# Patient Record
Sex: FEMALE | Race: WHITE
[De-identification: names, ages, dates, MRNs, and addresses within clinical notes are randomized per-mention and may not be internally consistent; named-entity substitution may affect disease eponyms.]

---

## 2021-01-14 ENCOUNTER — HOSPITAL ENCOUNTER (INPATIENT)
Dept: HOSPITAL 54 - ER | Age: 81
LOS: 40 days | Discharge: NURSING, REHAB OR LONG-TERM CARE | DRG: 3 | End: 2021-02-23
Attending: NURSE PRACTITIONER
Payer: MEDICARE

## 2021-01-14 VITALS — HEIGHT: 62 IN | WEIGHT: 146.31 LBS | BODY MASS INDEX: 26.93 KG/M2

## 2021-01-14 DIAGNOSIS — N18.30: ICD-10-CM

## 2021-01-14 DIAGNOSIS — M89.9: ICD-10-CM

## 2021-01-14 DIAGNOSIS — B96.5: ICD-10-CM

## 2021-01-14 DIAGNOSIS — T82.41XA: ICD-10-CM

## 2021-01-14 DIAGNOSIS — E11.65: ICD-10-CM

## 2021-01-14 DIAGNOSIS — J96.01: ICD-10-CM

## 2021-01-14 DIAGNOSIS — I13.2: ICD-10-CM

## 2021-01-14 DIAGNOSIS — Z79.4: ICD-10-CM

## 2021-01-14 DIAGNOSIS — G92: ICD-10-CM

## 2021-01-14 DIAGNOSIS — J90: ICD-10-CM

## 2021-01-14 DIAGNOSIS — D63.8: ICD-10-CM

## 2021-01-14 DIAGNOSIS — Z79.83: ICD-10-CM

## 2021-01-14 DIAGNOSIS — E43: ICD-10-CM

## 2021-01-14 DIAGNOSIS — R13.10: ICD-10-CM

## 2021-01-14 DIAGNOSIS — L97.128: ICD-10-CM

## 2021-01-14 DIAGNOSIS — I50.42: ICD-10-CM

## 2021-01-14 DIAGNOSIS — G20: ICD-10-CM

## 2021-01-14 DIAGNOSIS — F09: ICD-10-CM

## 2021-01-14 DIAGNOSIS — L89.153: ICD-10-CM

## 2021-01-14 DIAGNOSIS — E87.5: ICD-10-CM

## 2021-01-14 DIAGNOSIS — Z86.16: ICD-10-CM

## 2021-01-14 DIAGNOSIS — I70.0: ICD-10-CM

## 2021-01-14 DIAGNOSIS — N17.0: ICD-10-CM

## 2021-01-14 DIAGNOSIS — J44.0: ICD-10-CM

## 2021-01-14 DIAGNOSIS — J98.11: ICD-10-CM

## 2021-01-14 DIAGNOSIS — L89.143: ICD-10-CM

## 2021-01-14 DIAGNOSIS — F02.80: ICD-10-CM

## 2021-01-14 DIAGNOSIS — J69.0: ICD-10-CM

## 2021-01-14 DIAGNOSIS — L89.626: ICD-10-CM

## 2021-01-14 DIAGNOSIS — K31.7: ICD-10-CM

## 2021-01-14 DIAGNOSIS — K21.9: ICD-10-CM

## 2021-01-14 DIAGNOSIS — E87.2: ICD-10-CM

## 2021-01-14 DIAGNOSIS — J96.02: ICD-10-CM

## 2021-01-14 DIAGNOSIS — E11.22: ICD-10-CM

## 2021-01-14 DIAGNOSIS — J98.4: ICD-10-CM
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
## 2021-01-14 DIAGNOSIS — R18.8: ICD-10-CM

## 2021-01-14 DIAGNOSIS — Z20.822: ICD-10-CM

## 2021-01-14 DIAGNOSIS — J32.0: ICD-10-CM

## 2021-01-14 DIAGNOSIS — R65.21: ICD-10-CM
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
## 2021-01-14 DIAGNOSIS — L97.118: ICD-10-CM

## 2021-01-14 DIAGNOSIS — D68.59: ICD-10-CM

## 2021-01-14 DIAGNOSIS — E87.1: ICD-10-CM

## 2021-01-14 DIAGNOSIS — A41.9: Primary | ICD-10-CM

## 2021-01-14 DIAGNOSIS — E78.5: ICD-10-CM

## 2021-01-14 DIAGNOSIS — K29.40: ICD-10-CM

## 2021-01-14 DIAGNOSIS — L30.4: ICD-10-CM

## 2021-01-14 DIAGNOSIS — Z79.82: ICD-10-CM

## 2021-01-14 DIAGNOSIS — N39.0: ICD-10-CM

## 2021-01-14 DIAGNOSIS — E86.1: ICD-10-CM

## 2021-01-14 DIAGNOSIS — Z79.899: ICD-10-CM

## 2021-01-14 DIAGNOSIS — Z99.2: ICD-10-CM

## 2021-01-14 DIAGNOSIS — M10.9: ICD-10-CM

## 2021-01-14 DIAGNOSIS — K44.9: ICD-10-CM

## 2021-01-14 LAB
ALBUMIN SERPL BCP-MCNC: 2.2 G/DL (ref 3.4–5)
ALP SERPL-CCNC: 108 U/L (ref 46–116)
ALT SERPL W P-5'-P-CCNC: 19 U/L (ref 12–78)
AST SERPL W P-5'-P-CCNC: 15 U/L (ref 15–37)
BASE EXCESS BLDA CALC-SCNC: -9.9 MMOL/L
BASOPHILS # BLD AUTO: 0 /CMM (ref 0–0.2)
BASOPHILS NFR BLD AUTO: 0.3 % (ref 0–2)
BILIRUB DIRECT SERPL-MCNC: 0.3 MG/DL (ref 0–0.2)
BILIRUB SERPL-MCNC: 0.6 MG/DL (ref 0.2–1)
BILIRUB UR QL STRIP: (no result)
BUN SERPL-MCNC: 79 MG/DL (ref 7–18)
CALCIUM SERPL-MCNC: 8.5 MG/DL (ref 8.5–10.1)
CHLORIDE SERPL-SCNC: 102 MMOL/L (ref 98–107)
CK SERPL-CCNC: 28 U/L (ref 26–192)
CO2 SERPL-SCNC: 19 MMOL/L (ref 21–32)
COLOR UR: YELLOW
CREAT SERPL-MCNC: 4.4 MG/DL (ref 0.6–1.3)
CRP SERPL-MCNC: 16.3 MG/DL (ref 0–0.9)
D DIMER PPP FEU-MCNC: 2.23 MG/L(FEU (ref 0.17–0.5)
DO-HGB MFR BLDA: 15.6 MMHG
EOSINOPHIL NFR BLD AUTO: 0.1 % (ref 0–6)
GLUCOSE SERPL-MCNC: 155 MG/DL (ref 74–106)
GLUCOSE UR STRIP-MCNC: (no result) MG/DL
HCT VFR BLD AUTO: 24 % (ref 33–45)
HGB BLD-MCNC: 7.3 G/DL (ref 11.5–14.8)
INHALED O2 CONCENTRATION: 21 %
LYMPHOCYTES NFR BLD AUTO: 0.5 /CMM (ref 0.8–4.8)
LYMPHOCYTES NFR BLD AUTO: 6.6 % (ref 20–44)
MCHC RBC AUTO-ENTMCNC: 31 G/DL (ref 31–36)
MCV RBC AUTO: 103 FL (ref 82–100)
MONOCYTES NFR BLD AUTO: 0.4 /CMM (ref 0.1–1.3)
MONOCYTES NFR BLD AUTO: 5.2 % (ref 2–12)
NEUTROPHILS # BLD AUTO: 6.4 /CMM (ref 1.8–8.9)
NEUTROPHILS NFR BLD AUTO: 87.8 % (ref 43–81)
NT-PROBNP SERPL-MCNC: 2934 PG/ML (ref 0–125)
PCO2 TEMP ADJ BLDA: 56 MMHG (ref 35–45)
PH TEMP ADJ BLDA: 7.14 [PH] (ref 7.35–7.45)
PH UR STRIP: 5.5 [PH] (ref 5–8)
PLATELET # BLD AUTO: 256 /CMM (ref 150–450)
PO2 TEMP ADJ BLDA: 67.1 MMHG (ref 75–100)
POTASSIUM SERPL-SCNC: 6.2 MMOL/L (ref 3.5–5.1)
PROT SERPL-MCNC: 6.7 G/DL (ref 6.4–8.2)
PROT UR QL STRIP: >=300 MG/DL
RBC # BLD AUTO: 2.31 MIL/UL (ref 4–5.2)
RBC #/AREA URNS HPF: (no result) /HPF (ref 0–2)
SAO2 % BLDA: 87.7 % (ref 92–98.5)
SODIUM SERPL-SCNC: 137 MMOL/L (ref 136–145)
UROBILINOGEN UR STRIP-MCNC: 0.2 EU/DL
VENTILATION MODE VENT: (no result)
WBC #/AREA URNS HPF: (no result) /HPF
WBC #/AREA URNS HPF: (no result) /HPF (ref 0–3)
WBC NRBC COR # BLD AUTO: 7.3 K/UL (ref 4.3–11)

## 2021-01-14 PROCEDURE — C1751 CATH, INF, PER/CENT/MIDLINE: HCPCS

## 2021-01-14 PROCEDURE — A6403 STERILE GAUZE>16 <= 48 SQ IN: HCPCS

## 2021-01-14 PROCEDURE — A4623 TRACHEOSTOMY INNER CANNULA: HCPCS

## 2021-01-14 PROCEDURE — G0378 HOSPITAL OBSERVATION PER HR: HCPCS

## 2021-01-14 PROCEDURE — C9803 HOPD COVID-19 SPEC COLLECT: HCPCS

## 2021-01-14 PROCEDURE — A6253 ABSORPT DRG > 48 SQ IN W/O B: HCPCS

## 2021-01-14 PROCEDURE — P9047 ALBUMIN (HUMAN), 25%, 50ML: HCPCS

## 2021-01-14 PROCEDURE — A4216 STERILE WATER/SALINE, 10 ML: HCPCS

## 2021-01-14 PROCEDURE — U0003 INFECTIOUS AGENT DETECTION BY NUCLEIC ACID (DNA OR RNA); SEVERE ACUTE RESPIRATORY SYNDROME CORONAVIRUS 2 (SARS-COV-2) (CORONAVIRUS DISEASE [COVID-19]), AMPLIFIED PROBE TECHNIQUE, MAKING USE OF HIGH THROUGHPUT TECHNOLOGIES AS DESCRIBED BY CMS-2020-01-R: HCPCS

## 2021-01-14 PROCEDURE — C1769 GUIDE WIRE: HCPCS

## 2021-01-14 NOTE — NUR
-------------------------------------------------------------------------------

           *** Note undone in [facility] - 01/15/21 at 0018 by EVICTOR ***            

-------------------------------------------------------------------------------

MD PLACED CENTRAL LINE.

## 2021-01-14 NOTE — NUR
PT ROSYRA 60 FROM [employer] FOR LOW HR AND LOW BP. HR OF 40s ON THE FIELD, 
PER RA PT WAS GIVEN "0.5 ATROPINE AND 2ML OF EPI PUSH." UPON ARRIVAL PT PLACED 
ON PADS, CADIAC MONITOR, AND PULSE OX. IV LINE ESTABLISHED LAC 18G, BLOOD 
COLLECTED, SENT TO LAB. AWAITING MD FOR EVAL AND ORDERS.

## 2021-01-14 NOTE — NUR
RT



pt intubated post abg results on RA. abg results: ph 7.13 co2 56 po2 67 hco3 18.



pt intubated with ett size 7.0, 22 at lip. vent settings: 16 450 100% +5.

vent plugged in to red outlet. alarms on and audible. ambu bag at head of bed. 

lung  sounds clear throughout. no secretions suctioned via ett. 



no complications during intubations.



will continue to monitor.

## 2021-01-15 VITALS — SYSTOLIC BLOOD PRESSURE: 90 MMHG | DIASTOLIC BLOOD PRESSURE: 50 MMHG

## 2021-01-15 VITALS — SYSTOLIC BLOOD PRESSURE: 88 MMHG | DIASTOLIC BLOOD PRESSURE: 48 MMHG

## 2021-01-15 VITALS — SYSTOLIC BLOOD PRESSURE: 98 MMHG | DIASTOLIC BLOOD PRESSURE: 55 MMHG

## 2021-01-15 VITALS — SYSTOLIC BLOOD PRESSURE: 91 MMHG | DIASTOLIC BLOOD PRESSURE: 53 MMHG

## 2021-01-15 VITALS — DIASTOLIC BLOOD PRESSURE: 45 MMHG | SYSTOLIC BLOOD PRESSURE: 96 MMHG

## 2021-01-15 VITALS — DIASTOLIC BLOOD PRESSURE: 46 MMHG | SYSTOLIC BLOOD PRESSURE: 89 MMHG

## 2021-01-15 VITALS — SYSTOLIC BLOOD PRESSURE: 99 MMHG | DIASTOLIC BLOOD PRESSURE: 46 MMHG

## 2021-01-15 VITALS — DIASTOLIC BLOOD PRESSURE: 49 MMHG | SYSTOLIC BLOOD PRESSURE: 95 MMHG

## 2021-01-15 VITALS — SYSTOLIC BLOOD PRESSURE: 97 MMHG | DIASTOLIC BLOOD PRESSURE: 48 MMHG

## 2021-01-15 VITALS — SYSTOLIC BLOOD PRESSURE: 93 MMHG | DIASTOLIC BLOOD PRESSURE: 45 MMHG

## 2021-01-15 VITALS — SYSTOLIC BLOOD PRESSURE: 94 MMHG | DIASTOLIC BLOOD PRESSURE: 53 MMHG

## 2021-01-15 VITALS — DIASTOLIC BLOOD PRESSURE: 51 MMHG | SYSTOLIC BLOOD PRESSURE: 98 MMHG

## 2021-01-15 LAB
ALBUMIN SERPL BCP-MCNC: 1.9 G/DL (ref 3.4–5)
ALP SERPL-CCNC: 104 U/L (ref 46–116)
ALT SERPL W P-5'-P-CCNC: 16 U/L (ref 12–78)
AST SERPL W P-5'-P-CCNC: 18 U/L (ref 15–37)
BASE EXCESS BLDA CALC-SCNC: -6.7 MMOL/L
BASOPHILS # BLD AUTO: 0 /CMM (ref 0–0.2)
BASOPHILS NFR BLD AUTO: 0.1 % (ref 0–2)
BILIRUB SERPL-MCNC: 0.6 MG/DL (ref 0.2–1)
BUN SERPL-MCNC: 75 MG/DL (ref 7–18)
CALCIUM SERPL-MCNC: 8.1 MG/DL (ref 8.5–10.1)
CHLORIDE SERPL-SCNC: 98 MMOL/L (ref 98–107)
CHOLEST SERPL-MCNC: 100 MG/DL (ref ?–200)
CO2 SERPL-SCNC: 18 MMOL/L (ref 21–32)
CREAT SERPL-MCNC: 4 MG/DL (ref 0.6–1.3)
CRP SERPL-MCNC: 16.1 MG/DL (ref 0–0.9)
DO-HGB MFR BLDA: 95.5 MMHG
EOSINOPHIL NFR BLD AUTO: 0 % (ref 0–6)
FERRITIN SERPL-MCNC: 5816 NG/ML (ref 8–388)
GLUCOSE SERPL-MCNC: 413 MG/DL (ref 74–106)
HCT VFR BLD AUTO: 26 % (ref 33–45)
HDLC SERPL-MCNC: 50 MG/DL (ref 40–60)
HGB BLD-MCNC: 7.9 G/DL (ref 11.5–14.8)
INHALED O2 CONCENTRATION: 100 %
IRON SERPL-MCNC: 71 UG/DL (ref 50–175)
LDLC SERPL DIRECT ASSAY-MCNC: 44 MG/DL (ref 0–99)
LYMPHOCYTES NFR BLD AUTO: 0.1 /CMM (ref 0.8–4.8)
LYMPHOCYTES NFR BLD AUTO: 1.5 % (ref 20–44)
MAGNESIUM SERPL-MCNC: 1.9 MG/DL (ref 1.8–2.4)
MCHC RBC AUTO-ENTMCNC: 31 G/DL (ref 31–36)
MCV RBC AUTO: 100 FL (ref 82–100)
MONOCYTES NFR BLD AUTO: 0.1 /CMM (ref 0.1–1.3)
MONOCYTES NFR BLD AUTO: 1.5 % (ref 2–12)
NEUTROPHILS # BLD AUTO: 9.2 /CMM (ref 1.8–8.9)
NEUTROPHILS NFR BLD AUTO: 96.9 % (ref 43–81)
PCO2 TEMP ADJ BLDA: 23.2 MMHG (ref 35–45)
PH TEMP ADJ BLDA: 7.46 [PH] (ref 7.35–7.45)
PHOSPHATE SERPL-MCNC: 4.5 MG/DL (ref 2.5–4.9)
PLATELET # BLD AUTO: 348 /CMM (ref 150–450)
PO2 TEMP ADJ BLDA: 594.3 MMHG (ref 75–100)
POTASSIUM SERPL-SCNC: 5.3 MMOL/L (ref 3.5–5.1)
PROT SERPL-MCNC: 6.2 G/DL (ref 6.4–8.2)
RBC # BLD AUTO: 2.55 MIL/UL (ref 4–5.2)
SAO2 % BLDA: 99.5 % (ref 92–98.5)
SODIUM SERPL-SCNC: 133 MMOL/L (ref 136–145)
TIBC SERPL-MCNC: 157 UG/DL (ref 250–450)
TRIGL SERPL-MCNC: 59 MG/DL (ref 30–150)
TSH SERPL DL<=0.005 MIU/L-ACNC: 9.59 UIU/ML (ref 0.36–3.74)
VENTILATION MODE VENT: (no result)
WBC NRBC COR # BLD AUTO: 9.5 K/UL (ref 4.3–11)

## 2021-01-15 PROCEDURE — 5A1935Z RESPIRATORY VENTILATION, LESS THAN 24 CONSECUTIVE HOURS: ICD-10-PCS | Performed by: INTERNAL MEDICINE

## 2021-01-15 PROCEDURE — 0BH17EZ INSERTION OF ENDOTRACHEAL AIRWAY INTO TRACHEA, VIA NATURAL OR ARTIFICIAL OPENING: ICD-10-PCS | Performed by: STUDENT IN AN ORGANIZED HEALTH CARE EDUCATION/TRAINING PROGRAM

## 2021-01-15 RX ADMIN — Medication SCH EACH: at 23:27

## 2021-01-15 RX ADMIN — FAMOTIDINE SCH MG: 10 INJECTION INTRAVENOUS at 10:43

## 2021-01-15 RX ADMIN — MEROPENEM SCH MLS/HR: 500 INJECTION INTRAVENOUS at 22:36

## 2021-01-15 RX ADMIN — IPRATROPIUM BROMIDE AND ALBUTEROL SULFATE SCH PUFF: 103; 18 AEROSOL, METERED RESPIRATORY (INHALATION) at 18:00

## 2021-01-15 RX ADMIN — FAMOTIDINE SCH MG: 10 INJECTION INTRAVENOUS at 21:14

## 2021-01-15 RX ADMIN — Medication SCH EACH: at 12:18

## 2021-01-15 RX ADMIN — Medication SCH EACH: at 18:03

## 2021-01-15 RX ADMIN — DEXTROSE MONOHYDRATE SCH MLS/HR: 50 INJECTION, SOLUTION INTRAVENOUS at 10:43

## 2021-01-15 RX ADMIN — INSULIN HUMAN PRN UNIT: 100 INJECTION, SOLUTION PARENTERAL at 18:05

## 2021-01-15 RX ADMIN — DEXTROSE AND SODIUM CHLORIDE PRN MLS/HR: 5; 900 INJECTION, SOLUTION INTRAVENOUS at 06:33

## 2021-01-15 RX ADMIN — PROPOFOL PRN MLS/HR: 10 INJECTION, EMULSION INTRAVENOUS at 10:22

## 2021-01-15 RX ADMIN — DEXTROSE MONOHYDRATE PRN MLS/HR: 50 INJECTION, SOLUTION INTRAVENOUS at 10:22

## 2021-01-15 RX ADMIN — DEXTROSE MONOHYDRATE PRN MLS/HR: 50 INJECTION, SOLUTION INTRAVENOUS at 18:05

## 2021-01-15 RX ADMIN — Medication SCH EACH: at 06:29

## 2021-01-15 RX ADMIN — INSULIN HUMAN PRN UNIT: 100 INJECTION, SOLUTION PARENTERAL at 23:26

## 2021-01-15 RX ADMIN — DEXTROSE MONOHYDRATE SCH MLS/HR: 50 INJECTION, SOLUTION INTRAVENOUS at 21:11

## 2021-01-15 RX ADMIN — INSULIN HUMAN PRN UNIT: 100 INJECTION, SOLUTION PARENTERAL at 06:42

## 2021-01-15 RX ADMIN — INSULIN HUMAN PRN UNIT: 100 INJECTION, SOLUTION PARENTERAL at 12:49

## 2021-01-15 NOTE — NUR
PATIENT SEEN AND EVALUATED BY DR. PETERS. GAVE ORDER TO DC IV FLUIDS. AND CONTINUE 
DRIPS AS ORDERED.

## 2021-01-15 NOTE — NUR
RT



pt went to ct and back. no complications during transport and procedure. pt returned and 
placed back on vent.

## 2021-01-15 NOTE — NUR
PATIENT CONTINUES ON DOPAMINE, PROPOFOL AND SODIUM BICARB DRIP. TITRATE TO 
EFFECT. MESSAGED DR. PETERS RE: ORDERS.

## 2021-01-15 NOTE — NUR
ICU RN: RECEIVED PT FROM ED S/O ORAL INTUBATION WT SETTINGS AS ORDERED. ON BICARB. DRIP AT 
100ML/HR, DIPRIVAN AT 5MCG/KG/MIN AND DOPAMINE 20MCG/KG/MIN. WILL TITRATE DIPRIVAN AND 
DOPAMINE AS ORDERED. SR ON CARDIAC MONITOR. AFEBRILE. BODY ASSESSMENT DONE WT MULT. 
DISCOLORATIONS, MASD AND WOUNDS. PICTURES TAKEN. HOB AT 35 DEGREES. SAFETY PRECAUTION NOTED. 
WILL CONTINUE TO MONITOR.

## 2021-01-15 NOTE — NUR
PATIENT'S CONTINUES ON MECHANICAL VENTILATOR. TURNED AND REPOSITIONED EVERY 2 
HOURS. NO DISTRESS NOTED. CONT ON IV DRIPS. TITRATE TO EFFECT.

## 2021-01-16 VITALS — SYSTOLIC BLOOD PRESSURE: 120 MMHG | DIASTOLIC BLOOD PRESSURE: 53 MMHG

## 2021-01-16 VITALS — DIASTOLIC BLOOD PRESSURE: 54 MMHG | SYSTOLIC BLOOD PRESSURE: 102 MMHG

## 2021-01-16 VITALS — DIASTOLIC BLOOD PRESSURE: 43 MMHG | SYSTOLIC BLOOD PRESSURE: 94 MMHG

## 2021-01-16 VITALS — SYSTOLIC BLOOD PRESSURE: 129 MMHG | DIASTOLIC BLOOD PRESSURE: 56 MMHG

## 2021-01-16 VITALS — SYSTOLIC BLOOD PRESSURE: 114 MMHG | DIASTOLIC BLOOD PRESSURE: 49 MMHG

## 2021-01-16 VITALS — DIASTOLIC BLOOD PRESSURE: 47 MMHG | SYSTOLIC BLOOD PRESSURE: 110 MMHG

## 2021-01-16 VITALS — SYSTOLIC BLOOD PRESSURE: 118 MMHG | DIASTOLIC BLOOD PRESSURE: 48 MMHG

## 2021-01-16 VITALS — SYSTOLIC BLOOD PRESSURE: 109 MMHG | DIASTOLIC BLOOD PRESSURE: 48 MMHG

## 2021-01-16 VITALS — SYSTOLIC BLOOD PRESSURE: 108 MMHG | DIASTOLIC BLOOD PRESSURE: 50 MMHG

## 2021-01-16 VITALS — DIASTOLIC BLOOD PRESSURE: 50 MMHG | SYSTOLIC BLOOD PRESSURE: 102 MMHG

## 2021-01-16 VITALS — SYSTOLIC BLOOD PRESSURE: 106 MMHG | DIASTOLIC BLOOD PRESSURE: 48 MMHG

## 2021-01-16 VITALS — DIASTOLIC BLOOD PRESSURE: 45 MMHG | SYSTOLIC BLOOD PRESSURE: 104 MMHG

## 2021-01-16 VITALS — DIASTOLIC BLOOD PRESSURE: 56 MMHG | SYSTOLIC BLOOD PRESSURE: 119 MMHG

## 2021-01-16 VITALS — SYSTOLIC BLOOD PRESSURE: 71 MMHG | DIASTOLIC BLOOD PRESSURE: 30 MMHG

## 2021-01-16 VITALS — DIASTOLIC BLOOD PRESSURE: 52 MMHG | SYSTOLIC BLOOD PRESSURE: 109 MMHG

## 2021-01-16 VITALS — SYSTOLIC BLOOD PRESSURE: 134 MMHG | DIASTOLIC BLOOD PRESSURE: 59 MMHG

## 2021-01-16 VITALS — SYSTOLIC BLOOD PRESSURE: 100 MMHG | DIASTOLIC BLOOD PRESSURE: 56 MMHG

## 2021-01-16 VITALS — SYSTOLIC BLOOD PRESSURE: 112 MMHG | DIASTOLIC BLOOD PRESSURE: 65 MMHG

## 2021-01-16 VITALS — SYSTOLIC BLOOD PRESSURE: 114 MMHG | DIASTOLIC BLOOD PRESSURE: 50 MMHG

## 2021-01-16 VITALS — DIASTOLIC BLOOD PRESSURE: 53 MMHG | SYSTOLIC BLOOD PRESSURE: 101 MMHG

## 2021-01-16 VITALS — DIASTOLIC BLOOD PRESSURE: 59 MMHG | SYSTOLIC BLOOD PRESSURE: 104 MMHG

## 2021-01-16 VITALS — DIASTOLIC BLOOD PRESSURE: 61 MMHG | SYSTOLIC BLOOD PRESSURE: 120 MMHG

## 2021-01-16 VITALS — DIASTOLIC BLOOD PRESSURE: 57 MMHG | SYSTOLIC BLOOD PRESSURE: 104 MMHG

## 2021-01-16 VITALS — DIASTOLIC BLOOD PRESSURE: 54 MMHG | SYSTOLIC BLOOD PRESSURE: 112 MMHG

## 2021-01-16 VITALS — DIASTOLIC BLOOD PRESSURE: 50 MMHG | SYSTOLIC BLOOD PRESSURE: 116 MMHG

## 2021-01-16 VITALS — DIASTOLIC BLOOD PRESSURE: 52 MMHG | SYSTOLIC BLOOD PRESSURE: 103 MMHG

## 2021-01-16 VITALS — SYSTOLIC BLOOD PRESSURE: 94 MMHG | DIASTOLIC BLOOD PRESSURE: 53 MMHG

## 2021-01-16 VITALS — DIASTOLIC BLOOD PRESSURE: 52 MMHG | SYSTOLIC BLOOD PRESSURE: 110 MMHG

## 2021-01-16 VITALS — SYSTOLIC BLOOD PRESSURE: 111 MMHG | DIASTOLIC BLOOD PRESSURE: 49 MMHG

## 2021-01-16 VITALS — SYSTOLIC BLOOD PRESSURE: 123 MMHG | DIASTOLIC BLOOD PRESSURE: 55 MMHG

## 2021-01-16 VITALS — SYSTOLIC BLOOD PRESSURE: 110 MMHG | DIASTOLIC BLOOD PRESSURE: 52 MMHG

## 2021-01-16 VITALS — SYSTOLIC BLOOD PRESSURE: 102 MMHG | DIASTOLIC BLOOD PRESSURE: 54 MMHG

## 2021-01-16 VITALS — SYSTOLIC BLOOD PRESSURE: 100 MMHG | DIASTOLIC BLOOD PRESSURE: 47 MMHG

## 2021-01-16 VITALS — DIASTOLIC BLOOD PRESSURE: 45 MMHG | SYSTOLIC BLOOD PRESSURE: 99 MMHG

## 2021-01-16 VITALS — DIASTOLIC BLOOD PRESSURE: 47 MMHG | SYSTOLIC BLOOD PRESSURE: 96 MMHG

## 2021-01-16 VITALS — DIASTOLIC BLOOD PRESSURE: 55 MMHG | SYSTOLIC BLOOD PRESSURE: 99 MMHG

## 2021-01-16 VITALS — SYSTOLIC BLOOD PRESSURE: 78 MMHG | DIASTOLIC BLOOD PRESSURE: 39 MMHG

## 2021-01-16 VITALS — DIASTOLIC BLOOD PRESSURE: 60 MMHG | SYSTOLIC BLOOD PRESSURE: 117 MMHG

## 2021-01-16 VITALS — SYSTOLIC BLOOD PRESSURE: 113 MMHG | DIASTOLIC BLOOD PRESSURE: 52 MMHG

## 2021-01-16 VITALS — DIASTOLIC BLOOD PRESSURE: 58 MMHG | SYSTOLIC BLOOD PRESSURE: 106 MMHG

## 2021-01-16 VITALS — DIASTOLIC BLOOD PRESSURE: 75 MMHG | SYSTOLIC BLOOD PRESSURE: 103 MMHG

## 2021-01-16 VITALS — DIASTOLIC BLOOD PRESSURE: 45 MMHG | SYSTOLIC BLOOD PRESSURE: 102 MMHG

## 2021-01-16 VITALS — SYSTOLIC BLOOD PRESSURE: 101 MMHG | DIASTOLIC BLOOD PRESSURE: 57 MMHG

## 2021-01-16 VITALS — DIASTOLIC BLOOD PRESSURE: 51 MMHG | SYSTOLIC BLOOD PRESSURE: 110 MMHG

## 2021-01-16 VITALS — SYSTOLIC BLOOD PRESSURE: 105 MMHG | DIASTOLIC BLOOD PRESSURE: 48 MMHG

## 2021-01-16 VITALS — DIASTOLIC BLOOD PRESSURE: 59 MMHG | SYSTOLIC BLOOD PRESSURE: 113 MMHG

## 2021-01-16 VITALS — SYSTOLIC BLOOD PRESSURE: 111 MMHG | DIASTOLIC BLOOD PRESSURE: 53 MMHG

## 2021-01-16 VITALS — DIASTOLIC BLOOD PRESSURE: 48 MMHG | SYSTOLIC BLOOD PRESSURE: 102 MMHG

## 2021-01-16 VITALS — DIASTOLIC BLOOD PRESSURE: 46 MMHG | SYSTOLIC BLOOD PRESSURE: 107 MMHG

## 2021-01-16 VITALS — DIASTOLIC BLOOD PRESSURE: 41 MMHG | SYSTOLIC BLOOD PRESSURE: 94 MMHG

## 2021-01-16 VITALS — SYSTOLIC BLOOD PRESSURE: 107 MMHG | DIASTOLIC BLOOD PRESSURE: 52 MMHG

## 2021-01-16 VITALS — DIASTOLIC BLOOD PRESSURE: 49 MMHG | SYSTOLIC BLOOD PRESSURE: 118 MMHG

## 2021-01-16 VITALS — SYSTOLIC BLOOD PRESSURE: 110 MMHG | DIASTOLIC BLOOD PRESSURE: 54 MMHG

## 2021-01-16 VITALS — SYSTOLIC BLOOD PRESSURE: 95 MMHG | DIASTOLIC BLOOD PRESSURE: 46 MMHG

## 2021-01-16 VITALS — DIASTOLIC BLOOD PRESSURE: 69 MMHG | SYSTOLIC BLOOD PRESSURE: 112 MMHG

## 2021-01-16 VITALS — SYSTOLIC BLOOD PRESSURE: 108 MMHG | DIASTOLIC BLOOD PRESSURE: 48 MMHG

## 2021-01-16 VITALS — DIASTOLIC BLOOD PRESSURE: 61 MMHG | SYSTOLIC BLOOD PRESSURE: 117 MMHG

## 2021-01-16 VITALS — DIASTOLIC BLOOD PRESSURE: 58 MMHG | SYSTOLIC BLOOD PRESSURE: 116 MMHG

## 2021-01-16 VITALS — SYSTOLIC BLOOD PRESSURE: 129 MMHG | DIASTOLIC BLOOD PRESSURE: 54 MMHG

## 2021-01-16 VITALS — DIASTOLIC BLOOD PRESSURE: 58 MMHG | SYSTOLIC BLOOD PRESSURE: 114 MMHG

## 2021-01-16 VITALS — DIASTOLIC BLOOD PRESSURE: 51 MMHG | SYSTOLIC BLOOD PRESSURE: 120 MMHG

## 2021-01-16 VITALS — SYSTOLIC BLOOD PRESSURE: 81 MMHG | DIASTOLIC BLOOD PRESSURE: 44 MMHG

## 2021-01-16 VITALS — DIASTOLIC BLOOD PRESSURE: 37 MMHG | SYSTOLIC BLOOD PRESSURE: 73 MMHG

## 2021-01-16 VITALS — SYSTOLIC BLOOD PRESSURE: 113 MMHG | DIASTOLIC BLOOD PRESSURE: 51 MMHG

## 2021-01-16 VITALS — DIASTOLIC BLOOD PRESSURE: 51 MMHG | SYSTOLIC BLOOD PRESSURE: 109 MMHG

## 2021-01-16 VITALS — DIASTOLIC BLOOD PRESSURE: 55 MMHG | SYSTOLIC BLOOD PRESSURE: 110 MMHG

## 2021-01-16 VITALS — DIASTOLIC BLOOD PRESSURE: 46 MMHG | SYSTOLIC BLOOD PRESSURE: 108 MMHG

## 2021-01-16 VITALS — DIASTOLIC BLOOD PRESSURE: 49 MMHG | SYSTOLIC BLOOD PRESSURE: 108 MMHG

## 2021-01-16 VITALS — SYSTOLIC BLOOD PRESSURE: 117 MMHG | DIASTOLIC BLOOD PRESSURE: 53 MMHG

## 2021-01-16 VITALS — SYSTOLIC BLOOD PRESSURE: 109 MMHG | DIASTOLIC BLOOD PRESSURE: 49 MMHG

## 2021-01-16 VITALS — DIASTOLIC BLOOD PRESSURE: 47 MMHG | SYSTOLIC BLOOD PRESSURE: 97 MMHG

## 2021-01-16 VITALS — DIASTOLIC BLOOD PRESSURE: 57 MMHG | SYSTOLIC BLOOD PRESSURE: 110 MMHG

## 2021-01-16 VITALS — DIASTOLIC BLOOD PRESSURE: 51 MMHG | SYSTOLIC BLOOD PRESSURE: 117 MMHG

## 2021-01-16 VITALS — DIASTOLIC BLOOD PRESSURE: 53 MMHG | SYSTOLIC BLOOD PRESSURE: 100 MMHG

## 2021-01-16 VITALS — DIASTOLIC BLOOD PRESSURE: 50 MMHG | SYSTOLIC BLOOD PRESSURE: 107 MMHG

## 2021-01-16 VITALS — DIASTOLIC BLOOD PRESSURE: 55 MMHG | SYSTOLIC BLOOD PRESSURE: 107 MMHG

## 2021-01-16 VITALS — DIASTOLIC BLOOD PRESSURE: 48 MMHG | SYSTOLIC BLOOD PRESSURE: 115 MMHG

## 2021-01-16 VITALS — DIASTOLIC BLOOD PRESSURE: 60 MMHG | SYSTOLIC BLOOD PRESSURE: 110 MMHG

## 2021-01-16 VITALS — SYSTOLIC BLOOD PRESSURE: 106 MMHG | DIASTOLIC BLOOD PRESSURE: 50 MMHG

## 2021-01-16 LAB
BASE EXCESS BLDA CALC-SCNC: -5.1 MMOL/L
BASOPHILS # BLD AUTO: 0 /CMM (ref 0–0.2)
BASOPHILS NFR BLD AUTO: 0 % (ref 0–2)
BUN SERPL-MCNC: 70 MG/DL (ref 7–18)
CALCIUM SERPL-MCNC: 7.5 MG/DL (ref 8.5–10.1)
CHLORIDE SERPL-SCNC: 98 MMOL/L (ref 98–107)
CK SERPL-CCNC: 53 U/L (ref 26–192)
CO2 SERPL-SCNC: 22 MMOL/L (ref 21–32)
CREAT SERPL-MCNC: 4 MG/DL (ref 0.6–1.3)
DO-HGB MFR BLDA: 124 MMHG
EOSINOPHIL NFR BLD AUTO: 0 % (ref 0–6)
FERRITIN SERPL-MCNC: 3700 NG/ML (ref 8–388)
GLUCOSE SERPL-MCNC: 109 MG/DL (ref 74–106)
HCT VFR BLD AUTO: 24 % (ref 33–45)
HGB BLD-MCNC: 7.5 G/DL (ref 11.5–14.8)
INHALED O2 CONCENTRATION: 40 %
INTRINSIC PEEP RESPIRATORY: 5 CM H2O
LYMPHOCYTES NFR BLD AUTO: 0.5 /CMM (ref 0.8–4.8)
LYMPHOCYTES NFR BLD AUTO: 2.1 % (ref 20–44)
MAGNESIUM SERPL-MCNC: 1.8 MG/DL (ref 1.8–2.4)
MCHC RBC AUTO-ENTMCNC: 31 G/DL (ref 31–36)
MCV RBC AUTO: 99 FL (ref 82–100)
MONOCYTES NFR BLD AUTO: 0.2 /CMM (ref 0.1–1.3)
MONOCYTES NFR BLD AUTO: 0.9 % (ref 2–12)
NEUTROPHILS # BLD AUTO: 22.8 /CMM (ref 1.8–8.9)
NEUTROPHILS NFR BLD AUTO: 97 % (ref 43–81)
PCO2 TEMP ADJ BLDA: 36.8 MMHG (ref 35–45)
PH TEMP ADJ BLDA: 7.35 [PH] (ref 7.35–7.45)
PHOSPHATE SERPL-MCNC: 5.1 MG/DL (ref 2.5–4.9)
PLATELET # BLD AUTO: 303 /CMM (ref 150–450)
PO2 TEMP ADJ BLDA: 118.9 MMHG (ref 75–100)
POTASSIUM SERPL-SCNC: 5.2 MMOL/L (ref 3.5–5.1)
RBC # BLD AUTO: 2.45 MIL/UL (ref 4–5.2)
SAO2 % BLDA: 98.7 % (ref 92–98.5)
SODIUM SERPL-SCNC: 134 MMOL/L (ref 136–145)
VENTILATION MODE VENT: (no result)
WBC NRBC COR # BLD AUTO: 23.6 K/UL (ref 4.3–11)

## 2021-01-16 RX ADMIN — FAMOTIDINE SCH MG: 10 INJECTION INTRAVENOUS at 09:17

## 2021-01-16 RX ADMIN — IPRATROPIUM BROMIDE AND ALBUTEROL SULFATE SCH PUFF: 103; 18 AEROSOL, METERED RESPIRATORY (INHALATION) at 00:24

## 2021-01-16 RX ADMIN — Medication SCH EACH: at 13:31

## 2021-01-16 RX ADMIN — DEXTROSE MONOHYDRATE PRN MLS/HR: 50 INJECTION, SOLUTION INTRAVENOUS at 00:14

## 2021-01-16 RX ADMIN — DEXTROSE MONOHYDRATE SCH MLS/HR: 50 INJECTION, SOLUTION INTRAVENOUS at 20:44

## 2021-01-16 RX ADMIN — FAMOTIDINE SCH MG: 10 INJECTION INTRAVENOUS at 20:45

## 2021-01-16 RX ADMIN — IPRATROPIUM BROMIDE AND ALBUTEROL SULFATE SCH PUFF: 103; 18 AEROSOL, METERED RESPIRATORY (INHALATION) at 18:18

## 2021-01-16 RX ADMIN — DEXTROSE MONOHYDRATE PRN MLS/HR: 50 INJECTION, SOLUTION INTRAVENOUS at 06:47

## 2021-01-16 RX ADMIN — DEXTROSE MONOHYDRATE SCH MLS/HR: 50 INJECTION, SOLUTION INTRAVENOUS at 09:53

## 2021-01-16 RX ADMIN — INSULIN HUMAN PRN UNIT: 100 INJECTION, SOLUTION PARENTERAL at 17:50

## 2021-01-16 RX ADMIN — Medication SCH EACH: at 17:51

## 2021-01-16 RX ADMIN — INSULIN HUMAN PRN UNIT: 100 INJECTION, SOLUTION PARENTERAL at 13:40

## 2021-01-16 RX ADMIN — SODIUM CHLORIDE PRN MLS/HR: 4.5 INJECTION, SOLUTION INTRAVENOUS at 20:45

## 2021-01-16 RX ADMIN — DEXTROSE MONOHYDRATE PRN MLS/HR: 50 INJECTION, SOLUTION INTRAVENOUS at 13:16

## 2021-01-16 RX ADMIN — IPRATROPIUM BROMIDE AND ALBUTEROL SULFATE SCH PUFF: 103; 18 AEROSOL, METERED RESPIRATORY (INHALATION) at 14:05

## 2021-01-16 RX ADMIN — DEXTROSE MONOHYDRATE PRN MLS/HR: 50 INJECTION, SOLUTION INTRAVENOUS at 20:44

## 2021-01-16 RX ADMIN — Medication SCH EACH: at 06:47

## 2021-01-16 RX ADMIN — PROPOFOL PRN MLS/HR: 10 INJECTION, EMULSION INTRAVENOUS at 05:04

## 2021-01-16 RX ADMIN — MEROPENEM SCH MLS/HR: 500 INJECTION INTRAVENOUS at 22:02

## 2021-01-16 NOTE — NUR
pt is awake placed into SIMV 4 / PS 15, FIO2 40%, PEEP +5 PER DR. MATHIS FOR WEANING TRIAL.



RN IS AWARE ON VENT CHANGES MADE.

-------------------------------------------------------------------------------

Addendum: 01/16/21 at 1337 by DAVIE GOVEA RT

-------------------------------------------------------------------------------

Amended: Links added.

## 2021-01-16 NOTE — NUR
ICU RN: STILL SAME VENT SETTINGS AS ORDERED, CONTINUE ON BICARB DRIP AT 100ML/HR, DOPAMINE 
AT 20MCG/KG/MIN, AND DIPRIVAN DRIP AT 25MCG/KG/MIN. URINE OUPUT VIA F/C ONLY 100CC. SAFETY 
PRECAUTION NOTED AT ALL TIMES.

## 2021-01-16 NOTE — NUR
EXTUBATED @ 1525 PER DR. MATHIS.



VITAL SIGNS BELOW POST EXTUBATION:



SPO2 99 -100% @ 1 LPM O2 FLOW VIA NASAL CANNULA

RR 16 BPM

HR 90 - 94 BPM



NO INCREASE WOB NOTED.

-------------------------------------------------------------------------------

Addendum: 01/16/21 at 1532 by DAVIE GOVEA RT

-------------------------------------------------------------------------------

Amended: Links added.

## 2021-01-16 NOTE — NUR
RN ICU CLOSING NOTE



PT CONTINUES TO TOLERATE NC 1LPM WELL, SPO2 96% WITH NO SIGNS OF RESP DISTRESS OR SOB. NO 
OTHER CHANGES TO PT STATUS DURING SHIFT. PT IS STABLE. ALL PT SAFETY MEASURES IN PLACE. WILL 
ENDORSE DASHAWN TO ONCOMING RN

## 2021-01-16 NOTE — NUR
RN NOTE



PT EXTUBATED AT 1525, TOLERATING WELL NC 1LPM WITH NO SIGNS OF RESP DISTRESS OR SOB. SPO2 IN 
MID 90S

## 2021-01-17 VITALS — DIASTOLIC BLOOD PRESSURE: 41 MMHG | SYSTOLIC BLOOD PRESSURE: 102 MMHG

## 2021-01-17 VITALS — SYSTOLIC BLOOD PRESSURE: 88 MMHG | DIASTOLIC BLOOD PRESSURE: 43 MMHG

## 2021-01-17 VITALS — DIASTOLIC BLOOD PRESSURE: 41 MMHG | SYSTOLIC BLOOD PRESSURE: 105 MMHG

## 2021-01-17 VITALS — SYSTOLIC BLOOD PRESSURE: 100 MMHG | DIASTOLIC BLOOD PRESSURE: 51 MMHG

## 2021-01-17 VITALS — SYSTOLIC BLOOD PRESSURE: 107 MMHG | DIASTOLIC BLOOD PRESSURE: 48 MMHG

## 2021-01-17 VITALS — SYSTOLIC BLOOD PRESSURE: 92 MMHG | DIASTOLIC BLOOD PRESSURE: 46 MMHG

## 2021-01-17 VITALS — SYSTOLIC BLOOD PRESSURE: 102 MMHG | DIASTOLIC BLOOD PRESSURE: 48 MMHG

## 2021-01-17 VITALS — DIASTOLIC BLOOD PRESSURE: 44 MMHG | SYSTOLIC BLOOD PRESSURE: 96 MMHG

## 2021-01-17 VITALS — SYSTOLIC BLOOD PRESSURE: 99 MMHG | DIASTOLIC BLOOD PRESSURE: 46 MMHG

## 2021-01-17 VITALS — DIASTOLIC BLOOD PRESSURE: 51 MMHG | SYSTOLIC BLOOD PRESSURE: 99 MMHG

## 2021-01-17 VITALS — DIASTOLIC BLOOD PRESSURE: 66 MMHG | SYSTOLIC BLOOD PRESSURE: 107 MMHG

## 2021-01-17 VITALS — SYSTOLIC BLOOD PRESSURE: 98 MMHG | DIASTOLIC BLOOD PRESSURE: 55 MMHG

## 2021-01-17 VITALS — SYSTOLIC BLOOD PRESSURE: 86 MMHG | DIASTOLIC BLOOD PRESSURE: 39 MMHG

## 2021-01-17 VITALS — SYSTOLIC BLOOD PRESSURE: 104 MMHG | DIASTOLIC BLOOD PRESSURE: 44 MMHG

## 2021-01-17 VITALS — DIASTOLIC BLOOD PRESSURE: 49 MMHG | SYSTOLIC BLOOD PRESSURE: 106 MMHG

## 2021-01-17 VITALS — DIASTOLIC BLOOD PRESSURE: 44 MMHG | SYSTOLIC BLOOD PRESSURE: 92 MMHG

## 2021-01-17 VITALS — DIASTOLIC BLOOD PRESSURE: 50 MMHG | SYSTOLIC BLOOD PRESSURE: 104 MMHG

## 2021-01-17 VITALS — SYSTOLIC BLOOD PRESSURE: 86 MMHG | DIASTOLIC BLOOD PRESSURE: 34 MMHG

## 2021-01-17 VITALS — DIASTOLIC BLOOD PRESSURE: 42 MMHG | SYSTOLIC BLOOD PRESSURE: 94 MMHG

## 2021-01-17 VITALS — SYSTOLIC BLOOD PRESSURE: 96 MMHG | DIASTOLIC BLOOD PRESSURE: 45 MMHG

## 2021-01-17 VITALS — DIASTOLIC BLOOD PRESSURE: 39 MMHG | SYSTOLIC BLOOD PRESSURE: 85 MMHG

## 2021-01-17 VITALS — DIASTOLIC BLOOD PRESSURE: 49 MMHG | SYSTOLIC BLOOD PRESSURE: 101 MMHG

## 2021-01-17 VITALS — SYSTOLIC BLOOD PRESSURE: 113 MMHG | DIASTOLIC BLOOD PRESSURE: 46 MMHG

## 2021-01-17 VITALS — SYSTOLIC BLOOD PRESSURE: 98 MMHG | DIASTOLIC BLOOD PRESSURE: 44 MMHG

## 2021-01-17 VITALS — SYSTOLIC BLOOD PRESSURE: 87 MMHG | DIASTOLIC BLOOD PRESSURE: 43 MMHG

## 2021-01-17 VITALS — DIASTOLIC BLOOD PRESSURE: 50 MMHG | SYSTOLIC BLOOD PRESSURE: 107 MMHG

## 2021-01-17 VITALS — DIASTOLIC BLOOD PRESSURE: 38 MMHG | SYSTOLIC BLOOD PRESSURE: 95 MMHG

## 2021-01-17 VITALS — SYSTOLIC BLOOD PRESSURE: 96 MMHG | DIASTOLIC BLOOD PRESSURE: 50 MMHG

## 2021-01-17 VITALS — DIASTOLIC BLOOD PRESSURE: 41 MMHG | SYSTOLIC BLOOD PRESSURE: 86 MMHG

## 2021-01-17 VITALS — DIASTOLIC BLOOD PRESSURE: 49 MMHG | SYSTOLIC BLOOD PRESSURE: 102 MMHG

## 2021-01-17 VITALS — DIASTOLIC BLOOD PRESSURE: 46 MMHG | SYSTOLIC BLOOD PRESSURE: 96 MMHG

## 2021-01-17 VITALS — SYSTOLIC BLOOD PRESSURE: 91 MMHG | DIASTOLIC BLOOD PRESSURE: 37 MMHG

## 2021-01-17 VITALS — SYSTOLIC BLOOD PRESSURE: 91 MMHG | DIASTOLIC BLOOD PRESSURE: 48 MMHG

## 2021-01-17 VITALS — SYSTOLIC BLOOD PRESSURE: 94 MMHG | DIASTOLIC BLOOD PRESSURE: 48 MMHG

## 2021-01-17 VITALS — DIASTOLIC BLOOD PRESSURE: 43 MMHG | SYSTOLIC BLOOD PRESSURE: 90 MMHG

## 2021-01-17 VITALS — DIASTOLIC BLOOD PRESSURE: 46 MMHG | SYSTOLIC BLOOD PRESSURE: 98 MMHG

## 2021-01-17 VITALS — SYSTOLIC BLOOD PRESSURE: 95 MMHG | DIASTOLIC BLOOD PRESSURE: 42 MMHG

## 2021-01-17 VITALS — DIASTOLIC BLOOD PRESSURE: 39 MMHG | SYSTOLIC BLOOD PRESSURE: 92 MMHG

## 2021-01-17 VITALS — DIASTOLIC BLOOD PRESSURE: 55 MMHG | SYSTOLIC BLOOD PRESSURE: 100 MMHG

## 2021-01-17 VITALS — SYSTOLIC BLOOD PRESSURE: 109 MMHG | DIASTOLIC BLOOD PRESSURE: 50 MMHG

## 2021-01-17 VITALS — DIASTOLIC BLOOD PRESSURE: 50 MMHG | SYSTOLIC BLOOD PRESSURE: 106 MMHG

## 2021-01-17 VITALS — DIASTOLIC BLOOD PRESSURE: 42 MMHG | SYSTOLIC BLOOD PRESSURE: 96 MMHG

## 2021-01-17 VITALS — SYSTOLIC BLOOD PRESSURE: 95 MMHG | DIASTOLIC BLOOD PRESSURE: 31 MMHG

## 2021-01-17 LAB
ALBUMIN SERPL BCP-MCNC: 1.6 G/DL (ref 3.4–5)
ALP SERPL-CCNC: 84 U/L (ref 46–116)
ALT SERPL W P-5'-P-CCNC: 14 U/L (ref 12–78)
AST SERPL W P-5'-P-CCNC: 14 U/L (ref 15–37)
BASOPHILS # BLD AUTO: 0 /CMM (ref 0–0.2)
BASOPHILS NFR BLD AUTO: 0.1 % (ref 0–2)
BILIRUB SERPL-MCNC: 0.4 MG/DL (ref 0.2–1)
BUN SERPL-MCNC: 68 MG/DL (ref 7–18)
CALCIUM SERPL-MCNC: 7.5 MG/DL (ref 8.5–10.1)
CHLORIDE SERPL-SCNC: 95 MMOL/L (ref 98–107)
CO2 SERPL-SCNC: 21 MMOL/L (ref 21–32)
CREAT SERPL-MCNC: 3.6 MG/DL (ref 0.6–1.3)
EOSINOPHIL NFR BLD AUTO: 0 % (ref 0–6)
GLUCOSE SERPL-MCNC: 284 MG/DL (ref 74–106)
HCT VFR BLD AUTO: 22 % (ref 33–45)
HGB BLD-MCNC: 6.8 G/DL (ref 11.5–14.8)
LYMPHOCYTES NFR BLD AUTO: 0.5 /CMM (ref 0.8–4.8)
LYMPHOCYTES NFR BLD AUTO: 2.8 % (ref 20–44)
LYMPHOCYTES NFR BLD MANUAL: 1 % (ref 16–48)
MAGNESIUM SERPL-MCNC: 1.7 MG/DL (ref 1.8–2.4)
MCHC RBC AUTO-ENTMCNC: 31 G/DL (ref 31–36)
MCV RBC AUTO: 98 FL (ref 82–100)
MONOCYTES NFR BLD AUTO: 0.4 /CMM (ref 0.1–1.3)
MONOCYTES NFR BLD AUTO: 2 % (ref 2–12)
MONOCYTES NFR BLD MANUAL: 1 % (ref 0–11)
NEUTROPHILS # BLD AUTO: 18.3 /CMM (ref 1.8–8.9)
NEUTROPHILS NFR BLD AUTO: 95.1 % (ref 43–81)
NEUTS SEG NFR BLD MANUAL: 98 % (ref 42–76)
PHOSPHATE SERPL-MCNC: 6 MG/DL (ref 2.5–4.9)
PLATELET # BLD AUTO: 232 /CMM (ref 150–450)
POTASSIUM SERPL-SCNC: 5.1 MMOL/L (ref 3.5–5.1)
PROT SERPL-MCNC: 5.5 G/DL (ref 6.4–8.2)
RBC # BLD AUTO: 2.23 MIL/UL (ref 4–5.2)
SODIUM SERPL-SCNC: 128 MMOL/L (ref 136–145)
WBC NRBC COR # BLD AUTO: 19.2 K/UL (ref 4.3–11)

## 2021-01-17 PROCEDURE — 30233N1 TRANSFUSION OF NONAUTOLOGOUS RED BLOOD CELLS INTO PERIPHERAL VEIN, PERCUTANEOUS APPROACH: ICD-10-PCS | Performed by: INTERNAL MEDICINE

## 2021-01-17 PROCEDURE — 5A1D70Z PERFORMANCE OF URINARY FILTRATION, INTERMITTENT, LESS THAN 6 HOURS PER DAY: ICD-10-PCS | Performed by: INTERNAL MEDICINE

## 2021-01-17 RX ADMIN — INSULIN HUMAN PRN UNIT: 100 INJECTION, SOLUTION PARENTERAL at 01:13

## 2021-01-17 RX ADMIN — Medication SCH EACH: at 17:04

## 2021-01-17 RX ADMIN — Medication SCH EACH: at 07:03

## 2021-01-17 RX ADMIN — DEXTROSE MONOHYDRATE SCH MLS/HR: 50 INJECTION, SOLUTION INTRAVENOUS at 20:39

## 2021-01-17 RX ADMIN — INSULIN HUMAN PRN UNIT: 100 INJECTION, SOLUTION PARENTERAL at 07:04

## 2021-01-17 RX ADMIN — SODIUM CHLORIDE PRN MLS/HR: 4.5 INJECTION, SOLUTION INTRAVENOUS at 09:05

## 2021-01-17 RX ADMIN — MEROPENEM SCH MLS/HR: 500 INJECTION INTRAVENOUS at 22:04

## 2021-01-17 RX ADMIN — DEXTROSE MONOHYDRATE PRN MLS/HR: 50 INJECTION, SOLUTION INTRAVENOUS at 08:21

## 2021-01-17 RX ADMIN — IPRATROPIUM BROMIDE AND ALBUTEROL SULFATE SCH PUFF: 103; 18 AEROSOL, METERED RESPIRATORY (INHALATION) at 17:04

## 2021-01-17 RX ADMIN — INSULIN HUMAN PRN UNIT: 100 INJECTION, SOLUTION PARENTERAL at 12:33

## 2021-01-17 RX ADMIN — INSULIN HUMAN PRN UNIT: 100 INJECTION, SOLUTION PARENTERAL at 17:07

## 2021-01-17 RX ADMIN — IPRATROPIUM BROMIDE AND ALBUTEROL SULFATE SCH PUFF: 103; 18 AEROSOL, METERED RESPIRATORY (INHALATION) at 17:05

## 2021-01-17 RX ADMIN — Medication SCH EACH: at 12:30

## 2021-01-17 RX ADMIN — Medication SCH EACH: at 01:11

## 2021-01-17 RX ADMIN — FAMOTIDINE SCH MG: 10 INJECTION INTRAVENOUS at 09:06

## 2021-01-17 NOTE — NUR
RN NOTE



1 UNIT PRBC INFUSED INTO PT WITH NO ADVERSE EFFECTS, PT TOLERATED WELL, VITALS WNL. 
TRANSFUSION FROM 1513 TO 1831.

## 2021-01-17 NOTE — NUR
ICU/LVN

CHARGE NURSE RN ED WAS ABLE TO TITRATE DOWN THE DOPA TO 8MCG FROM 18, THAT WAS RECEIVED AT 
THE CHANGE OF SHIFT. PT'S BLOOD PRESSURE HAS BEEN STABLE WILL CONTINUE TO MONITOR THIS PT 
AND HER BLOOD PRESSURE.

## 2021-01-17 NOTE — NUR
RN ICU CLOSING NOTE



PT STABLE, NO SIGNS OF RESP DISTRESS OR SOB. PT A/Ox2. PT TOLERATED PRBC TRANSFUSION WELL. 
NO CHANGES TO PT STATUS DURING SHIFT. ALL PT SAFETY MEASURES IN PLACE. WILL ENDORSE DASHAWN TO 
ONCOMING NURSE

## 2021-01-17 NOTE — NUR
RN ICU OPENING NOTE



PT LYING IN BED ON NC 1LPM TOLERATING WELL WITH NO SIGNS OF RESP DISTRESS OR SOB, SPO2 OF 
97%. PT IS Greek SPEAKING A/Ox1 AND DENIES PAIN AT THIS TIME. PT HAS RT FEMORAL TLC THAT 
IS FLUSHED AND PATENT. PT CURRENTLY ON DOPAMINE AT 8MCG/KG/MIN AND 1/2 NS AT 100ML/HR. PT 
HAS ASHBY CATH DRAINING CLOUDY ABIOLA URINE TO GRAVITY. ALL PT SAFETY MEASURES IN PLACE. WILL 
CONTINUE TO MONITOR

## 2021-01-18 VITALS — SYSTOLIC BLOOD PRESSURE: 100 MMHG | DIASTOLIC BLOOD PRESSURE: 44 MMHG

## 2021-01-18 VITALS — SYSTOLIC BLOOD PRESSURE: 94 MMHG | DIASTOLIC BLOOD PRESSURE: 47 MMHG

## 2021-01-18 VITALS — DIASTOLIC BLOOD PRESSURE: 47 MMHG | SYSTOLIC BLOOD PRESSURE: 109 MMHG

## 2021-01-18 VITALS — SYSTOLIC BLOOD PRESSURE: 133 MMHG | DIASTOLIC BLOOD PRESSURE: 69 MMHG

## 2021-01-18 VITALS — DIASTOLIC BLOOD PRESSURE: 49 MMHG | SYSTOLIC BLOOD PRESSURE: 106 MMHG

## 2021-01-18 VITALS — DIASTOLIC BLOOD PRESSURE: 46 MMHG | SYSTOLIC BLOOD PRESSURE: 111 MMHG

## 2021-01-18 VITALS — DIASTOLIC BLOOD PRESSURE: 60 MMHG | SYSTOLIC BLOOD PRESSURE: 118 MMHG

## 2021-01-18 VITALS — DIASTOLIC BLOOD PRESSURE: 57 MMHG | SYSTOLIC BLOOD PRESSURE: 123 MMHG

## 2021-01-18 VITALS — SYSTOLIC BLOOD PRESSURE: 85 MMHG | DIASTOLIC BLOOD PRESSURE: 45 MMHG

## 2021-01-18 VITALS — DIASTOLIC BLOOD PRESSURE: 58 MMHG | SYSTOLIC BLOOD PRESSURE: 109 MMHG

## 2021-01-18 VITALS — DIASTOLIC BLOOD PRESSURE: 49 MMHG | SYSTOLIC BLOOD PRESSURE: 114 MMHG

## 2021-01-18 VITALS — DIASTOLIC BLOOD PRESSURE: 49 MMHG | SYSTOLIC BLOOD PRESSURE: 91 MMHG

## 2021-01-18 VITALS — SYSTOLIC BLOOD PRESSURE: 104 MMHG | DIASTOLIC BLOOD PRESSURE: 53 MMHG

## 2021-01-18 VITALS — SYSTOLIC BLOOD PRESSURE: 88 MMHG | DIASTOLIC BLOOD PRESSURE: 46 MMHG

## 2021-01-18 VITALS — SYSTOLIC BLOOD PRESSURE: 115 MMHG | DIASTOLIC BLOOD PRESSURE: 52 MMHG

## 2021-01-18 VITALS — DIASTOLIC BLOOD PRESSURE: 64 MMHG | SYSTOLIC BLOOD PRESSURE: 118 MMHG

## 2021-01-18 VITALS — SYSTOLIC BLOOD PRESSURE: 91 MMHG | DIASTOLIC BLOOD PRESSURE: 44 MMHG

## 2021-01-18 VITALS — SYSTOLIC BLOOD PRESSURE: 112 MMHG | DIASTOLIC BLOOD PRESSURE: 49 MMHG

## 2021-01-18 VITALS — DIASTOLIC BLOOD PRESSURE: 47 MMHG | SYSTOLIC BLOOD PRESSURE: 90 MMHG

## 2021-01-18 VITALS — SYSTOLIC BLOOD PRESSURE: 103 MMHG | DIASTOLIC BLOOD PRESSURE: 50 MMHG

## 2021-01-18 VITALS — DIASTOLIC BLOOD PRESSURE: 46 MMHG | SYSTOLIC BLOOD PRESSURE: 91 MMHG

## 2021-01-18 VITALS — DIASTOLIC BLOOD PRESSURE: 52 MMHG | SYSTOLIC BLOOD PRESSURE: 119 MMHG

## 2021-01-18 VITALS — DIASTOLIC BLOOD PRESSURE: 43 MMHG | SYSTOLIC BLOOD PRESSURE: 91 MMHG

## 2021-01-18 VITALS — SYSTOLIC BLOOD PRESSURE: 93 MMHG | DIASTOLIC BLOOD PRESSURE: 48 MMHG

## 2021-01-18 VITALS — SYSTOLIC BLOOD PRESSURE: 105 MMHG | DIASTOLIC BLOOD PRESSURE: 47 MMHG

## 2021-01-18 VITALS — SYSTOLIC BLOOD PRESSURE: 89 MMHG | DIASTOLIC BLOOD PRESSURE: 43 MMHG

## 2021-01-18 VITALS — DIASTOLIC BLOOD PRESSURE: 49 MMHG | SYSTOLIC BLOOD PRESSURE: 117 MMHG

## 2021-01-18 VITALS — DIASTOLIC BLOOD PRESSURE: 48 MMHG | SYSTOLIC BLOOD PRESSURE: 101 MMHG

## 2021-01-18 VITALS — DIASTOLIC BLOOD PRESSURE: 47 MMHG | SYSTOLIC BLOOD PRESSURE: 107 MMHG

## 2021-01-18 VITALS — DIASTOLIC BLOOD PRESSURE: 48 MMHG | SYSTOLIC BLOOD PRESSURE: 115 MMHG

## 2021-01-18 VITALS — DIASTOLIC BLOOD PRESSURE: 54 MMHG | SYSTOLIC BLOOD PRESSURE: 116 MMHG

## 2021-01-18 VITALS — DIASTOLIC BLOOD PRESSURE: 56 MMHG | SYSTOLIC BLOOD PRESSURE: 119 MMHG

## 2021-01-18 VITALS — SYSTOLIC BLOOD PRESSURE: 109 MMHG | DIASTOLIC BLOOD PRESSURE: 44 MMHG

## 2021-01-18 VITALS — SYSTOLIC BLOOD PRESSURE: 96 MMHG | DIASTOLIC BLOOD PRESSURE: 46 MMHG

## 2021-01-18 VITALS — DIASTOLIC BLOOD PRESSURE: 50 MMHG | SYSTOLIC BLOOD PRESSURE: 91 MMHG

## 2021-01-18 VITALS — SYSTOLIC BLOOD PRESSURE: 105 MMHG | DIASTOLIC BLOOD PRESSURE: 44 MMHG

## 2021-01-18 VITALS — SYSTOLIC BLOOD PRESSURE: 110 MMHG | DIASTOLIC BLOOD PRESSURE: 50 MMHG

## 2021-01-18 VITALS — SYSTOLIC BLOOD PRESSURE: 146 MMHG | DIASTOLIC BLOOD PRESSURE: 106 MMHG

## 2021-01-18 VITALS — DIASTOLIC BLOOD PRESSURE: 61 MMHG | SYSTOLIC BLOOD PRESSURE: 114 MMHG

## 2021-01-18 VITALS — DIASTOLIC BLOOD PRESSURE: 53 MMHG | SYSTOLIC BLOOD PRESSURE: 100 MMHG

## 2021-01-18 VITALS — DIASTOLIC BLOOD PRESSURE: 44 MMHG | SYSTOLIC BLOOD PRESSURE: 108 MMHG

## 2021-01-18 VITALS — DIASTOLIC BLOOD PRESSURE: 46 MMHG | SYSTOLIC BLOOD PRESSURE: 98 MMHG

## 2021-01-18 VITALS — SYSTOLIC BLOOD PRESSURE: 92 MMHG | DIASTOLIC BLOOD PRESSURE: 43 MMHG

## 2021-01-18 VITALS — SYSTOLIC BLOOD PRESSURE: 134 MMHG | DIASTOLIC BLOOD PRESSURE: 58 MMHG

## 2021-01-18 VITALS — DIASTOLIC BLOOD PRESSURE: 52 MMHG | SYSTOLIC BLOOD PRESSURE: 108 MMHG

## 2021-01-18 VITALS — DIASTOLIC BLOOD PRESSURE: 60 MMHG | SYSTOLIC BLOOD PRESSURE: 128 MMHG

## 2021-01-18 VITALS — SYSTOLIC BLOOD PRESSURE: 123 MMHG | DIASTOLIC BLOOD PRESSURE: 60 MMHG

## 2021-01-18 VITALS — SYSTOLIC BLOOD PRESSURE: 86 MMHG | DIASTOLIC BLOOD PRESSURE: 40 MMHG

## 2021-01-18 VITALS — SYSTOLIC BLOOD PRESSURE: 90 MMHG | DIASTOLIC BLOOD PRESSURE: 44 MMHG

## 2021-01-18 VITALS — SYSTOLIC BLOOD PRESSURE: 118 MMHG | DIASTOLIC BLOOD PRESSURE: 52 MMHG

## 2021-01-18 VITALS — DIASTOLIC BLOOD PRESSURE: 73 MMHG | SYSTOLIC BLOOD PRESSURE: 113 MMHG

## 2021-01-18 VITALS — DIASTOLIC BLOOD PRESSURE: 52 MMHG | SYSTOLIC BLOOD PRESSURE: 97 MMHG

## 2021-01-18 VITALS — DIASTOLIC BLOOD PRESSURE: 37 MMHG | SYSTOLIC BLOOD PRESSURE: 88 MMHG

## 2021-01-18 VITALS — DIASTOLIC BLOOD PRESSURE: 60 MMHG | SYSTOLIC BLOOD PRESSURE: 111 MMHG

## 2021-01-18 VITALS — SYSTOLIC BLOOD PRESSURE: 119 MMHG | DIASTOLIC BLOOD PRESSURE: 52 MMHG

## 2021-01-18 VITALS — DIASTOLIC BLOOD PRESSURE: 57 MMHG | SYSTOLIC BLOOD PRESSURE: 114 MMHG

## 2021-01-18 VITALS — DIASTOLIC BLOOD PRESSURE: 78 MMHG | SYSTOLIC BLOOD PRESSURE: 120 MMHG

## 2021-01-18 VITALS — SYSTOLIC BLOOD PRESSURE: 101 MMHG | DIASTOLIC BLOOD PRESSURE: 58 MMHG

## 2021-01-18 VITALS — SYSTOLIC BLOOD PRESSURE: 109 MMHG | DIASTOLIC BLOOD PRESSURE: 62 MMHG

## 2021-01-18 VITALS — SYSTOLIC BLOOD PRESSURE: 117 MMHG | DIASTOLIC BLOOD PRESSURE: 58 MMHG

## 2021-01-18 VITALS — DIASTOLIC BLOOD PRESSURE: 45 MMHG | SYSTOLIC BLOOD PRESSURE: 109 MMHG

## 2021-01-18 VITALS — DIASTOLIC BLOOD PRESSURE: 54 MMHG | SYSTOLIC BLOOD PRESSURE: 92 MMHG

## 2021-01-18 VITALS — DIASTOLIC BLOOD PRESSURE: 50 MMHG | SYSTOLIC BLOOD PRESSURE: 107 MMHG

## 2021-01-18 VITALS — DIASTOLIC BLOOD PRESSURE: 54 MMHG | SYSTOLIC BLOOD PRESSURE: 106 MMHG

## 2021-01-18 VITALS — SYSTOLIC BLOOD PRESSURE: 113 MMHG | DIASTOLIC BLOOD PRESSURE: 64 MMHG

## 2021-01-18 VITALS — DIASTOLIC BLOOD PRESSURE: 48 MMHG | SYSTOLIC BLOOD PRESSURE: 107 MMHG

## 2021-01-18 VITALS — DIASTOLIC BLOOD PRESSURE: 58 MMHG | SYSTOLIC BLOOD PRESSURE: 116 MMHG

## 2021-01-18 VITALS — DIASTOLIC BLOOD PRESSURE: 58 MMHG | SYSTOLIC BLOOD PRESSURE: 115 MMHG

## 2021-01-18 VITALS — SYSTOLIC BLOOD PRESSURE: 105 MMHG | DIASTOLIC BLOOD PRESSURE: 55 MMHG

## 2021-01-18 VITALS — DIASTOLIC BLOOD PRESSURE: 43 MMHG | SYSTOLIC BLOOD PRESSURE: 102 MMHG

## 2021-01-18 LAB
BASE EXCESS BLDA CALC-SCNC: -7.8 MMOL/L
BASE EXCESS BLDA CALC-SCNC: -8.4 MMOL/L
BASOPHILS # BLD AUTO: 0 /CMM (ref 0–0.2)
BASOPHILS NFR BLD AUTO: 0.1 % (ref 0–2)
BUN SERPL-MCNC: 69 MG/DL (ref 7–18)
CALCIUM SERPL-MCNC: 7.9 MG/DL (ref 8.5–10.1)
CHLORIDE SERPL-SCNC: 95 MMOL/L (ref 98–107)
CO2 SERPL-SCNC: 22 MMOL/L (ref 21–32)
CREAT SERPL-MCNC: 3.6 MG/DL (ref 0.6–1.3)
DO-HGB MFR BLDA: 176.6 MMHG
DO-HGB MFR BLDA: 21.3 MMHG
EOSINOPHIL NFR BLD AUTO: 0 % (ref 0–6)
GLUCOSE SERPL-MCNC: 98 MG/DL (ref 74–106)
HCT VFR BLD AUTO: 31 % (ref 33–45)
HGB BLD-MCNC: 9.7 G/DL (ref 11.5–14.8)
INHALED O2 CONCENTRATION: 24 %
INHALED O2 CONCENTRATION: 50 %
INHALED O2 FLOW RATE: 1 L/MIN (ref 0–30)
INTRINSIC PEEP RESPIRATORY: 5 CM H2O
LYMPHOCYTES NFR BLD AUTO: 0.6 /CMM (ref 0.8–4.8)
LYMPHOCYTES NFR BLD AUTO: 3.1 % (ref 20–44)
MCHC RBC AUTO-ENTMCNC: 32 G/DL (ref 31–36)
MCV RBC AUTO: 97 FL (ref 82–100)
MONOCYTES NFR BLD AUTO: 0.6 /CMM (ref 0.1–1.3)
MONOCYTES NFR BLD AUTO: 3.3 % (ref 2–12)
NEUTROPHILS # BLD AUTO: 17.3 /CMM (ref 1.8–8.9)
NEUTROPHILS NFR BLD AUTO: 93.5 % (ref 43–81)
PCO2 TEMP ADJ BLDA: 45.7 MMHG (ref 35–45)
PCO2 TEMP ADJ BLDA: 52.3 MMHG (ref 35–45)
PH TEMP ADJ BLDA: 7.2 [PH] (ref 7.35–7.45)
PH TEMP ADJ BLDA: 7.23 [PH] (ref 7.35–7.45)
PLATELET # BLD AUTO: 208 /CMM (ref 150–450)
PO2 TEMP ADJ BLDA: 128.5 MMHG (ref 75–100)
PO2 TEMP ADJ BLDA: 87.6 MMHG (ref 75–100)
POTASSIUM SERPL-SCNC: 4.7 MMOL/L (ref 3.5–5.1)
RBC # BLD AUTO: 3.13 MIL/UL (ref 4–5.2)
SAO2 % BLDA: 96.2 % (ref 92–98.5)
SAO2 % BLDA: 98.6 % (ref 92–98.5)
SET RATE, BG: 12
SODIUM SERPL-SCNC: 129 MMOL/L (ref 136–145)
VENTILATION MODE VENT: (no result)
VENTILATION MODE VENT: (no result)
WBC NRBC COR # BLD AUTO: 18.5 K/UL (ref 4.3–11)

## 2021-01-18 PROCEDURE — 5A09557 ASSISTANCE WITH RESPIRATORY VENTILATION, GREATER THAN 96 CONSECUTIVE HOURS, CONTINUOUS POSITIVE AIRWAY PRESSURE: ICD-10-PCS | Performed by: INTERNAL MEDICINE

## 2021-01-18 RX ADMIN — IPRATROPIUM BROMIDE AND ALBUTEROL SULFATE SCH PUFF: 103; 18 AEROSOL, METERED RESPIRATORY (INHALATION) at 11:59

## 2021-01-18 RX ADMIN — FAMOTIDINE SCH MG: 10 INJECTION INTRAVENOUS at 08:14

## 2021-01-18 RX ADMIN — MUPIROCIN SCH GM: 20 OINTMENT TOPICAL at 21:09

## 2021-01-18 RX ADMIN — DEXTROSE MONOHYDRATE PRN MLS/HR: 50 INJECTION, SOLUTION INTRAVENOUS at 08:14

## 2021-01-18 RX ADMIN — Medication SCH EACH: at 00:09

## 2021-01-18 RX ADMIN — Medication SCH EACH: at 06:43

## 2021-01-18 RX ADMIN — MEROPENEM SCH MLS/HR: 500 INJECTION INTRAVENOUS at 21:41

## 2021-01-18 RX ADMIN — Medication SCH OZ: at 15:31

## 2021-01-18 RX ADMIN — IPRATROPIUM BROMIDE AND ALBUTEROL SULFATE SCH PUFF: 103; 18 AEROSOL, METERED RESPIRATORY (INHALATION) at 04:20

## 2021-01-18 RX ADMIN — Medication SCH EACH: at 19:46

## 2021-01-18 RX ADMIN — DEXTROSE MONOHYDRATE SCH MLS/HR: 50 INJECTION, SOLUTION INTRAVENOUS at 21:09

## 2021-01-18 RX ADMIN — SODIUM CHLORIDE PRN MLS/HR: 4.5 INJECTION, SOLUTION INTRAVENOUS at 06:45

## 2021-01-18 RX ADMIN — Medication SCH EACH: at 12:32

## 2021-01-18 RX ADMIN — Medication SCH OZ: at 21:11

## 2021-01-18 RX ADMIN — IPRATROPIUM BROMIDE AND ALBUTEROL SULFATE SCH PUFF: 103; 18 AEROSOL, METERED RESPIRATORY (INHALATION) at 06:52

## 2021-01-18 RX ADMIN — IPRATROPIUM BROMIDE AND ALBUTEROL SULFATE SCH PUFF: 103; 18 AEROSOL, METERED RESPIRATORY (INHALATION) at 18:00

## 2021-01-18 NOTE — NUR
RN NOTES



PATIENT  REMAINED AOX2 Bruneian SPEAKING.  WITH O2 1-2 LPM VIA NC  SATURATION %.. 
AFEBRILE.  NO ACUTE RESPIRATORY DISTRESS. IV SITE  ON RIGHT  FEMORAL TLC WITH  DOPAMINE 
TITRATED AS PROTOCOL ORDERS AND 1/2 NS @ 100 M.HR. ACCUCHECKED  Q6H CONTINUE  LAST BS  97 
MG/DL. PATIENT REMAINED INPO WILL HAVE SWALLOW EVAL TODAY.  CRITICAL VALUES REPORTED TO MD. NUNEZ.

## 2021-01-18 NOTE — NUR
RN NOTES 

PT REMANINS ON BIPAP, PT IS MORE ALERT, TOLERATING BIPAP SETTING WELL, VSS STABLE, PT IS ON 
DOPAMINE FOR BP SUPPORT, WILL ENDOSE TO NIGHT SHIFT NURSE FOR CONTINUITY OF CARE .

## 2021-01-18 NOTE — NUR
bipap changes per dr delarosa telephone order rn notified

-------------------------------------------------------------------------------

Addendum: 01/18/21 at 1152 by MARIA ESTHER ESPINOZA RT

-------------------------------------------------------------------------------

Amended: Links added.

## 2021-01-19 VITALS — SYSTOLIC BLOOD PRESSURE: 99 MMHG | DIASTOLIC BLOOD PRESSURE: 55 MMHG

## 2021-01-19 VITALS — DIASTOLIC BLOOD PRESSURE: 45 MMHG | SYSTOLIC BLOOD PRESSURE: 105 MMHG

## 2021-01-19 VITALS — SYSTOLIC BLOOD PRESSURE: 67 MMHG | DIASTOLIC BLOOD PRESSURE: 27 MMHG

## 2021-01-19 VITALS — DIASTOLIC BLOOD PRESSURE: 56 MMHG | SYSTOLIC BLOOD PRESSURE: 117 MMHG

## 2021-01-19 VITALS — DIASTOLIC BLOOD PRESSURE: 44 MMHG | SYSTOLIC BLOOD PRESSURE: 96 MMHG

## 2021-01-19 VITALS — DIASTOLIC BLOOD PRESSURE: 51 MMHG | SYSTOLIC BLOOD PRESSURE: 107 MMHG

## 2021-01-19 VITALS — DIASTOLIC BLOOD PRESSURE: 65 MMHG | SYSTOLIC BLOOD PRESSURE: 101 MMHG

## 2021-01-19 VITALS — SYSTOLIC BLOOD PRESSURE: 106 MMHG | DIASTOLIC BLOOD PRESSURE: 59 MMHG

## 2021-01-19 VITALS — SYSTOLIC BLOOD PRESSURE: 91 MMHG | DIASTOLIC BLOOD PRESSURE: 36 MMHG

## 2021-01-19 VITALS — SYSTOLIC BLOOD PRESSURE: 97 MMHG | DIASTOLIC BLOOD PRESSURE: 50 MMHG

## 2021-01-19 VITALS — SYSTOLIC BLOOD PRESSURE: 108 MMHG | DIASTOLIC BLOOD PRESSURE: 56 MMHG

## 2021-01-19 VITALS — DIASTOLIC BLOOD PRESSURE: 40 MMHG | SYSTOLIC BLOOD PRESSURE: 100 MMHG

## 2021-01-19 VITALS — DIASTOLIC BLOOD PRESSURE: 56 MMHG | SYSTOLIC BLOOD PRESSURE: 101 MMHG

## 2021-01-19 VITALS — DIASTOLIC BLOOD PRESSURE: 54 MMHG | SYSTOLIC BLOOD PRESSURE: 109 MMHG

## 2021-01-19 VITALS — DIASTOLIC BLOOD PRESSURE: 49 MMHG | SYSTOLIC BLOOD PRESSURE: 120 MMHG

## 2021-01-19 VITALS — SYSTOLIC BLOOD PRESSURE: 88 MMHG | DIASTOLIC BLOOD PRESSURE: 46 MMHG

## 2021-01-19 VITALS — DIASTOLIC BLOOD PRESSURE: 50 MMHG | SYSTOLIC BLOOD PRESSURE: 110 MMHG

## 2021-01-19 VITALS — DIASTOLIC BLOOD PRESSURE: 54 MMHG | SYSTOLIC BLOOD PRESSURE: 108 MMHG

## 2021-01-19 VITALS — DIASTOLIC BLOOD PRESSURE: 54 MMHG | SYSTOLIC BLOOD PRESSURE: 102 MMHG

## 2021-01-19 VITALS — DIASTOLIC BLOOD PRESSURE: 48 MMHG | SYSTOLIC BLOOD PRESSURE: 107 MMHG

## 2021-01-19 VITALS — SYSTOLIC BLOOD PRESSURE: 94 MMHG | DIASTOLIC BLOOD PRESSURE: 49 MMHG

## 2021-01-19 VITALS — DIASTOLIC BLOOD PRESSURE: 62 MMHG | SYSTOLIC BLOOD PRESSURE: 121 MMHG

## 2021-01-19 VITALS — DIASTOLIC BLOOD PRESSURE: 52 MMHG | SYSTOLIC BLOOD PRESSURE: 100 MMHG

## 2021-01-19 VITALS — DIASTOLIC BLOOD PRESSURE: 42 MMHG | SYSTOLIC BLOOD PRESSURE: 101 MMHG

## 2021-01-19 VITALS — DIASTOLIC BLOOD PRESSURE: 45 MMHG | SYSTOLIC BLOOD PRESSURE: 89 MMHG

## 2021-01-19 VITALS — DIASTOLIC BLOOD PRESSURE: 54 MMHG | SYSTOLIC BLOOD PRESSURE: 90 MMHG

## 2021-01-19 VITALS — DIASTOLIC BLOOD PRESSURE: 49 MMHG | SYSTOLIC BLOOD PRESSURE: 93 MMHG

## 2021-01-19 VITALS — SYSTOLIC BLOOD PRESSURE: 112 MMHG | DIASTOLIC BLOOD PRESSURE: 53 MMHG

## 2021-01-19 VITALS — SYSTOLIC BLOOD PRESSURE: 100 MMHG | DIASTOLIC BLOOD PRESSURE: 41 MMHG

## 2021-01-19 VITALS — SYSTOLIC BLOOD PRESSURE: 99 MMHG | DIASTOLIC BLOOD PRESSURE: 57 MMHG

## 2021-01-19 VITALS — DIASTOLIC BLOOD PRESSURE: 47 MMHG | SYSTOLIC BLOOD PRESSURE: 99 MMHG

## 2021-01-19 VITALS — DIASTOLIC BLOOD PRESSURE: 53 MMHG | SYSTOLIC BLOOD PRESSURE: 109 MMHG

## 2021-01-19 VITALS — DIASTOLIC BLOOD PRESSURE: 50 MMHG | SYSTOLIC BLOOD PRESSURE: 106 MMHG

## 2021-01-19 VITALS — SYSTOLIC BLOOD PRESSURE: 98 MMHG | DIASTOLIC BLOOD PRESSURE: 43 MMHG

## 2021-01-19 VITALS — SYSTOLIC BLOOD PRESSURE: 125 MMHG | DIASTOLIC BLOOD PRESSURE: 54 MMHG

## 2021-01-19 VITALS — DIASTOLIC BLOOD PRESSURE: 48 MMHG | SYSTOLIC BLOOD PRESSURE: 110 MMHG

## 2021-01-19 VITALS — SYSTOLIC BLOOD PRESSURE: 110 MMHG | DIASTOLIC BLOOD PRESSURE: 50 MMHG

## 2021-01-19 VITALS — DIASTOLIC BLOOD PRESSURE: 54 MMHG | SYSTOLIC BLOOD PRESSURE: 107 MMHG

## 2021-01-19 VITALS — SYSTOLIC BLOOD PRESSURE: 112 MMHG | DIASTOLIC BLOOD PRESSURE: 47 MMHG

## 2021-01-19 VITALS — SYSTOLIC BLOOD PRESSURE: 108 MMHG | DIASTOLIC BLOOD PRESSURE: 51 MMHG

## 2021-01-19 VITALS — DIASTOLIC BLOOD PRESSURE: 55 MMHG | SYSTOLIC BLOOD PRESSURE: 111 MMHG

## 2021-01-19 VITALS — SYSTOLIC BLOOD PRESSURE: 101 MMHG | DIASTOLIC BLOOD PRESSURE: 48 MMHG

## 2021-01-19 VITALS — SYSTOLIC BLOOD PRESSURE: 106 MMHG | DIASTOLIC BLOOD PRESSURE: 50 MMHG

## 2021-01-19 VITALS — SYSTOLIC BLOOD PRESSURE: 104 MMHG | DIASTOLIC BLOOD PRESSURE: 48 MMHG

## 2021-01-19 VITALS — SYSTOLIC BLOOD PRESSURE: 108 MMHG | DIASTOLIC BLOOD PRESSURE: 48 MMHG

## 2021-01-19 VITALS — SYSTOLIC BLOOD PRESSURE: 116 MMHG | DIASTOLIC BLOOD PRESSURE: 55 MMHG

## 2021-01-19 VITALS — SYSTOLIC BLOOD PRESSURE: 93 MMHG | DIASTOLIC BLOOD PRESSURE: 42 MMHG

## 2021-01-19 VITALS — DIASTOLIC BLOOD PRESSURE: 59 MMHG | SYSTOLIC BLOOD PRESSURE: 98 MMHG

## 2021-01-19 VITALS — SYSTOLIC BLOOD PRESSURE: 102 MMHG | DIASTOLIC BLOOD PRESSURE: 49 MMHG

## 2021-01-19 VITALS — DIASTOLIC BLOOD PRESSURE: 51 MMHG | SYSTOLIC BLOOD PRESSURE: 126 MMHG

## 2021-01-19 VITALS — SYSTOLIC BLOOD PRESSURE: 104 MMHG | DIASTOLIC BLOOD PRESSURE: 50 MMHG

## 2021-01-19 VITALS — SYSTOLIC BLOOD PRESSURE: 105 MMHG | DIASTOLIC BLOOD PRESSURE: 52 MMHG

## 2021-01-19 VITALS — SYSTOLIC BLOOD PRESSURE: 117 MMHG | DIASTOLIC BLOOD PRESSURE: 69 MMHG

## 2021-01-19 VITALS — DIASTOLIC BLOOD PRESSURE: 48 MMHG | SYSTOLIC BLOOD PRESSURE: 104 MMHG

## 2021-01-19 VITALS — DIASTOLIC BLOOD PRESSURE: 46 MMHG | SYSTOLIC BLOOD PRESSURE: 100 MMHG

## 2021-01-19 VITALS — DIASTOLIC BLOOD PRESSURE: 52 MMHG | SYSTOLIC BLOOD PRESSURE: 104 MMHG

## 2021-01-19 VITALS — SYSTOLIC BLOOD PRESSURE: 103 MMHG | DIASTOLIC BLOOD PRESSURE: 45 MMHG

## 2021-01-19 VITALS — DIASTOLIC BLOOD PRESSURE: 49 MMHG | SYSTOLIC BLOOD PRESSURE: 98 MMHG

## 2021-01-19 VITALS — SYSTOLIC BLOOD PRESSURE: 98 MMHG | DIASTOLIC BLOOD PRESSURE: 51 MMHG

## 2021-01-19 VITALS — DIASTOLIC BLOOD PRESSURE: 45 MMHG | SYSTOLIC BLOOD PRESSURE: 98 MMHG

## 2021-01-19 VITALS — DIASTOLIC BLOOD PRESSURE: 52 MMHG | SYSTOLIC BLOOD PRESSURE: 107 MMHG

## 2021-01-19 VITALS — DIASTOLIC BLOOD PRESSURE: 52 MMHG | SYSTOLIC BLOOD PRESSURE: 106 MMHG

## 2021-01-19 VITALS — SYSTOLIC BLOOD PRESSURE: 107 MMHG | DIASTOLIC BLOOD PRESSURE: 53 MMHG

## 2021-01-19 VITALS — DIASTOLIC BLOOD PRESSURE: 50 MMHG | SYSTOLIC BLOOD PRESSURE: 113 MMHG

## 2021-01-19 LAB
BASE EXCESS BLDA CALC-SCNC: -7.3 MMOL/L
BASE EXCESS BLDA CALC-SCNC: -7.4 MMOL/L
BUN SERPL-MCNC: 69 MG/DL (ref 7–18)
CALCIUM SERPL-MCNC: 7.9 MG/DL (ref 8.5–10.1)
CHLORIDE SERPL-SCNC: 92 MMOL/L (ref 98–107)
CO2 SERPL-SCNC: 21 MMOL/L (ref 21–32)
CREAT SERPL-MCNC: 3.4 MG/DL (ref 0.6–1.3)
DO-HGB MFR BLDA: 67.9 MMHG
DO-HGB MFR BLDA: 75.2 MMHG
GLUCOSE SERPL-MCNC: 156 MG/DL (ref 74–106)
INHALED O2 CONCENTRATION: 28 %
INHALED O2 CONCENTRATION: 30 %
INHALED O2 FLOW RATE: 2 L/MIN (ref 0–30)
PCO2 TEMP ADJ BLDA: 35.2 MMHG (ref 35–45)
PCO2 TEMP ADJ BLDA: 39.6 MMHG (ref 35–45)
PH TEMP ADJ BLDA: 7.29 [PH] (ref 7.35–7.45)
PH TEMP ADJ BLDA: 7.32 [PH] (ref 7.35–7.45)
PO2 TEMP ADJ BLDA: 85 MMHG (ref 75–100)
PO2 TEMP ADJ BLDA: 97.3 MMHG (ref 75–100)
POTASSIUM SERPL-SCNC: 4.9 MMOL/L (ref 3.5–5.1)
SAO2 % BLDA: 96.1 % (ref 92–98.5)
SAO2 % BLDA: 97.5 % (ref 92–98.5)
SET RATE, BG: 12
SODIUM SERPL-SCNC: 125 MMOL/L (ref 136–145)
VENTILATION MODE VENT: (no result)

## 2021-01-19 RX ADMIN — DEXTROSE MONOHYDRATE PRN MLS/HR: 50 INJECTION, SOLUTION INTRAVENOUS at 05:13

## 2021-01-19 RX ADMIN — IPRATROPIUM BROMIDE AND ALBUTEROL SULFATE SCH PUFF: 103; 18 AEROSOL, METERED RESPIRATORY (INHALATION) at 06:46

## 2021-01-19 RX ADMIN — Medication SCH EACH: at 18:48

## 2021-01-19 RX ADMIN — FAMOTIDINE SCH MG: 10 INJECTION INTRAVENOUS at 12:14

## 2021-01-19 RX ADMIN — SODIUM CHLORIDE PRN MLS/HR: 9 INJECTION, SOLUTION INTRAVENOUS at 21:56

## 2021-01-19 RX ADMIN — MUPIROCIN SCH GM: 20 OINTMENT TOPICAL at 21:29

## 2021-01-19 RX ADMIN — MUPIROCIN SCH GM: 20 OINTMENT TOPICAL at 12:14

## 2021-01-19 RX ADMIN — Medication SCH EACH: at 14:35

## 2021-01-19 RX ADMIN — Medication SCH OZ: at 12:14

## 2021-01-19 RX ADMIN — Medication SCH EACH: at 00:41

## 2021-01-19 RX ADMIN — DEXTROSE MONOHYDRATE SCH MLS/HR: 50 INJECTION, SOLUTION INTRAVENOUS at 21:31

## 2021-01-19 RX ADMIN — IPRATROPIUM BROMIDE AND ALBUTEROL SULFATE SCH PUFF: 103; 18 AEROSOL, METERED RESPIRATORY (INHALATION) at 18:00

## 2021-01-19 RX ADMIN — SODIUM CHLORIDE PRN MLS/HR: 9 INJECTION, SOLUTION INTRAVENOUS at 12:24

## 2021-01-19 RX ADMIN — Medication SCH OZ: at 21:31

## 2021-01-19 RX ADMIN — SODIUM CHLORIDE PRN MLS/HR: 4.5 INJECTION, SOLUTION INTRAVENOUS at 05:06

## 2021-01-19 RX ADMIN — IPRATROPIUM BROMIDE AND ALBUTEROL SULFATE SCH PUFF: 103; 18 AEROSOL, METERED RESPIRATORY (INHALATION) at 12:00

## 2021-01-19 RX ADMIN — Medication SCH EACH: at 06:44

## 2021-01-19 RX ADMIN — IPRATROPIUM BROMIDE AND ALBUTEROL SULFATE SCH PUFF: 103; 18 AEROSOL, METERED RESPIRATORY (INHALATION) at 00:42

## 2021-01-19 NOTE — NUR
RN NOTES



CONTINUE  ON  BIPAP  AS ORDERED AND TOLERATED WELL. SATURATION  REMAINED >95%, AFEBRILE. 
VSS, DOPAMINE CONTINUE TITRATED AS ORDERED. IV SITE REMAINED INTACT AND PATENT  IVF  1/2 NS 
@ 40 ML/HR  ONGOING.  KEPT PT CLEAN AND DRY. ENDORSED CONTINUITY OF CARE TO AM NURSE.

## 2021-01-20 VITALS — SYSTOLIC BLOOD PRESSURE: 114 MMHG | DIASTOLIC BLOOD PRESSURE: 47 MMHG

## 2021-01-20 VITALS — DIASTOLIC BLOOD PRESSURE: 56 MMHG | SYSTOLIC BLOOD PRESSURE: 126 MMHG

## 2021-01-20 VITALS — SYSTOLIC BLOOD PRESSURE: 105 MMHG | DIASTOLIC BLOOD PRESSURE: 49 MMHG

## 2021-01-20 VITALS — SYSTOLIC BLOOD PRESSURE: 116 MMHG | DIASTOLIC BLOOD PRESSURE: 53 MMHG

## 2021-01-20 VITALS — DIASTOLIC BLOOD PRESSURE: 57 MMHG | SYSTOLIC BLOOD PRESSURE: 120 MMHG

## 2021-01-20 VITALS — DIASTOLIC BLOOD PRESSURE: 48 MMHG | SYSTOLIC BLOOD PRESSURE: 106 MMHG

## 2021-01-20 VITALS — SYSTOLIC BLOOD PRESSURE: 106 MMHG | DIASTOLIC BLOOD PRESSURE: 46 MMHG

## 2021-01-20 VITALS — SYSTOLIC BLOOD PRESSURE: 101 MMHG | DIASTOLIC BLOOD PRESSURE: 49 MMHG

## 2021-01-20 VITALS — DIASTOLIC BLOOD PRESSURE: 54 MMHG | SYSTOLIC BLOOD PRESSURE: 116 MMHG

## 2021-01-20 VITALS — DIASTOLIC BLOOD PRESSURE: 56 MMHG | SYSTOLIC BLOOD PRESSURE: 107 MMHG

## 2021-01-20 VITALS — DIASTOLIC BLOOD PRESSURE: 50 MMHG | SYSTOLIC BLOOD PRESSURE: 112 MMHG

## 2021-01-20 VITALS — SYSTOLIC BLOOD PRESSURE: 122 MMHG | DIASTOLIC BLOOD PRESSURE: 55 MMHG

## 2021-01-20 VITALS — SYSTOLIC BLOOD PRESSURE: 98 MMHG | DIASTOLIC BLOOD PRESSURE: 46 MMHG

## 2021-01-20 VITALS — SYSTOLIC BLOOD PRESSURE: 112 MMHG | DIASTOLIC BLOOD PRESSURE: 53 MMHG

## 2021-01-20 VITALS — SYSTOLIC BLOOD PRESSURE: 124 MMHG | DIASTOLIC BLOOD PRESSURE: 59 MMHG

## 2021-01-20 VITALS — SYSTOLIC BLOOD PRESSURE: 109 MMHG | DIASTOLIC BLOOD PRESSURE: 50 MMHG

## 2021-01-20 VITALS — SYSTOLIC BLOOD PRESSURE: 106 MMHG | DIASTOLIC BLOOD PRESSURE: 55 MMHG

## 2021-01-20 VITALS — DIASTOLIC BLOOD PRESSURE: 48 MMHG | SYSTOLIC BLOOD PRESSURE: 101 MMHG

## 2021-01-20 VITALS — DIASTOLIC BLOOD PRESSURE: 48 MMHG | SYSTOLIC BLOOD PRESSURE: 98 MMHG

## 2021-01-20 VITALS — DIASTOLIC BLOOD PRESSURE: 50 MMHG | SYSTOLIC BLOOD PRESSURE: 105 MMHG

## 2021-01-20 VITALS — DIASTOLIC BLOOD PRESSURE: 45 MMHG | SYSTOLIC BLOOD PRESSURE: 101 MMHG

## 2021-01-20 VITALS — SYSTOLIC BLOOD PRESSURE: 101 MMHG | DIASTOLIC BLOOD PRESSURE: 45 MMHG

## 2021-01-20 VITALS — DIASTOLIC BLOOD PRESSURE: 51 MMHG | SYSTOLIC BLOOD PRESSURE: 110 MMHG

## 2021-01-20 VITALS — DIASTOLIC BLOOD PRESSURE: 55 MMHG | SYSTOLIC BLOOD PRESSURE: 124 MMHG

## 2021-01-20 VITALS — SYSTOLIC BLOOD PRESSURE: 110 MMHG | DIASTOLIC BLOOD PRESSURE: 58 MMHG

## 2021-01-20 VITALS — DIASTOLIC BLOOD PRESSURE: 61 MMHG | SYSTOLIC BLOOD PRESSURE: 121 MMHG

## 2021-01-20 VITALS — SYSTOLIC BLOOD PRESSURE: 103 MMHG | DIASTOLIC BLOOD PRESSURE: 50 MMHG

## 2021-01-20 VITALS — SYSTOLIC BLOOD PRESSURE: 96 MMHG | DIASTOLIC BLOOD PRESSURE: 48 MMHG

## 2021-01-20 VITALS — SYSTOLIC BLOOD PRESSURE: 107 MMHG | DIASTOLIC BLOOD PRESSURE: 50 MMHG

## 2021-01-20 VITALS — DIASTOLIC BLOOD PRESSURE: 57 MMHG | SYSTOLIC BLOOD PRESSURE: 107 MMHG

## 2021-01-20 VITALS — DIASTOLIC BLOOD PRESSURE: 57 MMHG | SYSTOLIC BLOOD PRESSURE: 118 MMHG

## 2021-01-20 VITALS — DIASTOLIC BLOOD PRESSURE: 53 MMHG | SYSTOLIC BLOOD PRESSURE: 114 MMHG

## 2021-01-20 VITALS — DIASTOLIC BLOOD PRESSURE: 52 MMHG | SYSTOLIC BLOOD PRESSURE: 105 MMHG

## 2021-01-20 VITALS — SYSTOLIC BLOOD PRESSURE: 119 MMHG | DIASTOLIC BLOOD PRESSURE: 49 MMHG

## 2021-01-20 VITALS — DIASTOLIC BLOOD PRESSURE: 55 MMHG | SYSTOLIC BLOOD PRESSURE: 123 MMHG

## 2021-01-20 VITALS — DIASTOLIC BLOOD PRESSURE: 46 MMHG | SYSTOLIC BLOOD PRESSURE: 115 MMHG

## 2021-01-20 VITALS — SYSTOLIC BLOOD PRESSURE: 107 MMHG | DIASTOLIC BLOOD PRESSURE: 53 MMHG

## 2021-01-20 VITALS — DIASTOLIC BLOOD PRESSURE: 48 MMHG | SYSTOLIC BLOOD PRESSURE: 111 MMHG

## 2021-01-20 VITALS — DIASTOLIC BLOOD PRESSURE: 54 MMHG | SYSTOLIC BLOOD PRESSURE: 106 MMHG

## 2021-01-20 VITALS — DIASTOLIC BLOOD PRESSURE: 60 MMHG | SYSTOLIC BLOOD PRESSURE: 118 MMHG

## 2021-01-20 VITALS — SYSTOLIC BLOOD PRESSURE: 102 MMHG | DIASTOLIC BLOOD PRESSURE: 50 MMHG

## 2021-01-20 VITALS — DIASTOLIC BLOOD PRESSURE: 27 MMHG | SYSTOLIC BLOOD PRESSURE: 82 MMHG

## 2021-01-20 VITALS — SYSTOLIC BLOOD PRESSURE: 106 MMHG | DIASTOLIC BLOOD PRESSURE: 52 MMHG

## 2021-01-20 VITALS — SYSTOLIC BLOOD PRESSURE: 122 MMHG | DIASTOLIC BLOOD PRESSURE: 53 MMHG

## 2021-01-20 VITALS — DIASTOLIC BLOOD PRESSURE: 37 MMHG | SYSTOLIC BLOOD PRESSURE: 108 MMHG

## 2021-01-20 VITALS — DIASTOLIC BLOOD PRESSURE: 53 MMHG | SYSTOLIC BLOOD PRESSURE: 105 MMHG

## 2021-01-20 VITALS — DIASTOLIC BLOOD PRESSURE: 54 MMHG | SYSTOLIC BLOOD PRESSURE: 115 MMHG

## 2021-01-20 VITALS — DIASTOLIC BLOOD PRESSURE: 51 MMHG | SYSTOLIC BLOOD PRESSURE: 105 MMHG

## 2021-01-20 VITALS — DIASTOLIC BLOOD PRESSURE: 52 MMHG | SYSTOLIC BLOOD PRESSURE: 103 MMHG

## 2021-01-20 VITALS — SYSTOLIC BLOOD PRESSURE: 116 MMHG | DIASTOLIC BLOOD PRESSURE: 58 MMHG

## 2021-01-20 VITALS — SYSTOLIC BLOOD PRESSURE: 95 MMHG | DIASTOLIC BLOOD PRESSURE: 43 MMHG

## 2021-01-20 VITALS — DIASTOLIC BLOOD PRESSURE: 47 MMHG | SYSTOLIC BLOOD PRESSURE: 101 MMHG

## 2021-01-20 VITALS — DIASTOLIC BLOOD PRESSURE: 58 MMHG | SYSTOLIC BLOOD PRESSURE: 136 MMHG

## 2021-01-20 VITALS — DIASTOLIC BLOOD PRESSURE: 50 MMHG | SYSTOLIC BLOOD PRESSURE: 110 MMHG

## 2021-01-20 VITALS — DIASTOLIC BLOOD PRESSURE: 52 MMHG | SYSTOLIC BLOOD PRESSURE: 108 MMHG

## 2021-01-20 VITALS — DIASTOLIC BLOOD PRESSURE: 50 MMHG | SYSTOLIC BLOOD PRESSURE: 91 MMHG

## 2021-01-20 VITALS — SYSTOLIC BLOOD PRESSURE: 113 MMHG | DIASTOLIC BLOOD PRESSURE: 64 MMHG

## 2021-01-20 VITALS — DIASTOLIC BLOOD PRESSURE: 51 MMHG | SYSTOLIC BLOOD PRESSURE: 118 MMHG

## 2021-01-20 VITALS — DIASTOLIC BLOOD PRESSURE: 58 MMHG | SYSTOLIC BLOOD PRESSURE: 113 MMHG

## 2021-01-20 VITALS — DIASTOLIC BLOOD PRESSURE: 56 MMHG | SYSTOLIC BLOOD PRESSURE: 113 MMHG

## 2021-01-20 VITALS — SYSTOLIC BLOOD PRESSURE: 100 MMHG | DIASTOLIC BLOOD PRESSURE: 46 MMHG

## 2021-01-20 VITALS — DIASTOLIC BLOOD PRESSURE: 50 MMHG | SYSTOLIC BLOOD PRESSURE: 103 MMHG

## 2021-01-20 VITALS — DIASTOLIC BLOOD PRESSURE: 52 MMHG | SYSTOLIC BLOOD PRESSURE: 109 MMHG

## 2021-01-20 VITALS — DIASTOLIC BLOOD PRESSURE: 54 MMHG | SYSTOLIC BLOOD PRESSURE: 117 MMHG

## 2021-01-20 VITALS — SYSTOLIC BLOOD PRESSURE: 104 MMHG | DIASTOLIC BLOOD PRESSURE: 48 MMHG

## 2021-01-20 VITALS — SYSTOLIC BLOOD PRESSURE: 104 MMHG | DIASTOLIC BLOOD PRESSURE: 51 MMHG

## 2021-01-20 VITALS — DIASTOLIC BLOOD PRESSURE: 58 MMHG | SYSTOLIC BLOOD PRESSURE: 102 MMHG

## 2021-01-20 VITALS — SYSTOLIC BLOOD PRESSURE: 111 MMHG | DIASTOLIC BLOOD PRESSURE: 54 MMHG

## 2021-01-20 VITALS — SYSTOLIC BLOOD PRESSURE: 109 MMHG | DIASTOLIC BLOOD PRESSURE: 49 MMHG

## 2021-01-20 VITALS — DIASTOLIC BLOOD PRESSURE: 43 MMHG | SYSTOLIC BLOOD PRESSURE: 100 MMHG

## 2021-01-20 VITALS — SYSTOLIC BLOOD PRESSURE: 103 MMHG | DIASTOLIC BLOOD PRESSURE: 47 MMHG

## 2021-01-20 VITALS — SYSTOLIC BLOOD PRESSURE: 98 MMHG | DIASTOLIC BLOOD PRESSURE: 47 MMHG

## 2021-01-20 VITALS — DIASTOLIC BLOOD PRESSURE: 52 MMHG | SYSTOLIC BLOOD PRESSURE: 132 MMHG

## 2021-01-20 VITALS — DIASTOLIC BLOOD PRESSURE: 53 MMHG | SYSTOLIC BLOOD PRESSURE: 116 MMHG

## 2021-01-20 VITALS — DIASTOLIC BLOOD PRESSURE: 56 MMHG | SYSTOLIC BLOOD PRESSURE: 115 MMHG

## 2021-01-20 VITALS — DIASTOLIC BLOOD PRESSURE: 34 MMHG | SYSTOLIC BLOOD PRESSURE: 97 MMHG

## 2021-01-20 VITALS — SYSTOLIC BLOOD PRESSURE: 92 MMHG | DIASTOLIC BLOOD PRESSURE: 35 MMHG

## 2021-01-20 VITALS — SYSTOLIC BLOOD PRESSURE: 101 MMHG | DIASTOLIC BLOOD PRESSURE: 42 MMHG

## 2021-01-20 VITALS — SYSTOLIC BLOOD PRESSURE: 112 MMHG | DIASTOLIC BLOOD PRESSURE: 59 MMHG

## 2021-01-20 LAB
BASE EXCESS BLDA CALC-SCNC: -10.2 MMOL/L
BASOPHILS # BLD AUTO: 0 /CMM (ref 0–0.2)
BASOPHILS NFR BLD AUTO: 0.2 % (ref 0–2)
BUN SERPL-MCNC: 68 MG/DL (ref 7–18)
CALCIUM SERPL-MCNC: 8 MG/DL (ref 8.5–10.1)
CHLORIDE SERPL-SCNC: 93 MMOL/L (ref 98–107)
CO2 SERPL-SCNC: 19 MMOL/L (ref 21–32)
CREAT SERPL-MCNC: 3.1 MG/DL (ref 0.6–1.3)
DO-HGB MFR BLDA: 128.9 MMHG
EOSINOPHIL NFR BLD AUTO: 0 % (ref 0–6)
GLUCOSE SERPL-MCNC: 166 MG/DL (ref 74–106)
HCT VFR BLD AUTO: 30 % (ref 33–45)
HGB BLD-MCNC: 9.8 G/DL (ref 11.5–14.8)
INHALED O2 CONCENTRATION: 32 %
INHALED O2 FLOW RATE: 3 L/MIN (ref 0–30)
LYMPHOCYTES NFR BLD AUTO: 0.3 /CMM (ref 0.8–4.8)
LYMPHOCYTES NFR BLD AUTO: 3.3 % (ref 20–44)
MCHC RBC AUTO-ENTMCNC: 33 G/DL (ref 31–36)
MCV RBC AUTO: 96 FL (ref 82–100)
MONOCYTES NFR BLD AUTO: 0.4 /CMM (ref 0.1–1.3)
MONOCYTES NFR BLD AUTO: 3.8 % (ref 2–12)
NEUTROPHILS # BLD AUTO: 9.7 /CMM (ref 1.8–8.9)
NEUTROPHILS NFR BLD AUTO: 92.7 % (ref 43–81)
PCO2 TEMP ADJ BLDA: 39.2 MMHG (ref 35–45)
PH TEMP ADJ BLDA: 7.24 [PH] (ref 7.35–7.45)
PLATELET # BLD AUTO: 180 /CMM (ref 150–450)
PO2 TEMP ADJ BLDA: 53.4 MMHG (ref 75–100)
POTASSIUM SERPL-SCNC: 5 MMOL/L (ref 3.5–5.1)
RBC # BLD AUTO: 3.09 MIL/UL (ref 4–5.2)
SAO2 % BLDA: 86.8 % (ref 92–98.5)
SODIUM SERPL-SCNC: 124 MMOL/L (ref 136–145)
WBC NRBC COR # BLD AUTO: 10.4 K/UL (ref 4.3–11)

## 2021-01-20 RX ADMIN — Medication SCH EACH: at 17:35

## 2021-01-20 RX ADMIN — DEXTROSE MONOHYDRATE SCH MLS/HR: 50 INJECTION, SOLUTION INTRAVENOUS at 23:31

## 2021-01-20 RX ADMIN — IPRATROPIUM BROMIDE AND ALBUTEROL SULFATE SCH PUFF: 103; 18 AEROSOL, METERED RESPIRATORY (INHALATION) at 11:56

## 2021-01-20 RX ADMIN — MUPIROCIN SCH APPLIC: 20 OINTMENT TOPICAL at 21:11

## 2021-01-20 RX ADMIN — Medication SCH GM: at 17:38

## 2021-01-20 RX ADMIN — Medication SCH GM: at 15:47

## 2021-01-20 RX ADMIN — SODIUM CHLORIDE PRN MLS/HR: 9 INJECTION, SOLUTION INTRAVENOUS at 07:32

## 2021-01-20 RX ADMIN — INSULIN HUMAN PRN UNIT: 100 INJECTION, SOLUTION PARENTERAL at 17:37

## 2021-01-20 RX ADMIN — FAMOTIDINE SCH MG: 10 INJECTION INTRAVENOUS at 08:54

## 2021-01-20 RX ADMIN — Medication SCH EACH: at 00:44

## 2021-01-20 RX ADMIN — Medication SCH EACH: at 12:04

## 2021-01-20 RX ADMIN — MUPIROCIN SCH APPLIC: 20 OINTMENT TOPICAL at 08:55

## 2021-01-20 RX ADMIN — INSULIN HUMAN PRN UNIT: 100 INJECTION, SOLUTION PARENTERAL at 00:46

## 2021-01-20 RX ADMIN — DEXTROSE MONOHYDRATE SCH MLS/HR: 50 INJECTION, SOLUTION INTRAVENOUS at 12:04

## 2021-01-20 RX ADMIN — ACETYLCYSTEINE SCH MG: 100 INHALANT RESPIRATORY (INHALATION) at 18:16

## 2021-01-20 RX ADMIN — Medication SCH MG: at 18:16

## 2021-01-20 RX ADMIN — IPRATROPIUM BROMIDE AND ALBUTEROL SULFATE SCH PUFF: 103; 18 AEROSOL, METERED RESPIRATORY (INHALATION) at 00:46

## 2021-01-20 RX ADMIN — Medication SCH OZ: at 21:11

## 2021-01-20 RX ADMIN — IPRATROPIUM BROMIDE AND ALBUTEROL SULFATE SCH PUFF: 103; 18 AEROSOL, METERED RESPIRATORY (INHALATION) at 06:18

## 2021-01-20 RX ADMIN — Medication SCH EACH: at 06:18

## 2021-01-20 RX ADMIN — Medication SCH OZ: at 08:58

## 2021-01-20 RX ADMIN — Medication SCH MG: at 20:27

## 2021-01-20 RX ADMIN — ACETYLCYSTEINE SCH MG: 100 INHALANT RESPIRATORY (INHALATION) at 23:37

## 2021-01-20 NOTE — NUR
RN NOTE



RECEIVED PATIENT IN BED, AO X 1-2. PATIENT IN NO S/SX OF ACUTE DISTRESS AT THIS TIME. 
PATIENT'S BREATHING IS EVEN AND UNLABORED. PATIENT IS ON BIPAP WITH SETTINGS AS PRESCRIBED, 
TOLERATING WELL, SATURATION %. PATIENT IS SR ON THE MONITOR, HR IS 74. NOTED TEMP AT 
96.1. PATIENT ON KRYSTLE HUGGER, WARM BLANKET PROVIDED AS WELL. NOTED TLC AT R FEMORAL, PATENT 
AND FLUSHING WELL, NS INFUSING AT 75 ML/HR, AND SODIUM BICARB AT 75 ML, NO S/S OF INFECTION 
NOTED. ASHBY CATHETER CONNECTED TO URINE BAG IN PLACE, DRAINING TO A CLEAR, YELLOW OUTPUT. 
SAFETY MEASURES IMPLEMENTED PER PROTOCOL. PATIENT BED ALARM IS ON. HEAD OF BED ELEVATED. BED 
IS LOCKED,  IN LOWEST POSITION AND SIDE RAILS UP. CALL LIGHT WITHIN REACH OF THE PATIENT. 
WILL CONTINUE TO MONITOR AND REASSESS FOR ANY CHANGES.

## 2021-01-20 NOTE — NUR
pt returned to bipap post abg results. rn notified

-------------------------------------------------------------------------------

Addendum: 01/20/21 at 0520 by MARIA ESTHER ESPINOZA RT

-------------------------------------------------------------------------------

Amended: Links added.

## 2021-01-20 NOTE — NUR
RN NOTES 

PT REMAINS ON BIPAP, VSS STABLE, DOES NOT FOLLOW COMMAND, PT IS ALERT, IVF AT 75CC/HR 
RUNNING , DOPAMINE OFF AT  THIS TIME, WILL ENDORSE TO NIGHT SHIFT NURSE FOR CONTINUITY OF 
CARE .

## 2021-01-21 VITALS — SYSTOLIC BLOOD PRESSURE: 121 MMHG | DIASTOLIC BLOOD PRESSURE: 58 MMHG

## 2021-01-21 VITALS — DIASTOLIC BLOOD PRESSURE: 51 MMHG | SYSTOLIC BLOOD PRESSURE: 115 MMHG

## 2021-01-21 VITALS — DIASTOLIC BLOOD PRESSURE: 51 MMHG | SYSTOLIC BLOOD PRESSURE: 116 MMHG

## 2021-01-21 VITALS — DIASTOLIC BLOOD PRESSURE: 56 MMHG | SYSTOLIC BLOOD PRESSURE: 117 MMHG

## 2021-01-21 VITALS — DIASTOLIC BLOOD PRESSURE: 46 MMHG | SYSTOLIC BLOOD PRESSURE: 106 MMHG

## 2021-01-21 VITALS — SYSTOLIC BLOOD PRESSURE: 103 MMHG | DIASTOLIC BLOOD PRESSURE: 54 MMHG

## 2021-01-21 VITALS — SYSTOLIC BLOOD PRESSURE: 100 MMHG | DIASTOLIC BLOOD PRESSURE: 48 MMHG

## 2021-01-21 VITALS — SYSTOLIC BLOOD PRESSURE: 107 MMHG | DIASTOLIC BLOOD PRESSURE: 66 MMHG

## 2021-01-21 VITALS — DIASTOLIC BLOOD PRESSURE: 56 MMHG | SYSTOLIC BLOOD PRESSURE: 115 MMHG

## 2021-01-21 VITALS — DIASTOLIC BLOOD PRESSURE: 51 MMHG | SYSTOLIC BLOOD PRESSURE: 121 MMHG

## 2021-01-21 VITALS — SYSTOLIC BLOOD PRESSURE: 103 MMHG | DIASTOLIC BLOOD PRESSURE: 50 MMHG

## 2021-01-21 VITALS — DIASTOLIC BLOOD PRESSURE: 53 MMHG | SYSTOLIC BLOOD PRESSURE: 109 MMHG

## 2021-01-21 VITALS — SYSTOLIC BLOOD PRESSURE: 114 MMHG | DIASTOLIC BLOOD PRESSURE: 63 MMHG

## 2021-01-21 VITALS — DIASTOLIC BLOOD PRESSURE: 56 MMHG | SYSTOLIC BLOOD PRESSURE: 114 MMHG

## 2021-01-21 VITALS — DIASTOLIC BLOOD PRESSURE: 52 MMHG | SYSTOLIC BLOOD PRESSURE: 110 MMHG

## 2021-01-21 VITALS — SYSTOLIC BLOOD PRESSURE: 122 MMHG | DIASTOLIC BLOOD PRESSURE: 54 MMHG

## 2021-01-21 VITALS — SYSTOLIC BLOOD PRESSURE: 121 MMHG | DIASTOLIC BLOOD PRESSURE: 52 MMHG

## 2021-01-21 VITALS — DIASTOLIC BLOOD PRESSURE: 53 MMHG | SYSTOLIC BLOOD PRESSURE: 110 MMHG

## 2021-01-21 VITALS — SYSTOLIC BLOOD PRESSURE: 112 MMHG | DIASTOLIC BLOOD PRESSURE: 49 MMHG

## 2021-01-21 VITALS — SYSTOLIC BLOOD PRESSURE: 108 MMHG | DIASTOLIC BLOOD PRESSURE: 56 MMHG

## 2021-01-21 VITALS — SYSTOLIC BLOOD PRESSURE: 95 MMHG | DIASTOLIC BLOOD PRESSURE: 48 MMHG

## 2021-01-21 VITALS — DIASTOLIC BLOOD PRESSURE: 56 MMHG | SYSTOLIC BLOOD PRESSURE: 120 MMHG

## 2021-01-21 VITALS — SYSTOLIC BLOOD PRESSURE: 106 MMHG | DIASTOLIC BLOOD PRESSURE: 55 MMHG

## 2021-01-21 VITALS — SYSTOLIC BLOOD PRESSURE: 120 MMHG | DIASTOLIC BLOOD PRESSURE: 54 MMHG

## 2021-01-21 VITALS — DIASTOLIC BLOOD PRESSURE: 39 MMHG | SYSTOLIC BLOOD PRESSURE: 113 MMHG

## 2021-01-21 VITALS — SYSTOLIC BLOOD PRESSURE: 101 MMHG | DIASTOLIC BLOOD PRESSURE: 50 MMHG

## 2021-01-21 VITALS — SYSTOLIC BLOOD PRESSURE: 102 MMHG | DIASTOLIC BLOOD PRESSURE: 50 MMHG

## 2021-01-21 VITALS — DIASTOLIC BLOOD PRESSURE: 50 MMHG | SYSTOLIC BLOOD PRESSURE: 109 MMHG

## 2021-01-21 VITALS — SYSTOLIC BLOOD PRESSURE: 109 MMHG | DIASTOLIC BLOOD PRESSURE: 52 MMHG

## 2021-01-21 VITALS — SYSTOLIC BLOOD PRESSURE: 127 MMHG | DIASTOLIC BLOOD PRESSURE: 57 MMHG

## 2021-01-21 VITALS — SYSTOLIC BLOOD PRESSURE: 99 MMHG | DIASTOLIC BLOOD PRESSURE: 48 MMHG

## 2021-01-21 VITALS — DIASTOLIC BLOOD PRESSURE: 55 MMHG | SYSTOLIC BLOOD PRESSURE: 125 MMHG

## 2021-01-21 VITALS — DIASTOLIC BLOOD PRESSURE: 43 MMHG | SYSTOLIC BLOOD PRESSURE: 104 MMHG

## 2021-01-21 VITALS — SYSTOLIC BLOOD PRESSURE: 109 MMHG | DIASTOLIC BLOOD PRESSURE: 50 MMHG

## 2021-01-21 VITALS — SYSTOLIC BLOOD PRESSURE: 105 MMHG | DIASTOLIC BLOOD PRESSURE: 57 MMHG

## 2021-01-21 VITALS — DIASTOLIC BLOOD PRESSURE: 46 MMHG | SYSTOLIC BLOOD PRESSURE: 111 MMHG

## 2021-01-21 VITALS — SYSTOLIC BLOOD PRESSURE: 111 MMHG | DIASTOLIC BLOOD PRESSURE: 56 MMHG

## 2021-01-21 VITALS — SYSTOLIC BLOOD PRESSURE: 107 MMHG | DIASTOLIC BLOOD PRESSURE: 47 MMHG

## 2021-01-21 VITALS — SYSTOLIC BLOOD PRESSURE: 109 MMHG | DIASTOLIC BLOOD PRESSURE: 55 MMHG

## 2021-01-21 VITALS — DIASTOLIC BLOOD PRESSURE: 51 MMHG | SYSTOLIC BLOOD PRESSURE: 113 MMHG

## 2021-01-21 VITALS — DIASTOLIC BLOOD PRESSURE: 59 MMHG | SYSTOLIC BLOOD PRESSURE: 125 MMHG

## 2021-01-21 VITALS — DIASTOLIC BLOOD PRESSURE: 46 MMHG | SYSTOLIC BLOOD PRESSURE: 92 MMHG

## 2021-01-21 VITALS — DIASTOLIC BLOOD PRESSURE: 52 MMHG | SYSTOLIC BLOOD PRESSURE: 123 MMHG

## 2021-01-21 VITALS — DIASTOLIC BLOOD PRESSURE: 45 MMHG | SYSTOLIC BLOOD PRESSURE: 104 MMHG

## 2021-01-21 VITALS — SYSTOLIC BLOOD PRESSURE: 129 MMHG | DIASTOLIC BLOOD PRESSURE: 59 MMHG

## 2021-01-21 VITALS — DIASTOLIC BLOOD PRESSURE: 49 MMHG | SYSTOLIC BLOOD PRESSURE: 115 MMHG

## 2021-01-21 VITALS — DIASTOLIC BLOOD PRESSURE: 50 MMHG | SYSTOLIC BLOOD PRESSURE: 116 MMHG

## 2021-01-21 VITALS — SYSTOLIC BLOOD PRESSURE: 112 MMHG | DIASTOLIC BLOOD PRESSURE: 54 MMHG

## 2021-01-21 LAB
BASE EXCESS BLDA CALC-SCNC: -3.8 MMOL/L
BUN SERPL-MCNC: 69 MG/DL (ref 7–18)
CALCIUM SERPL-MCNC: 7.6 MG/DL (ref 8.5–10.1)
CHLORIDE SERPL-SCNC: 94 MMOL/L (ref 98–107)
CO2 SERPL-SCNC: 22 MMOL/L (ref 21–32)
CREAT SERPL-MCNC: 3.3 MG/DL (ref 0.6–1.3)
DO-HGB MFR BLDA: 67.8 MMHG
GLUCOSE SERPL-MCNC: 140 MG/DL (ref 74–106)
INHALED O2 CONCENTRATION: 28 %
INHALED O2 FLOW RATE: 2 L/MIN (ref 0–30)
PCO2 TEMP ADJ BLDA: 42.6 MMHG (ref 35–45)
PH TEMP ADJ BLDA: 7.33 [PH] (ref 7.35–7.45)
PO2 TEMP ADJ BLDA: 81.6 MMHG (ref 75–100)
POTASSIUM SERPL-SCNC: 4.3 MMOL/L (ref 3.5–5.1)
SAO2 % BLDA: 95.7 % (ref 92–98.5)
SODIUM SERPL-SCNC: 126 MMOL/L (ref 136–145)

## 2021-01-21 RX ADMIN — MUPIROCIN SCH APPLIC: 20 OINTMENT TOPICAL at 08:33

## 2021-01-21 RX ADMIN — SODIUM CHLORIDE PRN MLS/HR: 9 INJECTION, SOLUTION INTRAVENOUS at 03:58

## 2021-01-21 RX ADMIN — Medication SCH OZ: at 08:32

## 2021-01-21 RX ADMIN — Medication SCH MG: at 20:14

## 2021-01-21 RX ADMIN — Medication SCH MG: at 13:47

## 2021-01-21 RX ADMIN — Medication SCH MG: at 01:24

## 2021-01-21 RX ADMIN — ACETYLCYSTEINE SCH MG: 100 INHALANT RESPIRATORY (INHALATION) at 07:44

## 2021-01-21 RX ADMIN — MUPIROCIN SCH APPLIC: 20 OINTMENT TOPICAL at 21:31

## 2021-01-21 RX ADMIN — Medication SCH MG: at 07:43

## 2021-01-21 RX ADMIN — Medication SCH EACH: at 05:50

## 2021-01-21 RX ADMIN — Medication SCH EACH: at 00:41

## 2021-01-21 RX ADMIN — INSULIN HUMAN PRN UNIT: 100 INJECTION, SOLUTION PARENTERAL at 00:58

## 2021-01-21 RX ADMIN — Medication SCH EACH: at 17:23

## 2021-01-21 RX ADMIN — Medication SCH GM: at 08:33

## 2021-01-21 RX ADMIN — FAMOTIDINE SCH MG: 10 INJECTION INTRAVENOUS at 08:31

## 2021-01-21 RX ADMIN — DEXTROSE AND SODIUM CHLORIDE SCH MLS/HR: 5; 900 INJECTION, SOLUTION INTRAVENOUS at 09:22

## 2021-01-21 RX ADMIN — DEXTROSE AND SODIUM CHLORIDE SCH MLS/HR: 5; 900 INJECTION, SOLUTION INTRAVENOUS at 19:51

## 2021-01-21 RX ADMIN — ACETYLCYSTEINE SCH MG: 100 INHALANT RESPIRATORY (INHALATION) at 13:47

## 2021-01-21 RX ADMIN — Medication SCH GM: at 16:49

## 2021-01-21 RX ADMIN — INSULIN HUMAN PRN UNIT: 100 INJECTION, SOLUTION PARENTERAL at 05:49

## 2021-01-21 RX ADMIN — ACETYLCYSTEINE SCH MG: 100 INHALANT RESPIRATORY (INHALATION) at 23:57

## 2021-01-21 RX ADMIN — Medication SCH EACH: at 12:42

## 2021-01-21 RX ADMIN — Medication SCH OZ: at 21:32

## 2021-01-21 NOTE — NUR
RN NOTES



PATIENT A/O X1-2. NO SOB OR ANY RESPIRATORY DISTRESS. ON O2 @ 2LPM VIA NASAL CANNULA, O2 SAT 
WNL. EXTERNAL MONITOR ON, SR 79. TEMPERATURE NOTED 97.7, PATIENT ON BEAR HUGGER WITH 
BLANKET. WITH RIGHT FEMORAL TLC WITH D5 NS RUNNING @ 100MLS/HR. NO S/S OF ANY INFECTION. 
WITH ASHBY CATHETER, DRAINING YELLOW URINE TO GRAVITY. BED LOCKED AND IN LOWEST POSITION. 
SIDE RAILS UP X2. CALL LIGHT WITHIN REACH. WILL CONTINUE TO MONITOR.

## 2021-01-21 NOTE — NUR
pt. is awake, placed into nasal cannula @ 3 lpm o2 flow. rn notified.



spo2 98%

hr 69 - 74

rr 18 - 22 bpm

no sob noted

-------------------------------------------------------------------------------

Addendum: 01/21/21 at 0751 by DAVIE GOVEA RT

-------------------------------------------------------------------------------

Amended: Links added.

## 2021-01-21 NOTE — NUR
FIO2 DECREASED TO 2 LPM O2 FLOW VIA NASAL CANNULA.



SPO2 99% - 100%

-------------------------------------------------------------------------------

Addendum: 01/21/21 at 0814 by DAVIE GOVEA RT

-------------------------------------------------------------------------------

Amended: Links added.

## 2021-01-22 VITALS — SYSTOLIC BLOOD PRESSURE: 90 MMHG | DIASTOLIC BLOOD PRESSURE: 37 MMHG

## 2021-01-22 VITALS — SYSTOLIC BLOOD PRESSURE: 101 MMHG | DIASTOLIC BLOOD PRESSURE: 44 MMHG

## 2021-01-22 VITALS — DIASTOLIC BLOOD PRESSURE: 33 MMHG | SYSTOLIC BLOOD PRESSURE: 106 MMHG

## 2021-01-22 VITALS — SYSTOLIC BLOOD PRESSURE: 114 MMHG | DIASTOLIC BLOOD PRESSURE: 52 MMHG

## 2021-01-22 VITALS — SYSTOLIC BLOOD PRESSURE: 128 MMHG | DIASTOLIC BLOOD PRESSURE: 64 MMHG

## 2021-01-22 VITALS — SYSTOLIC BLOOD PRESSURE: 94 MMHG | DIASTOLIC BLOOD PRESSURE: 44 MMHG

## 2021-01-22 VITALS — SYSTOLIC BLOOD PRESSURE: 99 MMHG | DIASTOLIC BLOOD PRESSURE: 41 MMHG

## 2021-01-22 VITALS — DIASTOLIC BLOOD PRESSURE: 56 MMHG | SYSTOLIC BLOOD PRESSURE: 117 MMHG

## 2021-01-22 VITALS — DIASTOLIC BLOOD PRESSURE: 43 MMHG | SYSTOLIC BLOOD PRESSURE: 101 MMHG

## 2021-01-22 VITALS — SYSTOLIC BLOOD PRESSURE: 104 MMHG | DIASTOLIC BLOOD PRESSURE: 51 MMHG

## 2021-01-22 VITALS — DIASTOLIC BLOOD PRESSURE: 42 MMHG | SYSTOLIC BLOOD PRESSURE: 107 MMHG

## 2021-01-22 VITALS — DIASTOLIC BLOOD PRESSURE: 42 MMHG | SYSTOLIC BLOOD PRESSURE: 95 MMHG

## 2021-01-22 VITALS — SYSTOLIC BLOOD PRESSURE: 112 MMHG | DIASTOLIC BLOOD PRESSURE: 52 MMHG

## 2021-01-22 VITALS — SYSTOLIC BLOOD PRESSURE: 97 MMHG | DIASTOLIC BLOOD PRESSURE: 47 MMHG

## 2021-01-22 VITALS — SYSTOLIC BLOOD PRESSURE: 115 MMHG | DIASTOLIC BLOOD PRESSURE: 53 MMHG

## 2021-01-22 VITALS — DIASTOLIC BLOOD PRESSURE: 44 MMHG | SYSTOLIC BLOOD PRESSURE: 101 MMHG

## 2021-01-22 VITALS — DIASTOLIC BLOOD PRESSURE: 50 MMHG | SYSTOLIC BLOOD PRESSURE: 109 MMHG

## 2021-01-22 VITALS — SYSTOLIC BLOOD PRESSURE: 87 MMHG | DIASTOLIC BLOOD PRESSURE: 43 MMHG

## 2021-01-22 VITALS — DIASTOLIC BLOOD PRESSURE: 47 MMHG | SYSTOLIC BLOOD PRESSURE: 104 MMHG

## 2021-01-22 VITALS — DIASTOLIC BLOOD PRESSURE: 48 MMHG | SYSTOLIC BLOOD PRESSURE: 115 MMHG

## 2021-01-22 VITALS — SYSTOLIC BLOOD PRESSURE: 104 MMHG | DIASTOLIC BLOOD PRESSURE: 41 MMHG

## 2021-01-22 VITALS — SYSTOLIC BLOOD PRESSURE: 111 MMHG | DIASTOLIC BLOOD PRESSURE: 41 MMHG

## 2021-01-22 VITALS — DIASTOLIC BLOOD PRESSURE: 58 MMHG | SYSTOLIC BLOOD PRESSURE: 126 MMHG

## 2021-01-22 VITALS — DIASTOLIC BLOOD PRESSURE: 49 MMHG | SYSTOLIC BLOOD PRESSURE: 91 MMHG

## 2021-01-22 VITALS — DIASTOLIC BLOOD PRESSURE: 42 MMHG | SYSTOLIC BLOOD PRESSURE: 99 MMHG

## 2021-01-22 VITALS — SYSTOLIC BLOOD PRESSURE: 94 MMHG | DIASTOLIC BLOOD PRESSURE: 43 MMHG

## 2021-01-22 VITALS — DIASTOLIC BLOOD PRESSURE: 40 MMHG | SYSTOLIC BLOOD PRESSURE: 112 MMHG

## 2021-01-22 VITALS — SYSTOLIC BLOOD PRESSURE: 103 MMHG | DIASTOLIC BLOOD PRESSURE: 41 MMHG

## 2021-01-22 VITALS — SYSTOLIC BLOOD PRESSURE: 110 MMHG | DIASTOLIC BLOOD PRESSURE: 58 MMHG

## 2021-01-22 VITALS — DIASTOLIC BLOOD PRESSURE: 42 MMHG | SYSTOLIC BLOOD PRESSURE: 92 MMHG

## 2021-01-22 VITALS — DIASTOLIC BLOOD PRESSURE: 42 MMHG | SYSTOLIC BLOOD PRESSURE: 91 MMHG

## 2021-01-22 LAB
BASE EXCESS BLDA CALC-SCNC: -6.8 MMOL/L
BASOPHILS # BLD AUTO: 0 /CMM (ref 0–0.2)
BASOPHILS NFR BLD AUTO: 0.1 % (ref 0–2)
BUN SERPL-MCNC: 66 MG/DL (ref 7–18)
CALCIUM SERPL-MCNC: 7.7 MG/DL (ref 8.5–10.1)
CHLORIDE SERPL-SCNC: 96 MMOL/L (ref 98–107)
CO2 SERPL-SCNC: 23 MMOL/L (ref 21–32)
CREAT SERPL-MCNC: 3.2 MG/DL (ref 0.6–1.3)
DO-HGB MFR BLDA: 72.7 MMHG
EOSINOPHIL NFR BLD AUTO: 0.1 % (ref 0–6)
GLUCOSE SERPL-MCNC: 161 MG/DL (ref 74–106)
HCT VFR BLD AUTO: 25 % (ref 33–45)
HGB BLD-MCNC: 8.1 G/DL (ref 11.5–14.8)
INHALED O2 CONCENTRATION: 28 %
INHALED O2 FLOW RATE: 2 L/MIN (ref 0–30)
LYMPHOCYTES NFR BLD AUTO: 0.6 /CMM (ref 0.8–4.8)
LYMPHOCYTES NFR BLD AUTO: 4.7 % (ref 20–44)
MAGNESIUM SERPL-MCNC: 1.5 MG/DL (ref 1.8–2.4)
MCHC RBC AUTO-ENTMCNC: 32 G/DL (ref 31–36)
MCV RBC AUTO: 95 FL (ref 82–100)
MONOCYTES NFR BLD AUTO: 0.7 /CMM (ref 0.1–1.3)
MONOCYTES NFR BLD AUTO: 5.4 % (ref 2–12)
NEUTROPHILS # BLD AUTO: 11.7 /CMM (ref 1.8–8.9)
NEUTROPHILS NFR BLD AUTO: 89.7 % (ref 43–81)
PCO2 TEMP ADJ BLDA: 43.3 MMHG (ref 35–45)
PH TEMP ADJ BLDA: 7.27 [PH] (ref 7.35–7.45)
PHOSPHATE SERPL-MCNC: 6 MG/DL (ref 2.5–4.9)
PLATELET # BLD AUTO: 156 /CMM (ref 150–450)
PO2 TEMP ADJ BLDA: 75.8 MMHG (ref 75–100)
POTASSIUM SERPL-SCNC: 3.9 MMOL/L (ref 3.5–5.1)
RBC # BLD AUTO: 2.65 MIL/UL (ref 4–5.2)
SAO2 % BLDA: 94.6 % (ref 92–98.5)
SODIUM SERPL-SCNC: 129 MMOL/L (ref 136–145)
WBC NRBC COR # BLD AUTO: 13.1 K/UL (ref 4.3–11)

## 2021-01-22 PROCEDURE — 0W9B3ZZ DRAINAGE OF LEFT PLEURAL CAVITY, PERCUTANEOUS APPROACH: ICD-10-PCS

## 2021-01-22 RX ADMIN — Medication SCH MG: at 15:14

## 2021-01-22 RX ADMIN — Medication SCH OZ: at 21:52

## 2021-01-22 RX ADMIN — DEXTROSE AND SODIUM CHLORIDE SCH MLS/HR: 5; 900 INJECTION, SOLUTION INTRAVENOUS at 05:34

## 2021-01-22 RX ADMIN — INSULIN HUMAN PRN UNIT: 100 INJECTION, SOLUTION PARENTERAL at 00:13

## 2021-01-22 RX ADMIN — Medication SCH EACH: at 17:01

## 2021-01-22 RX ADMIN — Medication SCH EACH: at 23:18

## 2021-01-22 RX ADMIN — Medication SCH MG: at 19:55

## 2021-01-22 RX ADMIN — MUPIROCIN SCH APPLIC: 20 OINTMENT TOPICAL at 21:52

## 2021-01-22 RX ADMIN — Medication SCH EACH: at 12:09

## 2021-01-22 RX ADMIN — Medication SCH EACH: at 00:06

## 2021-01-22 RX ADMIN — DEXTROSE AND SODIUM CHLORIDE PRN MLS/HR: 5; 900 INJECTION, SOLUTION INTRAVENOUS at 22:51

## 2021-01-22 RX ADMIN — Medication SCH GM: at 17:01

## 2021-01-22 RX ADMIN — Medication SCH MG: at 08:02

## 2021-01-22 RX ADMIN — FAMOTIDINE SCH MG: 10 INJECTION INTRAVENOUS at 08:34

## 2021-01-22 RX ADMIN — Medication SCH EACH: at 06:02

## 2021-01-22 RX ADMIN — Medication SCH MG: at 02:03

## 2021-01-22 RX ADMIN — INSULIN HUMAN PRN UNIT: 100 INJECTION, SOLUTION PARENTERAL at 06:02

## 2021-01-22 RX ADMIN — MUPIROCIN SCH APPLIC: 20 OINTMENT TOPICAL at 09:10

## 2021-01-22 RX ADMIN — Medication SCH GM: at 09:10

## 2021-01-22 RX ADMIN — ACETYLCYSTEINE SCH MG: 100 INHALANT RESPIRATORY (INHALATION) at 08:02

## 2021-01-22 RX ADMIN — Medication SCH OZ: at 09:11

## 2021-01-22 RX ADMIN — ACETYLCYSTEINE SCH MG: 100 INHALANT RESPIRATORY (INHALATION) at 15:14

## 2021-01-22 RX ADMIN — ACETYLCYSTEINE SCH MG: 100 INHALANT RESPIRATORY (INHALATION) at 22:34

## 2021-01-22 NOTE — NUR
RN NOTES



SP left thora today rendered by Dr. Banegas, tolerated. US tech reported 810mL left pleural 
fluid, sent to lab for cytology and other studies. Titrated O2 supp to 3LPM via NC. Left fem 
TLC intact. Schultz cath noted with minimal UOP, MD aware. Alert and able to communicate at 
times. Per ST eval, recommends NPO, not safe for food intake. Remained off pressors.

## 2021-01-22 NOTE — NUR
RELAYED ABG PH 7.274 AND HCO3 19.6 TO GIGI WITH ORDERS TO INCREASE O2 5LPM VIA NASAL 
CANNULA. WILL CONTINUE TO MONITOR.

## 2021-01-22 NOTE — NUR
RN NOTES



PATIENT A/O X1, DROWSY. Jamaican SPEAKING. PT IS ON O2 @ 4LPM VIA NASAL CANNULA, O2 SAT 90. 
TOLERATING WELL NO SOB OR RESP DISTRESS NOTED. PT ON CARDIAC MONITORING PRESENTS WITH NSR 
79. PATIENT ON BEAR HUGGER WITH BLANKET DISPLAYS 97.1 WITH KRYSTLE HUGGER ON. PT WITH RIGHT 
FEMORAL TLC WITH D5 NS RUNNING @ 100MLS/HR. NO S/S OF ANY INFECTION OR INFILTRATION. 1 PORT 
ON TLC NOT WORKING, OTHER 2 PATENT FLUSHED ASEPTICALLY CLAMPED. PT HAS ASHBY CATHETER, 
DRAINING YELLOW URINE TO GRAVITY. HOB ELEVATED. BED LOCKED AND IN LOWEST POSITION. SIDE 
RAILS UP X2. CALL LIGHT WITHIN REACH. WILL CONTINUE TO MONITOR.

## 2021-01-22 NOTE — NUR
RN NOTES



PATIENT A/O X1-2. NO SOB OR ANY RESPIRATORY DISTRESS. ON O2 @ 5LPM VIA NASAL CANNULA, O2 SAT 
98%. EXTERNAL MONITOR ON, SR 70'S. PATIENT ON BEAR HUGGER WITH BLANKET. WITH RIGHT FEMORAL 
TLC WITH D5 NS RUNNING @ 100MLS/HR. NO S/S OF ANY INFECTION. WITH ASHBY CATHETER, DRAINING 
YELLOW URINE TO GRAVITY. BED LOCKED AND IN LOWEST POSITION. SIDE RAILS UP X2. CALL LIGHT 
WITHIN REACH. ENDORSED TO ONCOMING SHIFT.

## 2021-01-23 VITALS — DIASTOLIC BLOOD PRESSURE: 78 MMHG | SYSTOLIC BLOOD PRESSURE: 119 MMHG

## 2021-01-23 VITALS — SYSTOLIC BLOOD PRESSURE: 102 MMHG | DIASTOLIC BLOOD PRESSURE: 45 MMHG

## 2021-01-23 VITALS — DIASTOLIC BLOOD PRESSURE: 44 MMHG | SYSTOLIC BLOOD PRESSURE: 85 MMHG

## 2021-01-23 VITALS — SYSTOLIC BLOOD PRESSURE: 115 MMHG | DIASTOLIC BLOOD PRESSURE: 49 MMHG

## 2021-01-23 VITALS — SYSTOLIC BLOOD PRESSURE: 121 MMHG | DIASTOLIC BLOOD PRESSURE: 50 MMHG

## 2021-01-23 VITALS — SYSTOLIC BLOOD PRESSURE: 128 MMHG | DIASTOLIC BLOOD PRESSURE: 46 MMHG

## 2021-01-23 VITALS — DIASTOLIC BLOOD PRESSURE: 52 MMHG | SYSTOLIC BLOOD PRESSURE: 110 MMHG

## 2021-01-23 VITALS — DIASTOLIC BLOOD PRESSURE: 48 MMHG | SYSTOLIC BLOOD PRESSURE: 121 MMHG

## 2021-01-23 VITALS — DIASTOLIC BLOOD PRESSURE: 43 MMHG | SYSTOLIC BLOOD PRESSURE: 128 MMHG

## 2021-01-23 VITALS — SYSTOLIC BLOOD PRESSURE: 96 MMHG | DIASTOLIC BLOOD PRESSURE: 63 MMHG

## 2021-01-23 VITALS — DIASTOLIC BLOOD PRESSURE: 41 MMHG | SYSTOLIC BLOOD PRESSURE: 114 MMHG

## 2021-01-23 VITALS — SYSTOLIC BLOOD PRESSURE: 83 MMHG | DIASTOLIC BLOOD PRESSURE: 32 MMHG

## 2021-01-23 VITALS — DIASTOLIC BLOOD PRESSURE: 44 MMHG | SYSTOLIC BLOOD PRESSURE: 116 MMHG

## 2021-01-23 VITALS — DIASTOLIC BLOOD PRESSURE: 49 MMHG | SYSTOLIC BLOOD PRESSURE: 116 MMHG

## 2021-01-23 VITALS — SYSTOLIC BLOOD PRESSURE: 90 MMHG | DIASTOLIC BLOOD PRESSURE: 39 MMHG

## 2021-01-23 VITALS — DIASTOLIC BLOOD PRESSURE: 50 MMHG | SYSTOLIC BLOOD PRESSURE: 110 MMHG

## 2021-01-23 VITALS — SYSTOLIC BLOOD PRESSURE: 88 MMHG | DIASTOLIC BLOOD PRESSURE: 50 MMHG

## 2021-01-23 VITALS — DIASTOLIC BLOOD PRESSURE: 47 MMHG | SYSTOLIC BLOOD PRESSURE: 95 MMHG

## 2021-01-23 VITALS — SYSTOLIC BLOOD PRESSURE: 117 MMHG | DIASTOLIC BLOOD PRESSURE: 56 MMHG

## 2021-01-23 VITALS — DIASTOLIC BLOOD PRESSURE: 56 MMHG | SYSTOLIC BLOOD PRESSURE: 112 MMHG

## 2021-01-23 VITALS — SYSTOLIC BLOOD PRESSURE: 81 MMHG | DIASTOLIC BLOOD PRESSURE: 41 MMHG

## 2021-01-23 VITALS — DIASTOLIC BLOOD PRESSURE: 25 MMHG | SYSTOLIC BLOOD PRESSURE: 42 MMHG

## 2021-01-23 VITALS — SYSTOLIC BLOOD PRESSURE: 115 MMHG | DIASTOLIC BLOOD PRESSURE: 45 MMHG

## 2021-01-23 VITALS — DIASTOLIC BLOOD PRESSURE: 50 MMHG | SYSTOLIC BLOOD PRESSURE: 102 MMHG

## 2021-01-23 VITALS — SYSTOLIC BLOOD PRESSURE: 112 MMHG | DIASTOLIC BLOOD PRESSURE: 78 MMHG

## 2021-01-23 VITALS — DIASTOLIC BLOOD PRESSURE: 51 MMHG | SYSTOLIC BLOOD PRESSURE: 110 MMHG

## 2021-01-23 VITALS — SYSTOLIC BLOOD PRESSURE: 106 MMHG | DIASTOLIC BLOOD PRESSURE: 49 MMHG

## 2021-01-23 VITALS — SYSTOLIC BLOOD PRESSURE: 109 MMHG | DIASTOLIC BLOOD PRESSURE: 52 MMHG

## 2021-01-23 VITALS — DIASTOLIC BLOOD PRESSURE: 44 MMHG | SYSTOLIC BLOOD PRESSURE: 102 MMHG

## 2021-01-23 VITALS — DIASTOLIC BLOOD PRESSURE: 47 MMHG | SYSTOLIC BLOOD PRESSURE: 94 MMHG

## 2021-01-23 VITALS — DIASTOLIC BLOOD PRESSURE: 45 MMHG | SYSTOLIC BLOOD PRESSURE: 88 MMHG

## 2021-01-23 VITALS — SYSTOLIC BLOOD PRESSURE: 105 MMHG | DIASTOLIC BLOOD PRESSURE: 47 MMHG

## 2021-01-23 VITALS — SYSTOLIC BLOOD PRESSURE: 106 MMHG | DIASTOLIC BLOOD PRESSURE: 55 MMHG

## 2021-01-23 VITALS — SYSTOLIC BLOOD PRESSURE: 99 MMHG | DIASTOLIC BLOOD PRESSURE: 56 MMHG

## 2021-01-23 VITALS — DIASTOLIC BLOOD PRESSURE: 61 MMHG | SYSTOLIC BLOOD PRESSURE: 124 MMHG

## 2021-01-23 VITALS — SYSTOLIC BLOOD PRESSURE: 93 MMHG | DIASTOLIC BLOOD PRESSURE: 38 MMHG

## 2021-01-23 VITALS — DIASTOLIC BLOOD PRESSURE: 54 MMHG | SYSTOLIC BLOOD PRESSURE: 108 MMHG

## 2021-01-23 VITALS — DIASTOLIC BLOOD PRESSURE: 57 MMHG | SYSTOLIC BLOOD PRESSURE: 96 MMHG

## 2021-01-23 VITALS — DIASTOLIC BLOOD PRESSURE: 46 MMHG | SYSTOLIC BLOOD PRESSURE: 77 MMHG

## 2021-01-23 VITALS — SYSTOLIC BLOOD PRESSURE: 127 MMHG | DIASTOLIC BLOOD PRESSURE: 58 MMHG

## 2021-01-23 VITALS — DIASTOLIC BLOOD PRESSURE: 39 MMHG | SYSTOLIC BLOOD PRESSURE: 115 MMHG

## 2021-01-23 VITALS — SYSTOLIC BLOOD PRESSURE: 95 MMHG | DIASTOLIC BLOOD PRESSURE: 46 MMHG

## 2021-01-23 VITALS — DIASTOLIC BLOOD PRESSURE: 50 MMHG | SYSTOLIC BLOOD PRESSURE: 111 MMHG

## 2021-01-23 VITALS — SYSTOLIC BLOOD PRESSURE: 120 MMHG | DIASTOLIC BLOOD PRESSURE: 49 MMHG

## 2021-01-23 VITALS — SYSTOLIC BLOOD PRESSURE: 126 MMHG | DIASTOLIC BLOOD PRESSURE: 50 MMHG

## 2021-01-23 VITALS — SYSTOLIC BLOOD PRESSURE: 97 MMHG | DIASTOLIC BLOOD PRESSURE: 49 MMHG

## 2021-01-23 VITALS — DIASTOLIC BLOOD PRESSURE: 50 MMHG | SYSTOLIC BLOOD PRESSURE: 113 MMHG

## 2021-01-23 VITALS — DIASTOLIC BLOOD PRESSURE: 53 MMHG | SYSTOLIC BLOOD PRESSURE: 107 MMHG

## 2021-01-23 VITALS — SYSTOLIC BLOOD PRESSURE: 102 MMHG | DIASTOLIC BLOOD PRESSURE: 48 MMHG

## 2021-01-23 VITALS — DIASTOLIC BLOOD PRESSURE: 43 MMHG | SYSTOLIC BLOOD PRESSURE: 110 MMHG

## 2021-01-23 VITALS — SYSTOLIC BLOOD PRESSURE: 82 MMHG | DIASTOLIC BLOOD PRESSURE: 42 MMHG

## 2021-01-23 VITALS — DIASTOLIC BLOOD PRESSURE: 38 MMHG | SYSTOLIC BLOOD PRESSURE: 100 MMHG

## 2021-01-23 VITALS — SYSTOLIC BLOOD PRESSURE: 103 MMHG | DIASTOLIC BLOOD PRESSURE: 64 MMHG

## 2021-01-23 VITALS — SYSTOLIC BLOOD PRESSURE: 93 MMHG | DIASTOLIC BLOOD PRESSURE: 47 MMHG

## 2021-01-23 VITALS — DIASTOLIC BLOOD PRESSURE: 49 MMHG | SYSTOLIC BLOOD PRESSURE: 100 MMHG

## 2021-01-23 VITALS — SYSTOLIC BLOOD PRESSURE: 89 MMHG | DIASTOLIC BLOOD PRESSURE: 35 MMHG

## 2021-01-23 LAB
BASE EXCESS BLDA CALC-SCNC: -7.7 MMOL/L
BASE EXCESS BLDA CALC-SCNC: -7.9 MMOL/L
BASOPHILS # BLD AUTO: 0 /CMM (ref 0–0.2)
BASOPHILS NFR BLD AUTO: 0.2 % (ref 0–2)
BUN SERPL-MCNC: 65 MG/DL (ref 7–18)
CALCIUM SERPL-MCNC: 8 MG/DL (ref 8.5–10.1)
CHLORIDE SERPL-SCNC: 100 MMOL/L (ref 98–107)
CO2 SERPL-SCNC: 21 MMOL/L (ref 21–32)
CREAT SERPL-MCNC: 3.3 MG/DL (ref 0.6–1.3)
DO-HGB MFR BLDA: 616.5 MMHG
DO-HGB MFR BLDA: 624.7 MMHG
EOSINOPHIL NFR BLD AUTO: 0.1 % (ref 0–6)
GLUCOSE SERPL-MCNC: 145 MG/DL (ref 74–106)
HCT VFR BLD AUTO: 29 % (ref 33–45)
HGB BLD-MCNC: 9.1 G/DL (ref 11.5–14.8)
INHALED O2 CONCENTRATION: 100 %
INHALED O2 CONCENTRATION: 100 %
INTRINSIC PEEP RESPIRATORY: 5 CM H2O
LYMPHOCYTES NFR BLD AUTO: 0.4 /CMM (ref 0.8–4.8)
LYMPHOCYTES NFR BLD AUTO: 1.8 % (ref 20–44)
MCHC RBC AUTO-ENTMCNC: 32 G/DL (ref 31–36)
MCV RBC AUTO: 96 FL (ref 82–100)
MONOCYTES NFR BLD AUTO: 0.4 /CMM (ref 0.1–1.3)
MONOCYTES NFR BLD AUTO: 2 % (ref 2–12)
NEUTROPHILS # BLD AUTO: 21.1 /CMM (ref 1.8–8.9)
NEUTROPHILS NFR BLD AUTO: 95.9 % (ref 43–81)
PCO2 TEMP ADJ BLDA: 28.6 MMHG (ref 35–45)
PCO2 TEMP ADJ BLDA: 43.7 MMHG (ref 35–45)
PEEP SETTING VENT: 400 ML
PH TEMP ADJ BLDA: 7.26 [PH] (ref 7.35–7.45)
PH TEMP ADJ BLDA: 7.37 [PH] (ref 7.35–7.45)
PLATELET # BLD AUTO: 148 /CMM (ref 150–450)
PO2 TEMP ADJ BLDA: 52.8 MMHG (ref 75–100)
PO2 TEMP ADJ BLDA: 59.7 MMHG (ref 75–100)
POTASSIUM SERPL-SCNC: 3.9 MMOL/L (ref 3.5–5.1)
RBC # BLD AUTO: 2.97 MIL/UL (ref 4–5.2)
SAO2 % BLDA: 87.5 % (ref 92–98.5)
SAO2 % BLDA: 93.1 % (ref 92–98.5)
SET RATE, BG: 12
SET RATE, BG: 24
SODIUM SERPL-SCNC: 131 MMOL/L (ref 136–145)
VENTILATION MODE VENT: (no result)
WBC NRBC COR # BLD AUTO: 22 K/UL (ref 4.3–11)

## 2021-01-23 PROCEDURE — 0BH17EZ INSERTION OF ENDOTRACHEAL AIRWAY INTO TRACHEA, VIA NATURAL OR ARTIFICIAL OPENING: ICD-10-PCS | Performed by: NURSE PRACTITIONER

## 2021-01-23 PROCEDURE — 5A1955Z RESPIRATORY VENTILATION, GREATER THAN 96 CONSECUTIVE HOURS: ICD-10-PCS | Performed by: INTERNAL MEDICINE

## 2021-01-23 RX ADMIN — Medication SCH MG: at 07:35

## 2021-01-23 RX ADMIN — ACETYLCYSTEINE SCH MG: 100 INHALANT RESPIRATORY (INHALATION) at 15:00

## 2021-01-23 RX ADMIN — Medication SCH MG: at 15:00

## 2021-01-23 RX ADMIN — INSULIN HUMAN PRN UNIT: 100 INJECTION, SOLUTION PARENTERAL at 23:20

## 2021-01-23 RX ADMIN — DEXTROSE AND SODIUM CHLORIDE PRN MLS/HR: 5; 900 INJECTION, SOLUTION INTRAVENOUS at 08:55

## 2021-01-23 RX ADMIN — INSULIN HUMAN PRN UNIT: 100 INJECTION, SOLUTION PARENTERAL at 12:23

## 2021-01-23 RX ADMIN — PROPOFOL PRN MLS/HR: 10 INJECTION, EMULSION INTRAVENOUS at 14:56

## 2021-01-23 RX ADMIN — Medication SCH MG: at 19:29

## 2021-01-23 RX ADMIN — FAMOTIDINE SCH MG: 10 INJECTION INTRAVENOUS at 08:42

## 2021-01-23 RX ADMIN — Medication SCH EACH: at 12:19

## 2021-01-23 RX ADMIN — INSULIN HUMAN PRN UNIT: 100 INJECTION, SOLUTION PARENTERAL at 18:04

## 2021-01-23 RX ADMIN — ACETYLCYSTEINE SCH MG: 100 INHALANT RESPIRATORY (INHALATION) at 10:29

## 2021-01-23 RX ADMIN — ACETYLCYSTEINE SCH MG: 100 INHALANT RESPIRATORY (INHALATION) at 07:35

## 2021-01-23 RX ADMIN — SODIUM CHLORIDE PRN MLS/HR: 9 INJECTION, SOLUTION INTRAVENOUS at 13:29

## 2021-01-23 RX ADMIN — Medication SCH MG: at 00:46

## 2021-01-23 RX ADMIN — Medication SCH OZ: at 08:42

## 2021-01-23 RX ADMIN — CEFEPIME HYDROCHLORIDE SCH MLS/HR: 1 INJECTION, POWDER, FOR SOLUTION INTRAMUSCULAR; INTRAVENOUS at 10:27

## 2021-01-23 RX ADMIN — Medication SCH GM: at 17:51

## 2021-01-23 RX ADMIN — Medication SCH MG: at 12:54

## 2021-01-23 RX ADMIN — MUPIROCIN SCH APPLIC: 20 OINTMENT TOPICAL at 08:42

## 2021-01-23 RX ADMIN — Medication SCH EACH: at 06:37

## 2021-01-23 RX ADMIN — Medication SCH GM: at 08:42

## 2021-01-23 RX ADMIN — Medication SCH MG: at 10:29

## 2021-01-23 RX ADMIN — INSULIN HUMAN PRN UNIT: 100 INJECTION, SOLUTION PARENTERAL at 06:38

## 2021-01-23 RX ADMIN — MUPIROCIN SCH APPLIC: 20 OINTMENT TOPICAL at 21:29

## 2021-01-23 RX ADMIN — ACETYLCYSTEINE SCH MG: 100 INHALANT RESPIRATORY (INHALATION) at 23:35

## 2021-01-23 RX ADMIN — PROPOFOL PRN MLS/HR: 10 INJECTION, EMULSION INTRAVENOUS at 12:52

## 2021-01-23 RX ADMIN — Medication SCH EACH: at 23:19

## 2021-01-23 RX ADMIN — Medication SCH OZ: at 21:29

## 2021-01-23 RX ADMIN — Medication SCH EACH: at 18:02

## 2021-01-23 NOTE — NUR
RN NOTE

RECEIVED PT SEDATED IN BED IN SEMI BE'S POSITION. PT ORALLY INTUBATED. TOLERATING VENT 
SETTINGS. RESPIRATIONS EVEN AND UNLABORED, NO SIGNS OF PAIN OR DISCOMFORT. VITAL SIGNS 
STABLE VIA BEDSIDE MONITOR. SR ON THE CARDIAC MONITOR. ASHBY CATHETER PATENT AND IN PLACE 
DRAINING CLEAR YELLOW URINE. ASPIRATION PRECAUTIONS IN PLACE, WITH RIGHT FEMORAL PICC LINE 
PATENT AND INTACT WITHOUT COMPLICATIONS NOTED AT SITE. 1 LUMEN NOTED TO BE OCCLUDED. WITH 
DIRPOVAN RUNNING AT 20MCG AND LEVO RUNNING AT 0.2MCG FOR BP SUPPORT. ORAL CARE DONE. SAFETY 
MEASURES IMPLEMENTED PER PROTOCOL, ALARMS ON AND AUDIBLE, AMBU BAG AT BEDSIDE, VENT PLUGGED 
INTO RED OUTLET, BED ALARM ON, BED LOCKED AND IN LOW POSITION, SIDE RAILS UP X 2, WILL 
MONITOR PATIENT CLOSELY.

## 2021-01-23 NOTE — NUR
RN CLOSING NOTES



PT STILL WITH CONT BIPAP AT THIS TIME. NO SIGNIFICANT CHANGES, PT EASILY DESATS WHEN OFF 
BIPAP, DOWN TO 60S DURING RT ATTEMPT TO REMOVE. BIPAP PLACED BACK ON, O2 SAT IS 90 AND HAS 
BEEN CONSISTENT SINCE. NO RESP DISTRESS OR SOB NOTED. BED BATH DOWN, GOWN LINENS CHANGES. PT 
TOLERATED PT CARE. PT HAD 300CC OUTPUT VIA ASHBY CATH. RADIOLOGY/IMAGING CALLED. BRIDGETTE 
RELAYED MESSAGE:  NEW COMPLETE OPACIFICATION OF LEFT HEMOTHORAX. PERSISTENT MODERATE RIGHT 
PLEURAL EFFUSION NOTED. WILL ENDORSE TO AM NURSE. HOB ELEVATED. SIDE RAILS UP X2 BED LOCKED 
IN LOWEST POSITION. KRYSTLE HUGGER OFF. TEMP IS 97.9 AT THIS TIME. NEEDS ATTENDED. AFEBRILE. 
WILL ENDORSE TO AM NURSE FOR CONT OF CARE.

## 2021-01-23 NOTE — NUR
RN NOTE

PER MD, KEEP PT NPO. PT WITH POSSIBLE BRONCOSCOPY TOMORROW. NOTED WITH SIGNED CONSENT FORM. 
WILL MONITOR BLOOD SUGAR AS ORDERED.

## 2021-01-23 NOTE — NUR
RT NOTE:

LATE ENTRY- PATIENT ORALLY INTUBATED BY DR.TIM CARDENAS WITH 7.5 ETT SECURED AT 22CM MID LIP 
LINE AND PLACED ON MECHANICAL VENT SETTINGS: AC 24, VP=554, 100%, PEEP+5. POSITIVE COLOR 
CHANGE ON CO2 DETECTOR. BILATERAL B/S NOTED: RHONCHI IN THE APICES AND DIMINISHED ON LT 
BASE. VENT ALARMS SET AND AUDIBLE. SUCTIONED LARGE AMOUNT OF THIN YELLOW SECRETIONS. AMBU 
BAG AT [facility].

## 2021-01-23 NOTE — NUR
RN NOTE

BLOOD SUGAR 148. REGULAR INSULIN WITHHELD FOR NPO DIAGNOSIS. WILL CONTINUE TO MONITOR 
PATIENT.

## 2021-01-23 NOTE — NUR
RN NOTE



PT ORALLY INTUBATED BY DR ANA PAULA CARDENAS. VENT SETTINGS: AC 24, , FIO2 100%, PEEP +5. 
FAMILY AWARE, AND CONSENT OBTAINED. WILL CONTINUE TO MONITOR.

## 2021-01-24 VITALS — DIASTOLIC BLOOD PRESSURE: 58 MMHG | SYSTOLIC BLOOD PRESSURE: 113 MMHG

## 2021-01-24 VITALS — SYSTOLIC BLOOD PRESSURE: 113 MMHG | DIASTOLIC BLOOD PRESSURE: 49 MMHG

## 2021-01-24 VITALS — SYSTOLIC BLOOD PRESSURE: 151 MMHG | DIASTOLIC BLOOD PRESSURE: 87 MMHG

## 2021-01-24 VITALS — DIASTOLIC BLOOD PRESSURE: 55 MMHG | SYSTOLIC BLOOD PRESSURE: 110 MMHG

## 2021-01-24 VITALS — DIASTOLIC BLOOD PRESSURE: 57 MMHG | SYSTOLIC BLOOD PRESSURE: 108 MMHG

## 2021-01-24 VITALS — SYSTOLIC BLOOD PRESSURE: 113 MMHG | DIASTOLIC BLOOD PRESSURE: 60 MMHG

## 2021-01-24 VITALS — DIASTOLIC BLOOD PRESSURE: 52 MMHG | SYSTOLIC BLOOD PRESSURE: 115 MMHG

## 2021-01-24 VITALS — SYSTOLIC BLOOD PRESSURE: 111 MMHG | DIASTOLIC BLOOD PRESSURE: 53 MMHG

## 2021-01-24 VITALS — SYSTOLIC BLOOD PRESSURE: 117 MMHG | DIASTOLIC BLOOD PRESSURE: 56 MMHG

## 2021-01-24 VITALS — SYSTOLIC BLOOD PRESSURE: 128 MMHG | DIASTOLIC BLOOD PRESSURE: 58 MMHG

## 2021-01-24 VITALS — SYSTOLIC BLOOD PRESSURE: 105 MMHG | DIASTOLIC BLOOD PRESSURE: 51 MMHG

## 2021-01-24 VITALS — SYSTOLIC BLOOD PRESSURE: 110 MMHG | DIASTOLIC BLOOD PRESSURE: 54 MMHG

## 2021-01-24 VITALS — DIASTOLIC BLOOD PRESSURE: 56 MMHG | SYSTOLIC BLOOD PRESSURE: 118 MMHG

## 2021-01-24 VITALS — DIASTOLIC BLOOD PRESSURE: 53 MMHG | SYSTOLIC BLOOD PRESSURE: 77 MMHG

## 2021-01-24 VITALS — DIASTOLIC BLOOD PRESSURE: 56 MMHG | SYSTOLIC BLOOD PRESSURE: 113 MMHG

## 2021-01-24 VITALS — SYSTOLIC BLOOD PRESSURE: 118 MMHG | DIASTOLIC BLOOD PRESSURE: 54 MMHG

## 2021-01-24 VITALS — DIASTOLIC BLOOD PRESSURE: 79 MMHG | SYSTOLIC BLOOD PRESSURE: 151 MMHG

## 2021-01-24 VITALS — SYSTOLIC BLOOD PRESSURE: 111 MMHG | DIASTOLIC BLOOD PRESSURE: 52 MMHG

## 2021-01-24 VITALS — DIASTOLIC BLOOD PRESSURE: 60 MMHG | SYSTOLIC BLOOD PRESSURE: 121 MMHG

## 2021-01-24 VITALS — DIASTOLIC BLOOD PRESSURE: 63 MMHG | SYSTOLIC BLOOD PRESSURE: 108 MMHG

## 2021-01-24 VITALS — SYSTOLIC BLOOD PRESSURE: 95 MMHG | DIASTOLIC BLOOD PRESSURE: 62 MMHG

## 2021-01-24 VITALS — SYSTOLIC BLOOD PRESSURE: 95 MMHG | DIASTOLIC BLOOD PRESSURE: 57 MMHG

## 2021-01-24 VITALS — DIASTOLIC BLOOD PRESSURE: 54 MMHG | SYSTOLIC BLOOD PRESSURE: 111 MMHG

## 2021-01-24 VITALS — SYSTOLIC BLOOD PRESSURE: 106 MMHG | DIASTOLIC BLOOD PRESSURE: 66 MMHG

## 2021-01-24 VITALS — SYSTOLIC BLOOD PRESSURE: 95 MMHG | DIASTOLIC BLOOD PRESSURE: 51 MMHG

## 2021-01-24 VITALS — DIASTOLIC BLOOD PRESSURE: 49 MMHG | SYSTOLIC BLOOD PRESSURE: 83 MMHG

## 2021-01-24 VITALS — SYSTOLIC BLOOD PRESSURE: 126 MMHG | DIASTOLIC BLOOD PRESSURE: 64 MMHG

## 2021-01-24 VITALS — DIASTOLIC BLOOD PRESSURE: 63 MMHG | SYSTOLIC BLOOD PRESSURE: 113 MMHG

## 2021-01-24 VITALS — SYSTOLIC BLOOD PRESSURE: 113 MMHG | DIASTOLIC BLOOD PRESSURE: 52 MMHG

## 2021-01-24 VITALS — DIASTOLIC BLOOD PRESSURE: 53 MMHG | SYSTOLIC BLOOD PRESSURE: 114 MMHG

## 2021-01-24 VITALS — DIASTOLIC BLOOD PRESSURE: 65 MMHG | SYSTOLIC BLOOD PRESSURE: 97 MMHG

## 2021-01-24 VITALS — DIASTOLIC BLOOD PRESSURE: 57 MMHG | SYSTOLIC BLOOD PRESSURE: 123 MMHG

## 2021-01-24 VITALS — SYSTOLIC BLOOD PRESSURE: 100 MMHG | DIASTOLIC BLOOD PRESSURE: 50 MMHG

## 2021-01-24 VITALS — DIASTOLIC BLOOD PRESSURE: 67 MMHG | SYSTOLIC BLOOD PRESSURE: 91 MMHG

## 2021-01-24 VITALS — SYSTOLIC BLOOD PRESSURE: 80 MMHG | DIASTOLIC BLOOD PRESSURE: 46 MMHG

## 2021-01-24 VITALS — SYSTOLIC BLOOD PRESSURE: 94 MMHG | DIASTOLIC BLOOD PRESSURE: 54 MMHG

## 2021-01-24 VITALS — DIASTOLIC BLOOD PRESSURE: 24 MMHG | SYSTOLIC BLOOD PRESSURE: 40 MMHG

## 2021-01-24 VITALS — DIASTOLIC BLOOD PRESSURE: 57 MMHG | SYSTOLIC BLOOD PRESSURE: 106 MMHG

## 2021-01-24 VITALS — DIASTOLIC BLOOD PRESSURE: 44 MMHG | SYSTOLIC BLOOD PRESSURE: 62 MMHG

## 2021-01-24 VITALS — DIASTOLIC BLOOD PRESSURE: 53 MMHG | SYSTOLIC BLOOD PRESSURE: 105 MMHG

## 2021-01-24 VITALS — DIASTOLIC BLOOD PRESSURE: 45 MMHG | SYSTOLIC BLOOD PRESSURE: 124 MMHG

## 2021-01-24 VITALS — DIASTOLIC BLOOD PRESSURE: 58 MMHG | SYSTOLIC BLOOD PRESSURE: 116 MMHG

## 2021-01-24 VITALS — DIASTOLIC BLOOD PRESSURE: 58 MMHG | SYSTOLIC BLOOD PRESSURE: 118 MMHG

## 2021-01-24 VITALS — DIASTOLIC BLOOD PRESSURE: 66 MMHG | SYSTOLIC BLOOD PRESSURE: 106 MMHG

## 2021-01-24 VITALS — SYSTOLIC BLOOD PRESSURE: 122 MMHG | DIASTOLIC BLOOD PRESSURE: 55 MMHG

## 2021-01-24 VITALS — SYSTOLIC BLOOD PRESSURE: 99 MMHG | DIASTOLIC BLOOD PRESSURE: 62 MMHG

## 2021-01-24 VITALS — DIASTOLIC BLOOD PRESSURE: 59 MMHG | SYSTOLIC BLOOD PRESSURE: 113 MMHG

## 2021-01-24 VITALS — DIASTOLIC BLOOD PRESSURE: 56 MMHG | SYSTOLIC BLOOD PRESSURE: 114 MMHG

## 2021-01-24 VITALS — DIASTOLIC BLOOD PRESSURE: 50 MMHG | SYSTOLIC BLOOD PRESSURE: 107 MMHG

## 2021-01-24 VITALS — DIASTOLIC BLOOD PRESSURE: 56 MMHG | SYSTOLIC BLOOD PRESSURE: 111 MMHG

## 2021-01-24 VITALS — DIASTOLIC BLOOD PRESSURE: 53 MMHG | SYSTOLIC BLOOD PRESSURE: 84 MMHG

## 2021-01-24 VITALS — SYSTOLIC BLOOD PRESSURE: 129 MMHG | DIASTOLIC BLOOD PRESSURE: 64 MMHG

## 2021-01-24 VITALS — SYSTOLIC BLOOD PRESSURE: 118 MMHG | DIASTOLIC BLOOD PRESSURE: 58 MMHG

## 2021-01-24 VITALS — DIASTOLIC BLOOD PRESSURE: 50 MMHG | SYSTOLIC BLOOD PRESSURE: 111 MMHG

## 2021-01-24 VITALS — DIASTOLIC BLOOD PRESSURE: 57 MMHG | SYSTOLIC BLOOD PRESSURE: 110 MMHG

## 2021-01-24 VITALS — SYSTOLIC BLOOD PRESSURE: 112 MMHG | DIASTOLIC BLOOD PRESSURE: 64 MMHG

## 2021-01-24 VITALS — DIASTOLIC BLOOD PRESSURE: 65 MMHG | SYSTOLIC BLOOD PRESSURE: 124 MMHG

## 2021-01-24 VITALS — SYSTOLIC BLOOD PRESSURE: 121 MMHG | DIASTOLIC BLOOD PRESSURE: 57 MMHG

## 2021-01-24 VITALS — SYSTOLIC BLOOD PRESSURE: 123 MMHG | DIASTOLIC BLOOD PRESSURE: 54 MMHG

## 2021-01-24 VITALS — SYSTOLIC BLOOD PRESSURE: 110 MMHG | DIASTOLIC BLOOD PRESSURE: 51 MMHG

## 2021-01-24 VITALS — SYSTOLIC BLOOD PRESSURE: 118 MMHG | DIASTOLIC BLOOD PRESSURE: 48 MMHG

## 2021-01-24 VITALS — DIASTOLIC BLOOD PRESSURE: 53 MMHG | SYSTOLIC BLOOD PRESSURE: 98 MMHG

## 2021-01-24 VITALS — SYSTOLIC BLOOD PRESSURE: 120 MMHG | DIASTOLIC BLOOD PRESSURE: 59 MMHG

## 2021-01-24 VITALS — DIASTOLIC BLOOD PRESSURE: 54 MMHG | SYSTOLIC BLOOD PRESSURE: 119 MMHG

## 2021-01-24 VITALS — DIASTOLIC BLOOD PRESSURE: 55 MMHG | SYSTOLIC BLOOD PRESSURE: 122 MMHG

## 2021-01-24 VITALS — SYSTOLIC BLOOD PRESSURE: 121 MMHG | DIASTOLIC BLOOD PRESSURE: 53 MMHG

## 2021-01-24 VITALS — SYSTOLIC BLOOD PRESSURE: 119 MMHG | DIASTOLIC BLOOD PRESSURE: 58 MMHG

## 2021-01-24 VITALS — DIASTOLIC BLOOD PRESSURE: 48 MMHG | SYSTOLIC BLOOD PRESSURE: 62 MMHG

## 2021-01-24 VITALS — DIASTOLIC BLOOD PRESSURE: 45 MMHG | SYSTOLIC BLOOD PRESSURE: 87 MMHG

## 2021-01-24 VITALS — DIASTOLIC BLOOD PRESSURE: 52 MMHG | SYSTOLIC BLOOD PRESSURE: 116 MMHG

## 2021-01-24 VITALS — DIASTOLIC BLOOD PRESSURE: 53 MMHG | SYSTOLIC BLOOD PRESSURE: 108 MMHG

## 2021-01-24 VITALS — SYSTOLIC BLOOD PRESSURE: 116 MMHG | DIASTOLIC BLOOD PRESSURE: 61 MMHG

## 2021-01-24 VITALS — DIASTOLIC BLOOD PRESSURE: 56 MMHG | SYSTOLIC BLOOD PRESSURE: 121 MMHG

## 2021-01-24 VITALS — DIASTOLIC BLOOD PRESSURE: 62 MMHG | SYSTOLIC BLOOD PRESSURE: 122 MMHG

## 2021-01-24 VITALS — SYSTOLIC BLOOD PRESSURE: 93 MMHG | DIASTOLIC BLOOD PRESSURE: 58 MMHG

## 2021-01-24 VITALS — DIASTOLIC BLOOD PRESSURE: 62 MMHG | SYSTOLIC BLOOD PRESSURE: 124 MMHG

## 2021-01-24 VITALS — DIASTOLIC BLOOD PRESSURE: 55 MMHG | SYSTOLIC BLOOD PRESSURE: 112 MMHG

## 2021-01-24 VITALS — DIASTOLIC BLOOD PRESSURE: 55 MMHG | SYSTOLIC BLOOD PRESSURE: 114 MMHG

## 2021-01-24 VITALS — SYSTOLIC BLOOD PRESSURE: 117 MMHG | DIASTOLIC BLOOD PRESSURE: 55 MMHG

## 2021-01-24 VITALS — DIASTOLIC BLOOD PRESSURE: 52 MMHG | SYSTOLIC BLOOD PRESSURE: 93 MMHG

## 2021-01-24 VITALS — DIASTOLIC BLOOD PRESSURE: 64 MMHG | SYSTOLIC BLOOD PRESSURE: 118 MMHG

## 2021-01-24 VITALS — SYSTOLIC BLOOD PRESSURE: 117 MMHG | DIASTOLIC BLOOD PRESSURE: 57 MMHG

## 2021-01-24 VITALS — DIASTOLIC BLOOD PRESSURE: 65 MMHG | SYSTOLIC BLOOD PRESSURE: 106 MMHG

## 2021-01-24 VITALS — DIASTOLIC BLOOD PRESSURE: 60 MMHG | SYSTOLIC BLOOD PRESSURE: 110 MMHG

## 2021-01-24 VITALS — SYSTOLIC BLOOD PRESSURE: 106 MMHG | DIASTOLIC BLOOD PRESSURE: 59 MMHG

## 2021-01-24 VITALS — SYSTOLIC BLOOD PRESSURE: 110 MMHG | DIASTOLIC BLOOD PRESSURE: 63 MMHG

## 2021-01-24 LAB
BASE EXCESS BLDA CALC-SCNC: -7.5 MMOL/L
BASOPHILS # BLD AUTO: 0 /CMM (ref 0–0.2)
BASOPHILS # BLD AUTO: 0 /CMM (ref 0–0.2)
BASOPHILS NFR BLD AUTO: 0 % (ref 0–2)
BASOPHILS NFR BLD AUTO: 0.1 % (ref 0–2)
BUN SERPL-MCNC: 63 MG/DL (ref 7–18)
CALCIUM SERPL-MCNC: 7.9 MG/DL (ref 8.5–10.1)
CHLORIDE SERPL-SCNC: 102 MMOL/L (ref 98–107)
CO2 SERPL-SCNC: 19 MMOL/L (ref 21–32)
CREAT SERPL-MCNC: 3.3 MG/DL (ref 0.6–1.3)
DO-HGB MFR BLDA: 184.4 MMHG
EOSINOPHIL NFR BLD AUTO: 1 % (ref 0–6)
EOSINOPHIL NFR BLD AUTO: 1.2 % (ref 0–6)
GLUCOSE SERPL-MCNC: 93 MG/DL (ref 74–106)
HCT VFR BLD AUTO: 31 % (ref 33–45)
HCT VFR BLD AUTO: 35 % (ref 33–45)
HGB BLD-MCNC: 10 G/DL (ref 11.5–14.8)
HGB BLD-MCNC: 10.4 G/DL (ref 11.5–14.8)
INHALED O2 CONCENTRATION: 40 %
LYMPHOCYTES NFR BLD AUTO: 1.2 /CMM (ref 0.8–4.8)
LYMPHOCYTES NFR BLD AUTO: 1.7 /CMM (ref 0.8–4.8)
LYMPHOCYTES NFR BLD AUTO: 4.2 % (ref 20–44)
LYMPHOCYTES NFR BLD AUTO: 5.8 % (ref 20–44)
MCHC RBC AUTO-ENTMCNC: 30 G/DL (ref 31–36)
MCHC RBC AUTO-ENTMCNC: 33 G/DL (ref 31–36)
MCV RBC AUTO: 103 FL (ref 82–100)
MCV RBC AUTO: 96 FL (ref 82–100)
MONOCYTES NFR BLD AUTO: 1.1 /CMM (ref 0.1–1.3)
MONOCYTES NFR BLD AUTO: 1.1 /CMM (ref 0.1–1.3)
MONOCYTES NFR BLD AUTO: 3.8 % (ref 2–12)
MONOCYTES NFR BLD AUTO: 3.9 % (ref 2–12)
NEUTROPHILS # BLD AUTO: 25.5 /CMM (ref 1.8–8.9)
NEUTROPHILS # BLD AUTO: 25.6 /CMM (ref 1.8–8.9)
NEUTROPHILS NFR BLD AUTO: 89.2 % (ref 43–81)
NEUTROPHILS NFR BLD AUTO: 90.8 % (ref 43–81)
PCO2 TEMP ADJ BLDA: 27.5 MMHG (ref 35–45)
PH TEMP ADJ BLDA: 7.39 [PH] (ref 7.35–7.45)
PLATELET # BLD AUTO: 192 /CMM (ref 150–450)
PLATELET # BLD AUTO: 210 /CMM (ref 150–450)
PO2 TEMP ADJ BLDA: 69.2 MMHG (ref 75–100)
POTASSIUM SERPL-SCNC: 3.7 MMOL/L (ref 3.5–5.1)
RBC # BLD AUTO: 3.21 MIL/UL (ref 4–5.2)
RBC # BLD AUTO: 3.37 MIL/UL (ref 4–5.2)
SAO2 % BLDA: 95 % (ref 92–98.5)
SODIUM SERPL-SCNC: 133 MMOL/L (ref 136–145)
VENTILATION MODE VENT: (no result)
WBC NRBC COR # BLD AUTO: 28.1 K/UL (ref 4.3–11)
WBC NRBC COR # BLD AUTO: 28.7 K/UL (ref 4.3–11)

## 2021-01-24 RX ADMIN — MUPIROCIN SCH APPLIC: 20 OINTMENT TOPICAL at 20:06

## 2021-01-24 RX ADMIN — Medication SCH EACH: at 06:00

## 2021-01-24 RX ADMIN — PROPOFOL PRN MLS/HR: 10 INJECTION, EMULSION INTRAVENOUS at 21:53

## 2021-01-24 RX ADMIN — MEROPENEM SCH MLS/HR: 500 INJECTION INTRAVENOUS at 16:16

## 2021-01-24 RX ADMIN — PROPOFOL PRN MLS/HR: 10 INJECTION, EMULSION INTRAVENOUS at 13:30

## 2021-01-24 RX ADMIN — Medication SCH OZ: at 20:05

## 2021-01-24 RX ADMIN — Medication SCH MG: at 19:43

## 2021-01-24 RX ADMIN — Medication SCH EACH: at 18:09

## 2021-01-24 RX ADMIN — FAMOTIDINE SCH MG: 10 INJECTION INTRAVENOUS at 09:42

## 2021-01-24 RX ADMIN — ACETYLCYSTEINE SCH MG: 100 INHALANT RESPIRATORY (INHALATION) at 23:09

## 2021-01-24 RX ADMIN — Medication SCH MG: at 01:38

## 2021-01-24 RX ADMIN — Medication SCH MG: at 08:09

## 2021-01-24 RX ADMIN — Medication SCH EACH: at 11:57

## 2021-01-24 RX ADMIN — ACETYLCYSTEINE SCH MG: 100 INHALANT RESPIRATORY (INHALATION) at 08:09

## 2021-01-24 RX ADMIN — MUPIROCIN SCH APPLIC: 20 OINTMENT TOPICAL at 09:42

## 2021-01-24 RX ADMIN — Medication SCH GM: at 17:00

## 2021-01-24 RX ADMIN — CEFEPIME HYDROCHLORIDE SCH MLS/HR: 1 INJECTION, POWDER, FOR SOLUTION INTRAMUSCULAR; INTRAVENOUS at 09:45

## 2021-01-24 RX ADMIN — PROPOFOL PRN MLS/HR: 10 INJECTION, EMULSION INTRAVENOUS at 01:31

## 2021-01-24 RX ADMIN — Medication SCH GM: at 12:16

## 2021-01-24 RX ADMIN — SODIUM CHLORIDE PRN MLS/HR: 9 INJECTION, SOLUTION INTRAVENOUS at 09:43

## 2021-01-24 RX ADMIN — Medication SCH OZ: at 09:42

## 2021-01-24 RX ADMIN — Medication SCH MG: at 16:07

## 2021-01-24 RX ADMIN — ACETYLCYSTEINE SCH MG: 100 INHALANT RESPIRATORY (INHALATION) at 16:07

## 2021-01-24 NOTE — NUR
RN NOTE





NO CHANGE IN PATIENT CONDITION AT THIS TIME PATIENT VITALS STABLE, NO SIGNS OF ACUTE 
RESPIRATORY DISTRESS. PT REPOSITIONED FOR COMFORT. ORAL CARE DONE Q2H. WILL CONTINUE TO 
MONITOR AND REASSESS FOR ANY CHANGES THROUGHOUT THE SHIFT.

## 2021-01-24 NOTE — NUR
RN NOTE

NO CHANGE IN PATIENT CONDITION AT THIS TIME PATIENT VITALS STABLE, NO SIGNS OF ACUTE 
RESPIRATORY DISTRESS.  WILL CONTINUE TO MONITOR AND REASSESS FOR ANY CHANGES THROUGHOUT THE 
SHIFT.

## 2021-01-24 NOTE — NUR
RN NOTE



NO ACUTE CHANGES OBSERVED. PT REMAINS SEDATED IN BED IN SEMI BE'S POSITION. PT ORALLY 
INTUBATED. TOLERATING VENT SETTINGS. RESPIRATIONS EVEN AND UNLABORED, NO SIGNS OF PAIN OR 
DISCOMFORT. VITAL SIGNS STABLE VIA BEDSIDE MONITOR. SR ON THE CARDIAC MONITOR. ASHBY 
CATHETER PATENT AND IN PLACE DRAINING CLEAR YELLOW URINE. ASPIRATION PRECAUTIONS IN PLACE, 
WITH RIGHT FEMORAL PICC LINE PATENT AND INTACT WITHOUT COMPLICATIONS NOTED AT SITE. 1 LUMEN 
NOTED TO BE OCCLUDED BUT FLUSHES WELL. WITH DIRPOVAN RUNNING AT 20MCG AND LEVO RUNNING AT 
0.3MCG FOR BP SUPPORT. ORAL CARE DONE Q2H. SAFETY MEASURES IMPLEMENTED PER PROTOCOL, ALARMS 
ON AND AUDIBLE, AMBU BAG AT BEDSIDE, VENT PLUGGED INTO RED OUTLET, BED ALARM ON, BED LOCKED 
AND IN LOW POSITION, SIDE RAILS UP X 2, ENDORSED TO MORNING RN FOR DASHAWN.

## 2021-01-24 NOTE — NUR
RN NOTE

COMPLETE BED BATH/AM CARE AND LINEN CHANGE COMPLETED. WOUND CARE RENDERED AS ORDERED, PT 
TOLERATED WELL. VITAL SIGNS STABLE VIA BEDSIDE MONITOR. WILL CONTINUE TO MONITOR.

## 2021-01-24 NOTE — NUR
RN OPENING NOTES



PATIENT PRESENT IN BED, SEDATED, ON [facility] VENTILATOR, TOLERATING SETTINGS WELL, SPO2 %, 
NO S/SX OF RESP DISTRES NOTED, RESPIRATIONS EVEN AND UNLABORED, SEDATED ON PROPOFOL DRIP AT 
20 MCG/KG/MIN, RELAXED, RR IS 24, NSR NOTED ON TELE-MONITOR, ASHBY CATH DRAINING LIGHT YELOW 
URINE BY GRAVITY, PATENT AND INTACT , IV LINE FLUSHED AND PATENT, SAFETY MEASURES IN PLACE, 
HOB ELEVATED, BED LOCKED IN LOWEST POSITION, WILL CONT TO MONITOR

## 2021-01-25 VITALS — SYSTOLIC BLOOD PRESSURE: 92 MMHG | DIASTOLIC BLOOD PRESSURE: 55 MMHG

## 2021-01-25 VITALS — SYSTOLIC BLOOD PRESSURE: 101 MMHG | DIASTOLIC BLOOD PRESSURE: 49 MMHG

## 2021-01-25 VITALS — DIASTOLIC BLOOD PRESSURE: 51 MMHG | SYSTOLIC BLOOD PRESSURE: 109 MMHG

## 2021-01-25 VITALS — DIASTOLIC BLOOD PRESSURE: 49 MMHG | SYSTOLIC BLOOD PRESSURE: 112 MMHG

## 2021-01-25 VITALS — DIASTOLIC BLOOD PRESSURE: 41 MMHG | SYSTOLIC BLOOD PRESSURE: 82 MMHG

## 2021-01-25 VITALS — SYSTOLIC BLOOD PRESSURE: 112 MMHG | DIASTOLIC BLOOD PRESSURE: 43 MMHG

## 2021-01-25 VITALS — DIASTOLIC BLOOD PRESSURE: 60 MMHG | SYSTOLIC BLOOD PRESSURE: 118 MMHG

## 2021-01-25 VITALS — SYSTOLIC BLOOD PRESSURE: 111 MMHG | DIASTOLIC BLOOD PRESSURE: 52 MMHG

## 2021-01-25 VITALS — SYSTOLIC BLOOD PRESSURE: 102 MMHG | DIASTOLIC BLOOD PRESSURE: 50 MMHG

## 2021-01-25 VITALS — SYSTOLIC BLOOD PRESSURE: 102 MMHG | DIASTOLIC BLOOD PRESSURE: 54 MMHG

## 2021-01-25 VITALS — DIASTOLIC BLOOD PRESSURE: 47 MMHG | SYSTOLIC BLOOD PRESSURE: 103 MMHG

## 2021-01-25 VITALS — SYSTOLIC BLOOD PRESSURE: 98 MMHG | DIASTOLIC BLOOD PRESSURE: 48 MMHG

## 2021-01-25 VITALS — SYSTOLIC BLOOD PRESSURE: 106 MMHG | DIASTOLIC BLOOD PRESSURE: 43 MMHG

## 2021-01-25 VITALS — DIASTOLIC BLOOD PRESSURE: 57 MMHG | SYSTOLIC BLOOD PRESSURE: 114 MMHG

## 2021-01-25 VITALS — SYSTOLIC BLOOD PRESSURE: 102 MMHG | DIASTOLIC BLOOD PRESSURE: 52 MMHG

## 2021-01-25 VITALS — SYSTOLIC BLOOD PRESSURE: 108 MMHG | DIASTOLIC BLOOD PRESSURE: 50 MMHG

## 2021-01-25 VITALS — DIASTOLIC BLOOD PRESSURE: 45 MMHG | SYSTOLIC BLOOD PRESSURE: 101 MMHG

## 2021-01-25 VITALS — DIASTOLIC BLOOD PRESSURE: 45 MMHG | SYSTOLIC BLOOD PRESSURE: 97 MMHG

## 2021-01-25 VITALS — SYSTOLIC BLOOD PRESSURE: 111 MMHG | DIASTOLIC BLOOD PRESSURE: 57 MMHG

## 2021-01-25 VITALS — SYSTOLIC BLOOD PRESSURE: 103 MMHG | DIASTOLIC BLOOD PRESSURE: 48 MMHG

## 2021-01-25 VITALS — DIASTOLIC BLOOD PRESSURE: 49 MMHG | SYSTOLIC BLOOD PRESSURE: 101 MMHG

## 2021-01-25 VITALS — DIASTOLIC BLOOD PRESSURE: 49 MMHG | SYSTOLIC BLOOD PRESSURE: 111 MMHG

## 2021-01-25 VITALS — SYSTOLIC BLOOD PRESSURE: 109 MMHG | DIASTOLIC BLOOD PRESSURE: 55 MMHG

## 2021-01-25 VITALS — DIASTOLIC BLOOD PRESSURE: 67 MMHG | SYSTOLIC BLOOD PRESSURE: 141 MMHG

## 2021-01-25 VITALS — DIASTOLIC BLOOD PRESSURE: 52 MMHG | SYSTOLIC BLOOD PRESSURE: 112 MMHG

## 2021-01-25 VITALS — DIASTOLIC BLOOD PRESSURE: 45 MMHG | SYSTOLIC BLOOD PRESSURE: 100 MMHG

## 2021-01-25 VITALS — SYSTOLIC BLOOD PRESSURE: 107 MMHG | DIASTOLIC BLOOD PRESSURE: 59 MMHG

## 2021-01-25 VITALS — SYSTOLIC BLOOD PRESSURE: 107 MMHG | DIASTOLIC BLOOD PRESSURE: 51 MMHG

## 2021-01-25 VITALS — SYSTOLIC BLOOD PRESSURE: 116 MMHG | DIASTOLIC BLOOD PRESSURE: 51 MMHG

## 2021-01-25 VITALS — DIASTOLIC BLOOD PRESSURE: 49 MMHG | SYSTOLIC BLOOD PRESSURE: 100 MMHG

## 2021-01-25 VITALS — DIASTOLIC BLOOD PRESSURE: 55 MMHG | SYSTOLIC BLOOD PRESSURE: 119 MMHG

## 2021-01-25 VITALS — SYSTOLIC BLOOD PRESSURE: 117 MMHG | DIASTOLIC BLOOD PRESSURE: 54 MMHG

## 2021-01-25 VITALS — DIASTOLIC BLOOD PRESSURE: 51 MMHG | SYSTOLIC BLOOD PRESSURE: 115 MMHG

## 2021-01-25 VITALS — SYSTOLIC BLOOD PRESSURE: 98 MMHG | DIASTOLIC BLOOD PRESSURE: 53 MMHG

## 2021-01-25 VITALS — DIASTOLIC BLOOD PRESSURE: 63 MMHG | SYSTOLIC BLOOD PRESSURE: 115 MMHG

## 2021-01-25 VITALS — SYSTOLIC BLOOD PRESSURE: 107 MMHG | DIASTOLIC BLOOD PRESSURE: 48 MMHG

## 2021-01-25 VITALS — SYSTOLIC BLOOD PRESSURE: 110 MMHG | DIASTOLIC BLOOD PRESSURE: 51 MMHG

## 2021-01-25 VITALS — SYSTOLIC BLOOD PRESSURE: 96 MMHG | DIASTOLIC BLOOD PRESSURE: 55 MMHG

## 2021-01-25 VITALS — SYSTOLIC BLOOD PRESSURE: 120 MMHG | DIASTOLIC BLOOD PRESSURE: 46 MMHG

## 2021-01-25 VITALS — SYSTOLIC BLOOD PRESSURE: 125 MMHG | DIASTOLIC BLOOD PRESSURE: 57 MMHG

## 2021-01-25 VITALS — SYSTOLIC BLOOD PRESSURE: 99 MMHG | DIASTOLIC BLOOD PRESSURE: 47 MMHG

## 2021-01-25 VITALS — DIASTOLIC BLOOD PRESSURE: 83 MMHG | SYSTOLIC BLOOD PRESSURE: 149 MMHG

## 2021-01-25 VITALS — DIASTOLIC BLOOD PRESSURE: 45 MMHG | SYSTOLIC BLOOD PRESSURE: 96 MMHG

## 2021-01-25 VITALS — SYSTOLIC BLOOD PRESSURE: 102 MMHG | DIASTOLIC BLOOD PRESSURE: 53 MMHG

## 2021-01-25 VITALS — DIASTOLIC BLOOD PRESSURE: 54 MMHG | SYSTOLIC BLOOD PRESSURE: 113 MMHG

## 2021-01-25 VITALS — DIASTOLIC BLOOD PRESSURE: 48 MMHG | SYSTOLIC BLOOD PRESSURE: 112 MMHG

## 2021-01-25 VITALS — SYSTOLIC BLOOD PRESSURE: 142 MMHG | DIASTOLIC BLOOD PRESSURE: 60 MMHG

## 2021-01-25 VITALS — DIASTOLIC BLOOD PRESSURE: 40 MMHG | SYSTOLIC BLOOD PRESSURE: 70 MMHG

## 2021-01-25 VITALS — SYSTOLIC BLOOD PRESSURE: 118 MMHG | DIASTOLIC BLOOD PRESSURE: 58 MMHG

## 2021-01-25 VITALS — DIASTOLIC BLOOD PRESSURE: 49 MMHG | SYSTOLIC BLOOD PRESSURE: 110 MMHG

## 2021-01-25 VITALS — SYSTOLIC BLOOD PRESSURE: 120 MMHG | DIASTOLIC BLOOD PRESSURE: 60 MMHG

## 2021-01-25 VITALS — SYSTOLIC BLOOD PRESSURE: 124 MMHG | DIASTOLIC BLOOD PRESSURE: 61 MMHG

## 2021-01-25 VITALS — DIASTOLIC BLOOD PRESSURE: 44 MMHG | SYSTOLIC BLOOD PRESSURE: 98 MMHG

## 2021-01-25 VITALS — DIASTOLIC BLOOD PRESSURE: 56 MMHG | SYSTOLIC BLOOD PRESSURE: 112 MMHG

## 2021-01-25 VITALS — DIASTOLIC BLOOD PRESSURE: 51 MMHG | SYSTOLIC BLOOD PRESSURE: 101 MMHG

## 2021-01-25 VITALS — DIASTOLIC BLOOD PRESSURE: 52 MMHG | SYSTOLIC BLOOD PRESSURE: 109 MMHG

## 2021-01-25 VITALS — SYSTOLIC BLOOD PRESSURE: 121 MMHG | DIASTOLIC BLOOD PRESSURE: 66 MMHG

## 2021-01-25 VITALS — SYSTOLIC BLOOD PRESSURE: 106 MMHG | DIASTOLIC BLOOD PRESSURE: 56 MMHG

## 2021-01-25 VITALS — DIASTOLIC BLOOD PRESSURE: 48 MMHG | SYSTOLIC BLOOD PRESSURE: 100 MMHG

## 2021-01-25 VITALS — DIASTOLIC BLOOD PRESSURE: 55 MMHG | SYSTOLIC BLOOD PRESSURE: 78 MMHG

## 2021-01-25 VITALS — DIASTOLIC BLOOD PRESSURE: 52 MMHG | SYSTOLIC BLOOD PRESSURE: 106 MMHG

## 2021-01-25 VITALS — DIASTOLIC BLOOD PRESSURE: 45 MMHG | SYSTOLIC BLOOD PRESSURE: 94 MMHG

## 2021-01-25 VITALS — SYSTOLIC BLOOD PRESSURE: 130 MMHG | DIASTOLIC BLOOD PRESSURE: 60 MMHG

## 2021-01-25 VITALS — SYSTOLIC BLOOD PRESSURE: 114 MMHG | DIASTOLIC BLOOD PRESSURE: 56 MMHG

## 2021-01-25 VITALS — SYSTOLIC BLOOD PRESSURE: 101 MMHG | DIASTOLIC BLOOD PRESSURE: 46 MMHG

## 2021-01-25 VITALS — SYSTOLIC BLOOD PRESSURE: 88 MMHG | DIASTOLIC BLOOD PRESSURE: 48 MMHG

## 2021-01-25 VITALS — SYSTOLIC BLOOD PRESSURE: 104 MMHG | DIASTOLIC BLOOD PRESSURE: 48 MMHG

## 2021-01-25 VITALS — SYSTOLIC BLOOD PRESSURE: 105 MMHG | DIASTOLIC BLOOD PRESSURE: 50 MMHG

## 2021-01-25 VITALS — DIASTOLIC BLOOD PRESSURE: 37 MMHG | SYSTOLIC BLOOD PRESSURE: 74 MMHG

## 2021-01-25 VITALS — SYSTOLIC BLOOD PRESSURE: 118 MMHG | DIASTOLIC BLOOD PRESSURE: 55 MMHG

## 2021-01-25 VITALS — SYSTOLIC BLOOD PRESSURE: 82 MMHG | DIASTOLIC BLOOD PRESSURE: 42 MMHG

## 2021-01-25 VITALS — SYSTOLIC BLOOD PRESSURE: 95 MMHG | DIASTOLIC BLOOD PRESSURE: 48 MMHG

## 2021-01-25 VITALS — SYSTOLIC BLOOD PRESSURE: 105 MMHG | DIASTOLIC BLOOD PRESSURE: 44 MMHG

## 2021-01-25 VITALS — SYSTOLIC BLOOD PRESSURE: 104 MMHG | DIASTOLIC BLOOD PRESSURE: 56 MMHG

## 2021-01-25 VITALS — SYSTOLIC BLOOD PRESSURE: 111 MMHG | DIASTOLIC BLOOD PRESSURE: 46 MMHG

## 2021-01-25 VITALS — SYSTOLIC BLOOD PRESSURE: 123 MMHG | DIASTOLIC BLOOD PRESSURE: 59 MMHG

## 2021-01-25 VITALS — DIASTOLIC BLOOD PRESSURE: 53 MMHG | SYSTOLIC BLOOD PRESSURE: 111 MMHG

## 2021-01-25 VITALS — DIASTOLIC BLOOD PRESSURE: 40 MMHG | SYSTOLIC BLOOD PRESSURE: 77 MMHG

## 2021-01-25 VITALS — DIASTOLIC BLOOD PRESSURE: 52 MMHG | SYSTOLIC BLOOD PRESSURE: 111 MMHG

## 2021-01-25 VITALS — DIASTOLIC BLOOD PRESSURE: 41 MMHG | SYSTOLIC BLOOD PRESSURE: 81 MMHG

## 2021-01-25 VITALS — DIASTOLIC BLOOD PRESSURE: 36 MMHG | SYSTOLIC BLOOD PRESSURE: 65 MMHG

## 2021-01-25 VITALS — SYSTOLIC BLOOD PRESSURE: 108 MMHG | DIASTOLIC BLOOD PRESSURE: 52 MMHG

## 2021-01-25 VITALS — DIASTOLIC BLOOD PRESSURE: 51 MMHG | SYSTOLIC BLOOD PRESSURE: 116 MMHG

## 2021-01-25 VITALS — DIASTOLIC BLOOD PRESSURE: 57 MMHG | SYSTOLIC BLOOD PRESSURE: 117 MMHG

## 2021-01-25 VITALS — DIASTOLIC BLOOD PRESSURE: 50 MMHG | SYSTOLIC BLOOD PRESSURE: 112 MMHG

## 2021-01-25 VITALS — DIASTOLIC BLOOD PRESSURE: 55 MMHG | SYSTOLIC BLOOD PRESSURE: 105 MMHG

## 2021-01-25 VITALS — SYSTOLIC BLOOD PRESSURE: 112 MMHG | DIASTOLIC BLOOD PRESSURE: 54 MMHG

## 2021-01-25 VITALS — DIASTOLIC BLOOD PRESSURE: 48 MMHG | SYSTOLIC BLOOD PRESSURE: 101 MMHG

## 2021-01-25 VITALS — SYSTOLIC BLOOD PRESSURE: 101 MMHG | DIASTOLIC BLOOD PRESSURE: 55 MMHG

## 2021-01-25 VITALS — SYSTOLIC BLOOD PRESSURE: 109 MMHG | DIASTOLIC BLOOD PRESSURE: 54 MMHG

## 2021-01-25 VITALS — SYSTOLIC BLOOD PRESSURE: 111 MMHG | DIASTOLIC BLOOD PRESSURE: 56 MMHG

## 2021-01-25 LAB
BASE EXCESS BLDA CALC-SCNC: -6.5 MMOL/L
BUN SERPL-MCNC: 67 MG/DL (ref 7–18)
CALCIUM SERPL-MCNC: 8 MG/DL (ref 8.5–10.1)
CHLORIDE SERPL-SCNC: 102 MMOL/L (ref 98–107)
CO2 SERPL-SCNC: 18 MMOL/L (ref 21–32)
CREAT SERPL-MCNC: 3.5 MG/DL (ref 0.6–1.3)
DO-HGB MFR BLDA: 147 MMHG
GLUCOSE SERPL-MCNC: 73 MG/DL (ref 74–106)
INHALED O2 CONCENTRATION: 35 %
INTRINSIC PEEP RESPIRATORY: 5 CM H2O
MAGNESIUM SERPL-MCNC: 1.6 MG/DL (ref 1.8–2.4)
PCO2 TEMP ADJ BLDA: 26 MMHG (ref 35–45)
PEEP SETTING VENT: 400 ML
PH TEMP ADJ BLDA: 7.42 [PH] (ref 7.35–7.45)
PHOSPHATE SERPL-MCNC: 5.3 MG/DL (ref 2.5–4.9)
PO2 TEMP ADJ BLDA: 72.3 MMHG (ref 75–100)
POTASSIUM SERPL-SCNC: 3.7 MMOL/L (ref 3.5–5.1)
SAO2 % BLDA: 94.7 % (ref 92–98.5)
SET RATE, BG: 24
SODIUM SERPL-SCNC: 134 MMOL/L (ref 136–145)

## 2021-01-25 PROCEDURE — 0BJ08ZZ INSPECTION OF TRACHEOBRONCHIAL TREE, VIA NATURAL OR ARTIFICIAL OPENING ENDOSCOPIC: ICD-10-PCS | Performed by: INTERNAL MEDICINE

## 2021-01-25 RX ADMIN — MUPIROCIN SCH APPLIC: 20 OINTMENT TOPICAL at 09:23

## 2021-01-25 RX ADMIN — ACETYLCYSTEINE SCH MG: 100 INHALANT RESPIRATORY (INHALATION) at 14:49

## 2021-01-25 RX ADMIN — Medication SCH OZ: at 09:23

## 2021-01-25 RX ADMIN — Medication SCH EACH: at 17:46

## 2021-01-25 RX ADMIN — ACETYLCYSTEINE SCH MG: 100 INHALANT RESPIRATORY (INHALATION) at 23:49

## 2021-01-25 RX ADMIN — SODIUM CHLORIDE PRN MLS/HR: 9 INJECTION, SOLUTION INTRAVENOUS at 14:49

## 2021-01-25 RX ADMIN — Medication SCH MG: at 01:46

## 2021-01-25 RX ADMIN — Medication SCH OZ: at 21:04

## 2021-01-25 RX ADMIN — Medication SCH EACH: at 06:27

## 2021-01-25 RX ADMIN — Medication SCH GM: at 09:23

## 2021-01-25 RX ADMIN — Medication SCH EACH: at 00:53

## 2021-01-25 RX ADMIN — FAMOTIDINE SCH MG: 10 INJECTION INTRAVENOUS at 08:59

## 2021-01-25 RX ADMIN — MUPIROCIN SCH APPLIC: 20 OINTMENT TOPICAL at 21:03

## 2021-01-25 RX ADMIN — MEROPENEM SCH MLS/HR: 500 INJECTION INTRAVENOUS at 03:18

## 2021-01-25 RX ADMIN — Medication SCH MG: at 13:30

## 2021-01-25 RX ADMIN — MEROPENEM SCH MLS/HR: 500 INJECTION INTRAVENOUS at 14:44

## 2021-01-25 RX ADMIN — PROPOFOL PRN MLS/HR: 10 INJECTION, EMULSION INTRAVENOUS at 08:11

## 2021-01-25 RX ADMIN — Medication SCH MG: at 11:03

## 2021-01-25 RX ADMIN — Medication SCH GM: at 17:46

## 2021-01-25 RX ADMIN — PROPOFOL PRN MLS/HR: 10 INJECTION, EMULSION INTRAVENOUS at 19:16

## 2021-01-25 RX ADMIN — Medication SCH MG: at 19:39

## 2021-01-25 RX ADMIN — ACETYLCYSTEINE SCH MG: 100 INHALANT RESPIRATORY (INHALATION) at 11:03

## 2021-01-25 RX ADMIN — Medication SCH EACH: at 12:48

## 2021-01-25 NOTE — NUR
RN ICU CLOSING NOTES



NO CHANGES TO PT DURING SHIFT. PT IN STABLE CONDITIONS. ALL PT SAFETY PRECAUTIONS IN PLACE. 
WILL ENORSE DASHAWN TO ONCOMING NURSE

## 2021-01-25 NOTE — NUR
RN ICU OPENING NOTES



PATIENT IN BED, SEDATED, ON [facility] VENTILATOR, TOLERATING SETTINGS WELL, SPO2 %, NO 
SIGNS OF RESP DISTRESS OR SOB NOTED, RESPIRATIONS EVEN AND UNLABORED, PT IS SEDATED ON 
PROPOFOL AT 25 MCG/KG/MIN, RELAXED, LEVO @ 0.3 MCG/KG/MIN, RR IS 24, NSR NOTED ON 
TELE-MONITOR, ASHBY CATH DRAINING YELLOW CLOUDY URINE BY GRAVITY, PATENT AND INTACT , RT 
FEMORAL PICC LINE FLUSHED AND PATENT, AND INFUSING PROPOFOL AND LEVO WELL. ALL SAFETY 
MEASURES IN PLACE. WILL CONTINUE TO MONITOR

## 2021-01-26 VITALS — DIASTOLIC BLOOD PRESSURE: 42 MMHG | SYSTOLIC BLOOD PRESSURE: 88 MMHG

## 2021-01-26 VITALS — DIASTOLIC BLOOD PRESSURE: 51 MMHG | SYSTOLIC BLOOD PRESSURE: 118 MMHG

## 2021-01-26 VITALS — DIASTOLIC BLOOD PRESSURE: 45 MMHG | SYSTOLIC BLOOD PRESSURE: 113 MMHG

## 2021-01-26 VITALS — DIASTOLIC BLOOD PRESSURE: 43 MMHG | SYSTOLIC BLOOD PRESSURE: 102 MMHG

## 2021-01-26 VITALS — SYSTOLIC BLOOD PRESSURE: 110 MMHG | DIASTOLIC BLOOD PRESSURE: 49 MMHG

## 2021-01-26 VITALS — SYSTOLIC BLOOD PRESSURE: 103 MMHG | DIASTOLIC BLOOD PRESSURE: 50 MMHG

## 2021-01-26 VITALS — SYSTOLIC BLOOD PRESSURE: 114 MMHG | DIASTOLIC BLOOD PRESSURE: 46 MMHG

## 2021-01-26 VITALS — DIASTOLIC BLOOD PRESSURE: 57 MMHG | SYSTOLIC BLOOD PRESSURE: 130 MMHG

## 2021-01-26 VITALS — DIASTOLIC BLOOD PRESSURE: 50 MMHG | SYSTOLIC BLOOD PRESSURE: 107 MMHG

## 2021-01-26 VITALS — SYSTOLIC BLOOD PRESSURE: 107 MMHG | DIASTOLIC BLOOD PRESSURE: 45 MMHG

## 2021-01-26 VITALS — SYSTOLIC BLOOD PRESSURE: 113 MMHG | DIASTOLIC BLOOD PRESSURE: 48 MMHG

## 2021-01-26 VITALS — DIASTOLIC BLOOD PRESSURE: 44 MMHG | SYSTOLIC BLOOD PRESSURE: 99 MMHG

## 2021-01-26 VITALS — SYSTOLIC BLOOD PRESSURE: 111 MMHG | DIASTOLIC BLOOD PRESSURE: 47 MMHG

## 2021-01-26 VITALS — DIASTOLIC BLOOD PRESSURE: 44 MMHG | SYSTOLIC BLOOD PRESSURE: 93 MMHG

## 2021-01-26 VITALS — SYSTOLIC BLOOD PRESSURE: 87 MMHG | DIASTOLIC BLOOD PRESSURE: 43 MMHG

## 2021-01-26 VITALS — DIASTOLIC BLOOD PRESSURE: 51 MMHG | SYSTOLIC BLOOD PRESSURE: 110 MMHG

## 2021-01-26 VITALS — SYSTOLIC BLOOD PRESSURE: 90 MMHG | DIASTOLIC BLOOD PRESSURE: 38 MMHG

## 2021-01-26 VITALS — DIASTOLIC BLOOD PRESSURE: 52 MMHG | SYSTOLIC BLOOD PRESSURE: 113 MMHG

## 2021-01-26 VITALS — SYSTOLIC BLOOD PRESSURE: 92 MMHG | DIASTOLIC BLOOD PRESSURE: 46 MMHG

## 2021-01-26 VITALS — SYSTOLIC BLOOD PRESSURE: 114 MMHG | DIASTOLIC BLOOD PRESSURE: 54 MMHG

## 2021-01-26 VITALS — DIASTOLIC BLOOD PRESSURE: 58 MMHG | SYSTOLIC BLOOD PRESSURE: 123 MMHG

## 2021-01-26 VITALS — DIASTOLIC BLOOD PRESSURE: 44 MMHG | SYSTOLIC BLOOD PRESSURE: 97 MMHG

## 2021-01-26 VITALS — DIASTOLIC BLOOD PRESSURE: 44 MMHG | SYSTOLIC BLOOD PRESSURE: 83 MMHG

## 2021-01-26 VITALS — DIASTOLIC BLOOD PRESSURE: 50 MMHG | SYSTOLIC BLOOD PRESSURE: 89 MMHG

## 2021-01-26 VITALS — DIASTOLIC BLOOD PRESSURE: 52 MMHG | SYSTOLIC BLOOD PRESSURE: 115 MMHG

## 2021-01-26 VITALS — DIASTOLIC BLOOD PRESSURE: 50 MMHG | SYSTOLIC BLOOD PRESSURE: 116 MMHG

## 2021-01-26 VITALS — SYSTOLIC BLOOD PRESSURE: 120 MMHG | DIASTOLIC BLOOD PRESSURE: 55 MMHG

## 2021-01-26 VITALS — SYSTOLIC BLOOD PRESSURE: 92 MMHG | DIASTOLIC BLOOD PRESSURE: 51 MMHG

## 2021-01-26 VITALS — SYSTOLIC BLOOD PRESSURE: 90 MMHG | DIASTOLIC BLOOD PRESSURE: 47 MMHG

## 2021-01-26 VITALS — SYSTOLIC BLOOD PRESSURE: 109 MMHG | DIASTOLIC BLOOD PRESSURE: 55 MMHG

## 2021-01-26 VITALS — DIASTOLIC BLOOD PRESSURE: 58 MMHG | SYSTOLIC BLOOD PRESSURE: 115 MMHG

## 2021-01-26 VITALS — SYSTOLIC BLOOD PRESSURE: 96 MMHG | DIASTOLIC BLOOD PRESSURE: 44 MMHG

## 2021-01-26 VITALS — SYSTOLIC BLOOD PRESSURE: 114 MMHG | DIASTOLIC BLOOD PRESSURE: 50 MMHG

## 2021-01-26 VITALS — SYSTOLIC BLOOD PRESSURE: 110 MMHG | DIASTOLIC BLOOD PRESSURE: 50 MMHG

## 2021-01-26 VITALS — SYSTOLIC BLOOD PRESSURE: 87 MMHG | DIASTOLIC BLOOD PRESSURE: 44 MMHG

## 2021-01-26 VITALS — SYSTOLIC BLOOD PRESSURE: 130 MMHG | DIASTOLIC BLOOD PRESSURE: 60 MMHG

## 2021-01-26 VITALS — DIASTOLIC BLOOD PRESSURE: 49 MMHG | SYSTOLIC BLOOD PRESSURE: 104 MMHG

## 2021-01-26 VITALS — DIASTOLIC BLOOD PRESSURE: 45 MMHG | SYSTOLIC BLOOD PRESSURE: 92 MMHG

## 2021-01-26 VITALS — SYSTOLIC BLOOD PRESSURE: 116 MMHG | DIASTOLIC BLOOD PRESSURE: 54 MMHG

## 2021-01-26 VITALS — DIASTOLIC BLOOD PRESSURE: 48 MMHG | SYSTOLIC BLOOD PRESSURE: 111 MMHG

## 2021-01-26 VITALS — DIASTOLIC BLOOD PRESSURE: 46 MMHG | SYSTOLIC BLOOD PRESSURE: 105 MMHG

## 2021-01-26 VITALS — DIASTOLIC BLOOD PRESSURE: 51 MMHG | SYSTOLIC BLOOD PRESSURE: 111 MMHG

## 2021-01-26 VITALS — SYSTOLIC BLOOD PRESSURE: 130 MMHG | DIASTOLIC BLOOD PRESSURE: 62 MMHG

## 2021-01-26 VITALS — SYSTOLIC BLOOD PRESSURE: 121 MMHG | DIASTOLIC BLOOD PRESSURE: 52 MMHG

## 2021-01-26 VITALS — SYSTOLIC BLOOD PRESSURE: 108 MMHG | DIASTOLIC BLOOD PRESSURE: 47 MMHG

## 2021-01-26 VITALS — SYSTOLIC BLOOD PRESSURE: 119 MMHG | DIASTOLIC BLOOD PRESSURE: 58 MMHG

## 2021-01-26 VITALS — SYSTOLIC BLOOD PRESSURE: 119 MMHG | DIASTOLIC BLOOD PRESSURE: 53 MMHG

## 2021-01-26 VITALS — DIASTOLIC BLOOD PRESSURE: 45 MMHG | SYSTOLIC BLOOD PRESSURE: 111 MMHG

## 2021-01-26 VITALS — SYSTOLIC BLOOD PRESSURE: 111 MMHG | DIASTOLIC BLOOD PRESSURE: 51 MMHG

## 2021-01-26 VITALS — DIASTOLIC BLOOD PRESSURE: 45 MMHG | SYSTOLIC BLOOD PRESSURE: 88 MMHG

## 2021-01-26 VITALS — SYSTOLIC BLOOD PRESSURE: 96 MMHG | DIASTOLIC BLOOD PRESSURE: 43 MMHG

## 2021-01-26 VITALS — SYSTOLIC BLOOD PRESSURE: 99 MMHG | DIASTOLIC BLOOD PRESSURE: 50 MMHG

## 2021-01-26 VITALS — SYSTOLIC BLOOD PRESSURE: 88 MMHG | DIASTOLIC BLOOD PRESSURE: 46 MMHG

## 2021-01-26 VITALS — SYSTOLIC BLOOD PRESSURE: 105 MMHG | DIASTOLIC BLOOD PRESSURE: 48 MMHG

## 2021-01-26 VITALS — SYSTOLIC BLOOD PRESSURE: 117 MMHG | DIASTOLIC BLOOD PRESSURE: 53 MMHG

## 2021-01-26 VITALS — DIASTOLIC BLOOD PRESSURE: 52 MMHG | SYSTOLIC BLOOD PRESSURE: 109 MMHG

## 2021-01-26 VITALS — DIASTOLIC BLOOD PRESSURE: 49 MMHG | SYSTOLIC BLOOD PRESSURE: 112 MMHG

## 2021-01-26 VITALS — SYSTOLIC BLOOD PRESSURE: 109 MMHG | DIASTOLIC BLOOD PRESSURE: 52 MMHG

## 2021-01-26 VITALS — SYSTOLIC BLOOD PRESSURE: 113 MMHG | DIASTOLIC BLOOD PRESSURE: 50 MMHG

## 2021-01-26 VITALS — SYSTOLIC BLOOD PRESSURE: 94 MMHG | DIASTOLIC BLOOD PRESSURE: 48 MMHG

## 2021-01-26 VITALS — SYSTOLIC BLOOD PRESSURE: 115 MMHG | DIASTOLIC BLOOD PRESSURE: 56 MMHG

## 2021-01-26 VITALS — SYSTOLIC BLOOD PRESSURE: 112 MMHG | DIASTOLIC BLOOD PRESSURE: 45 MMHG

## 2021-01-26 VITALS — SYSTOLIC BLOOD PRESSURE: 105 MMHG | DIASTOLIC BLOOD PRESSURE: 46 MMHG

## 2021-01-26 VITALS — SYSTOLIC BLOOD PRESSURE: 116 MMHG | DIASTOLIC BLOOD PRESSURE: 53 MMHG

## 2021-01-26 LAB
BASE EXCESS BLDA CALC-SCNC: -9.9 MMOL/L
BASOPHILS # BLD AUTO: 0 /CMM (ref 0–0.2)
BASOPHILS NFR BLD AUTO: 0.1 % (ref 0–2)
BUN SERPL-MCNC: 66 MG/DL (ref 7–18)
CALCIUM SERPL-MCNC: 7.8 MG/DL (ref 8.5–10.1)
CHLORIDE SERPL-SCNC: 102 MMOL/L (ref 98–107)
CO2 SERPL-SCNC: 18 MMOL/L (ref 21–32)
CREAT SERPL-MCNC: 3.4 MG/DL (ref 0.6–1.3)
DO-HGB MFR BLDA: 95.1 MMHG
EOSINOPHIL NFR BLD AUTO: 1.1 % (ref 0–6)
EOSINOPHIL NFR BLD MANUAL: 1 % (ref 0–4)
GLUCOSE SERPL-MCNC: 84 MG/DL (ref 74–106)
HCT VFR BLD AUTO: 27 % (ref 33–45)
HGB BLD-MCNC: 8.8 G/DL (ref 11.5–14.8)
INHALED O2 CONCENTRATION: 30 %
INTRINSIC PEEP RESPIRATORY: 5 CM H2O
LYMPHOCYTES NFR BLD AUTO: 1 /CMM (ref 0.8–4.8)
LYMPHOCYTES NFR BLD AUTO: 3 % (ref 20–44)
LYMPHOCYTES NFR BLD MANUAL: 3 % (ref 16–48)
MAGNESIUM SERPL-MCNC: 1.7 MG/DL (ref 1.8–2.4)
MCHC RBC AUTO-ENTMCNC: 33 G/DL (ref 31–36)
MCV RBC AUTO: 95 FL (ref 82–100)
MONOCYTES NFR BLD AUTO: 0.6 /CMM (ref 0.1–1.3)
MONOCYTES NFR BLD AUTO: 1.7 % (ref 2–12)
MONOCYTES NFR BLD MANUAL: 2 % (ref 0–11)
NEUTROPHILS # BLD AUTO: 31.9 /CMM (ref 1.8–8.9)
NEUTROPHILS NFR BLD AUTO: 94.1 % (ref 43–81)
NEUTS SEG NFR BLD MANUAL: 94 % (ref 42–76)
PCO2 TEMP ADJ BLDA: 38.5 MMHG (ref 35–45)
PEEP SETTING VENT: 400 ML
PH TEMP ADJ BLDA: 7.25 [PH] (ref 7.35–7.45)
PHOSPHATE SERPL-MCNC: 5.3 MG/DL (ref 2.5–4.9)
PLATELET # BLD AUTO: 182 /CMM (ref 150–450)
PO2 TEMP ADJ BLDA: 73.6 MMHG (ref 75–100)
POTASSIUM SERPL-SCNC: 3.8 MMOL/L (ref 3.5–5.1)
RBC # BLD AUTO: 2.8 MIL/UL (ref 4–5.2)
SAO2 % BLDA: 92.1 % (ref 92–98.5)
SET RATE, BG: 4
SODIUM SERPL-SCNC: 135 MMOL/L (ref 136–145)
VENTILATION MODE VENT: (no result)
WBC NRBC COR # BLD AUTO: 33.9 K/UL (ref 4.3–11)

## 2021-01-26 RX ADMIN — Medication SCH EACH: at 17:32

## 2021-01-26 RX ADMIN — Medication SCH OZ: at 20:54

## 2021-01-26 RX ADMIN — Medication SCH MG: at 07:42

## 2021-01-26 RX ADMIN — Medication SCH EACH: at 12:04

## 2021-01-26 RX ADMIN — ACETYLCYSTEINE SCH MG: 100 INHALANT RESPIRATORY (INHALATION) at 14:30

## 2021-01-26 RX ADMIN — ACETYLCYSTEINE SCH MG: 100 INHALANT RESPIRATORY (INHALATION) at 23:58

## 2021-01-26 RX ADMIN — Medication SCH EACH: at 06:01

## 2021-01-26 RX ADMIN — MUPIROCIN SCH APPLIC: 20 OINTMENT TOPICAL at 20:55

## 2021-01-26 RX ADMIN — Medication SCH MG: at 03:36

## 2021-01-26 RX ADMIN — MEROPENEM SCH MLS/HR: 500 INJECTION INTRAVENOUS at 03:08

## 2021-01-26 RX ADMIN — Medication SCH MG: at 01:30

## 2021-01-26 RX ADMIN — Medication SCH MG: at 14:30

## 2021-01-26 RX ADMIN — MUPIROCIN SCH APPLIC: 20 OINTMENT TOPICAL at 08:06

## 2021-01-26 RX ADMIN — Medication SCH GM: at 08:06

## 2021-01-26 RX ADMIN — ACETYLCYSTEINE SCH MG: 100 INHALANT RESPIRATORY (INHALATION) at 07:42

## 2021-01-26 RX ADMIN — Medication SCH EACH: at 00:16

## 2021-01-26 RX ADMIN — MEROPENEM SCH MLS/HR: 500 INJECTION INTRAVENOUS at 15:23

## 2021-01-26 RX ADMIN — PROPOFOL PRN MLS/HR: 10 INJECTION, EMULSION INTRAVENOUS at 06:08

## 2021-01-26 RX ADMIN — SODIUM CHLORIDE PRN MLS/HR: 9 INJECTION, SOLUTION INTRAVENOUS at 14:40

## 2021-01-26 RX ADMIN — Medication SCH MG: at 19:35

## 2021-01-26 RX ADMIN — Medication SCH GM: at 16:47

## 2021-01-26 RX ADMIN — FAMOTIDINE SCH MG: 10 INJECTION INTRAVENOUS at 08:05

## 2021-01-26 RX ADMIN — Medication SCH OZ: at 08:06

## 2021-01-26 NOTE — NUR
icu rn note 

 per dr hayes order ok to start Glucerna via n gtube , per dietary recommendation 
started at 20 ml per hour , max dose 45 ml per hour placement, checked by auscultation by 
rushing air  ,keep  hob elevated at all time will  monitor

## 2021-01-26 NOTE — NUR
WOUND CARE FOLLOW UP: PT SEEN FOR RE-EVALUATION OF LEFT HEEL INTACT DEEP TISSUE INJURY WHICH 
REMAINS INTACT. THERE IS DISCOLORATION TO DISTAL TOES AND TO DORSAL FOOT AS WELL. 
RECOMMENDATIONS MADE FOR SKIN PROTECTION. CONTINUE CURRENT TREATMENT. DISCUSSED WITH NURSING 
STAFF. MD IN AGREEMENT WITH PLAN OF CARE. PT IS CURRENTLY INTUBATED. OPEN WOUNDS ARE 
FOLLOWED BY SURGICAL TEAM.

## 2021-01-26 NOTE — NUR
ICU RNNOTE 

CONT ON VENT SETTING AS ORDERED , CONT N G TUBE FEEDING AS ORDERED ,CONT LEVOPHED DRIP AS 
ORDERED, NO SOB FOR NOW WILL MONITOR

## 2021-01-26 NOTE — NUR
ICU RN NOTE 

 ABG DONE, RESULT REPORTED TO DR MATHIS BY RT CANO, AWAITING FOR FURTHER ORDER WILL F\U No

## 2021-01-26 NOTE — NUR
icu rn note 

 patient in bed sedated ,on propofol drip as ordered, and levophed drip as ordered,  with 
trach to vent setting as ordered,  with Schultz cath to gravity ,with edema both upper and 
lower legs dr hayes at bedside notified about it ,  rt femoral picc gabby inplve will 
cont to monitor

## 2021-01-27 VITALS — DIASTOLIC BLOOD PRESSURE: 56 MMHG | SYSTOLIC BLOOD PRESSURE: 121 MMHG

## 2021-01-27 VITALS — SYSTOLIC BLOOD PRESSURE: 104 MMHG | DIASTOLIC BLOOD PRESSURE: 50 MMHG

## 2021-01-27 VITALS — DIASTOLIC BLOOD PRESSURE: 42 MMHG | SYSTOLIC BLOOD PRESSURE: 103 MMHG

## 2021-01-27 VITALS — DIASTOLIC BLOOD PRESSURE: 54 MMHG | SYSTOLIC BLOOD PRESSURE: 113 MMHG

## 2021-01-27 VITALS — DIASTOLIC BLOOD PRESSURE: 45 MMHG | SYSTOLIC BLOOD PRESSURE: 104 MMHG

## 2021-01-27 VITALS — DIASTOLIC BLOOD PRESSURE: 40 MMHG | SYSTOLIC BLOOD PRESSURE: 86 MMHG

## 2021-01-27 VITALS — SYSTOLIC BLOOD PRESSURE: 112 MMHG | DIASTOLIC BLOOD PRESSURE: 54 MMHG

## 2021-01-27 VITALS — DIASTOLIC BLOOD PRESSURE: 46 MMHG | SYSTOLIC BLOOD PRESSURE: 108 MMHG

## 2021-01-27 VITALS — SYSTOLIC BLOOD PRESSURE: 88 MMHG | DIASTOLIC BLOOD PRESSURE: 43 MMHG

## 2021-01-27 VITALS — SYSTOLIC BLOOD PRESSURE: 104 MMHG | DIASTOLIC BLOOD PRESSURE: 43 MMHG

## 2021-01-27 VITALS — DIASTOLIC BLOOD PRESSURE: 41 MMHG | SYSTOLIC BLOOD PRESSURE: 98 MMHG

## 2021-01-27 VITALS — DIASTOLIC BLOOD PRESSURE: 36 MMHG | SYSTOLIC BLOOD PRESSURE: 84 MMHG

## 2021-01-27 VITALS — SYSTOLIC BLOOD PRESSURE: 101 MMHG | DIASTOLIC BLOOD PRESSURE: 43 MMHG

## 2021-01-27 VITALS — DIASTOLIC BLOOD PRESSURE: 47 MMHG | SYSTOLIC BLOOD PRESSURE: 113 MMHG

## 2021-01-27 VITALS — SYSTOLIC BLOOD PRESSURE: 89 MMHG | DIASTOLIC BLOOD PRESSURE: 39 MMHG

## 2021-01-27 VITALS — SYSTOLIC BLOOD PRESSURE: 119 MMHG | DIASTOLIC BLOOD PRESSURE: 52 MMHG

## 2021-01-27 VITALS — SYSTOLIC BLOOD PRESSURE: 89 MMHG | DIASTOLIC BLOOD PRESSURE: 31 MMHG

## 2021-01-27 VITALS — SYSTOLIC BLOOD PRESSURE: 88 MMHG | DIASTOLIC BLOOD PRESSURE: 40 MMHG

## 2021-01-27 VITALS — SYSTOLIC BLOOD PRESSURE: 119 MMHG | DIASTOLIC BLOOD PRESSURE: 50 MMHG

## 2021-01-27 VITALS — DIASTOLIC BLOOD PRESSURE: 45 MMHG | SYSTOLIC BLOOD PRESSURE: 107 MMHG

## 2021-01-27 VITALS — SYSTOLIC BLOOD PRESSURE: 93 MMHG | DIASTOLIC BLOOD PRESSURE: 41 MMHG

## 2021-01-27 VITALS — DIASTOLIC BLOOD PRESSURE: 48 MMHG | SYSTOLIC BLOOD PRESSURE: 114 MMHG

## 2021-01-27 VITALS — DIASTOLIC BLOOD PRESSURE: 48 MMHG | SYSTOLIC BLOOD PRESSURE: 105 MMHG

## 2021-01-27 VITALS — DIASTOLIC BLOOD PRESSURE: 51 MMHG | SYSTOLIC BLOOD PRESSURE: 118 MMHG

## 2021-01-27 VITALS — DIASTOLIC BLOOD PRESSURE: 54 MMHG | SYSTOLIC BLOOD PRESSURE: 120 MMHG

## 2021-01-27 VITALS — DIASTOLIC BLOOD PRESSURE: 49 MMHG | SYSTOLIC BLOOD PRESSURE: 119 MMHG

## 2021-01-27 VITALS — SYSTOLIC BLOOD PRESSURE: 90 MMHG | DIASTOLIC BLOOD PRESSURE: 43 MMHG

## 2021-01-27 VITALS — DIASTOLIC BLOOD PRESSURE: 44 MMHG | SYSTOLIC BLOOD PRESSURE: 94 MMHG

## 2021-01-27 VITALS — DIASTOLIC BLOOD PRESSURE: 53 MMHG | SYSTOLIC BLOOD PRESSURE: 119 MMHG

## 2021-01-27 VITALS — DIASTOLIC BLOOD PRESSURE: 47 MMHG | SYSTOLIC BLOOD PRESSURE: 108 MMHG

## 2021-01-27 VITALS — DIASTOLIC BLOOD PRESSURE: 41 MMHG | SYSTOLIC BLOOD PRESSURE: 92 MMHG

## 2021-01-27 VITALS — SYSTOLIC BLOOD PRESSURE: 99 MMHG | DIASTOLIC BLOOD PRESSURE: 40 MMHG

## 2021-01-27 VITALS — SYSTOLIC BLOOD PRESSURE: 99 MMHG | DIASTOLIC BLOOD PRESSURE: 41 MMHG

## 2021-01-27 VITALS — DIASTOLIC BLOOD PRESSURE: 43 MMHG | SYSTOLIC BLOOD PRESSURE: 104 MMHG

## 2021-01-27 VITALS — DIASTOLIC BLOOD PRESSURE: 44 MMHG | SYSTOLIC BLOOD PRESSURE: 107 MMHG

## 2021-01-27 VITALS — SYSTOLIC BLOOD PRESSURE: 110 MMHG | DIASTOLIC BLOOD PRESSURE: 42 MMHG

## 2021-01-27 VITALS — DIASTOLIC BLOOD PRESSURE: 49 MMHG | SYSTOLIC BLOOD PRESSURE: 122 MMHG

## 2021-01-27 VITALS — SYSTOLIC BLOOD PRESSURE: 101 MMHG | DIASTOLIC BLOOD PRESSURE: 38 MMHG

## 2021-01-27 VITALS — SYSTOLIC BLOOD PRESSURE: 94 MMHG | DIASTOLIC BLOOD PRESSURE: 31 MMHG

## 2021-01-27 VITALS — DIASTOLIC BLOOD PRESSURE: 39 MMHG | SYSTOLIC BLOOD PRESSURE: 94 MMHG

## 2021-01-27 VITALS — DIASTOLIC BLOOD PRESSURE: 45 MMHG | SYSTOLIC BLOOD PRESSURE: 105 MMHG

## 2021-01-27 VITALS — SYSTOLIC BLOOD PRESSURE: 88 MMHG | DIASTOLIC BLOOD PRESSURE: 61 MMHG

## 2021-01-27 VITALS — SYSTOLIC BLOOD PRESSURE: 90 MMHG | DIASTOLIC BLOOD PRESSURE: 40 MMHG

## 2021-01-27 VITALS — SYSTOLIC BLOOD PRESSURE: 121 MMHG | DIASTOLIC BLOOD PRESSURE: 53 MMHG

## 2021-01-27 VITALS — SYSTOLIC BLOOD PRESSURE: 94 MMHG | DIASTOLIC BLOOD PRESSURE: 39 MMHG

## 2021-01-27 VITALS — SYSTOLIC BLOOD PRESSURE: 113 MMHG | DIASTOLIC BLOOD PRESSURE: 56 MMHG

## 2021-01-27 VITALS — DIASTOLIC BLOOD PRESSURE: 50 MMHG | SYSTOLIC BLOOD PRESSURE: 111 MMHG

## 2021-01-27 VITALS — SYSTOLIC BLOOD PRESSURE: 122 MMHG | DIASTOLIC BLOOD PRESSURE: 57 MMHG

## 2021-01-27 VITALS — DIASTOLIC BLOOD PRESSURE: 47 MMHG | SYSTOLIC BLOOD PRESSURE: 109 MMHG

## 2021-01-27 VITALS — SYSTOLIC BLOOD PRESSURE: 107 MMHG | DIASTOLIC BLOOD PRESSURE: 47 MMHG

## 2021-01-27 VITALS — SYSTOLIC BLOOD PRESSURE: 118 MMHG | DIASTOLIC BLOOD PRESSURE: 51 MMHG

## 2021-01-27 VITALS — SYSTOLIC BLOOD PRESSURE: 109 MMHG | DIASTOLIC BLOOD PRESSURE: 54 MMHG

## 2021-01-27 VITALS — SYSTOLIC BLOOD PRESSURE: 98 MMHG | DIASTOLIC BLOOD PRESSURE: 45 MMHG

## 2021-01-27 VITALS — SYSTOLIC BLOOD PRESSURE: 100 MMHG | DIASTOLIC BLOOD PRESSURE: 50 MMHG

## 2021-01-27 VITALS — SYSTOLIC BLOOD PRESSURE: 107 MMHG | DIASTOLIC BLOOD PRESSURE: 42 MMHG

## 2021-01-27 VITALS — DIASTOLIC BLOOD PRESSURE: 54 MMHG | SYSTOLIC BLOOD PRESSURE: 121 MMHG

## 2021-01-27 VITALS — SYSTOLIC BLOOD PRESSURE: 99 MMHG | DIASTOLIC BLOOD PRESSURE: 43 MMHG

## 2021-01-27 VITALS — DIASTOLIC BLOOD PRESSURE: 40 MMHG | SYSTOLIC BLOOD PRESSURE: 104 MMHG

## 2021-01-27 VITALS — DIASTOLIC BLOOD PRESSURE: 42 MMHG | SYSTOLIC BLOOD PRESSURE: 93 MMHG

## 2021-01-27 VITALS — SYSTOLIC BLOOD PRESSURE: 122 MMHG | DIASTOLIC BLOOD PRESSURE: 54 MMHG

## 2021-01-27 VITALS — DIASTOLIC BLOOD PRESSURE: 54 MMHG | SYSTOLIC BLOOD PRESSURE: 119 MMHG

## 2021-01-27 VITALS — SYSTOLIC BLOOD PRESSURE: 103 MMHG | DIASTOLIC BLOOD PRESSURE: 55 MMHG

## 2021-01-27 VITALS — SYSTOLIC BLOOD PRESSURE: 108 MMHG | DIASTOLIC BLOOD PRESSURE: 42 MMHG

## 2021-01-27 VITALS — DIASTOLIC BLOOD PRESSURE: 42 MMHG | SYSTOLIC BLOOD PRESSURE: 101 MMHG

## 2021-01-27 VITALS — DIASTOLIC BLOOD PRESSURE: 48 MMHG | SYSTOLIC BLOOD PRESSURE: 113 MMHG

## 2021-01-27 VITALS — SYSTOLIC BLOOD PRESSURE: 108 MMHG | DIASTOLIC BLOOD PRESSURE: 45 MMHG

## 2021-01-27 VITALS — DIASTOLIC BLOOD PRESSURE: 58 MMHG | SYSTOLIC BLOOD PRESSURE: 117 MMHG

## 2021-01-27 VITALS — DIASTOLIC BLOOD PRESSURE: 38 MMHG | SYSTOLIC BLOOD PRESSURE: 91 MMHG

## 2021-01-27 VITALS — SYSTOLIC BLOOD PRESSURE: 98 MMHG | DIASTOLIC BLOOD PRESSURE: 48 MMHG

## 2021-01-27 VITALS — SYSTOLIC BLOOD PRESSURE: 119 MMHG | DIASTOLIC BLOOD PRESSURE: 63 MMHG

## 2021-01-27 VITALS — DIASTOLIC BLOOD PRESSURE: 37 MMHG | SYSTOLIC BLOOD PRESSURE: 117 MMHG

## 2021-01-27 VITALS — DIASTOLIC BLOOD PRESSURE: 37 MMHG | SYSTOLIC BLOOD PRESSURE: 96 MMHG

## 2021-01-27 VITALS — DIASTOLIC BLOOD PRESSURE: 38 MMHG | SYSTOLIC BLOOD PRESSURE: 110 MMHG

## 2021-01-27 VITALS — DIASTOLIC BLOOD PRESSURE: 48 MMHG | SYSTOLIC BLOOD PRESSURE: 103 MMHG

## 2021-01-27 VITALS — SYSTOLIC BLOOD PRESSURE: 102 MMHG | DIASTOLIC BLOOD PRESSURE: 41 MMHG

## 2021-01-27 VITALS — SYSTOLIC BLOOD PRESSURE: 124 MMHG | DIASTOLIC BLOOD PRESSURE: 57 MMHG

## 2021-01-27 VITALS — DIASTOLIC BLOOD PRESSURE: 46 MMHG | SYSTOLIC BLOOD PRESSURE: 105 MMHG

## 2021-01-27 VITALS — SYSTOLIC BLOOD PRESSURE: 118 MMHG | DIASTOLIC BLOOD PRESSURE: 49 MMHG

## 2021-01-27 VITALS — SYSTOLIC BLOOD PRESSURE: 107 MMHG | DIASTOLIC BLOOD PRESSURE: 46 MMHG

## 2021-01-27 VITALS — SYSTOLIC BLOOD PRESSURE: 109 MMHG | DIASTOLIC BLOOD PRESSURE: 46 MMHG

## 2021-01-27 VITALS — SYSTOLIC BLOOD PRESSURE: 92 MMHG | DIASTOLIC BLOOD PRESSURE: 40 MMHG

## 2021-01-27 VITALS — SYSTOLIC BLOOD PRESSURE: 111 MMHG | DIASTOLIC BLOOD PRESSURE: 56 MMHG

## 2021-01-27 VITALS — SYSTOLIC BLOOD PRESSURE: 100 MMHG | DIASTOLIC BLOOD PRESSURE: 67 MMHG

## 2021-01-27 VITALS — DIASTOLIC BLOOD PRESSURE: 45 MMHG | SYSTOLIC BLOOD PRESSURE: 103 MMHG

## 2021-01-27 VITALS — DIASTOLIC BLOOD PRESSURE: 31 MMHG | SYSTOLIC BLOOD PRESSURE: 96 MMHG

## 2021-01-27 VITALS — DIASTOLIC BLOOD PRESSURE: 43 MMHG | SYSTOLIC BLOOD PRESSURE: 102 MMHG

## 2021-01-27 VITALS — SYSTOLIC BLOOD PRESSURE: 119 MMHG | DIASTOLIC BLOOD PRESSURE: 55 MMHG

## 2021-01-27 VITALS — DIASTOLIC BLOOD PRESSURE: 45 MMHG | SYSTOLIC BLOOD PRESSURE: 102 MMHG

## 2021-01-27 VITALS — SYSTOLIC BLOOD PRESSURE: 104 MMHG | DIASTOLIC BLOOD PRESSURE: 45 MMHG

## 2021-01-27 VITALS — SYSTOLIC BLOOD PRESSURE: 95 MMHG | DIASTOLIC BLOOD PRESSURE: 41 MMHG

## 2021-01-27 LAB
BASE EXCESS BLDA CALC-SCNC: -12.9 MMOL/L
BUN SERPL-MCNC: 70 MG/DL (ref 7–18)
CALCIUM SERPL-MCNC: 8.4 MG/DL (ref 8.5–10.1)
CHLORIDE SERPL-SCNC: 102 MMOL/L (ref 98–107)
CO2 SERPL-SCNC: 16 MMOL/L (ref 21–32)
CREAT SERPL-MCNC: 3.5 MG/DL (ref 0.6–1.3)
DO-HGB MFR BLDA: 71.5 MMHG
GLUCOSE SERPL-MCNC: 134 MG/DL (ref 74–106)
INHALED O2 CONCENTRATION: 30 %
INTRINSIC PEEP RESPIRATORY: 5 CM H2O
PCO2 TEMP ADJ BLDA: 32.2 MMHG (ref 35–45)
PEEP SETTING VENT: 400 ML
PH TEMP ADJ BLDA: 7.24 [PH] (ref 7.35–7.45)
PO2 TEMP ADJ BLDA: 104.6 MMHG (ref 75–100)
POTASSIUM SERPL-SCNC: 4.1 MMOL/L (ref 3.5–5.1)
SAO2 % BLDA: 97.1 % (ref 92–98.5)
SODIUM SERPL-SCNC: 137 MMOL/L (ref 136–145)
VENTILATION MODE VENT: (no result)

## 2021-01-27 RX ADMIN — Medication SCH GM: at 09:06

## 2021-01-27 RX ADMIN — Medication SCH MG: at 20:08

## 2021-01-27 RX ADMIN — Medication SCH MG: at 02:29

## 2021-01-27 RX ADMIN — Medication SCH OZ: at 09:06

## 2021-01-27 RX ADMIN — Medication SCH MG: at 14:22

## 2021-01-27 RX ADMIN — SODIUM CHLORIDE PRN MLS/HR: 9 INJECTION, SOLUTION INTRAVENOUS at 18:36

## 2021-01-27 RX ADMIN — Medication SCH EACH: at 12:17

## 2021-01-27 RX ADMIN — MUPIROCIN SCH APPLIC: 20 OINTMENT TOPICAL at 22:47

## 2021-01-27 RX ADMIN — FAMOTIDINE SCH MG: 10 INJECTION INTRAVENOUS at 09:02

## 2021-01-27 RX ADMIN — MEROPENEM SCH MLS/HR: 500 INJECTION INTRAVENOUS at 03:14

## 2021-01-27 RX ADMIN — ACETYLCYSTEINE SCH MG: 100 INHALANT RESPIRATORY (INHALATION) at 14:22

## 2021-01-27 RX ADMIN — MEROPENEM SCH MLS/HR: 500 INJECTION INTRAVENOUS at 14:36

## 2021-01-27 RX ADMIN — Medication SCH EACH: at 00:19

## 2021-01-27 RX ADMIN — PROPOFOL PRN MLS/HR: 10 INJECTION, EMULSION INTRAVENOUS at 15:37

## 2021-01-27 RX ADMIN — INSULIN HUMAN PRN UNIT: 100 INJECTION, SOLUTION PARENTERAL at 17:44

## 2021-01-27 RX ADMIN — Medication SCH GM: at 16:17

## 2021-01-27 RX ADMIN — SODIUM CITRATE AND CITRIC ACID MONOHYDRATE SCH ML: 500; 334 SOLUTION ORAL at 12:17

## 2021-01-27 RX ADMIN — Medication SCH OZ: at 22:47

## 2021-01-27 RX ADMIN — SODIUM CITRATE AND CITRIC ACID MONOHYDRATE SCH ML: 500; 334 SOLUTION ORAL at 22:47

## 2021-01-27 RX ADMIN — Medication SCH EACH: at 05:52

## 2021-01-27 RX ADMIN — MUPIROCIN SCH APPLIC: 20 OINTMENT TOPICAL at 09:06

## 2021-01-27 RX ADMIN — Medication SCH EACH: at 17:35

## 2021-01-27 RX ADMIN — INSULIN HUMAN PRN UNIT: 100 INJECTION, SOLUTION PARENTERAL at 12:21

## 2021-01-27 RX ADMIN — ACETYLCYSTEINE SCH MG: 100 INHALANT RESPIRATORY (INHALATION) at 07:27

## 2021-01-27 RX ADMIN — Medication SCH MG: at 07:27

## 2021-01-27 RX ADMIN — SODIUM CITRATE AND CITRIC ACID MONOHYDRATE SCH ML: 500; 334 SOLUTION ORAL at 16:17

## 2021-01-27 NOTE — NUR
RT



REPOSITIONED AND RETAPED PT'S ETT TUBE, CURRENT PLACEMENT IS 22 CM AT THE LIP. TODAYS 
MORNING CHEST XRAY SHOWS TIP OF ETT TUBE 2.9 CM ABOVE ANDREWS. TUBE RETAPED IN CENTER 
POSITION. RN JAMIE INFORMED OF SKIN BREAKDOWN ON UPPER RIGHT SIDE LIP.

## 2021-01-27 NOTE — NUR
DIPROVAN RESTARTED THIS SHIFT DUE TO PATIENT BREATHING OVER VENT WITH INCREASED PEAK 
PRESSURES. PT CURRENTLY ON 10 MCG DIPROVAN. LEVO INCREASED THIS SHIFT FROM 0.3 MCG TO 0.32 
MCG. PT REMAINS ON PRESCRIBED VENT SETTINGS, NO RESPIRATORY DISTRESS. ALL SAFETY MEASURES IN 
PLACE.

## 2021-01-28 VITALS — DIASTOLIC BLOOD PRESSURE: 53 MMHG | SYSTOLIC BLOOD PRESSURE: 110 MMHG

## 2021-01-28 VITALS — SYSTOLIC BLOOD PRESSURE: 116 MMHG | DIASTOLIC BLOOD PRESSURE: 56 MMHG

## 2021-01-28 VITALS — DIASTOLIC BLOOD PRESSURE: 46 MMHG | SYSTOLIC BLOOD PRESSURE: 106 MMHG

## 2021-01-28 VITALS — SYSTOLIC BLOOD PRESSURE: 105 MMHG | DIASTOLIC BLOOD PRESSURE: 46 MMHG

## 2021-01-28 VITALS — SYSTOLIC BLOOD PRESSURE: 102 MMHG | DIASTOLIC BLOOD PRESSURE: 46 MMHG

## 2021-01-28 VITALS — SYSTOLIC BLOOD PRESSURE: 95 MMHG | DIASTOLIC BLOOD PRESSURE: 40 MMHG

## 2021-01-28 VITALS — SYSTOLIC BLOOD PRESSURE: 122 MMHG | DIASTOLIC BLOOD PRESSURE: 57 MMHG

## 2021-01-28 VITALS — SYSTOLIC BLOOD PRESSURE: 104 MMHG | DIASTOLIC BLOOD PRESSURE: 43 MMHG

## 2021-01-28 VITALS — DIASTOLIC BLOOD PRESSURE: 45 MMHG | SYSTOLIC BLOOD PRESSURE: 101 MMHG

## 2021-01-28 VITALS — SYSTOLIC BLOOD PRESSURE: 102 MMHG | DIASTOLIC BLOOD PRESSURE: 47 MMHG

## 2021-01-28 VITALS — DIASTOLIC BLOOD PRESSURE: 38 MMHG | SYSTOLIC BLOOD PRESSURE: 89 MMHG

## 2021-01-28 VITALS — SYSTOLIC BLOOD PRESSURE: 95 MMHG | DIASTOLIC BLOOD PRESSURE: 49 MMHG

## 2021-01-28 VITALS — SYSTOLIC BLOOD PRESSURE: 118 MMHG | DIASTOLIC BLOOD PRESSURE: 50 MMHG

## 2021-01-28 VITALS — SYSTOLIC BLOOD PRESSURE: 91 MMHG | DIASTOLIC BLOOD PRESSURE: 42 MMHG

## 2021-01-28 VITALS — DIASTOLIC BLOOD PRESSURE: 35 MMHG | SYSTOLIC BLOOD PRESSURE: 89 MMHG

## 2021-01-28 VITALS — SYSTOLIC BLOOD PRESSURE: 126 MMHG | DIASTOLIC BLOOD PRESSURE: 61 MMHG

## 2021-01-28 VITALS — SYSTOLIC BLOOD PRESSURE: 116 MMHG | DIASTOLIC BLOOD PRESSURE: 50 MMHG

## 2021-01-28 VITALS — DIASTOLIC BLOOD PRESSURE: 51 MMHG | SYSTOLIC BLOOD PRESSURE: 102 MMHG

## 2021-01-28 VITALS — DIASTOLIC BLOOD PRESSURE: 56 MMHG | SYSTOLIC BLOOD PRESSURE: 110 MMHG

## 2021-01-28 VITALS — DIASTOLIC BLOOD PRESSURE: 40 MMHG | SYSTOLIC BLOOD PRESSURE: 91 MMHG

## 2021-01-28 VITALS — SYSTOLIC BLOOD PRESSURE: 91 MMHG | DIASTOLIC BLOOD PRESSURE: 44 MMHG

## 2021-01-28 VITALS — SYSTOLIC BLOOD PRESSURE: 100 MMHG | DIASTOLIC BLOOD PRESSURE: 45 MMHG

## 2021-01-28 VITALS — DIASTOLIC BLOOD PRESSURE: 41 MMHG | SYSTOLIC BLOOD PRESSURE: 97 MMHG

## 2021-01-28 VITALS — DIASTOLIC BLOOD PRESSURE: 51 MMHG | SYSTOLIC BLOOD PRESSURE: 118 MMHG

## 2021-01-28 VITALS — DIASTOLIC BLOOD PRESSURE: 50 MMHG | SYSTOLIC BLOOD PRESSURE: 112 MMHG

## 2021-01-28 VITALS — SYSTOLIC BLOOD PRESSURE: 107 MMHG | DIASTOLIC BLOOD PRESSURE: 49 MMHG

## 2021-01-28 VITALS — DIASTOLIC BLOOD PRESSURE: 49 MMHG | SYSTOLIC BLOOD PRESSURE: 119 MMHG

## 2021-01-28 VITALS — DIASTOLIC BLOOD PRESSURE: 47 MMHG | SYSTOLIC BLOOD PRESSURE: 107 MMHG

## 2021-01-28 VITALS — SYSTOLIC BLOOD PRESSURE: 124 MMHG | DIASTOLIC BLOOD PRESSURE: 55 MMHG

## 2021-01-28 VITALS — DIASTOLIC BLOOD PRESSURE: 50 MMHG | SYSTOLIC BLOOD PRESSURE: 116 MMHG

## 2021-01-28 VITALS — DIASTOLIC BLOOD PRESSURE: 63 MMHG | SYSTOLIC BLOOD PRESSURE: 123 MMHG

## 2021-01-28 VITALS — SYSTOLIC BLOOD PRESSURE: 115 MMHG | DIASTOLIC BLOOD PRESSURE: 61 MMHG

## 2021-01-28 VITALS — SYSTOLIC BLOOD PRESSURE: 123 MMHG | DIASTOLIC BLOOD PRESSURE: 54 MMHG

## 2021-01-28 VITALS — DIASTOLIC BLOOD PRESSURE: 54 MMHG | SYSTOLIC BLOOD PRESSURE: 120 MMHG

## 2021-01-28 VITALS — DIASTOLIC BLOOD PRESSURE: 47 MMHG | SYSTOLIC BLOOD PRESSURE: 102 MMHG

## 2021-01-28 VITALS — SYSTOLIC BLOOD PRESSURE: 119 MMHG | DIASTOLIC BLOOD PRESSURE: 58 MMHG

## 2021-01-28 VITALS — DIASTOLIC BLOOD PRESSURE: 39 MMHG | SYSTOLIC BLOOD PRESSURE: 82 MMHG

## 2021-01-28 VITALS — DIASTOLIC BLOOD PRESSURE: 54 MMHG | SYSTOLIC BLOOD PRESSURE: 115 MMHG

## 2021-01-28 VITALS — SYSTOLIC BLOOD PRESSURE: 88 MMHG | DIASTOLIC BLOOD PRESSURE: 45 MMHG

## 2021-01-28 VITALS — SYSTOLIC BLOOD PRESSURE: 109 MMHG | DIASTOLIC BLOOD PRESSURE: 43 MMHG

## 2021-01-28 VITALS — DIASTOLIC BLOOD PRESSURE: 51 MMHG | SYSTOLIC BLOOD PRESSURE: 111 MMHG

## 2021-01-28 VITALS — DIASTOLIC BLOOD PRESSURE: 47 MMHG | SYSTOLIC BLOOD PRESSURE: 106 MMHG

## 2021-01-28 VITALS — DIASTOLIC BLOOD PRESSURE: 50 MMHG | SYSTOLIC BLOOD PRESSURE: 104 MMHG

## 2021-01-28 VITALS — SYSTOLIC BLOOD PRESSURE: 100 MMHG | DIASTOLIC BLOOD PRESSURE: 48 MMHG

## 2021-01-28 VITALS — SYSTOLIC BLOOD PRESSURE: 92 MMHG | DIASTOLIC BLOOD PRESSURE: 40 MMHG

## 2021-01-28 VITALS — DIASTOLIC BLOOD PRESSURE: 52 MMHG | SYSTOLIC BLOOD PRESSURE: 117 MMHG

## 2021-01-28 VITALS — SYSTOLIC BLOOD PRESSURE: 94 MMHG | DIASTOLIC BLOOD PRESSURE: 47 MMHG

## 2021-01-28 VITALS — SYSTOLIC BLOOD PRESSURE: 103 MMHG | DIASTOLIC BLOOD PRESSURE: 48 MMHG

## 2021-01-28 VITALS — DIASTOLIC BLOOD PRESSURE: 47 MMHG | SYSTOLIC BLOOD PRESSURE: 109 MMHG

## 2021-01-28 VITALS — SYSTOLIC BLOOD PRESSURE: 119 MMHG | DIASTOLIC BLOOD PRESSURE: 55 MMHG

## 2021-01-28 VITALS — SYSTOLIC BLOOD PRESSURE: 97 MMHG | DIASTOLIC BLOOD PRESSURE: 48 MMHG

## 2021-01-28 VITALS — DIASTOLIC BLOOD PRESSURE: 43 MMHG | SYSTOLIC BLOOD PRESSURE: 102 MMHG

## 2021-01-28 VITALS — DIASTOLIC BLOOD PRESSURE: 45 MMHG | SYSTOLIC BLOOD PRESSURE: 94 MMHG

## 2021-01-28 VITALS — DIASTOLIC BLOOD PRESSURE: 39 MMHG | SYSTOLIC BLOOD PRESSURE: 94 MMHG

## 2021-01-28 VITALS — DIASTOLIC BLOOD PRESSURE: 60 MMHG | SYSTOLIC BLOOD PRESSURE: 132 MMHG

## 2021-01-28 VITALS — SYSTOLIC BLOOD PRESSURE: 123 MMHG | DIASTOLIC BLOOD PRESSURE: 44 MMHG

## 2021-01-28 VITALS — SYSTOLIC BLOOD PRESSURE: 91 MMHG | DIASTOLIC BLOOD PRESSURE: 45 MMHG

## 2021-01-28 VITALS — SYSTOLIC BLOOD PRESSURE: 84 MMHG | DIASTOLIC BLOOD PRESSURE: 40 MMHG

## 2021-01-28 VITALS — DIASTOLIC BLOOD PRESSURE: 63 MMHG | SYSTOLIC BLOOD PRESSURE: 114 MMHG

## 2021-01-28 VITALS — DIASTOLIC BLOOD PRESSURE: 47 MMHG | SYSTOLIC BLOOD PRESSURE: 87 MMHG

## 2021-01-28 VITALS — DIASTOLIC BLOOD PRESSURE: 52 MMHG | SYSTOLIC BLOOD PRESSURE: 107 MMHG

## 2021-01-28 VITALS — SYSTOLIC BLOOD PRESSURE: 114 MMHG | DIASTOLIC BLOOD PRESSURE: 48 MMHG

## 2021-01-28 VITALS — SYSTOLIC BLOOD PRESSURE: 119 MMHG | DIASTOLIC BLOOD PRESSURE: 46 MMHG

## 2021-01-28 VITALS — DIASTOLIC BLOOD PRESSURE: 52 MMHG | SYSTOLIC BLOOD PRESSURE: 116 MMHG

## 2021-01-28 VITALS — DIASTOLIC BLOOD PRESSURE: 52 MMHG | SYSTOLIC BLOOD PRESSURE: 108 MMHG

## 2021-01-28 VITALS — DIASTOLIC BLOOD PRESSURE: 55 MMHG | SYSTOLIC BLOOD PRESSURE: 121 MMHG

## 2021-01-28 VITALS — SYSTOLIC BLOOD PRESSURE: 117 MMHG | DIASTOLIC BLOOD PRESSURE: 56 MMHG

## 2021-01-28 VITALS — SYSTOLIC BLOOD PRESSURE: 95 MMHG | DIASTOLIC BLOOD PRESSURE: 56 MMHG

## 2021-01-28 VITALS — DIASTOLIC BLOOD PRESSURE: 50 MMHG | SYSTOLIC BLOOD PRESSURE: 99 MMHG

## 2021-01-28 VITALS — SYSTOLIC BLOOD PRESSURE: 105 MMHG | DIASTOLIC BLOOD PRESSURE: 44 MMHG

## 2021-01-28 VITALS — SYSTOLIC BLOOD PRESSURE: 114 MMHG | DIASTOLIC BLOOD PRESSURE: 49 MMHG

## 2021-01-28 VITALS — DIASTOLIC BLOOD PRESSURE: 47 MMHG | SYSTOLIC BLOOD PRESSURE: 117 MMHG

## 2021-01-28 VITALS — SYSTOLIC BLOOD PRESSURE: 134 MMHG | DIASTOLIC BLOOD PRESSURE: 60 MMHG

## 2021-01-28 VITALS — DIASTOLIC BLOOD PRESSURE: 47 MMHG | SYSTOLIC BLOOD PRESSURE: 108 MMHG

## 2021-01-28 VITALS — DIASTOLIC BLOOD PRESSURE: 48 MMHG | SYSTOLIC BLOOD PRESSURE: 111 MMHG

## 2021-01-28 VITALS — DIASTOLIC BLOOD PRESSURE: 49 MMHG | SYSTOLIC BLOOD PRESSURE: 113 MMHG

## 2021-01-28 VITALS — DIASTOLIC BLOOD PRESSURE: 53 MMHG | SYSTOLIC BLOOD PRESSURE: 124 MMHG

## 2021-01-28 VITALS — DIASTOLIC BLOOD PRESSURE: 45 MMHG | SYSTOLIC BLOOD PRESSURE: 103 MMHG

## 2021-01-28 VITALS — DIASTOLIC BLOOD PRESSURE: 53 MMHG | SYSTOLIC BLOOD PRESSURE: 96 MMHG

## 2021-01-28 VITALS — SYSTOLIC BLOOD PRESSURE: 104 MMHG | DIASTOLIC BLOOD PRESSURE: 53 MMHG

## 2021-01-28 VITALS — SYSTOLIC BLOOD PRESSURE: 132 MMHG | DIASTOLIC BLOOD PRESSURE: 67 MMHG

## 2021-01-28 VITALS — SYSTOLIC BLOOD PRESSURE: 112 MMHG | DIASTOLIC BLOOD PRESSURE: 50 MMHG

## 2021-01-28 VITALS — SYSTOLIC BLOOD PRESSURE: 105 MMHG | DIASTOLIC BLOOD PRESSURE: 52 MMHG

## 2021-01-28 VITALS — SYSTOLIC BLOOD PRESSURE: 106 MMHG | DIASTOLIC BLOOD PRESSURE: 50 MMHG

## 2021-01-28 VITALS — DIASTOLIC BLOOD PRESSURE: 49 MMHG | SYSTOLIC BLOOD PRESSURE: 126 MMHG

## 2021-01-28 VITALS — DIASTOLIC BLOOD PRESSURE: 48 MMHG | SYSTOLIC BLOOD PRESSURE: 106 MMHG

## 2021-01-28 VITALS — DIASTOLIC BLOOD PRESSURE: 58 MMHG | SYSTOLIC BLOOD PRESSURE: 139 MMHG

## 2021-01-28 VITALS — DIASTOLIC BLOOD PRESSURE: 54 MMHG | SYSTOLIC BLOOD PRESSURE: 123 MMHG

## 2021-01-28 VITALS — SYSTOLIC BLOOD PRESSURE: 100 MMHG | DIASTOLIC BLOOD PRESSURE: 46 MMHG

## 2021-01-28 VITALS — DIASTOLIC BLOOD PRESSURE: 41 MMHG | SYSTOLIC BLOOD PRESSURE: 96 MMHG

## 2021-01-28 VITALS — DIASTOLIC BLOOD PRESSURE: 43 MMHG | SYSTOLIC BLOOD PRESSURE: 79 MMHG

## 2021-01-28 VITALS — DIASTOLIC BLOOD PRESSURE: 46 MMHG | SYSTOLIC BLOOD PRESSURE: 110 MMHG

## 2021-01-28 LAB
BASOPHILS # BLD AUTO: 0 /CMM (ref 0–0.2)
BASOPHILS NFR BLD AUTO: 0 % (ref 0–2)
BUN SERPL-MCNC: 74 MG/DL (ref 7–18)
CALCIUM SERPL-MCNC: 8.5 MG/DL (ref 8.5–10.1)
CHLORIDE SERPL-SCNC: 104 MMOL/L (ref 98–107)
CO2 SERPL-SCNC: 17 MMOL/L (ref 21–32)
CREAT SERPL-MCNC: 3.8 MG/DL (ref 0.6–1.3)
EOSINOPHIL NFR BLD AUTO: 0.5 % (ref 0–6)
GLUCOSE SERPL-MCNC: 140 MG/DL (ref 74–106)
HCT VFR BLD AUTO: 23 % (ref 33–45)
HGB BLD-MCNC: 7.4 G/DL (ref 11.5–14.8)
LYMPHOCYTES NFR BLD AUTO: 0.6 /CMM (ref 0.8–4.8)
LYMPHOCYTES NFR BLD AUTO: 2.4 % (ref 20–44)
MAGNESIUM SERPL-MCNC: 2 MG/DL (ref 1.8–2.4)
MCHC RBC AUTO-ENTMCNC: 32 G/DL (ref 31–36)
MCV RBC AUTO: 96 FL (ref 82–100)
MONOCYTES NFR BLD AUTO: 0.5 /CMM (ref 0.1–1.3)
MONOCYTES NFR BLD AUTO: 1.9 % (ref 2–12)
NEUTROPHILS # BLD AUTO: 22.8 /CMM (ref 1.8–8.9)
NEUTROPHILS NFR BLD AUTO: 95.2 % (ref 43–81)
PHOSPHATE SERPL-MCNC: 5.6 MG/DL (ref 2.5–4.9)
PLATELET # BLD AUTO: 198 /CMM (ref 150–450)
POTASSIUM SERPL-SCNC: 4 MMOL/L (ref 3.5–5.1)
RBC # BLD AUTO: 2.41 MIL/UL (ref 4–5.2)
SODIUM SERPL-SCNC: 137 MMOL/L (ref 136–145)
WBC NRBC COR # BLD AUTO: 23.9 K/UL (ref 4.3–11)

## 2021-01-28 RX ADMIN — Medication SCH MG: at 01:28

## 2021-01-28 RX ADMIN — INSULIN HUMAN PRN UNIT: 100 INJECTION, SOLUTION PARENTERAL at 18:51

## 2021-01-28 RX ADMIN — SODIUM CITRATE AND CITRIC ACID MONOHYDRATE SCH ML: 500; 334 SOLUTION ORAL at 08:28

## 2021-01-28 RX ADMIN — SODIUM CHLORIDE SCH MG: 9 INJECTION, SOLUTION INTRAVENOUS at 10:50

## 2021-01-28 RX ADMIN — SODIUM CITRATE AND CITRIC ACID MONOHYDRATE SCH ML: 500; 334 SOLUTION ORAL at 18:52

## 2021-01-28 RX ADMIN — SODIUM CITRATE AND CITRIC ACID MONOHYDRATE SCH ML: 500; 334 SOLUTION ORAL at 21:24

## 2021-01-28 RX ADMIN — Medication SCH GM: at 09:54

## 2021-01-28 RX ADMIN — ACETYLCYSTEINE SCH MG: 100 INHALANT RESPIRATORY (INHALATION) at 00:08

## 2021-01-28 RX ADMIN — Medication SCH MG: at 15:46

## 2021-01-28 RX ADMIN — Medication SCH OZ: at 21:00

## 2021-01-28 RX ADMIN — INSULIN HUMAN PRN UNIT: 100 INJECTION, SOLUTION PARENTERAL at 00:42

## 2021-01-28 RX ADMIN — MEROPENEM SCH MLS/HR: 500 INJECTION INTRAVENOUS at 18:51

## 2021-01-28 RX ADMIN — SODIUM CHLORIDE SCH MG: 9 INJECTION, SOLUTION INTRAVENOUS at 18:51

## 2021-01-28 RX ADMIN — Medication SCH MG: at 19:50

## 2021-01-28 RX ADMIN — Medication SCH GM: at 17:00

## 2021-01-28 RX ADMIN — Medication SCH EACH: at 19:04

## 2021-01-28 RX ADMIN — ACETYLCYSTEINE SCH MG: 100 INHALANT RESPIRATORY (INHALATION) at 23:13

## 2021-01-28 RX ADMIN — ACETYLCYSTEINE SCH MG: 100 INHALANT RESPIRATORY (INHALATION) at 15:46

## 2021-01-28 RX ADMIN — ACETYLCYSTEINE SCH MG: 100 INHALANT RESPIRATORY (INHALATION) at 09:23

## 2021-01-28 RX ADMIN — MEROPENEM SCH MLS/HR: 500 INJECTION INTRAVENOUS at 03:30

## 2021-01-28 RX ADMIN — PROPOFOL PRN MLS/HR: 10 INJECTION, EMULSION INTRAVENOUS at 03:30

## 2021-01-28 RX ADMIN — Medication SCH EACH: at 00:40

## 2021-01-28 RX ADMIN — MUPIROCIN SCH APPLIC: 20 OINTMENT TOPICAL at 08:30

## 2021-01-28 RX ADMIN — FAMOTIDINE SCH MG: 10 INJECTION INTRAVENOUS at 08:28

## 2021-01-28 RX ADMIN — Medication SCH EACH: at 11:21

## 2021-01-28 RX ADMIN — INSULIN HUMAN PRN UNIT: 100 INJECTION, SOLUTION PARENTERAL at 06:02

## 2021-01-28 RX ADMIN — SODIUM CITRATE AND CITRIC ACID MONOHYDRATE SCH ML: 500; 334 SOLUTION ORAL at 12:19

## 2021-01-28 RX ADMIN — Medication SCH OZ: at 09:54

## 2021-01-28 RX ADMIN — Medication SCH MG: at 09:23

## 2021-01-28 RX ADMIN — MUPIROCIN SCH APPLIC: 20 OINTMENT TOPICAL at 21:25

## 2021-01-28 RX ADMIN — Medication SCH EACH: at 06:01

## 2021-01-28 NOTE — NUR
RN ICU OPENING NOTE



PT SEDATED ON VENT SETTINGS AS ORDERED, NSR ON BEDSIDE MONITOR WITH NO SIGN OF RESP 
DISTRESS, PT SPO2 OF 99%. PT HAS RT NARE NGT RUNNING GLUCERNA @ 30ML/HR WITH A GOAL OF 
45ML/HR MAX. PT HAS ASHBY CATH DRAINING ABIOLA COLORED URINE TO GRAVITY. PT CURRENTLY ON 
DIPRIVAN 10 MCG/KG/MIN, LEVO 0.15 MCG/KG/MIN. PT RT FEMORAL PICC LINE INTACT WITH NO SIGNS 
OF INFILTRATION/INFECTION, IS PATENT. PT HAS GENERALIZED EDEMA IN ALL 4 EXTREMITIES AND 
BILAT ARM SKIN IS WEEPING, LT HEEL DTI, ALL COVERED. PT BILAT SOFT WRIST RESTRAINTS, CMS 
INTACT AND WILL CONT TO MONITOR. ALL PT SAFETY MEASURES IN PLACE. WILL CONT TO MONITOR

## 2021-01-28 NOTE — NUR
RN NOTE



NGT RESIDUALS OF 190CC. PER DR VELOZ, HOLD FEED UNTIL RESIDUALS ARE GONE AND RESTART 
FEED AT 20CC/HR

## 2021-01-28 NOTE — NUR
RN NOTE



PT GTUBE RESIDUALS 120CC. PER DR AMTHIS, CONT FEED @ 30ML/HR, OKAY TO ADMIN NGT MEDS, AND 
ORDER REGLAN IV Q8H

## 2021-01-28 NOTE — NUR
ICU RN

PT DECLINES INSULIN COVERAGE AS HE DOES NOT USE INSULIN AT HOME.

-------------------------------------------------------------------------------

Addendum: 01/28/21 at 0659 by LUNA RASMUSSEN RN

-------------------------------------------------------------------------------

WRONG PT

## 2021-01-28 NOTE — NUR
RN NOTE



PT IN STABLE CONDITION. NO SIGNS OF RESP DISTRESS. ALL SAFETY MEASURES IN PLACE. WILL 
ENDORSE DASHAWN TO ONCOMING NURSE

## 2021-01-29 VITALS — SYSTOLIC BLOOD PRESSURE: 90 MMHG | DIASTOLIC BLOOD PRESSURE: 42 MMHG

## 2021-01-29 VITALS — DIASTOLIC BLOOD PRESSURE: 50 MMHG | SYSTOLIC BLOOD PRESSURE: 110 MMHG

## 2021-01-29 VITALS — DIASTOLIC BLOOD PRESSURE: 50 MMHG | SYSTOLIC BLOOD PRESSURE: 96 MMHG

## 2021-01-29 VITALS — SYSTOLIC BLOOD PRESSURE: 78 MMHG | DIASTOLIC BLOOD PRESSURE: 37 MMHG

## 2021-01-29 VITALS — DIASTOLIC BLOOD PRESSURE: 45 MMHG | SYSTOLIC BLOOD PRESSURE: 99 MMHG

## 2021-01-29 VITALS — DIASTOLIC BLOOD PRESSURE: 46 MMHG | SYSTOLIC BLOOD PRESSURE: 96 MMHG

## 2021-01-29 VITALS — DIASTOLIC BLOOD PRESSURE: 43 MMHG | SYSTOLIC BLOOD PRESSURE: 92 MMHG

## 2021-01-29 VITALS — DIASTOLIC BLOOD PRESSURE: 41 MMHG | SYSTOLIC BLOOD PRESSURE: 89 MMHG

## 2021-01-29 VITALS — SYSTOLIC BLOOD PRESSURE: 93 MMHG | DIASTOLIC BLOOD PRESSURE: 41 MMHG

## 2021-01-29 VITALS — DIASTOLIC BLOOD PRESSURE: 37 MMHG | SYSTOLIC BLOOD PRESSURE: 71 MMHG

## 2021-01-29 VITALS — DIASTOLIC BLOOD PRESSURE: 46 MMHG | SYSTOLIC BLOOD PRESSURE: 95 MMHG

## 2021-01-29 VITALS — SYSTOLIC BLOOD PRESSURE: 99 MMHG | DIASTOLIC BLOOD PRESSURE: 47 MMHG

## 2021-01-29 VITALS — SYSTOLIC BLOOD PRESSURE: 97 MMHG | DIASTOLIC BLOOD PRESSURE: 43 MMHG

## 2021-01-29 VITALS — SYSTOLIC BLOOD PRESSURE: 117 MMHG | DIASTOLIC BLOOD PRESSURE: 49 MMHG

## 2021-01-29 VITALS — DIASTOLIC BLOOD PRESSURE: 48 MMHG | SYSTOLIC BLOOD PRESSURE: 99 MMHG

## 2021-01-29 VITALS — DIASTOLIC BLOOD PRESSURE: 49 MMHG | SYSTOLIC BLOOD PRESSURE: 113 MMHG

## 2021-01-29 VITALS — SYSTOLIC BLOOD PRESSURE: 115 MMHG | DIASTOLIC BLOOD PRESSURE: 46 MMHG

## 2021-01-29 VITALS — DIASTOLIC BLOOD PRESSURE: 31 MMHG | SYSTOLIC BLOOD PRESSURE: 61 MMHG

## 2021-01-29 VITALS — DIASTOLIC BLOOD PRESSURE: 48 MMHG | SYSTOLIC BLOOD PRESSURE: 95 MMHG

## 2021-01-29 VITALS — DIASTOLIC BLOOD PRESSURE: 41 MMHG | SYSTOLIC BLOOD PRESSURE: 93 MMHG

## 2021-01-29 VITALS — DIASTOLIC BLOOD PRESSURE: 47 MMHG | SYSTOLIC BLOOD PRESSURE: 93 MMHG

## 2021-01-29 VITALS — SYSTOLIC BLOOD PRESSURE: 116 MMHG | DIASTOLIC BLOOD PRESSURE: 54 MMHG

## 2021-01-29 VITALS — DIASTOLIC BLOOD PRESSURE: 44 MMHG | SYSTOLIC BLOOD PRESSURE: 95 MMHG

## 2021-01-29 VITALS — SYSTOLIC BLOOD PRESSURE: 89 MMHG | DIASTOLIC BLOOD PRESSURE: 43 MMHG

## 2021-01-29 VITALS — DIASTOLIC BLOOD PRESSURE: 46 MMHG | SYSTOLIC BLOOD PRESSURE: 99 MMHG

## 2021-01-29 VITALS — SYSTOLIC BLOOD PRESSURE: 82 MMHG | DIASTOLIC BLOOD PRESSURE: 42 MMHG

## 2021-01-29 VITALS — SYSTOLIC BLOOD PRESSURE: 99 MMHG | DIASTOLIC BLOOD PRESSURE: 40 MMHG

## 2021-01-29 VITALS — DIASTOLIC BLOOD PRESSURE: 42 MMHG | SYSTOLIC BLOOD PRESSURE: 82 MMHG

## 2021-01-29 VITALS — SYSTOLIC BLOOD PRESSURE: 110 MMHG | DIASTOLIC BLOOD PRESSURE: 48 MMHG

## 2021-01-29 VITALS — SYSTOLIC BLOOD PRESSURE: 104 MMHG | DIASTOLIC BLOOD PRESSURE: 54 MMHG

## 2021-01-29 VITALS — SYSTOLIC BLOOD PRESSURE: 98 MMHG | DIASTOLIC BLOOD PRESSURE: 45 MMHG

## 2021-01-29 VITALS — SYSTOLIC BLOOD PRESSURE: 99 MMHG | DIASTOLIC BLOOD PRESSURE: 50 MMHG

## 2021-01-29 VITALS — DIASTOLIC BLOOD PRESSURE: 47 MMHG | SYSTOLIC BLOOD PRESSURE: 111 MMHG

## 2021-01-29 VITALS — SYSTOLIC BLOOD PRESSURE: 80 MMHG | DIASTOLIC BLOOD PRESSURE: 36 MMHG

## 2021-01-29 VITALS — DIASTOLIC BLOOD PRESSURE: 45 MMHG | SYSTOLIC BLOOD PRESSURE: 100 MMHG

## 2021-01-29 VITALS — DIASTOLIC BLOOD PRESSURE: 46 MMHG | SYSTOLIC BLOOD PRESSURE: 93 MMHG

## 2021-01-29 VITALS — SYSTOLIC BLOOD PRESSURE: 90 MMHG | DIASTOLIC BLOOD PRESSURE: 45 MMHG

## 2021-01-29 VITALS — DIASTOLIC BLOOD PRESSURE: 45 MMHG | SYSTOLIC BLOOD PRESSURE: 108 MMHG

## 2021-01-29 VITALS — DIASTOLIC BLOOD PRESSURE: 42 MMHG | SYSTOLIC BLOOD PRESSURE: 94 MMHG

## 2021-01-29 VITALS — DIASTOLIC BLOOD PRESSURE: 42 MMHG | SYSTOLIC BLOOD PRESSURE: 100 MMHG

## 2021-01-29 VITALS — DIASTOLIC BLOOD PRESSURE: 57 MMHG | SYSTOLIC BLOOD PRESSURE: 104 MMHG

## 2021-01-29 VITALS — DIASTOLIC BLOOD PRESSURE: 43 MMHG | SYSTOLIC BLOOD PRESSURE: 101 MMHG

## 2021-01-29 VITALS — SYSTOLIC BLOOD PRESSURE: 100 MMHG | DIASTOLIC BLOOD PRESSURE: 46 MMHG

## 2021-01-29 VITALS — SYSTOLIC BLOOD PRESSURE: 104 MMHG | DIASTOLIC BLOOD PRESSURE: 43 MMHG

## 2021-01-29 VITALS — DIASTOLIC BLOOD PRESSURE: 40 MMHG | SYSTOLIC BLOOD PRESSURE: 91 MMHG

## 2021-01-29 VITALS — SYSTOLIC BLOOD PRESSURE: 92 MMHG | DIASTOLIC BLOOD PRESSURE: 40 MMHG

## 2021-01-29 VITALS — DIASTOLIC BLOOD PRESSURE: 41 MMHG | SYSTOLIC BLOOD PRESSURE: 70 MMHG

## 2021-01-29 VITALS — DIASTOLIC BLOOD PRESSURE: 47 MMHG | SYSTOLIC BLOOD PRESSURE: 97 MMHG

## 2021-01-29 VITALS — DIASTOLIC BLOOD PRESSURE: 45 MMHG | SYSTOLIC BLOOD PRESSURE: 103 MMHG

## 2021-01-29 VITALS — SYSTOLIC BLOOD PRESSURE: 101 MMHG | DIASTOLIC BLOOD PRESSURE: 44 MMHG

## 2021-01-29 VITALS — SYSTOLIC BLOOD PRESSURE: 104 MMHG | DIASTOLIC BLOOD PRESSURE: 48 MMHG

## 2021-01-29 VITALS — SYSTOLIC BLOOD PRESSURE: 96 MMHG | DIASTOLIC BLOOD PRESSURE: 45 MMHG

## 2021-01-29 VITALS — DIASTOLIC BLOOD PRESSURE: 44 MMHG | SYSTOLIC BLOOD PRESSURE: 96 MMHG

## 2021-01-29 VITALS — DIASTOLIC BLOOD PRESSURE: 42 MMHG | SYSTOLIC BLOOD PRESSURE: 81 MMHG

## 2021-01-29 VITALS — SYSTOLIC BLOOD PRESSURE: 95 MMHG | DIASTOLIC BLOOD PRESSURE: 49 MMHG

## 2021-01-29 VITALS — DIASTOLIC BLOOD PRESSURE: 43 MMHG | SYSTOLIC BLOOD PRESSURE: 108 MMHG

## 2021-01-29 VITALS — DIASTOLIC BLOOD PRESSURE: 44 MMHG | SYSTOLIC BLOOD PRESSURE: 110 MMHG

## 2021-01-29 VITALS — DIASTOLIC BLOOD PRESSURE: 51 MMHG | SYSTOLIC BLOOD PRESSURE: 84 MMHG

## 2021-01-29 VITALS — DIASTOLIC BLOOD PRESSURE: 45 MMHG | SYSTOLIC BLOOD PRESSURE: 91 MMHG

## 2021-01-29 VITALS — DIASTOLIC BLOOD PRESSURE: 42 MMHG | SYSTOLIC BLOOD PRESSURE: 83 MMHG

## 2021-01-29 VITALS — DIASTOLIC BLOOD PRESSURE: 46 MMHG | SYSTOLIC BLOOD PRESSURE: 103 MMHG

## 2021-01-29 VITALS — DIASTOLIC BLOOD PRESSURE: 43 MMHG | SYSTOLIC BLOOD PRESSURE: 89 MMHG

## 2021-01-29 VITALS — SYSTOLIC BLOOD PRESSURE: 102 MMHG | DIASTOLIC BLOOD PRESSURE: 37 MMHG

## 2021-01-29 VITALS — SYSTOLIC BLOOD PRESSURE: 103 MMHG | DIASTOLIC BLOOD PRESSURE: 44 MMHG

## 2021-01-29 VITALS — SYSTOLIC BLOOD PRESSURE: 95 MMHG | DIASTOLIC BLOOD PRESSURE: 40 MMHG

## 2021-01-29 VITALS — SYSTOLIC BLOOD PRESSURE: 101 MMHG | DIASTOLIC BLOOD PRESSURE: 46 MMHG

## 2021-01-29 VITALS — SYSTOLIC BLOOD PRESSURE: 99 MMHG | DIASTOLIC BLOOD PRESSURE: 46 MMHG

## 2021-01-29 VITALS — SYSTOLIC BLOOD PRESSURE: 95 MMHG | DIASTOLIC BLOOD PRESSURE: 46 MMHG

## 2021-01-29 VITALS — DIASTOLIC BLOOD PRESSURE: 52 MMHG | SYSTOLIC BLOOD PRESSURE: 121 MMHG

## 2021-01-29 VITALS — SYSTOLIC BLOOD PRESSURE: 96 MMHG | DIASTOLIC BLOOD PRESSURE: 50 MMHG

## 2021-01-29 VITALS — SYSTOLIC BLOOD PRESSURE: 102 MMHG | DIASTOLIC BLOOD PRESSURE: 44 MMHG

## 2021-01-29 VITALS — DIASTOLIC BLOOD PRESSURE: 47 MMHG | SYSTOLIC BLOOD PRESSURE: 94 MMHG

## 2021-01-29 VITALS — SYSTOLIC BLOOD PRESSURE: 94 MMHG | DIASTOLIC BLOOD PRESSURE: 46 MMHG

## 2021-01-29 VITALS — SYSTOLIC BLOOD PRESSURE: 97 MMHG | DIASTOLIC BLOOD PRESSURE: 42 MMHG

## 2021-01-29 VITALS — DIASTOLIC BLOOD PRESSURE: 48 MMHG | SYSTOLIC BLOOD PRESSURE: 104 MMHG

## 2021-01-29 VITALS — SYSTOLIC BLOOD PRESSURE: 95 MMHG | DIASTOLIC BLOOD PRESSURE: 43 MMHG

## 2021-01-29 VITALS — SYSTOLIC BLOOD PRESSURE: 86 MMHG | DIASTOLIC BLOOD PRESSURE: 43 MMHG

## 2021-01-29 VITALS — DIASTOLIC BLOOD PRESSURE: 49 MMHG | SYSTOLIC BLOOD PRESSURE: 98 MMHG

## 2021-01-29 VITALS — DIASTOLIC BLOOD PRESSURE: 45 MMHG | SYSTOLIC BLOOD PRESSURE: 101 MMHG

## 2021-01-29 VITALS — DIASTOLIC BLOOD PRESSURE: 48 MMHG | SYSTOLIC BLOOD PRESSURE: 85 MMHG

## 2021-01-29 VITALS — DIASTOLIC BLOOD PRESSURE: 45 MMHG | SYSTOLIC BLOOD PRESSURE: 94 MMHG

## 2021-01-29 VITALS — SYSTOLIC BLOOD PRESSURE: 94 MMHG | DIASTOLIC BLOOD PRESSURE: 39 MMHG

## 2021-01-29 VITALS — DIASTOLIC BLOOD PRESSURE: 40 MMHG | SYSTOLIC BLOOD PRESSURE: 99 MMHG

## 2021-01-29 VITALS — DIASTOLIC BLOOD PRESSURE: 43 MMHG | SYSTOLIC BLOOD PRESSURE: 90 MMHG

## 2021-01-29 VITALS — DIASTOLIC BLOOD PRESSURE: 37 MMHG | SYSTOLIC BLOOD PRESSURE: 83 MMHG

## 2021-01-29 LAB
BASE EXCESS BLDA CALC-SCNC: -4.4 MMOL/L
BASOPHILS # BLD AUTO: 0 /CMM (ref 0–0.2)
BASOPHILS NFR BLD AUTO: 0.1 % (ref 0–2)
BUN SERPL-MCNC: 77 MG/DL (ref 7–18)
CALCIUM SERPL-MCNC: 8.2 MG/DL (ref 8.5–10.1)
CHLORIDE SERPL-SCNC: 104 MMOL/L (ref 98–107)
CO2 SERPL-SCNC: 20 MMOL/L (ref 21–32)
CREAT SERPL-MCNC: 3.8 MG/DL (ref 0.6–1.3)
DO-HGB MFR BLDA: 65 MMHG
EOSINOPHIL NFR BLD AUTO: 1.7 % (ref 0–6)
GLUCOSE SERPL-MCNC: 111 MG/DL (ref 74–106)
HCT VFR BLD AUTO: 22 % (ref 33–45)
HGB BLD-MCNC: 7.2 G/DL (ref 11.5–14.8)
INHALED O2 CONCENTRATION: 30 %
LYMPHOCYTES NFR BLD AUTO: 0.6 /CMM (ref 0.8–4.8)
LYMPHOCYTES NFR BLD AUTO: 4.1 % (ref 20–44)
MCHC RBC AUTO-ENTMCNC: 33 G/DL (ref 31–36)
MCV RBC AUTO: 94 FL (ref 82–100)
MONOCYTES NFR BLD AUTO: 0.3 /CMM (ref 0.1–1.3)
MONOCYTES NFR BLD AUTO: 2.2 % (ref 2–12)
NEUTROPHILS # BLD AUTO: 13.9 /CMM (ref 1.8–8.9)
NEUTROPHILS NFR BLD AUTO: 91.9 % (ref 43–81)
PCO2 TEMP ADJ BLDA: 27.5 MMHG (ref 35–45)
PH TEMP ADJ BLDA: 7.45 [PH] (ref 7.35–7.45)
PLATELET # BLD AUTO: 151 /CMM (ref 150–450)
PO2 TEMP ADJ BLDA: 116.6 MMHG (ref 75–100)
POTASSIUM SERPL-SCNC: 3.9 MMOL/L (ref 3.5–5.1)
RBC # BLD AUTO: 2.36 MIL/UL (ref 4–5.2)
SAO2 % BLDA: 98 % (ref 92–98.5)
SODIUM SERPL-SCNC: 137 MMOL/L (ref 136–145)
VENTILATION MODE VENT: (no result)
WBC NRBC COR # BLD AUTO: 15.1 K/UL (ref 4.3–11)

## 2021-01-29 RX ADMIN — PROPOFOL PRN MLS/HR: 10 INJECTION, EMULSION INTRAVENOUS at 02:30

## 2021-01-29 RX ADMIN — Medication SCH GM: at 09:26

## 2021-01-29 RX ADMIN — Medication SCH OZ: at 09:26

## 2021-01-29 RX ADMIN — Medication SCH EACH: at 11:39

## 2021-01-29 RX ADMIN — Medication SCH MG: at 15:18

## 2021-01-29 RX ADMIN — Medication PRN ML: at 00:15

## 2021-01-29 RX ADMIN — Medication SCH MG: at 19:42

## 2021-01-29 RX ADMIN — CEFEPIME HYDROCHLORIDE SCH MLS/HR: 1 INJECTION, POWDER, FOR SOLUTION INTRAMUSCULAR; INTRAVENOUS at 15:07

## 2021-01-29 RX ADMIN — ACETYLCYSTEINE SCH MG: 100 INHALANT RESPIRATORY (INHALATION) at 23:33

## 2021-01-29 RX ADMIN — ACETYLCYSTEINE SCH MG: 100 INHALANT RESPIRATORY (INHALATION) at 07:54

## 2021-01-29 RX ADMIN — ACETYLCYSTEINE SCH MG: 100 INHALANT RESPIRATORY (INHALATION) at 15:19

## 2021-01-29 RX ADMIN — SODIUM CITRATE AND CITRIC ACID MONOHYDRATE SCH ML: 500; 334 SOLUTION ORAL at 21:36

## 2021-01-29 RX ADMIN — Medication SCH EACH: at 00:06

## 2021-01-29 RX ADMIN — SODIUM CITRATE AND CITRIC ACID MONOHYDRATE SCH ML: 500; 334 SOLUTION ORAL at 17:08

## 2021-01-29 RX ADMIN — Medication SCH EACH: at 06:02

## 2021-01-29 RX ADMIN — SODIUM CHLORIDE SCH MG: 9 INJECTION, SOLUTION INTRAVENOUS at 09:16

## 2021-01-29 RX ADMIN — INSULIN HUMAN PRN UNIT: 100 INJECTION, SOLUTION PARENTERAL at 17:51

## 2021-01-29 RX ADMIN — Medication SCH GM: at 17:09

## 2021-01-29 RX ADMIN — SODIUM CHLORIDE SCH MG: 9 INJECTION, SOLUTION INTRAVENOUS at 02:30

## 2021-01-29 RX ADMIN — MEROPENEM SCH MLS/HR: 500 INJECTION INTRAVENOUS at 02:30

## 2021-01-29 RX ADMIN — SODIUM CITRATE AND CITRIC ACID MONOHYDRATE SCH ML: 500; 334 SOLUTION ORAL at 12:00

## 2021-01-29 RX ADMIN — Medication SCH MG: at 01:57

## 2021-01-29 RX ADMIN — Medication SCH OZ: at 21:34

## 2021-01-29 RX ADMIN — MUPIROCIN SCH APPLIC: 20 OINTMENT TOPICAL at 21:36

## 2021-01-29 RX ADMIN — SODIUM CHLORIDE PRN MLS/HR: 9 INJECTION, SOLUTION INTRAVENOUS at 14:46

## 2021-01-29 RX ADMIN — Medication SCH EACH: at 17:09

## 2021-01-29 RX ADMIN — SODIUM CHLORIDE SCH MG: 9 INJECTION, SOLUTION INTRAVENOUS at 17:08

## 2021-01-29 RX ADMIN — FAMOTIDINE SCH MG: 10 INJECTION INTRAVENOUS at 09:15

## 2021-01-29 RX ADMIN — MUPIROCIN SCH APPLIC: 20 OINTMENT TOPICAL at 09:22

## 2021-01-29 RX ADMIN — Medication SCH MG: at 07:54

## 2021-01-29 RX ADMIN — SODIUM CITRATE AND CITRIC ACID MONOHYDRATE SCH ML: 500; 334 SOLUTION ORAL at 09:16

## 2021-01-29 NOTE — NUR
RT



pt received on mechanical vent with current vent settings. orally intubated, ett 7.5, 
23@lip. alarms on and audible. vent plugged in to red outlet. no sob, no resp distress. ambu 
bag at [facility].

## 2021-01-29 NOTE — NUR
RN CLOSING NOTES



PT REMAINS INTUBATED, SEDATED. NO RESPIRATORY DISTRESS NOTED. LEVOPHED DOSE DECREASED. TUBE 
FEEDING INCREASED. ASHBY IN PLACE. KEPT COMFORTABLE. WILL ENDORSE FOR CONTINUITY OF CARE

## 2021-01-30 VITALS — DIASTOLIC BLOOD PRESSURE: 52 MMHG | SYSTOLIC BLOOD PRESSURE: 113 MMHG

## 2021-01-30 VITALS — DIASTOLIC BLOOD PRESSURE: 46 MMHG | SYSTOLIC BLOOD PRESSURE: 107 MMHG

## 2021-01-30 VITALS — SYSTOLIC BLOOD PRESSURE: 124 MMHG | DIASTOLIC BLOOD PRESSURE: 57 MMHG

## 2021-01-30 VITALS — DIASTOLIC BLOOD PRESSURE: 44 MMHG | SYSTOLIC BLOOD PRESSURE: 105 MMHG

## 2021-01-30 VITALS — SYSTOLIC BLOOD PRESSURE: 114 MMHG | DIASTOLIC BLOOD PRESSURE: 46 MMHG

## 2021-01-30 VITALS — DIASTOLIC BLOOD PRESSURE: 43 MMHG | SYSTOLIC BLOOD PRESSURE: 106 MMHG

## 2021-01-30 VITALS — SYSTOLIC BLOOD PRESSURE: 112 MMHG | DIASTOLIC BLOOD PRESSURE: 46 MMHG

## 2021-01-30 VITALS — SYSTOLIC BLOOD PRESSURE: 89 MMHG | DIASTOLIC BLOOD PRESSURE: 43 MMHG

## 2021-01-30 VITALS — SYSTOLIC BLOOD PRESSURE: 138 MMHG | DIASTOLIC BLOOD PRESSURE: 83 MMHG

## 2021-01-30 VITALS — SYSTOLIC BLOOD PRESSURE: 109 MMHG | DIASTOLIC BLOOD PRESSURE: 45 MMHG

## 2021-01-30 VITALS — DIASTOLIC BLOOD PRESSURE: 43 MMHG | SYSTOLIC BLOOD PRESSURE: 108 MMHG

## 2021-01-30 VITALS — DIASTOLIC BLOOD PRESSURE: 58 MMHG | SYSTOLIC BLOOD PRESSURE: 127 MMHG

## 2021-01-30 VITALS — DIASTOLIC BLOOD PRESSURE: 48 MMHG | SYSTOLIC BLOOD PRESSURE: 99 MMHG

## 2021-01-30 VITALS — DIASTOLIC BLOOD PRESSURE: 58 MMHG | SYSTOLIC BLOOD PRESSURE: 130 MMHG

## 2021-01-30 VITALS — DIASTOLIC BLOOD PRESSURE: 47 MMHG | SYSTOLIC BLOOD PRESSURE: 105 MMHG

## 2021-01-30 VITALS — DIASTOLIC BLOOD PRESSURE: 49 MMHG | SYSTOLIC BLOOD PRESSURE: 113 MMHG

## 2021-01-30 VITALS — SYSTOLIC BLOOD PRESSURE: 90 MMHG | DIASTOLIC BLOOD PRESSURE: 39 MMHG

## 2021-01-30 VITALS — SYSTOLIC BLOOD PRESSURE: 102 MMHG | DIASTOLIC BLOOD PRESSURE: 43 MMHG

## 2021-01-30 VITALS — SYSTOLIC BLOOD PRESSURE: 105 MMHG | DIASTOLIC BLOOD PRESSURE: 46 MMHG

## 2021-01-30 VITALS — DIASTOLIC BLOOD PRESSURE: 58 MMHG | SYSTOLIC BLOOD PRESSURE: 117 MMHG

## 2021-01-30 VITALS — DIASTOLIC BLOOD PRESSURE: 36 MMHG | SYSTOLIC BLOOD PRESSURE: 86 MMHG

## 2021-01-30 VITALS — SYSTOLIC BLOOD PRESSURE: 126 MMHG | DIASTOLIC BLOOD PRESSURE: 58 MMHG

## 2021-01-30 VITALS — SYSTOLIC BLOOD PRESSURE: 100 MMHG | DIASTOLIC BLOOD PRESSURE: 50 MMHG

## 2021-01-30 VITALS — SYSTOLIC BLOOD PRESSURE: 103 MMHG | DIASTOLIC BLOOD PRESSURE: 44 MMHG

## 2021-01-30 VITALS — SYSTOLIC BLOOD PRESSURE: 95 MMHG | DIASTOLIC BLOOD PRESSURE: 46 MMHG

## 2021-01-30 VITALS — SYSTOLIC BLOOD PRESSURE: 102 MMHG | DIASTOLIC BLOOD PRESSURE: 44 MMHG

## 2021-01-30 VITALS — DIASTOLIC BLOOD PRESSURE: 40 MMHG | SYSTOLIC BLOOD PRESSURE: 108 MMHG

## 2021-01-30 VITALS — SYSTOLIC BLOOD PRESSURE: 94 MMHG | DIASTOLIC BLOOD PRESSURE: 47 MMHG

## 2021-01-30 VITALS — SYSTOLIC BLOOD PRESSURE: 99 MMHG | DIASTOLIC BLOOD PRESSURE: 44 MMHG

## 2021-01-30 VITALS — DIASTOLIC BLOOD PRESSURE: 58 MMHG | SYSTOLIC BLOOD PRESSURE: 125 MMHG

## 2021-01-30 VITALS — DIASTOLIC BLOOD PRESSURE: 47 MMHG | SYSTOLIC BLOOD PRESSURE: 109 MMHG

## 2021-01-30 VITALS — DIASTOLIC BLOOD PRESSURE: 50 MMHG | SYSTOLIC BLOOD PRESSURE: 104 MMHG

## 2021-01-30 VITALS — SYSTOLIC BLOOD PRESSURE: 108 MMHG | DIASTOLIC BLOOD PRESSURE: 47 MMHG

## 2021-01-30 VITALS — DIASTOLIC BLOOD PRESSURE: 45 MMHG | SYSTOLIC BLOOD PRESSURE: 99 MMHG

## 2021-01-30 VITALS — DIASTOLIC BLOOD PRESSURE: 55 MMHG | SYSTOLIC BLOOD PRESSURE: 121 MMHG

## 2021-01-30 VITALS — SYSTOLIC BLOOD PRESSURE: 97 MMHG | DIASTOLIC BLOOD PRESSURE: 48 MMHG

## 2021-01-30 VITALS — DIASTOLIC BLOOD PRESSURE: 48 MMHG | SYSTOLIC BLOOD PRESSURE: 106 MMHG

## 2021-01-30 VITALS — SYSTOLIC BLOOD PRESSURE: 104 MMHG | DIASTOLIC BLOOD PRESSURE: 44 MMHG

## 2021-01-30 VITALS — DIASTOLIC BLOOD PRESSURE: 48 MMHG | SYSTOLIC BLOOD PRESSURE: 104 MMHG

## 2021-01-30 VITALS — SYSTOLIC BLOOD PRESSURE: 116 MMHG | DIASTOLIC BLOOD PRESSURE: 53 MMHG

## 2021-01-30 VITALS — SYSTOLIC BLOOD PRESSURE: 111 MMHG | DIASTOLIC BLOOD PRESSURE: 49 MMHG

## 2021-01-30 VITALS — SYSTOLIC BLOOD PRESSURE: 103 MMHG | DIASTOLIC BLOOD PRESSURE: 45 MMHG

## 2021-01-30 VITALS — DIASTOLIC BLOOD PRESSURE: 56 MMHG | SYSTOLIC BLOOD PRESSURE: 129 MMHG

## 2021-01-30 VITALS — SYSTOLIC BLOOD PRESSURE: 101 MMHG | DIASTOLIC BLOOD PRESSURE: 42 MMHG

## 2021-01-30 VITALS — DIASTOLIC BLOOD PRESSURE: 48 MMHG | SYSTOLIC BLOOD PRESSURE: 119 MMHG

## 2021-01-30 VITALS — DIASTOLIC BLOOD PRESSURE: 56 MMHG | SYSTOLIC BLOOD PRESSURE: 128 MMHG

## 2021-01-30 VITALS — DIASTOLIC BLOOD PRESSURE: 47 MMHG | SYSTOLIC BLOOD PRESSURE: 100 MMHG

## 2021-01-30 VITALS — DIASTOLIC BLOOD PRESSURE: 43 MMHG | SYSTOLIC BLOOD PRESSURE: 93 MMHG

## 2021-01-30 VITALS — DIASTOLIC BLOOD PRESSURE: 43 MMHG | SYSTOLIC BLOOD PRESSURE: 111 MMHG

## 2021-01-30 VITALS — DIASTOLIC BLOOD PRESSURE: 41 MMHG | SYSTOLIC BLOOD PRESSURE: 93 MMHG

## 2021-01-30 VITALS — DIASTOLIC BLOOD PRESSURE: 45 MMHG | SYSTOLIC BLOOD PRESSURE: 111 MMHG

## 2021-01-30 VITALS — DIASTOLIC BLOOD PRESSURE: 59 MMHG | SYSTOLIC BLOOD PRESSURE: 146 MMHG

## 2021-01-30 VITALS — DIASTOLIC BLOOD PRESSURE: 50 MMHG | SYSTOLIC BLOOD PRESSURE: 108 MMHG

## 2021-01-30 VITALS — SYSTOLIC BLOOD PRESSURE: 105 MMHG | DIASTOLIC BLOOD PRESSURE: 45 MMHG

## 2021-01-30 VITALS — SYSTOLIC BLOOD PRESSURE: 135 MMHG | DIASTOLIC BLOOD PRESSURE: 55 MMHG

## 2021-01-30 VITALS — DIASTOLIC BLOOD PRESSURE: 44 MMHG | SYSTOLIC BLOOD PRESSURE: 94 MMHG

## 2021-01-30 VITALS — DIASTOLIC BLOOD PRESSURE: 49 MMHG | SYSTOLIC BLOOD PRESSURE: 97 MMHG

## 2021-01-30 VITALS — SYSTOLIC BLOOD PRESSURE: 98 MMHG | DIASTOLIC BLOOD PRESSURE: 46 MMHG

## 2021-01-30 VITALS — SYSTOLIC BLOOD PRESSURE: 107 MMHG | DIASTOLIC BLOOD PRESSURE: 45 MMHG

## 2021-01-30 VITALS — SYSTOLIC BLOOD PRESSURE: 93 MMHG | DIASTOLIC BLOOD PRESSURE: 41 MMHG

## 2021-01-30 VITALS — DIASTOLIC BLOOD PRESSURE: 47 MMHG | SYSTOLIC BLOOD PRESSURE: 106 MMHG

## 2021-01-30 VITALS — DIASTOLIC BLOOD PRESSURE: 47 MMHG | SYSTOLIC BLOOD PRESSURE: 115 MMHG

## 2021-01-30 VITALS — SYSTOLIC BLOOD PRESSURE: 109 MMHG | DIASTOLIC BLOOD PRESSURE: 49 MMHG

## 2021-01-30 VITALS — SYSTOLIC BLOOD PRESSURE: 120 MMHG | DIASTOLIC BLOOD PRESSURE: 47 MMHG

## 2021-01-30 VITALS — DIASTOLIC BLOOD PRESSURE: 47 MMHG | SYSTOLIC BLOOD PRESSURE: 103 MMHG

## 2021-01-30 VITALS — SYSTOLIC BLOOD PRESSURE: 101 MMHG | DIASTOLIC BLOOD PRESSURE: 43 MMHG

## 2021-01-30 VITALS — SYSTOLIC BLOOD PRESSURE: 104 MMHG | DIASTOLIC BLOOD PRESSURE: 46 MMHG

## 2021-01-30 VITALS — DIASTOLIC BLOOD PRESSURE: 43 MMHG | SYSTOLIC BLOOD PRESSURE: 101 MMHG

## 2021-01-30 VITALS — DIASTOLIC BLOOD PRESSURE: 47 MMHG | SYSTOLIC BLOOD PRESSURE: 116 MMHG

## 2021-01-30 VITALS — DIASTOLIC BLOOD PRESSURE: 53 MMHG | SYSTOLIC BLOOD PRESSURE: 123 MMHG

## 2021-01-30 VITALS — SYSTOLIC BLOOD PRESSURE: 113 MMHG | DIASTOLIC BLOOD PRESSURE: 50 MMHG

## 2021-01-30 VITALS — DIASTOLIC BLOOD PRESSURE: 56 MMHG | SYSTOLIC BLOOD PRESSURE: 123 MMHG

## 2021-01-30 VITALS — DIASTOLIC BLOOD PRESSURE: 44 MMHG | SYSTOLIC BLOOD PRESSURE: 107 MMHG

## 2021-01-30 VITALS — DIASTOLIC BLOOD PRESSURE: 42 MMHG | SYSTOLIC BLOOD PRESSURE: 109 MMHG

## 2021-01-30 VITALS — DIASTOLIC BLOOD PRESSURE: 42 MMHG | SYSTOLIC BLOOD PRESSURE: 101 MMHG

## 2021-01-30 VITALS — DIASTOLIC BLOOD PRESSURE: 45 MMHG | SYSTOLIC BLOOD PRESSURE: 133 MMHG

## 2021-01-30 VITALS — SYSTOLIC BLOOD PRESSURE: 100 MMHG | DIASTOLIC BLOOD PRESSURE: 46 MMHG

## 2021-01-30 VITALS — DIASTOLIC BLOOD PRESSURE: 49 MMHG | SYSTOLIC BLOOD PRESSURE: 109 MMHG

## 2021-01-30 VITALS — DIASTOLIC BLOOD PRESSURE: 61 MMHG | SYSTOLIC BLOOD PRESSURE: 138 MMHG

## 2021-01-30 LAB
BASOPHILS # BLD AUTO: 0 /CMM (ref 0–0.2)
BASOPHILS NFR BLD AUTO: 0.1 % (ref 0–2)
BUN SERPL-MCNC: 81 MG/DL (ref 7–18)
CALCIUM SERPL-MCNC: 8.2 MG/DL (ref 8.5–10.1)
CHLORIDE SERPL-SCNC: 104 MMOL/L (ref 98–107)
CO2 SERPL-SCNC: 22 MMOL/L (ref 21–32)
CREAT SERPL-MCNC: 3.8 MG/DL (ref 0.6–1.3)
EOSINOPHIL NFR BLD AUTO: 3.6 % (ref 0–6)
GLUCOSE SERPL-MCNC: 126 MG/DL (ref 74–106)
HCT VFR BLD AUTO: 22 % (ref 33–45)
HGB BLD-MCNC: 7.1 G/DL (ref 11.5–14.8)
LYMPHOCYTES NFR BLD AUTO: 0.6 /CMM (ref 0.8–4.8)
LYMPHOCYTES NFR BLD AUTO: 4.2 % (ref 20–44)
MCHC RBC AUTO-ENTMCNC: 33 G/DL (ref 31–36)
MCV RBC AUTO: 95 FL (ref 82–100)
MONOCYTES NFR BLD AUTO: 0.4 /CMM (ref 0.1–1.3)
MONOCYTES NFR BLD AUTO: 2.7 % (ref 2–12)
NEUTROPHILS # BLD AUTO: 12.7 /CMM (ref 1.8–8.9)
NEUTROPHILS NFR BLD AUTO: 89.4 % (ref 43–81)
PLATELET # BLD AUTO: 138 /CMM (ref 150–450)
POTASSIUM SERPL-SCNC: 4.3 MMOL/L (ref 3.5–5.1)
RBC # BLD AUTO: 2.27 MIL/UL (ref 4–5.2)
SODIUM SERPL-SCNC: 139 MMOL/L (ref 136–145)
WBC NRBC COR # BLD AUTO: 14.2 K/UL (ref 4.3–11)

## 2021-01-30 RX ADMIN — ACETYLCYSTEINE SCH MG: 100 INHALANT RESPIRATORY (INHALATION) at 14:30

## 2021-01-30 RX ADMIN — SODIUM CHLORIDE SCH MG: 9 INJECTION, SOLUTION INTRAVENOUS at 16:51

## 2021-01-30 RX ADMIN — ACETYLCYSTEINE SCH MG: 100 INHALANT RESPIRATORY (INHALATION) at 07:54

## 2021-01-30 RX ADMIN — Medication SCH OZ: at 21:09

## 2021-01-30 RX ADMIN — SODIUM CITRATE AND CITRIC ACID MONOHYDRATE SCH ML: 500; 334 SOLUTION ORAL at 08:25

## 2021-01-30 RX ADMIN — Medication SCH EACH: at 00:03

## 2021-01-30 RX ADMIN — SODIUM CITRATE AND CITRIC ACID MONOHYDRATE SCH ML: 500; 334 SOLUTION ORAL at 21:28

## 2021-01-30 RX ADMIN — SODIUM CITRATE AND CITRIC ACID MONOHYDRATE SCH ML: 500; 334 SOLUTION ORAL at 13:22

## 2021-01-30 RX ADMIN — SODIUM CHLORIDE SCH MG: 9 INJECTION, SOLUTION INTRAVENOUS at 08:26

## 2021-01-30 RX ADMIN — MUPIROCIN SCH APPLIC: 20 OINTMENT TOPICAL at 08:26

## 2021-01-30 RX ADMIN — PROPOFOL PRN MLS/HR: 10 INJECTION, EMULSION INTRAVENOUS at 16:51

## 2021-01-30 RX ADMIN — SODIUM CHLORIDE PRN MLS/HR: 9 INJECTION, SOLUTION INTRAVENOUS at 02:22

## 2021-01-30 RX ADMIN — SODIUM CHLORIDE PRN MLS/HR: 9 INJECTION, SOLUTION INTRAVENOUS at 17:44

## 2021-01-30 RX ADMIN — Medication SCH GM: at 08:26

## 2021-01-30 RX ADMIN — PROPOFOL PRN MLS/HR: 10 INJECTION, EMULSION INTRAVENOUS at 01:25

## 2021-01-30 RX ADMIN — Medication SCH MG: at 20:10

## 2021-01-30 RX ADMIN — FAMOTIDINE SCH MG: 10 INJECTION INTRAVENOUS at 08:26

## 2021-01-30 RX ADMIN — INSULIN HUMAN PRN UNIT: 100 INJECTION, SOLUTION PARENTERAL at 17:45

## 2021-01-30 RX ADMIN — Medication SCH OZ: at 08:26

## 2021-01-30 RX ADMIN — Medication SCH MG: at 13:30

## 2021-01-30 RX ADMIN — ACETYLCYSTEINE SCH MG: 100 INHALANT RESPIRATORY (INHALATION) at 23:06

## 2021-01-30 RX ADMIN — INSULIN HUMAN PRN UNIT: 100 INJECTION, SOLUTION PARENTERAL at 00:43

## 2021-01-30 RX ADMIN — MUPIROCIN SCH APPLIC: 20 OINTMENT TOPICAL at 21:09

## 2021-01-30 RX ADMIN — SODIUM CHLORIDE SCH MG: 9 INJECTION, SOLUTION INTRAVENOUS at 02:38

## 2021-01-30 RX ADMIN — SODIUM CITRATE AND CITRIC ACID MONOHYDRATE SCH ML: 500; 334 SOLUTION ORAL at 16:50

## 2021-01-30 RX ADMIN — Medication SCH MG: at 01:22

## 2021-01-30 RX ADMIN — Medication SCH GM: at 16:51

## 2021-01-30 RX ADMIN — Medication PRN ML: at 08:25

## 2021-01-30 RX ADMIN — Medication SCH MG: at 07:54

## 2021-01-30 RX ADMIN — Medication SCH EACH: at 17:43

## 2021-01-30 RX ADMIN — CEFEPIME HYDROCHLORIDE SCH MLS/HR: 1 INJECTION, POWDER, FOR SOLUTION INTRAMUSCULAR; INTRAVENOUS at 16:50

## 2021-01-30 RX ADMIN — Medication SCH EACH: at 06:03

## 2021-01-30 RX ADMIN — Medication SCH EACH: at 12:30

## 2021-01-30 NOTE — NUR
ICU/RN

PT IS INTUBATED ON THE VENT .AC MODE..FIO2-30%,SAT O2-100%. SEDATED ON DIPRIVAN.ON LEVOPHED 
DRIP,RIGHT FEMORAL  TLC. NG TUBE INFUSING WITH GLUCERNA AT 25 ML/HR, 60 ML RESIDUAL 
NOTED.F/C IN PLACE ,NO URINE OUTPUT.PT HAS GENERALIZED WEEPING EDEMA .BILATERAL ARMS AND 
FEET WOUNDS.LOWER BACK WOUND AND MULTIPLY BRUISES.COVERED WITH DRESSING.SUCTION 
PROVIDED.REPOSITION FOR COMFORT.DUE MEDS ARE GIVEN AS ORDERED.LABS REVIEW.MD NOTIFIED.

## 2021-01-31 VITALS — DIASTOLIC BLOOD PRESSURE: 49 MMHG | SYSTOLIC BLOOD PRESSURE: 98 MMHG

## 2021-01-31 VITALS — SYSTOLIC BLOOD PRESSURE: 116 MMHG | DIASTOLIC BLOOD PRESSURE: 58 MMHG

## 2021-01-31 VITALS — SYSTOLIC BLOOD PRESSURE: 100 MMHG | DIASTOLIC BLOOD PRESSURE: 48 MMHG

## 2021-01-31 VITALS — DIASTOLIC BLOOD PRESSURE: 46 MMHG | SYSTOLIC BLOOD PRESSURE: 109 MMHG

## 2021-01-31 VITALS — SYSTOLIC BLOOD PRESSURE: 94 MMHG | DIASTOLIC BLOOD PRESSURE: 47 MMHG

## 2021-01-31 VITALS — SYSTOLIC BLOOD PRESSURE: 99 MMHG | DIASTOLIC BLOOD PRESSURE: 44 MMHG

## 2021-01-31 VITALS — SYSTOLIC BLOOD PRESSURE: 95 MMHG | DIASTOLIC BLOOD PRESSURE: 47 MMHG

## 2021-01-31 VITALS — DIASTOLIC BLOOD PRESSURE: 47 MMHG | SYSTOLIC BLOOD PRESSURE: 107 MMHG

## 2021-01-31 VITALS — DIASTOLIC BLOOD PRESSURE: 44 MMHG | SYSTOLIC BLOOD PRESSURE: 92 MMHG

## 2021-01-31 VITALS — SYSTOLIC BLOOD PRESSURE: 99 MMHG | DIASTOLIC BLOOD PRESSURE: 49 MMHG

## 2021-01-31 VITALS — SYSTOLIC BLOOD PRESSURE: 101 MMHG | DIASTOLIC BLOOD PRESSURE: 47 MMHG

## 2021-01-31 VITALS — DIASTOLIC BLOOD PRESSURE: 49 MMHG | SYSTOLIC BLOOD PRESSURE: 102 MMHG

## 2021-01-31 VITALS — DIASTOLIC BLOOD PRESSURE: 45 MMHG | SYSTOLIC BLOOD PRESSURE: 94 MMHG

## 2021-01-31 VITALS — SYSTOLIC BLOOD PRESSURE: 106 MMHG | DIASTOLIC BLOOD PRESSURE: 46 MMHG

## 2021-01-31 VITALS — DIASTOLIC BLOOD PRESSURE: 50 MMHG | SYSTOLIC BLOOD PRESSURE: 100 MMHG

## 2021-01-31 VITALS — DIASTOLIC BLOOD PRESSURE: 51 MMHG | SYSTOLIC BLOOD PRESSURE: 100 MMHG

## 2021-01-31 VITALS — DIASTOLIC BLOOD PRESSURE: 42 MMHG | SYSTOLIC BLOOD PRESSURE: 90 MMHG

## 2021-01-31 VITALS — DIASTOLIC BLOOD PRESSURE: 40 MMHG | SYSTOLIC BLOOD PRESSURE: 91 MMHG

## 2021-01-31 VITALS — SYSTOLIC BLOOD PRESSURE: 107 MMHG | DIASTOLIC BLOOD PRESSURE: 51 MMHG

## 2021-01-31 VITALS — DIASTOLIC BLOOD PRESSURE: 46 MMHG | SYSTOLIC BLOOD PRESSURE: 104 MMHG

## 2021-01-31 VITALS — SYSTOLIC BLOOD PRESSURE: 108 MMHG | DIASTOLIC BLOOD PRESSURE: 52 MMHG

## 2021-01-31 VITALS — DIASTOLIC BLOOD PRESSURE: 47 MMHG | SYSTOLIC BLOOD PRESSURE: 98 MMHG

## 2021-01-31 VITALS — SYSTOLIC BLOOD PRESSURE: 117 MMHG | DIASTOLIC BLOOD PRESSURE: 49 MMHG

## 2021-01-31 VITALS — DIASTOLIC BLOOD PRESSURE: 48 MMHG | SYSTOLIC BLOOD PRESSURE: 104 MMHG

## 2021-01-31 VITALS — SYSTOLIC BLOOD PRESSURE: 101 MMHG | DIASTOLIC BLOOD PRESSURE: 46 MMHG

## 2021-01-31 VITALS — SYSTOLIC BLOOD PRESSURE: 98 MMHG | DIASTOLIC BLOOD PRESSURE: 46 MMHG

## 2021-01-31 VITALS — DIASTOLIC BLOOD PRESSURE: 48 MMHG | SYSTOLIC BLOOD PRESSURE: 96 MMHG

## 2021-01-31 VITALS — SYSTOLIC BLOOD PRESSURE: 121 MMHG | DIASTOLIC BLOOD PRESSURE: 56 MMHG

## 2021-01-31 VITALS — SYSTOLIC BLOOD PRESSURE: 114 MMHG | DIASTOLIC BLOOD PRESSURE: 49 MMHG

## 2021-01-31 VITALS — DIASTOLIC BLOOD PRESSURE: 47 MMHG | SYSTOLIC BLOOD PRESSURE: 97 MMHG

## 2021-01-31 VITALS — SYSTOLIC BLOOD PRESSURE: 97 MMHG | DIASTOLIC BLOOD PRESSURE: 46 MMHG

## 2021-01-31 VITALS — DIASTOLIC BLOOD PRESSURE: 55 MMHG | SYSTOLIC BLOOD PRESSURE: 121 MMHG

## 2021-01-31 VITALS — DIASTOLIC BLOOD PRESSURE: 45 MMHG | SYSTOLIC BLOOD PRESSURE: 86 MMHG

## 2021-01-31 VITALS — SYSTOLIC BLOOD PRESSURE: 102 MMHG | DIASTOLIC BLOOD PRESSURE: 54 MMHG

## 2021-01-31 VITALS — SYSTOLIC BLOOD PRESSURE: 107 MMHG | DIASTOLIC BLOOD PRESSURE: 55 MMHG

## 2021-01-31 VITALS — DIASTOLIC BLOOD PRESSURE: 48 MMHG | SYSTOLIC BLOOD PRESSURE: 112 MMHG

## 2021-01-31 VITALS — DIASTOLIC BLOOD PRESSURE: 51 MMHG | SYSTOLIC BLOOD PRESSURE: 119 MMHG

## 2021-01-31 VITALS — DIASTOLIC BLOOD PRESSURE: 59 MMHG | SYSTOLIC BLOOD PRESSURE: 122 MMHG

## 2021-01-31 VITALS — DIASTOLIC BLOOD PRESSURE: 43 MMHG | SYSTOLIC BLOOD PRESSURE: 96 MMHG

## 2021-01-31 VITALS — SYSTOLIC BLOOD PRESSURE: 102 MMHG | DIASTOLIC BLOOD PRESSURE: 48 MMHG

## 2021-01-31 VITALS — DIASTOLIC BLOOD PRESSURE: 47 MMHG | SYSTOLIC BLOOD PRESSURE: 106 MMHG

## 2021-01-31 VITALS — SYSTOLIC BLOOD PRESSURE: 78 MMHG | DIASTOLIC BLOOD PRESSURE: 48 MMHG

## 2021-01-31 VITALS — DIASTOLIC BLOOD PRESSURE: 58 MMHG | SYSTOLIC BLOOD PRESSURE: 89 MMHG

## 2021-01-31 VITALS — DIASTOLIC BLOOD PRESSURE: 46 MMHG | SYSTOLIC BLOOD PRESSURE: 97 MMHG

## 2021-01-31 VITALS — DIASTOLIC BLOOD PRESSURE: 50 MMHG | SYSTOLIC BLOOD PRESSURE: 105 MMHG

## 2021-01-31 VITALS — SYSTOLIC BLOOD PRESSURE: 95 MMHG | DIASTOLIC BLOOD PRESSURE: 42 MMHG

## 2021-01-31 VITALS — DIASTOLIC BLOOD PRESSURE: 54 MMHG | SYSTOLIC BLOOD PRESSURE: 104 MMHG

## 2021-01-31 VITALS — SYSTOLIC BLOOD PRESSURE: 108 MMHG | DIASTOLIC BLOOD PRESSURE: 48 MMHG

## 2021-01-31 VITALS — SYSTOLIC BLOOD PRESSURE: 103 MMHG | DIASTOLIC BLOOD PRESSURE: 47 MMHG

## 2021-01-31 VITALS — SYSTOLIC BLOOD PRESSURE: 112 MMHG | DIASTOLIC BLOOD PRESSURE: 48 MMHG

## 2021-01-31 VITALS — SYSTOLIC BLOOD PRESSURE: 114 MMHG | DIASTOLIC BLOOD PRESSURE: 56 MMHG

## 2021-01-31 VITALS — SYSTOLIC BLOOD PRESSURE: 119 MMHG | DIASTOLIC BLOOD PRESSURE: 51 MMHG

## 2021-01-31 VITALS — SYSTOLIC BLOOD PRESSURE: 106 MMHG | DIASTOLIC BLOOD PRESSURE: 48 MMHG

## 2021-01-31 VITALS — DIASTOLIC BLOOD PRESSURE: 50 MMHG | SYSTOLIC BLOOD PRESSURE: 106 MMHG

## 2021-01-31 VITALS — SYSTOLIC BLOOD PRESSURE: 89 MMHG | DIASTOLIC BLOOD PRESSURE: 43 MMHG

## 2021-01-31 VITALS — SYSTOLIC BLOOD PRESSURE: 96 MMHG | DIASTOLIC BLOOD PRESSURE: 47 MMHG

## 2021-01-31 VITALS — SYSTOLIC BLOOD PRESSURE: 106 MMHG | DIASTOLIC BLOOD PRESSURE: 54 MMHG

## 2021-01-31 VITALS — DIASTOLIC BLOOD PRESSURE: 71 MMHG | SYSTOLIC BLOOD PRESSURE: 160 MMHG

## 2021-01-31 VITALS — DIASTOLIC BLOOD PRESSURE: 51 MMHG | SYSTOLIC BLOOD PRESSURE: 99 MMHG

## 2021-01-31 VITALS — DIASTOLIC BLOOD PRESSURE: 50 MMHG | SYSTOLIC BLOOD PRESSURE: 119 MMHG

## 2021-01-31 VITALS — DIASTOLIC BLOOD PRESSURE: 53 MMHG | SYSTOLIC BLOOD PRESSURE: 115 MMHG

## 2021-01-31 VITALS — SYSTOLIC BLOOD PRESSURE: 105 MMHG | DIASTOLIC BLOOD PRESSURE: 50 MMHG

## 2021-01-31 VITALS — SYSTOLIC BLOOD PRESSURE: 122 MMHG | DIASTOLIC BLOOD PRESSURE: 60 MMHG

## 2021-01-31 VITALS — DIASTOLIC BLOOD PRESSURE: 61 MMHG | SYSTOLIC BLOOD PRESSURE: 115 MMHG

## 2021-01-31 VITALS — SYSTOLIC BLOOD PRESSURE: 119 MMHG | DIASTOLIC BLOOD PRESSURE: 55 MMHG

## 2021-01-31 VITALS — DIASTOLIC BLOOD PRESSURE: 48 MMHG | SYSTOLIC BLOOD PRESSURE: 103 MMHG

## 2021-01-31 VITALS — SYSTOLIC BLOOD PRESSURE: 98 MMHG | DIASTOLIC BLOOD PRESSURE: 47 MMHG

## 2021-01-31 VITALS — DIASTOLIC BLOOD PRESSURE: 53 MMHG | SYSTOLIC BLOOD PRESSURE: 118 MMHG

## 2021-01-31 VITALS — SYSTOLIC BLOOD PRESSURE: 112 MMHG | DIASTOLIC BLOOD PRESSURE: 46 MMHG

## 2021-01-31 VITALS — SYSTOLIC BLOOD PRESSURE: 114 MMHG | DIASTOLIC BLOOD PRESSURE: 47 MMHG

## 2021-01-31 VITALS — SYSTOLIC BLOOD PRESSURE: 80 MMHG | DIASTOLIC BLOOD PRESSURE: 42 MMHG

## 2021-01-31 VITALS — DIASTOLIC BLOOD PRESSURE: 48 MMHG | SYSTOLIC BLOOD PRESSURE: 105 MMHG

## 2021-01-31 VITALS — SYSTOLIC BLOOD PRESSURE: 71 MMHG | DIASTOLIC BLOOD PRESSURE: 41 MMHG

## 2021-01-31 VITALS — SYSTOLIC BLOOD PRESSURE: 120 MMHG | DIASTOLIC BLOOD PRESSURE: 53 MMHG

## 2021-01-31 VITALS — SYSTOLIC BLOOD PRESSURE: 110 MMHG | DIASTOLIC BLOOD PRESSURE: 45 MMHG

## 2021-01-31 VITALS — DIASTOLIC BLOOD PRESSURE: 52 MMHG | SYSTOLIC BLOOD PRESSURE: 113 MMHG

## 2021-01-31 VITALS — SYSTOLIC BLOOD PRESSURE: 109 MMHG | DIASTOLIC BLOOD PRESSURE: 49 MMHG

## 2021-01-31 VITALS — DIASTOLIC BLOOD PRESSURE: 53 MMHG | SYSTOLIC BLOOD PRESSURE: 131 MMHG

## 2021-01-31 VITALS — SYSTOLIC BLOOD PRESSURE: 101 MMHG | DIASTOLIC BLOOD PRESSURE: 43 MMHG

## 2021-01-31 VITALS — SYSTOLIC BLOOD PRESSURE: 103 MMHG | DIASTOLIC BLOOD PRESSURE: 45 MMHG

## 2021-01-31 VITALS — DIASTOLIC BLOOD PRESSURE: 54 MMHG | SYSTOLIC BLOOD PRESSURE: 120 MMHG

## 2021-01-31 VITALS — SYSTOLIC BLOOD PRESSURE: 122 MMHG | DIASTOLIC BLOOD PRESSURE: 54 MMHG

## 2021-01-31 VITALS — DIASTOLIC BLOOD PRESSURE: 43 MMHG | SYSTOLIC BLOOD PRESSURE: 91 MMHG

## 2021-01-31 VITALS — SYSTOLIC BLOOD PRESSURE: 141 MMHG | DIASTOLIC BLOOD PRESSURE: 60 MMHG

## 2021-01-31 VITALS — DIASTOLIC BLOOD PRESSURE: 55 MMHG | SYSTOLIC BLOOD PRESSURE: 130 MMHG

## 2021-01-31 VITALS — DIASTOLIC BLOOD PRESSURE: 42 MMHG | SYSTOLIC BLOOD PRESSURE: 78 MMHG

## 2021-01-31 VITALS — SYSTOLIC BLOOD PRESSURE: 99 MMHG | DIASTOLIC BLOOD PRESSURE: 45 MMHG

## 2021-01-31 VITALS — DIASTOLIC BLOOD PRESSURE: 50 MMHG | SYSTOLIC BLOOD PRESSURE: 112 MMHG

## 2021-01-31 VITALS — DIASTOLIC BLOOD PRESSURE: 42 MMHG | SYSTOLIC BLOOD PRESSURE: 96 MMHG

## 2021-01-31 VITALS — DIASTOLIC BLOOD PRESSURE: 49 MMHG | SYSTOLIC BLOOD PRESSURE: 95 MMHG

## 2021-01-31 VITALS — SYSTOLIC BLOOD PRESSURE: 107 MMHG | DIASTOLIC BLOOD PRESSURE: 45 MMHG

## 2021-01-31 VITALS — DIASTOLIC BLOOD PRESSURE: 52 MMHG | SYSTOLIC BLOOD PRESSURE: 118 MMHG

## 2021-01-31 VITALS — DIASTOLIC BLOOD PRESSURE: 46 MMHG | SYSTOLIC BLOOD PRESSURE: 95 MMHG

## 2021-01-31 LAB
BASOPHILS # BLD AUTO: 0 /CMM (ref 0–0.2)
BASOPHILS NFR BLD AUTO: 0.3 % (ref 0–2)
BUN SERPL-MCNC: 85 MG/DL (ref 7–18)
CALCIUM SERPL-MCNC: 8.2 MG/DL (ref 8.5–10.1)
CHLORIDE SERPL-SCNC: 105 MMOL/L (ref 98–107)
CO2 SERPL-SCNC: 23 MMOL/L (ref 21–32)
CREAT SERPL-MCNC: 3.8 MG/DL (ref 0.6–1.3)
EOSINOPHIL NFR BLD AUTO: 3.4 % (ref 0–6)
GLUCOSE SERPL-MCNC: 143 MG/DL (ref 74–106)
HCT VFR BLD AUTO: 21 % (ref 33–45)
HEMOCCULT STL QL: NEGATIVE
HGB BLD-MCNC: 6.9 G/DL (ref 11.5–14.8)
LYMPHOCYTES NFR BLD AUTO: 0.7 /CMM (ref 0.8–4.8)
LYMPHOCYTES NFR BLD AUTO: 5.7 % (ref 20–44)
MAGNESIUM SERPL-MCNC: 2.3 MG/DL (ref 1.8–2.4)
MCHC RBC AUTO-ENTMCNC: 33 G/DL (ref 31–36)
MCV RBC AUTO: 94 FL (ref 82–100)
MONOCYTES NFR BLD AUTO: 0.3 /CMM (ref 0.1–1.3)
MONOCYTES NFR BLD AUTO: 2.7 % (ref 2–12)
NEUTROPHILS # BLD AUTO: 10.4 /CMM (ref 1.8–8.9)
NEUTROPHILS NFR BLD AUTO: 87.9 % (ref 43–81)
PHOSPHATE SERPL-MCNC: 5.6 MG/DL (ref 2.5–4.9)
PLATELET # BLD AUTO: 129 /CMM (ref 150–450)
POTASSIUM SERPL-SCNC: 4.5 MMOL/L (ref 3.5–5.1)
RBC # BLD AUTO: 2.25 MIL/UL (ref 4–5.2)
SODIUM SERPL-SCNC: 138 MMOL/L (ref 136–145)
WBC NRBC COR # BLD AUTO: 11.9 K/UL (ref 4.3–11)

## 2021-01-31 RX ADMIN — SODIUM CITRATE AND CITRIC ACID MONOHYDRATE SCH ML: 500; 334 SOLUTION ORAL at 22:26

## 2021-01-31 RX ADMIN — INSULIN HUMAN PRN UNIT: 100 INJECTION, SOLUTION PARENTERAL at 06:20

## 2021-01-31 RX ADMIN — SODIUM CITRATE AND CITRIC ACID MONOHYDRATE SCH ML: 500; 334 SOLUTION ORAL at 13:07

## 2021-01-31 RX ADMIN — Medication SCH OZ: at 21:00

## 2021-01-31 RX ADMIN — Medication SCH OZ: at 09:43

## 2021-01-31 RX ADMIN — Medication SCH EACH: at 18:07

## 2021-01-31 RX ADMIN — ACETYLCYSTEINE SCH MG: 100 INHALANT RESPIRATORY (INHALATION) at 13:42

## 2021-01-31 RX ADMIN — SODIUM CITRATE AND CITRIC ACID MONOHYDRATE SCH ML: 500; 334 SOLUTION ORAL at 09:09

## 2021-01-31 RX ADMIN — Medication SCH EACH: at 06:18

## 2021-01-31 RX ADMIN — SODIUM CITRATE AND CITRIC ACID MONOHYDRATE SCH ML: 500; 334 SOLUTION ORAL at 18:07

## 2021-01-31 RX ADMIN — Medication SCH GM: at 18:08

## 2021-01-31 RX ADMIN — Medication SCH MG: at 09:18

## 2021-01-31 RX ADMIN — Medication SCH EACH: at 12:57

## 2021-01-31 RX ADMIN — Medication SCH EACH: at 00:57

## 2021-01-31 RX ADMIN — SODIUM CHLORIDE SCH MG: 9 INJECTION, SOLUTION INTRAVENOUS at 02:07

## 2021-01-31 RX ADMIN — MUPIROCIN SCH APPLIC: 20 OINTMENT TOPICAL at 22:26

## 2021-01-31 RX ADMIN — Medication SCH GM: at 09:43

## 2021-01-31 RX ADMIN — PROPOFOL PRN MLS/HR: 10 INJECTION, EMULSION INTRAVENOUS at 05:06

## 2021-01-31 RX ADMIN — ACETYLCYSTEINE SCH MG: 100 INHALANT RESPIRATORY (INHALATION) at 09:18

## 2021-01-31 RX ADMIN — Medication SCH MG: at 01:44

## 2021-01-31 RX ADMIN — Medication SCH MG: at 13:30

## 2021-01-31 RX ADMIN — INSULIN HUMAN PRN UNIT: 100 INJECTION, SOLUTION PARENTERAL at 13:06

## 2021-01-31 RX ADMIN — Medication SCH MG: at 19:16

## 2021-01-31 RX ADMIN — Medication PRN ML: at 22:35

## 2021-01-31 RX ADMIN — MUPIROCIN SCH APPLIC: 20 OINTMENT TOPICAL at 09:43

## 2021-01-31 RX ADMIN — SODIUM CHLORIDE SCH MG: 9 INJECTION, SOLUTION INTRAVENOUS at 18:07

## 2021-01-31 RX ADMIN — FAMOTIDINE SCH MG: 10 INJECTION INTRAVENOUS at 09:09

## 2021-01-31 RX ADMIN — CEFEPIME HYDROCHLORIDE SCH MLS/HR: 1 INJECTION, POWDER, FOR SOLUTION INTRAMUSCULAR; INTRAVENOUS at 18:07

## 2021-01-31 RX ADMIN — ACETYLCYSTEINE SCH MG: 100 INHALANT RESPIRATORY (INHALATION) at 22:51

## 2021-01-31 RX ADMIN — SODIUM CHLORIDE SCH MG: 9 INJECTION, SOLUTION INTRAVENOUS at 09:09

## 2021-01-31 NOTE — NUR
NOTIFIED DR. FERNANDEZ ABOUT CRITICAL LAB OF HGB OF 6.9. NO ORDERS GIVEN AS OF YET. DR 
INFORMED HE WILL LOOKS AT THE LABS AND WILL INPUT ORDERS.

## 2021-01-31 NOTE — NUR
RT NOTE



pt received on mechanical vent with current vent settings. orally intubated, ETT 7.5, 
23@lip. alarms on and audible. vent plugged in to red outlet. no sob, no resp distress. ambu 
bag at [facility].  will continue to monitor t/o shift.

## 2021-02-01 VITALS — SYSTOLIC BLOOD PRESSURE: 140 MMHG | DIASTOLIC BLOOD PRESSURE: 67 MMHG

## 2021-02-01 VITALS — SYSTOLIC BLOOD PRESSURE: 116 MMHG | DIASTOLIC BLOOD PRESSURE: 53 MMHG

## 2021-02-01 VITALS — DIASTOLIC BLOOD PRESSURE: 63 MMHG | SYSTOLIC BLOOD PRESSURE: 118 MMHG

## 2021-02-01 VITALS — DIASTOLIC BLOOD PRESSURE: 55 MMHG | SYSTOLIC BLOOD PRESSURE: 123 MMHG

## 2021-02-01 VITALS — SYSTOLIC BLOOD PRESSURE: 130 MMHG | DIASTOLIC BLOOD PRESSURE: 61 MMHG

## 2021-02-01 VITALS — DIASTOLIC BLOOD PRESSURE: 54 MMHG | SYSTOLIC BLOOD PRESSURE: 124 MMHG

## 2021-02-01 VITALS — SYSTOLIC BLOOD PRESSURE: 109 MMHG | DIASTOLIC BLOOD PRESSURE: 52 MMHG

## 2021-02-01 VITALS — DIASTOLIC BLOOD PRESSURE: 53 MMHG | SYSTOLIC BLOOD PRESSURE: 122 MMHG

## 2021-02-01 VITALS — DIASTOLIC BLOOD PRESSURE: 55 MMHG | SYSTOLIC BLOOD PRESSURE: 121 MMHG

## 2021-02-01 VITALS — SYSTOLIC BLOOD PRESSURE: 120 MMHG | DIASTOLIC BLOOD PRESSURE: 55 MMHG

## 2021-02-01 VITALS — SYSTOLIC BLOOD PRESSURE: 117 MMHG | DIASTOLIC BLOOD PRESSURE: 56 MMHG

## 2021-02-01 VITALS — SYSTOLIC BLOOD PRESSURE: 112 MMHG | DIASTOLIC BLOOD PRESSURE: 50 MMHG

## 2021-02-01 VITALS — DIASTOLIC BLOOD PRESSURE: 51 MMHG | SYSTOLIC BLOOD PRESSURE: 103 MMHG

## 2021-02-01 VITALS — DIASTOLIC BLOOD PRESSURE: 60 MMHG | SYSTOLIC BLOOD PRESSURE: 124 MMHG

## 2021-02-01 VITALS — DIASTOLIC BLOOD PRESSURE: 57 MMHG | SYSTOLIC BLOOD PRESSURE: 122 MMHG

## 2021-02-01 VITALS — SYSTOLIC BLOOD PRESSURE: 128 MMHG | DIASTOLIC BLOOD PRESSURE: 59 MMHG

## 2021-02-01 VITALS — SYSTOLIC BLOOD PRESSURE: 119 MMHG | DIASTOLIC BLOOD PRESSURE: 53 MMHG

## 2021-02-01 VITALS — SYSTOLIC BLOOD PRESSURE: 104 MMHG | DIASTOLIC BLOOD PRESSURE: 49 MMHG

## 2021-02-01 VITALS — DIASTOLIC BLOOD PRESSURE: 61 MMHG | SYSTOLIC BLOOD PRESSURE: 122 MMHG

## 2021-02-01 VITALS — DIASTOLIC BLOOD PRESSURE: 53 MMHG | SYSTOLIC BLOOD PRESSURE: 116 MMHG

## 2021-02-01 VITALS — DIASTOLIC BLOOD PRESSURE: 54 MMHG | SYSTOLIC BLOOD PRESSURE: 119 MMHG

## 2021-02-01 VITALS — DIASTOLIC BLOOD PRESSURE: 63 MMHG | SYSTOLIC BLOOD PRESSURE: 120 MMHG

## 2021-02-01 VITALS — SYSTOLIC BLOOD PRESSURE: 123 MMHG | DIASTOLIC BLOOD PRESSURE: 55 MMHG

## 2021-02-01 VITALS — DIASTOLIC BLOOD PRESSURE: 57 MMHG | SYSTOLIC BLOOD PRESSURE: 125 MMHG

## 2021-02-01 VITALS — DIASTOLIC BLOOD PRESSURE: 50 MMHG | SYSTOLIC BLOOD PRESSURE: 105 MMHG

## 2021-02-01 VITALS — SYSTOLIC BLOOD PRESSURE: 116 MMHG | DIASTOLIC BLOOD PRESSURE: 51 MMHG

## 2021-02-01 VITALS — DIASTOLIC BLOOD PRESSURE: 60 MMHG | SYSTOLIC BLOOD PRESSURE: 123 MMHG

## 2021-02-01 VITALS — DIASTOLIC BLOOD PRESSURE: 57 MMHG | SYSTOLIC BLOOD PRESSURE: 133 MMHG

## 2021-02-01 VITALS — SYSTOLIC BLOOD PRESSURE: 127 MMHG | DIASTOLIC BLOOD PRESSURE: 59 MMHG

## 2021-02-01 VITALS — DIASTOLIC BLOOD PRESSURE: 59 MMHG | SYSTOLIC BLOOD PRESSURE: 131 MMHG

## 2021-02-01 VITALS — SYSTOLIC BLOOD PRESSURE: 133 MMHG | DIASTOLIC BLOOD PRESSURE: 67 MMHG

## 2021-02-01 VITALS — SYSTOLIC BLOOD PRESSURE: 135 MMHG | DIASTOLIC BLOOD PRESSURE: 59 MMHG

## 2021-02-01 VITALS — DIASTOLIC BLOOD PRESSURE: 54 MMHG | SYSTOLIC BLOOD PRESSURE: 105 MMHG

## 2021-02-01 VITALS — SYSTOLIC BLOOD PRESSURE: 125 MMHG | DIASTOLIC BLOOD PRESSURE: 63 MMHG

## 2021-02-01 VITALS — SYSTOLIC BLOOD PRESSURE: 99 MMHG | DIASTOLIC BLOOD PRESSURE: 49 MMHG

## 2021-02-01 VITALS — SYSTOLIC BLOOD PRESSURE: 98 MMHG | DIASTOLIC BLOOD PRESSURE: 56 MMHG

## 2021-02-01 VITALS — DIASTOLIC BLOOD PRESSURE: 77 MMHG | SYSTOLIC BLOOD PRESSURE: 123 MMHG

## 2021-02-01 VITALS — DIASTOLIC BLOOD PRESSURE: 56 MMHG | SYSTOLIC BLOOD PRESSURE: 122 MMHG

## 2021-02-01 VITALS — DIASTOLIC BLOOD PRESSURE: 52 MMHG | SYSTOLIC BLOOD PRESSURE: 109 MMHG

## 2021-02-01 VITALS — SYSTOLIC BLOOD PRESSURE: 98 MMHG | DIASTOLIC BLOOD PRESSURE: 49 MMHG

## 2021-02-01 VITALS — SYSTOLIC BLOOD PRESSURE: 106 MMHG | DIASTOLIC BLOOD PRESSURE: 56 MMHG

## 2021-02-01 VITALS — SYSTOLIC BLOOD PRESSURE: 104 MMHG | DIASTOLIC BLOOD PRESSURE: 45 MMHG

## 2021-02-01 VITALS — SYSTOLIC BLOOD PRESSURE: 114 MMHG | DIASTOLIC BLOOD PRESSURE: 57 MMHG

## 2021-02-01 VITALS — DIASTOLIC BLOOD PRESSURE: 59 MMHG | SYSTOLIC BLOOD PRESSURE: 128 MMHG

## 2021-02-01 VITALS — DIASTOLIC BLOOD PRESSURE: 58 MMHG | SYSTOLIC BLOOD PRESSURE: 123 MMHG

## 2021-02-01 VITALS — DIASTOLIC BLOOD PRESSURE: 59 MMHG | SYSTOLIC BLOOD PRESSURE: 125 MMHG

## 2021-02-01 VITALS — DIASTOLIC BLOOD PRESSURE: 57 MMHG | SYSTOLIC BLOOD PRESSURE: 127 MMHG

## 2021-02-01 VITALS — SYSTOLIC BLOOD PRESSURE: 128 MMHG | DIASTOLIC BLOOD PRESSURE: 60 MMHG

## 2021-02-01 VITALS — SYSTOLIC BLOOD PRESSURE: 123 MMHG | DIASTOLIC BLOOD PRESSURE: 58 MMHG

## 2021-02-01 VITALS — DIASTOLIC BLOOD PRESSURE: 57 MMHG | SYSTOLIC BLOOD PRESSURE: 130 MMHG

## 2021-02-01 VITALS — DIASTOLIC BLOOD PRESSURE: 49 MMHG | SYSTOLIC BLOOD PRESSURE: 98 MMHG

## 2021-02-01 VITALS — DIASTOLIC BLOOD PRESSURE: 56 MMHG | SYSTOLIC BLOOD PRESSURE: 118 MMHG

## 2021-02-01 VITALS — DIASTOLIC BLOOD PRESSURE: 53 MMHG | SYSTOLIC BLOOD PRESSURE: 124 MMHG

## 2021-02-01 VITALS — DIASTOLIC BLOOD PRESSURE: 57 MMHG | SYSTOLIC BLOOD PRESSURE: 123 MMHG

## 2021-02-01 VITALS — DIASTOLIC BLOOD PRESSURE: 54 MMHG | SYSTOLIC BLOOD PRESSURE: 122 MMHG

## 2021-02-01 VITALS — SYSTOLIC BLOOD PRESSURE: 121 MMHG | DIASTOLIC BLOOD PRESSURE: 55 MMHG

## 2021-02-01 VITALS — SYSTOLIC BLOOD PRESSURE: 129 MMHG | DIASTOLIC BLOOD PRESSURE: 58 MMHG

## 2021-02-01 VITALS — SYSTOLIC BLOOD PRESSURE: 105 MMHG | DIASTOLIC BLOOD PRESSURE: 51 MMHG

## 2021-02-01 VITALS — DIASTOLIC BLOOD PRESSURE: 56 MMHG | SYSTOLIC BLOOD PRESSURE: 117 MMHG

## 2021-02-01 VITALS — SYSTOLIC BLOOD PRESSURE: 105 MMHG | DIASTOLIC BLOOD PRESSURE: 49 MMHG

## 2021-02-01 VITALS — SYSTOLIC BLOOD PRESSURE: 112 MMHG | DIASTOLIC BLOOD PRESSURE: 59 MMHG

## 2021-02-01 VITALS — SYSTOLIC BLOOD PRESSURE: 128 MMHG | DIASTOLIC BLOOD PRESSURE: 56 MMHG

## 2021-02-01 VITALS — SYSTOLIC BLOOD PRESSURE: 119 MMHG | DIASTOLIC BLOOD PRESSURE: 58 MMHG

## 2021-02-01 VITALS — DIASTOLIC BLOOD PRESSURE: 57 MMHG | SYSTOLIC BLOOD PRESSURE: 118 MMHG

## 2021-02-01 VITALS — DIASTOLIC BLOOD PRESSURE: 59 MMHG | SYSTOLIC BLOOD PRESSURE: 130 MMHG

## 2021-02-01 VITALS — SYSTOLIC BLOOD PRESSURE: 85 MMHG | DIASTOLIC BLOOD PRESSURE: 53 MMHG

## 2021-02-01 VITALS — DIASTOLIC BLOOD PRESSURE: 55 MMHG | SYSTOLIC BLOOD PRESSURE: 120 MMHG

## 2021-02-01 VITALS — DIASTOLIC BLOOD PRESSURE: 52 MMHG | SYSTOLIC BLOOD PRESSURE: 115 MMHG

## 2021-02-01 VITALS — SYSTOLIC BLOOD PRESSURE: 108 MMHG | DIASTOLIC BLOOD PRESSURE: 47 MMHG

## 2021-02-01 VITALS — SYSTOLIC BLOOD PRESSURE: 124 MMHG | DIASTOLIC BLOOD PRESSURE: 60 MMHG

## 2021-02-01 VITALS — SYSTOLIC BLOOD PRESSURE: 131 MMHG | DIASTOLIC BLOOD PRESSURE: 57 MMHG

## 2021-02-01 VITALS — SYSTOLIC BLOOD PRESSURE: 132 MMHG | DIASTOLIC BLOOD PRESSURE: 64 MMHG

## 2021-02-01 VITALS — DIASTOLIC BLOOD PRESSURE: 55 MMHG | SYSTOLIC BLOOD PRESSURE: 129 MMHG

## 2021-02-01 VITALS — SYSTOLIC BLOOD PRESSURE: 114 MMHG | DIASTOLIC BLOOD PRESSURE: 51 MMHG

## 2021-02-01 VITALS — DIASTOLIC BLOOD PRESSURE: 58 MMHG | SYSTOLIC BLOOD PRESSURE: 127 MMHG

## 2021-02-01 VITALS — SYSTOLIC BLOOD PRESSURE: 129 MMHG | DIASTOLIC BLOOD PRESSURE: 57 MMHG

## 2021-02-01 VITALS — DIASTOLIC BLOOD PRESSURE: 50 MMHG | SYSTOLIC BLOOD PRESSURE: 102 MMHG

## 2021-02-01 VITALS — DIASTOLIC BLOOD PRESSURE: 62 MMHG | SYSTOLIC BLOOD PRESSURE: 125 MMHG

## 2021-02-01 VITALS — DIASTOLIC BLOOD PRESSURE: 46 MMHG | SYSTOLIC BLOOD PRESSURE: 96 MMHG

## 2021-02-01 VITALS — SYSTOLIC BLOOD PRESSURE: 128 MMHG | DIASTOLIC BLOOD PRESSURE: 57 MMHG

## 2021-02-01 VITALS — SYSTOLIC BLOOD PRESSURE: 132 MMHG | DIASTOLIC BLOOD PRESSURE: 59 MMHG

## 2021-02-01 VITALS — DIASTOLIC BLOOD PRESSURE: 58 MMHG | SYSTOLIC BLOOD PRESSURE: 136 MMHG

## 2021-02-01 VITALS — DIASTOLIC BLOOD PRESSURE: 62 MMHG | SYSTOLIC BLOOD PRESSURE: 117 MMHG

## 2021-02-01 LAB
BASE EXCESS BLDA CALC-SCNC: -7.7 MMOL/L
BASOPHILS # BLD AUTO: 0 /CMM (ref 0–0.2)
BASOPHILS NFR BLD AUTO: 0.1 % (ref 0–2)
BUN SERPL-MCNC: 88 MG/DL (ref 7–18)
CALCIUM SERPL-MCNC: 8.6 MG/DL (ref 8.5–10.1)
CHLORIDE SERPL-SCNC: 104 MMOL/L (ref 98–107)
CO2 SERPL-SCNC: 24 MMOL/L (ref 21–32)
CREAT SERPL-MCNC: 3.9 MG/DL (ref 0.6–1.3)
DO-HGB MFR BLDA: 120.1 MMHG
EOSINOPHIL NFR BLD AUTO: 1.4 % (ref 0–6)
GLUCOSE SERPL-MCNC: 132 MG/DL (ref 74–106)
HCT VFR BLD AUTO: 31 % (ref 33–45)
HGB BLD-MCNC: 10.3 G/DL (ref 11.5–14.8)
INHALED O2 CONCENTRATION: 35 %
LYMPHOCYTES NFR BLD AUTO: 0.6 /CMM (ref 0.8–4.8)
LYMPHOCYTES NFR BLD AUTO: 3.7 % (ref 20–44)
MAGNESIUM SERPL-MCNC: 2.1 MG/DL (ref 1.8–2.4)
MCHC RBC AUTO-ENTMCNC: 33 G/DL (ref 31–36)
MCV RBC AUTO: 93 FL (ref 82–100)
MONOCYTES NFR BLD AUTO: 0.3 /CMM (ref 0.1–1.3)
MONOCYTES NFR BLD AUTO: 1.8 % (ref 2–12)
NEUTROPHILS # BLD AUTO: 14.6 /CMM (ref 1.8–8.9)
NEUTROPHILS NFR BLD AUTO: 93 % (ref 43–81)
PCO2 TEMP ADJ BLDA: 39.7 MMHG (ref 35–45)
PH TEMP ADJ BLDA: 7.29 [PH] (ref 7.35–7.45)
PHOSPHATE SERPL-MCNC: 6.1 MG/DL (ref 2.5–4.9)
PLATELET # BLD AUTO: 104 /CMM (ref 150–450)
PO2 TEMP ADJ BLDA: 83.3 MMHG (ref 75–100)
POTASSIUM SERPL-SCNC: 5 MMOL/L (ref 3.5–5.1)
RBC # BLD AUTO: 3.31 MIL/UL (ref 4–5.2)
SAO2 % BLDA: 95.4 % (ref 92–98.5)
SODIUM SERPL-SCNC: 138 MMOL/L (ref 136–145)
VENTILATION MODE VENT: (no result)
WBC NRBC COR # BLD AUTO: 15.7 K/UL (ref 4.3–11)

## 2021-02-01 RX ADMIN — ACETYLCYSTEINE SCH MG: 100 INHALANT RESPIRATORY (INHALATION) at 13:56

## 2021-02-01 RX ADMIN — Medication SCH EACH: at 11:58

## 2021-02-01 RX ADMIN — Medication SCH EACH: at 17:33

## 2021-02-01 RX ADMIN — SODIUM CITRATE AND CITRIC ACID MONOHYDRATE SCH ML: 500; 334 SOLUTION ORAL at 21:13

## 2021-02-01 RX ADMIN — SODIUM CHLORIDE SCH MG: 9 INJECTION, SOLUTION INTRAVENOUS at 17:33

## 2021-02-01 RX ADMIN — Medication SCH MG: at 00:38

## 2021-02-01 RX ADMIN — SODIUM CITRATE AND CITRIC ACID MONOHYDRATE SCH ML: 500; 334 SOLUTION ORAL at 08:21

## 2021-02-01 RX ADMIN — Medication SCH OZ: at 08:23

## 2021-02-01 RX ADMIN — SODIUM CHLORIDE PRN MLS/HR: 9 INJECTION, SOLUTION INTRAVENOUS at 05:56

## 2021-02-01 RX ADMIN — SODIUM CITRATE AND CITRIC ACID MONOHYDRATE SCH ML: 500; 334 SOLUTION ORAL at 16:34

## 2021-02-01 RX ADMIN — INSULIN HUMAN PRN UNIT: 100 INJECTION, SOLUTION PARENTERAL at 12:02

## 2021-02-01 RX ADMIN — INSULIN HUMAN PRN UNIT: 100 INJECTION, SOLUTION PARENTERAL at 17:52

## 2021-02-01 RX ADMIN — SODIUM CHLORIDE SCH MG: 9 INJECTION, SOLUTION INTRAVENOUS at 09:25

## 2021-02-01 RX ADMIN — MUPIROCIN SCH APPLIC: 20 OINTMENT TOPICAL at 21:15

## 2021-02-01 RX ADMIN — Medication SCH MG: at 07:35

## 2021-02-01 RX ADMIN — Medication SCH GM: at 16:26

## 2021-02-01 RX ADMIN — ACETYLCYSTEINE SCH MG: 100 INHALANT RESPIRATORY (INHALATION) at 07:35

## 2021-02-01 RX ADMIN — Medication SCH MG: at 13:30

## 2021-02-01 RX ADMIN — Medication SCH OZ: at 21:00

## 2021-02-01 RX ADMIN — Medication SCH MG: at 19:53

## 2021-02-01 RX ADMIN — CEFEPIME HYDROCHLORIDE SCH MLS/HR: 1 INJECTION, POWDER, FOR SOLUTION INTRAMUSCULAR; INTRAVENOUS at 16:23

## 2021-02-01 RX ADMIN — SODIUM CHLORIDE SCH MG: 9 INJECTION, SOLUTION INTRAVENOUS at 01:19

## 2021-02-01 RX ADMIN — MUPIROCIN SCH APPLIC: 20 OINTMENT TOPICAL at 08:22

## 2021-02-01 RX ADMIN — SODIUM CITRATE AND CITRIC ACID MONOHYDRATE SCH ML: 500; 334 SOLUTION ORAL at 13:09

## 2021-02-01 RX ADMIN — Medication SCH EACH: at 06:03

## 2021-02-01 RX ADMIN — FAMOTIDINE SCH MG: 10 INJECTION INTRAVENOUS at 08:22

## 2021-02-01 RX ADMIN — SODIUM CHLORIDE PRN MLS/HR: 9 INJECTION, SOLUTION INTRAVENOUS at 23:56

## 2021-02-01 RX ADMIN — Medication SCH EACH: at 01:10

## 2021-02-01 RX ADMIN — Medication SCH GM: at 08:23

## 2021-02-01 NOTE — NUR
4 week s/p PCIOL OD-Pt doing well at 4 week s/p PCIOL. 9 week s/p PCIOL OS-Pt doing well at 9 week s/p PCIOL. NIDDM s DR LARSEN-Stressed the importance of keeping blood sugars under control blood pressure under control and weight normalization and regular visits with PCP. -Explained the possible effects of poorly controlled diabetes and the damage that diabetes can cause to ocular health. -Patient to check HgbA1C.-Pt instructed to contact our office with any vision changes. - Recommend follow-up in 8 months for yearly diabetic check OU with photos. Astigmatism OU-Discussed diagnosis with patient. Updated spec Rx given. Recommend lens that will provide comfort as well as protect safety and health of eyes. RN OPENING NOTES

PATIENT PRESENT IN BED, A/OX1, OPENS EYES TO HER NAME, [facility], TOLERATING SETTINGS 
WELL, SPO2 %, RESPIRATIONS EVEN AND UNLABORED, NO S/SX OF RESP DISTRESS NOTED, NSR ON 
TELE-MONITOR, NGT NOTED IN R NARES, AUSCULTATED FOR PLACEMENT, RUNNING TUBE FEEDING @ 20 
CC/HR, RESIDUAL CHECKED (15CC), FLUSHED,RIGHT FEMORAL PICC LINE IN PLACE, PATENT, FLUSHED 
AND INTACT. PATIENT IS ON LEVO DRIP @0.08 MCG/KG.MIN, MAINTAINS BP AND HR WNL, BLE AND BUE 
EDEMA NOTED. SAFETY MEASURES IN PLACE, BED IS LOCKED , IN LOWEST POSITION, CALL LIGHT IN 
REACH, WILL CONT TO MONITOR

## 2021-02-01 NOTE — NUR
RN CLOSING NOTES

PATIENT REMAINS IN BED, CARE PROVIDED, MEDICATIONS GIVEN, CLEANED AND REPOSITIONED, SAFETY 
PRECAUTIONS IN PLACE, BED IS LOCKED , HOB ELEVATED, WILL ENDORSE TO PM SHIFT RN FOR DASHAWN

## 2021-02-01 NOTE — NUR
RT

PATIENT REMAINS ORALLY INTUBATED ON [facility] VENT. VENT ALARMS AND SETTINGS CHECKED + AUDIBLE. 
ETT SECURE AND IN PROPER POSITION. VENT PLUGGED INTO RED OUTLET. AMBU BAG AT HOB. 

-------------------------------------------------------------------------------

Addendum: 02/01/21 at 1704 by AARON ATWOOD RT

-------------------------------------------------------------------------------

Amended: Links added.

## 2021-02-01 NOTE — NUR
WOUND CARE FOLLOW UP: PT SEEN FOR LEFT HEEL INTACT DEEP TISSUE INJURY WHICH CONTINUES TO BE 
INTACT. RECOMMENDATIONS MADE FOR SKIN PROTECTION. CONTINUE PRESENT TREATMENT. DISCUSSED WITH 
NURSING STAFF. MD IN AGREEMENT WITH PLAN OF CARE. 

-------------------------------------------------------------------------------

Addendum: 02/01/21 at 1046 by REGINA STRICKLAND WNDNU

-------------------------------------------------------------------------------

Amended: Links added.

## 2021-02-01 NOTE — NUR
ICU RN

RCD PT W/DX SEPSIS, PNA; PT IS AWAKE DOES NOT FOLLOW COMMANDS. NSR ON MONITOR. LEVOPHED 
TITRATED OFF FOR /56. NG TUBE WITH GLUCERNA @ 20 ML/HR WITH A GOAL RATE OF 45. 10 ML 
RESIDUALS NOTED AT THIS TIME. R FEM TLC PATENT W/GOOD BLOOD RETURN. APPLIED MEPILEX TO BL 
HEELS. RENDERED WOUND CARE TO ARMS; CHANGED DRESSING TO SACRAL AREA HOWEVER THERE IS NO OIL 
EMULSION AT THIS TIME. PT INTUBATED 7.5 @ 22 ON SIMV 4 PS 15 400 30% +5.

## 2021-02-02 VITALS — SYSTOLIC BLOOD PRESSURE: 111 MMHG | DIASTOLIC BLOOD PRESSURE: 50 MMHG

## 2021-02-02 VITALS — DIASTOLIC BLOOD PRESSURE: 52 MMHG | SYSTOLIC BLOOD PRESSURE: 106 MMHG

## 2021-02-02 VITALS — SYSTOLIC BLOOD PRESSURE: 122 MMHG | DIASTOLIC BLOOD PRESSURE: 53 MMHG

## 2021-02-02 VITALS — SYSTOLIC BLOOD PRESSURE: 123 MMHG | DIASTOLIC BLOOD PRESSURE: 52 MMHG

## 2021-02-02 VITALS — DIASTOLIC BLOOD PRESSURE: 55 MMHG | SYSTOLIC BLOOD PRESSURE: 114 MMHG

## 2021-02-02 VITALS — SYSTOLIC BLOOD PRESSURE: 109 MMHG | DIASTOLIC BLOOD PRESSURE: 52 MMHG

## 2021-02-02 VITALS — DIASTOLIC BLOOD PRESSURE: 57 MMHG | SYSTOLIC BLOOD PRESSURE: 126 MMHG

## 2021-02-02 VITALS — SYSTOLIC BLOOD PRESSURE: 113 MMHG | DIASTOLIC BLOOD PRESSURE: 49 MMHG

## 2021-02-02 VITALS — SYSTOLIC BLOOD PRESSURE: 100 MMHG | DIASTOLIC BLOOD PRESSURE: 45 MMHG

## 2021-02-02 VITALS — DIASTOLIC BLOOD PRESSURE: 47 MMHG | SYSTOLIC BLOOD PRESSURE: 101 MMHG

## 2021-02-02 VITALS — SYSTOLIC BLOOD PRESSURE: 115 MMHG | DIASTOLIC BLOOD PRESSURE: 50 MMHG

## 2021-02-02 VITALS — DIASTOLIC BLOOD PRESSURE: 50 MMHG | SYSTOLIC BLOOD PRESSURE: 111 MMHG

## 2021-02-02 VITALS — DIASTOLIC BLOOD PRESSURE: 55 MMHG | SYSTOLIC BLOOD PRESSURE: 122 MMHG

## 2021-02-02 VITALS — SYSTOLIC BLOOD PRESSURE: 109 MMHG | DIASTOLIC BLOOD PRESSURE: 48 MMHG

## 2021-02-02 VITALS — DIASTOLIC BLOOD PRESSURE: 56 MMHG | SYSTOLIC BLOOD PRESSURE: 117 MMHG

## 2021-02-02 VITALS — SYSTOLIC BLOOD PRESSURE: 125 MMHG | DIASTOLIC BLOOD PRESSURE: 67 MMHG

## 2021-02-02 VITALS — SYSTOLIC BLOOD PRESSURE: 109 MMHG | DIASTOLIC BLOOD PRESSURE: 50 MMHG

## 2021-02-02 VITALS — DIASTOLIC BLOOD PRESSURE: 52 MMHG | SYSTOLIC BLOOD PRESSURE: 109 MMHG

## 2021-02-02 VITALS — DIASTOLIC BLOOD PRESSURE: 46 MMHG | SYSTOLIC BLOOD PRESSURE: 101 MMHG

## 2021-02-02 VITALS — SYSTOLIC BLOOD PRESSURE: 105 MMHG | DIASTOLIC BLOOD PRESSURE: 51 MMHG

## 2021-02-02 VITALS — DIASTOLIC BLOOD PRESSURE: 52 MMHG | SYSTOLIC BLOOD PRESSURE: 113 MMHG

## 2021-02-02 VITALS — SYSTOLIC BLOOD PRESSURE: 110 MMHG | DIASTOLIC BLOOD PRESSURE: 49 MMHG

## 2021-02-02 VITALS — DIASTOLIC BLOOD PRESSURE: 57 MMHG | SYSTOLIC BLOOD PRESSURE: 121 MMHG

## 2021-02-02 VITALS — SYSTOLIC BLOOD PRESSURE: 134 MMHG | DIASTOLIC BLOOD PRESSURE: 61 MMHG

## 2021-02-02 VITALS — DIASTOLIC BLOOD PRESSURE: 55 MMHG | SYSTOLIC BLOOD PRESSURE: 109 MMHG

## 2021-02-02 VITALS — DIASTOLIC BLOOD PRESSURE: 56 MMHG | SYSTOLIC BLOOD PRESSURE: 125 MMHG

## 2021-02-02 VITALS — DIASTOLIC BLOOD PRESSURE: 54 MMHG | SYSTOLIC BLOOD PRESSURE: 111 MMHG

## 2021-02-02 VITALS — DIASTOLIC BLOOD PRESSURE: 53 MMHG | SYSTOLIC BLOOD PRESSURE: 112 MMHG

## 2021-02-02 VITALS — SYSTOLIC BLOOD PRESSURE: 114 MMHG | DIASTOLIC BLOOD PRESSURE: 53 MMHG

## 2021-02-02 VITALS — DIASTOLIC BLOOD PRESSURE: 52 MMHG | SYSTOLIC BLOOD PRESSURE: 112 MMHG

## 2021-02-02 VITALS — SYSTOLIC BLOOD PRESSURE: 119 MMHG | DIASTOLIC BLOOD PRESSURE: 54 MMHG

## 2021-02-02 VITALS — SYSTOLIC BLOOD PRESSURE: 111 MMHG | DIASTOLIC BLOOD PRESSURE: 47 MMHG

## 2021-02-02 VITALS — SYSTOLIC BLOOD PRESSURE: 110 MMHG | DIASTOLIC BLOOD PRESSURE: 54 MMHG

## 2021-02-02 VITALS — DIASTOLIC BLOOD PRESSURE: 50 MMHG | SYSTOLIC BLOOD PRESSURE: 131 MMHG

## 2021-02-02 VITALS — DIASTOLIC BLOOD PRESSURE: 52 MMHG | SYSTOLIC BLOOD PRESSURE: 110 MMHG

## 2021-02-02 LAB
BASE EXCESS BLDA CALC-SCNC: -6.1 MMOL/L
BASOPHILS # BLD AUTO: 0 /CMM (ref 0–0.2)
BASOPHILS NFR BLD AUTO: 0.2 % (ref 0–2)
BUN SERPL-MCNC: 88 MG/DL (ref 7–18)
CALCIUM SERPL-MCNC: 8.4 MG/DL (ref 8.5–10.1)
CHLORIDE SERPL-SCNC: 105 MMOL/L (ref 98–107)
CO2 SERPL-SCNC: 24 MMOL/L (ref 21–32)
CREAT SERPL-MCNC: 3.9 MG/DL (ref 0.6–1.3)
DO-HGB MFR BLDA: 69.2 MMHG
EOSINOPHIL NFR BLD AUTO: 0.6 % (ref 0–6)
GLUCOSE SERPL-MCNC: 112 MG/DL (ref 74–106)
HCT VFR BLD AUTO: 29 % (ref 33–45)
HGB BLD-MCNC: 9.6 G/DL (ref 11.5–14.8)
INHALED O2 CONCENTRATION: 30 %
LYMPHOCYTES NFR BLD AUTO: 0.6 /CMM (ref 0.8–4.8)
LYMPHOCYTES NFR BLD AUTO: 4.7 % (ref 20–44)
MCHC RBC AUTO-ENTMCNC: 33 G/DL (ref 31–36)
MCV RBC AUTO: 94 FL (ref 82–100)
MONOCYTES NFR BLD AUTO: 0.4 /CMM (ref 0.1–1.3)
MONOCYTES NFR BLD AUTO: 3.4 % (ref 2–12)
NEUTROPHILS # BLD AUTO: 11.1 /CMM (ref 1.8–8.9)
NEUTROPHILS NFR BLD AUTO: 91.1 % (ref 43–81)
PCO2 TEMP ADJ BLDA: 38.3 MMHG (ref 35–45)
PH TEMP ADJ BLDA: 7.32 [PH] (ref 7.35–7.45)
PLATELET # BLD AUTO: 140 /CMM (ref 150–450)
PO2 TEMP ADJ BLDA: 99.7 MMHG (ref 75–100)
POTASSIUM SERPL-SCNC: 5.1 MMOL/L (ref 3.5–5.1)
RBC # BLD AUTO: 3.13 MIL/UL (ref 4–5.2)
SAO2 % BLDA: 96.9 % (ref 92–98.5)
SODIUM SERPL-SCNC: 139 MMOL/L (ref 136–145)
VENTILATION MODE VENT: (no result)
WBC NRBC COR # BLD AUTO: 12.2 K/UL (ref 4.3–11)

## 2021-02-02 PROCEDURE — B543ZZA ULTRASONOGRAPHY OF RIGHT JUGULAR VEINS, GUIDANCE: ICD-10-PCS | Performed by: SURGERY

## 2021-02-02 PROCEDURE — 05HM33Z INSERTION OF INFUSION DEVICE INTO RIGHT INTERNAL JUGULAR VEIN, PERCUTANEOUS APPROACH: ICD-10-PCS | Performed by: SURGERY

## 2021-02-02 RX ADMIN — ACETYLCYSTEINE SCH MG: 100 INHALANT RESPIRATORY (INHALATION) at 01:46

## 2021-02-02 RX ADMIN — SODIUM CITRATE AND CITRIC ACID MONOHYDRATE SCH ML: 500; 334 SOLUTION ORAL at 08:23

## 2021-02-02 RX ADMIN — SODIUM CHLORIDE SCH MG: 9 INJECTION, SOLUTION INTRAVENOUS at 08:23

## 2021-02-02 RX ADMIN — Medication SCH GM: at 16:39

## 2021-02-02 RX ADMIN — SODIUM CITRATE AND CITRIC ACID MONOHYDRATE SCH ML: 500; 334 SOLUTION ORAL at 21:15

## 2021-02-02 RX ADMIN — ACETYLCYSTEINE SCH MG: 100 INHALANT RESPIRATORY (INHALATION) at 23:40

## 2021-02-02 RX ADMIN — Medication SCH MG: at 20:19

## 2021-02-02 RX ADMIN — Medication SCH EACH: at 05:54

## 2021-02-02 RX ADMIN — ACETYLCYSTEINE SCH MG: 100 INHALANT RESPIRATORY (INHALATION) at 13:48

## 2021-02-02 RX ADMIN — Medication SCH EACH: at 00:32

## 2021-02-02 RX ADMIN — MUPIROCIN SCH APPLIC: 20 OINTMENT TOPICAL at 08:24

## 2021-02-02 RX ADMIN — Medication SCH MG: at 13:47

## 2021-02-02 RX ADMIN — FAMOTIDINE SCH MG: 10 INJECTION INTRAVENOUS at 08:23

## 2021-02-02 RX ADMIN — SODIUM CHLORIDE SCH MG: 9 INJECTION, SOLUTION INTRAVENOUS at 17:49

## 2021-02-02 RX ADMIN — SODIUM CITRATE AND CITRIC ACID MONOHYDRATE SCH ML: 500; 334 SOLUTION ORAL at 12:09

## 2021-02-02 RX ADMIN — Medication SCH EACH: at 17:49

## 2021-02-02 RX ADMIN — Medication SCH OZ: at 08:24

## 2021-02-02 RX ADMIN — INSULIN HUMAN PRN UNIT: 100 INJECTION, SOLUTION PARENTERAL at 13:06

## 2021-02-02 RX ADMIN — Medication SCH EACH: at 23:53

## 2021-02-02 RX ADMIN — SODIUM CHLORIDE SCH MG: 9 INJECTION, SOLUTION INTRAVENOUS at 02:24

## 2021-02-02 RX ADMIN — Medication PRN ML: at 00:32

## 2021-02-02 RX ADMIN — MUPIROCIN SCH APPLIC: 20 OINTMENT TOPICAL at 21:08

## 2021-02-02 RX ADMIN — CEFEPIME HYDROCHLORIDE SCH MLS/HR: 1 INJECTION, POWDER, FOR SOLUTION INTRAMUSCULAR; INTRAVENOUS at 14:23

## 2021-02-02 RX ADMIN — SODIUM CITRATE AND CITRIC ACID MONOHYDRATE SCH ML: 500; 334 SOLUTION ORAL at 16:35

## 2021-02-02 RX ADMIN — Medication SCH OZ: at 21:08

## 2021-02-02 RX ADMIN — Medication SCH EACH: at 11:08

## 2021-02-02 RX ADMIN — Medication SCH MG: at 01:46

## 2021-02-02 RX ADMIN — Medication SCH MG: at 07:35

## 2021-02-02 RX ADMIN — Medication SCH GM: at 08:24

## 2021-02-02 RX ADMIN — ACETYLCYSTEINE SCH MG: 100 INHALANT RESPIRATORY (INHALATION) at 07:35

## 2021-02-02 NOTE — NUR
RT NOTE

Pt rec'd orally intubated via ETT sz 7.5 secured at 23cm secured at the lipline. Pt on SIMV 
mode on noted settings as charted. Pt shows no signs of resp distress or sob.pt sx'd for mod 
amt of pale yellow secretions. Alarms are set and audible. Ambu bag bedside. Vent plugged 
into red outlet. will continue to monitor closely.

-------------------------------------------------------------------------------

Addendum: 02/02/21 at 0536 by LEAH CAMPUZANO RT

-------------------------------------------------------------------------------

Amended: Links added.

## 2021-02-02 NOTE — NUR
ICU/RN

PT IS INTUBATED ON THE VENT SIMV MODE.SAT O2-100%.OFF SEDATION .AWAKE .ALERT .OFF 
PRESSORS..BP STABLE ,AFEBRILE.NO PAIN REPORTED AT THIS TIME.NG TUBE INFUSING WITH GLUCERNA 
.NO RESIDUAL NOTED.F/C IN PLACE ,WITH NO URINE OUTPUT.PT GOING TO RENAL FAILURE.GENERALIZED 
EDEMA PRESENT.PT HAS MULTIPLY WOUNDS SKIN TEARS ALL OVER THE BODY COVERED WITH 
DRESSING.LOWER BACK WOUND PERINEAL AREA WOUNDS.RECTAL TUBE IN PLACE DRAINING WITH  BROWN 
LIQUID STOOL.SUCTION PROVIDED.REPOSITION FOR COMFORT.LABS REVIEW.MD NOTIFIED. DUE MEDS ARE 
GIVEN AS ORDERED.

## 2021-02-02 NOTE — NUR
RECEIVED PATIENT ORALLY INTUBATED TO THE VENTILATOR ON SIMV MODE,TOLERATING WELL ,NOT IN ANY 
DISTRESS,PATIENT AWAKE( NOT ON ANY SEDATION), DOES NOT FOLLOW COMMANDS BUT EYES OPEN AND 
TRACKING, GENERALIZED EDEMA, + SKIN WEEPING ON BOTH ARMS.HD CATHETER VIA RIGHT IJ, TRIPLE 
LUMEN CATHETER RIGHT FEMORAL AREA.TUBE FEEDING VIA NGT ,ASPIRATION PRECAUTION IMPLEMENTED.

2000 HYPOTHERMIC ,KRYSTLE HUGGER(WARMER) APPLIED.

## 2021-02-03 VITALS — DIASTOLIC BLOOD PRESSURE: 51 MMHG | SYSTOLIC BLOOD PRESSURE: 113 MMHG

## 2021-02-03 VITALS — SYSTOLIC BLOOD PRESSURE: 129 MMHG | DIASTOLIC BLOOD PRESSURE: 60 MMHG

## 2021-02-03 VITALS — SYSTOLIC BLOOD PRESSURE: 114 MMHG | DIASTOLIC BLOOD PRESSURE: 56 MMHG

## 2021-02-03 VITALS — DIASTOLIC BLOOD PRESSURE: 59 MMHG | SYSTOLIC BLOOD PRESSURE: 127 MMHG

## 2021-02-03 VITALS — SYSTOLIC BLOOD PRESSURE: 122 MMHG | DIASTOLIC BLOOD PRESSURE: 52 MMHG

## 2021-02-03 VITALS — SYSTOLIC BLOOD PRESSURE: 122 MMHG | DIASTOLIC BLOOD PRESSURE: 53 MMHG

## 2021-02-03 VITALS — SYSTOLIC BLOOD PRESSURE: 111 MMHG | DIASTOLIC BLOOD PRESSURE: 46 MMHG

## 2021-02-03 VITALS — SYSTOLIC BLOOD PRESSURE: 129 MMHG | DIASTOLIC BLOOD PRESSURE: 61 MMHG

## 2021-02-03 VITALS — DIASTOLIC BLOOD PRESSURE: 51 MMHG | SYSTOLIC BLOOD PRESSURE: 99 MMHG

## 2021-02-03 VITALS — SYSTOLIC BLOOD PRESSURE: 131 MMHG | DIASTOLIC BLOOD PRESSURE: 61 MMHG

## 2021-02-03 VITALS — SYSTOLIC BLOOD PRESSURE: 118 MMHG | DIASTOLIC BLOOD PRESSURE: 52 MMHG

## 2021-02-03 VITALS — SYSTOLIC BLOOD PRESSURE: 116 MMHG | DIASTOLIC BLOOD PRESSURE: 51 MMHG

## 2021-02-03 VITALS — DIASTOLIC BLOOD PRESSURE: 55 MMHG | SYSTOLIC BLOOD PRESSURE: 119 MMHG

## 2021-02-03 VITALS — DIASTOLIC BLOOD PRESSURE: 56 MMHG | SYSTOLIC BLOOD PRESSURE: 102 MMHG

## 2021-02-03 VITALS — SYSTOLIC BLOOD PRESSURE: 109 MMHG | DIASTOLIC BLOOD PRESSURE: 51 MMHG

## 2021-02-03 VITALS — SYSTOLIC BLOOD PRESSURE: 114 MMHG | DIASTOLIC BLOOD PRESSURE: 54 MMHG

## 2021-02-03 VITALS — SYSTOLIC BLOOD PRESSURE: 130 MMHG | DIASTOLIC BLOOD PRESSURE: 60 MMHG

## 2021-02-03 VITALS — DIASTOLIC BLOOD PRESSURE: 65 MMHG | SYSTOLIC BLOOD PRESSURE: 138 MMHG

## 2021-02-03 VITALS — SYSTOLIC BLOOD PRESSURE: 100 MMHG | DIASTOLIC BLOOD PRESSURE: 47 MMHG

## 2021-02-03 VITALS — SYSTOLIC BLOOD PRESSURE: 122 MMHG | DIASTOLIC BLOOD PRESSURE: 60 MMHG

## 2021-02-03 VITALS — DIASTOLIC BLOOD PRESSURE: 56 MMHG | SYSTOLIC BLOOD PRESSURE: 123 MMHG

## 2021-02-03 VITALS — SYSTOLIC BLOOD PRESSURE: 110 MMHG | DIASTOLIC BLOOD PRESSURE: 71 MMHG

## 2021-02-03 VITALS — DIASTOLIC BLOOD PRESSURE: 49 MMHG | SYSTOLIC BLOOD PRESSURE: 110 MMHG

## 2021-02-03 VITALS — DIASTOLIC BLOOD PRESSURE: 60 MMHG | SYSTOLIC BLOOD PRESSURE: 128 MMHG

## 2021-02-03 VITALS — DIASTOLIC BLOOD PRESSURE: 52 MMHG | SYSTOLIC BLOOD PRESSURE: 110 MMHG

## 2021-02-03 VITALS — SYSTOLIC BLOOD PRESSURE: 115 MMHG | DIASTOLIC BLOOD PRESSURE: 50 MMHG

## 2021-02-03 VITALS — SYSTOLIC BLOOD PRESSURE: 126 MMHG | DIASTOLIC BLOOD PRESSURE: 57 MMHG

## 2021-02-03 VITALS — SYSTOLIC BLOOD PRESSURE: 115 MMHG | DIASTOLIC BLOOD PRESSURE: 52 MMHG

## 2021-02-03 VITALS — DIASTOLIC BLOOD PRESSURE: 51 MMHG | SYSTOLIC BLOOD PRESSURE: 115 MMHG

## 2021-02-03 VITALS — SYSTOLIC BLOOD PRESSURE: 122 MMHG | DIASTOLIC BLOOD PRESSURE: 58 MMHG

## 2021-02-03 VITALS — DIASTOLIC BLOOD PRESSURE: 55 MMHG | SYSTOLIC BLOOD PRESSURE: 123 MMHG

## 2021-02-03 VITALS — SYSTOLIC BLOOD PRESSURE: 118 MMHG | DIASTOLIC BLOOD PRESSURE: 56 MMHG

## 2021-02-03 LAB
BASOPHILS # BLD AUTO: 0.1 /CMM (ref 0–0.2)
BASOPHILS NFR BLD AUTO: 0.5 % (ref 0–2)
BUN SERPL-MCNC: 93 MG/DL (ref 7–18)
CALCIUM SERPL-MCNC: 8.4 MG/DL (ref 8.5–10.1)
CHLORIDE SERPL-SCNC: 104 MMOL/L (ref 98–107)
CO2 SERPL-SCNC: 25 MMOL/L (ref 21–32)
CREAT SERPL-MCNC: 4 MG/DL (ref 0.6–1.3)
EOSINOPHIL NFR BLD AUTO: 1.2 % (ref 0–6)
GLUCOSE SERPL-MCNC: 98 MG/DL (ref 74–106)
HCT VFR BLD AUTO: 29 % (ref 33–45)
HGB BLD-MCNC: 9.5 G/DL (ref 11.5–14.8)
LYMPHOCYTES NFR BLD AUTO: 0.4 /CMM (ref 0.8–4.8)
LYMPHOCYTES NFR BLD AUTO: 4.4 % (ref 20–44)
MCHC RBC AUTO-ENTMCNC: 33 G/DL (ref 31–36)
MCV RBC AUTO: 94 FL (ref 82–100)
MONOCYTES NFR BLD AUTO: 0.4 /CMM (ref 0.1–1.3)
MONOCYTES NFR BLD AUTO: 4.5 % (ref 2–12)
NEUTROPHILS # BLD AUTO: 8.5 /CMM (ref 1.8–8.9)
NEUTROPHILS NFR BLD AUTO: 89.4 % (ref 43–81)
PLATELET # BLD AUTO: 159 /CMM (ref 150–450)
POTASSIUM SERPL-SCNC: 5.2 MMOL/L (ref 3.5–5.1)
RBC # BLD AUTO: 3.03 MIL/UL (ref 4–5.2)
SODIUM SERPL-SCNC: 141 MMOL/L (ref 136–145)
WBC NRBC COR # BLD AUTO: 9.5 K/UL (ref 4.3–11)

## 2021-02-03 RX ADMIN — Medication SCH GM: at 08:31

## 2021-02-03 RX ADMIN — ACETYLCYSTEINE SCH MG: 100 INHALANT RESPIRATORY (INHALATION) at 16:14

## 2021-02-03 RX ADMIN — SODIUM CHLORIDE PRN MLS/HR: 9 INJECTION, SOLUTION INTRAVENOUS at 22:31

## 2021-02-03 RX ADMIN — Medication SCH MG: at 12:47

## 2021-02-03 RX ADMIN — FAMOTIDINE SCH MG: 10 INJECTION INTRAVENOUS at 08:29

## 2021-02-03 RX ADMIN — Medication SCH MG: at 19:37

## 2021-02-03 RX ADMIN — INSULIN HUMAN PRN UNIT: 100 INJECTION, SOLUTION PARENTERAL at 17:49

## 2021-02-03 RX ADMIN — SODIUM CITRATE AND CITRIC ACID MONOHYDRATE SCH ML: 500; 334 SOLUTION ORAL at 12:43

## 2021-02-03 RX ADMIN — Medication SCH EACH: at 12:43

## 2021-02-03 RX ADMIN — Medication SCH MG: at 07:48

## 2021-02-03 RX ADMIN — ACETYLCYSTEINE SCH MG: 100 INHALANT RESPIRATORY (INHALATION) at 23:02

## 2021-02-03 RX ADMIN — ACETYLCYSTEINE SCH MG: 100 INHALANT RESPIRATORY (INHALATION) at 07:48

## 2021-02-03 RX ADMIN — SODIUM CITRATE AND CITRIC ACID MONOHYDRATE SCH ML: 500; 334 SOLUTION ORAL at 21:21

## 2021-02-03 RX ADMIN — Medication SCH OZ: at 21:21

## 2021-02-03 RX ADMIN — MUPIROCIN SCH APPLIC: 20 OINTMENT TOPICAL at 08:32

## 2021-02-03 RX ADMIN — SODIUM CHLORIDE SCH MG: 9 INJECTION, SOLUTION INTRAVENOUS at 02:05

## 2021-02-03 RX ADMIN — Medication SCH EACH: at 23:32

## 2021-02-03 RX ADMIN — Medication SCH EACH: at 17:45

## 2021-02-03 RX ADMIN — MUPIROCIN SCH APPLIC: 20 OINTMENT TOPICAL at 21:22

## 2021-02-03 RX ADMIN — SODIUM CITRATE AND CITRIC ACID MONOHYDRATE SCH ML: 500; 334 SOLUTION ORAL at 08:30

## 2021-02-03 RX ADMIN — Medication SCH OZ: at 08:31

## 2021-02-03 RX ADMIN — CEFEPIME HYDROCHLORIDE SCH MLS/HR: 1 INJECTION, POWDER, FOR SOLUTION INTRAMUSCULAR; INTRAVENOUS at 16:28

## 2021-02-03 RX ADMIN — SODIUM CITRATE AND CITRIC ACID MONOHYDRATE SCH ML: 500; 334 SOLUTION ORAL at 16:27

## 2021-02-03 RX ADMIN — Medication PRN ML: at 04:24

## 2021-02-03 RX ADMIN — INSULIN HUMAN PRN UNIT: 100 INJECTION, SOLUTION PARENTERAL at 00:05

## 2021-02-03 RX ADMIN — SODIUM CHLORIDE PRN MLS/HR: 9 INJECTION, SOLUTION INTRAVENOUS at 01:21

## 2021-02-03 RX ADMIN — SODIUM CHLORIDE SCH MG: 9 INJECTION, SOLUTION INTRAVENOUS at 09:34

## 2021-02-03 RX ADMIN — Medication SCH GM: at 16:28

## 2021-02-03 RX ADMIN — SODIUM CHLORIDE SCH MG: 9 INJECTION, SOLUTION INTRAVENOUS at 17:45

## 2021-02-03 RX ADMIN — Medication SCH EACH: at 05:43

## 2021-02-03 RX ADMIN — Medication SCH MG: at 02:11

## 2021-02-03 NOTE — NUR
RT NOTE



pt received on mechanical vent with current vent settings. orally intubated, ETT 7.5, 
23@lip. alarms on and audible. vent plugged in to red outlet.in-line tx given. no sob, no 
resp distress noted. ambu bag at [facility].  will continue to monitor t/o shift.

## 2021-02-03 NOTE — NUR
Received patient intubated on full vent support with same vent settings well tolerated.

SR.VSS.R NGT feeding infusing well.Tube placement verified.No residual noted.Maintain

HOB elevated.FC o gravity drainage.Flexi Seal intact with liquid stools.Turned and 

repositioned.No acute distress noted.

## 2021-02-03 NOTE — NUR
ICU/RN

PM CARE PROVIDED.DUE MEDS ARE GIVEN AS ORDERED.WOUND DRESSING DONE AS ORDERED.F/C WAS 
LEAKING. REPLACED FOR THE NEW ONE. LEFT FEMORAL CENTRAL LINE ,DRESSING CHANGED.CONTINUE 
MONITORING.

## 2021-02-03 NOTE — NUR
ICU/RN

PT IS INTUBATED ON THE VENT SINV MODE.SAT O2-99%.V/S STABLE ,AFEBRILE. NO PAIN REPORTED AT 
THIS TIME.PT IS NOT SEDATED .OPEN EYES .ALERT.VERY WEAK NOT MOVING EXTREMITIES.NG TUBE IN 
PLACE INFUSING WITH GLUCERNA AT 45 ML/HR NO RESIDUAL NOTED.F/C DRAINING WITH MINIMAL AMOUNT 
OF URINE.RECTAL TUBE IS DRAINING WITH BROWN LIQUID STOOL.MULTIPLY WOUNDS NOTED ALL OVER THE 
BODY.COVERED WITH DRESSING. GENERALIZED EDEMA PRESENT.LABS REVIEW MD NOTIFIED.PT IS WAITING 
FOR THE [facility] HD .SUCTION PROVIDED.REPOSITION FOR COMFORT.DUE MEDS ARE GIVEN AS ORDERED.

## 2021-02-04 VITALS — DIASTOLIC BLOOD PRESSURE: 62 MMHG | SYSTOLIC BLOOD PRESSURE: 136 MMHG

## 2021-02-04 VITALS — SYSTOLIC BLOOD PRESSURE: 153 MMHG | DIASTOLIC BLOOD PRESSURE: 67 MMHG

## 2021-02-04 VITALS — DIASTOLIC BLOOD PRESSURE: 70 MMHG | SYSTOLIC BLOOD PRESSURE: 139 MMHG

## 2021-02-04 VITALS — SYSTOLIC BLOOD PRESSURE: 134 MMHG | DIASTOLIC BLOOD PRESSURE: 62 MMHG

## 2021-02-04 VITALS — DIASTOLIC BLOOD PRESSURE: 61 MMHG | SYSTOLIC BLOOD PRESSURE: 134 MMHG

## 2021-02-04 VITALS — SYSTOLIC BLOOD PRESSURE: 121 MMHG | DIASTOLIC BLOOD PRESSURE: 65 MMHG

## 2021-02-04 VITALS — SYSTOLIC BLOOD PRESSURE: 125 MMHG | DIASTOLIC BLOOD PRESSURE: 54 MMHG

## 2021-02-04 VITALS — SYSTOLIC BLOOD PRESSURE: 129 MMHG | DIASTOLIC BLOOD PRESSURE: 56 MMHG

## 2021-02-04 VITALS — SYSTOLIC BLOOD PRESSURE: 134 MMHG | DIASTOLIC BLOOD PRESSURE: 63 MMHG

## 2021-02-04 VITALS — SYSTOLIC BLOOD PRESSURE: 135 MMHG | DIASTOLIC BLOOD PRESSURE: 59 MMHG

## 2021-02-04 VITALS — DIASTOLIC BLOOD PRESSURE: 60 MMHG | SYSTOLIC BLOOD PRESSURE: 99 MMHG

## 2021-02-04 VITALS — SYSTOLIC BLOOD PRESSURE: 107 MMHG | DIASTOLIC BLOOD PRESSURE: 64 MMHG

## 2021-02-04 VITALS — DIASTOLIC BLOOD PRESSURE: 65 MMHG | SYSTOLIC BLOOD PRESSURE: 138 MMHG

## 2021-02-04 VITALS — SYSTOLIC BLOOD PRESSURE: 125 MMHG | DIASTOLIC BLOOD PRESSURE: 57 MMHG

## 2021-02-04 VITALS — SYSTOLIC BLOOD PRESSURE: 140 MMHG | DIASTOLIC BLOOD PRESSURE: 68 MMHG

## 2021-02-04 VITALS — DIASTOLIC BLOOD PRESSURE: 60 MMHG | SYSTOLIC BLOOD PRESSURE: 129 MMHG

## 2021-02-04 VITALS — DIASTOLIC BLOOD PRESSURE: 60 MMHG | SYSTOLIC BLOOD PRESSURE: 117 MMHG

## 2021-02-04 VITALS — SYSTOLIC BLOOD PRESSURE: 114 MMHG | DIASTOLIC BLOOD PRESSURE: 56 MMHG

## 2021-02-04 VITALS — DIASTOLIC BLOOD PRESSURE: 70 MMHG | SYSTOLIC BLOOD PRESSURE: 133 MMHG

## 2021-02-04 VITALS — SYSTOLIC BLOOD PRESSURE: 126 MMHG | DIASTOLIC BLOOD PRESSURE: 61 MMHG

## 2021-02-04 VITALS — DIASTOLIC BLOOD PRESSURE: 77 MMHG | SYSTOLIC BLOOD PRESSURE: 144 MMHG

## 2021-02-04 VITALS — DIASTOLIC BLOOD PRESSURE: 52 MMHG | SYSTOLIC BLOOD PRESSURE: 112 MMHG

## 2021-02-04 VITALS — SYSTOLIC BLOOD PRESSURE: 139 MMHG | DIASTOLIC BLOOD PRESSURE: 62 MMHG

## 2021-02-04 VITALS — SYSTOLIC BLOOD PRESSURE: 99 MMHG | DIASTOLIC BLOOD PRESSURE: 58 MMHG

## 2021-02-04 VITALS — SYSTOLIC BLOOD PRESSURE: 135 MMHG | DIASTOLIC BLOOD PRESSURE: 63 MMHG

## 2021-02-04 VITALS — SYSTOLIC BLOOD PRESSURE: 120 MMHG | DIASTOLIC BLOOD PRESSURE: 57 MMHG

## 2021-02-04 VITALS — SYSTOLIC BLOOD PRESSURE: 121 MMHG | DIASTOLIC BLOOD PRESSURE: 53 MMHG

## 2021-02-04 VITALS — DIASTOLIC BLOOD PRESSURE: 54 MMHG | SYSTOLIC BLOOD PRESSURE: 116 MMHG

## 2021-02-04 VITALS — SYSTOLIC BLOOD PRESSURE: 126 MMHG | DIASTOLIC BLOOD PRESSURE: 62 MMHG

## 2021-02-04 VITALS — DIASTOLIC BLOOD PRESSURE: 56 MMHG | SYSTOLIC BLOOD PRESSURE: 126 MMHG

## 2021-02-04 VITALS — SYSTOLIC BLOOD PRESSURE: 117 MMHG | DIASTOLIC BLOOD PRESSURE: 57 MMHG

## 2021-02-04 VITALS — DIASTOLIC BLOOD PRESSURE: 52 MMHG | SYSTOLIC BLOOD PRESSURE: 119 MMHG

## 2021-02-04 VITALS — SYSTOLIC BLOOD PRESSURE: 121 MMHG | DIASTOLIC BLOOD PRESSURE: 55 MMHG

## 2021-02-04 VITALS — DIASTOLIC BLOOD PRESSURE: 62 MMHG | SYSTOLIC BLOOD PRESSURE: 127 MMHG

## 2021-02-04 VITALS — DIASTOLIC BLOOD PRESSURE: 57 MMHG | SYSTOLIC BLOOD PRESSURE: 125 MMHG

## 2021-02-04 VITALS — SYSTOLIC BLOOD PRESSURE: 118 MMHG | DIASTOLIC BLOOD PRESSURE: 50 MMHG

## 2021-02-04 VITALS — DIASTOLIC BLOOD PRESSURE: 59 MMHG | SYSTOLIC BLOOD PRESSURE: 131 MMHG

## 2021-02-04 VITALS — DIASTOLIC BLOOD PRESSURE: 57 MMHG | SYSTOLIC BLOOD PRESSURE: 134 MMHG

## 2021-02-04 VITALS — DIASTOLIC BLOOD PRESSURE: 65 MMHG | SYSTOLIC BLOOD PRESSURE: 129 MMHG

## 2021-02-04 VITALS — DIASTOLIC BLOOD PRESSURE: 73 MMHG | SYSTOLIC BLOOD PRESSURE: 128 MMHG

## 2021-02-04 VITALS — SYSTOLIC BLOOD PRESSURE: 123 MMHG | DIASTOLIC BLOOD PRESSURE: 63 MMHG

## 2021-02-04 VITALS — SYSTOLIC BLOOD PRESSURE: 124 MMHG | DIASTOLIC BLOOD PRESSURE: 64 MMHG

## 2021-02-04 VITALS — SYSTOLIC BLOOD PRESSURE: 151 MMHG | DIASTOLIC BLOOD PRESSURE: 65 MMHG

## 2021-02-04 VITALS — SYSTOLIC BLOOD PRESSURE: 135 MMHG | DIASTOLIC BLOOD PRESSURE: 65 MMHG

## 2021-02-04 VITALS — SYSTOLIC BLOOD PRESSURE: 119 MMHG | DIASTOLIC BLOOD PRESSURE: 53 MMHG

## 2021-02-04 VITALS — SYSTOLIC BLOOD PRESSURE: 124 MMHG | DIASTOLIC BLOOD PRESSURE: 56 MMHG

## 2021-02-04 VITALS — SYSTOLIC BLOOD PRESSURE: 136 MMHG | DIASTOLIC BLOOD PRESSURE: 70 MMHG

## 2021-02-04 VITALS — DIASTOLIC BLOOD PRESSURE: 69 MMHG | SYSTOLIC BLOOD PRESSURE: 144 MMHG

## 2021-02-04 LAB
BASOPHILS # BLD AUTO: 0.1 /CMM (ref 0–0.2)
BASOPHILS NFR BLD AUTO: 0.6 % (ref 0–2)
BUN SERPL-MCNC: 63 MG/DL (ref 7–18)
CALCIUM SERPL-MCNC: 8.2 MG/DL (ref 8.5–10.1)
CHLORIDE SERPL-SCNC: 102 MMOL/L (ref 98–107)
CO2 SERPL-SCNC: 30 MMOL/L (ref 21–32)
CREAT SERPL-MCNC: 3 MG/DL (ref 0.6–1.3)
EOSINOPHIL NFR BLD AUTO: 1.6 % (ref 0–6)
GLUCOSE SERPL-MCNC: 160 MG/DL (ref 74–106)
HCT VFR BLD AUTO: 27 % (ref 33–45)
HGB BLD-MCNC: 9 G/DL (ref 11.5–14.8)
LYMPHOCYTES NFR BLD AUTO: 0.5 /CMM (ref 0.8–4.8)
LYMPHOCYTES NFR BLD AUTO: 6 % (ref 20–44)
MCHC RBC AUTO-ENTMCNC: 33 G/DL (ref 31–36)
MCV RBC AUTO: 94 FL (ref 82–100)
MONOCYTES NFR BLD AUTO: 0.4 /CMM (ref 0.1–1.3)
MONOCYTES NFR BLD AUTO: 5.5 % (ref 2–12)
NEUTROPHILS # BLD AUTO: 6.8 /CMM (ref 1.8–8.9)
NEUTROPHILS NFR BLD AUTO: 86.3 % (ref 43–81)
PLATELET # BLD AUTO: 119 /CMM (ref 150–450)
POTASSIUM SERPL-SCNC: 4.5 MMOL/L (ref 3.5–5.1)
RBC # BLD AUTO: 2.89 MIL/UL (ref 4–5.2)
SODIUM SERPL-SCNC: 140 MMOL/L (ref 136–145)
WBC NRBC COR # BLD AUTO: 7.9 K/UL (ref 4.3–11)

## 2021-02-04 RX ADMIN — SODIUM CITRATE AND CITRIC ACID MONOHYDRATE SCH ML: 500; 334 SOLUTION ORAL at 09:30

## 2021-02-04 RX ADMIN — SODIUM CHLORIDE SCH MG: 9 INJECTION, SOLUTION INTRAVENOUS at 18:10

## 2021-02-04 RX ADMIN — ACETYLCYSTEINE SCH MG: 100 INHALANT RESPIRATORY (INHALATION) at 09:06

## 2021-02-04 RX ADMIN — CEFEPIME HYDROCHLORIDE SCH MLS/HR: 1 INJECTION, POWDER, FOR SOLUTION INTRAMUSCULAR; INTRAVENOUS at 15:38

## 2021-02-04 RX ADMIN — MUPIROCIN SCH APPLIC: 20 OINTMENT TOPICAL at 20:56

## 2021-02-04 RX ADMIN — Medication PRN ML: at 03:40

## 2021-02-04 RX ADMIN — ACETYLCYSTEINE SCH MG: 100 INHALANT RESPIRATORY (INHALATION) at 23:27

## 2021-02-04 RX ADMIN — Medication SCH GM: at 17:58

## 2021-02-04 RX ADMIN — SODIUM CHLORIDE SCH MG: 9 INJECTION, SOLUTION INTRAVENOUS at 02:16

## 2021-02-04 RX ADMIN — Medication SCH EACH: at 11:51

## 2021-02-04 RX ADMIN — INSULIN HUMAN PRN UNIT: 100 INJECTION, SOLUTION PARENTERAL at 05:38

## 2021-02-04 RX ADMIN — Medication SCH OZ: at 20:56

## 2021-02-04 RX ADMIN — Medication SCH EACH: at 05:36

## 2021-02-04 RX ADMIN — SODIUM CITRATE AND CITRIC ACID MONOHYDRATE SCH ML: 500; 334 SOLUTION ORAL at 18:10

## 2021-02-04 RX ADMIN — SODIUM CITRATE AND CITRIC ACID MONOHYDRATE SCH ML: 500; 334 SOLUTION ORAL at 20:56

## 2021-02-04 RX ADMIN — Medication PRN ML: at 23:22

## 2021-02-04 RX ADMIN — ACETYLCYSTEINE SCH MG: 100 INHALANT RESPIRATORY (INHALATION) at 15:30

## 2021-02-04 RX ADMIN — FAMOTIDINE SCH MG: 10 INJECTION INTRAVENOUS at 09:30

## 2021-02-04 RX ADMIN — Medication SCH GM: at 09:32

## 2021-02-04 RX ADMIN — MUPIROCIN SCH APPLIC: 20 OINTMENT TOPICAL at 09:31

## 2021-02-04 RX ADMIN — SODIUM CITRATE AND CITRIC ACID MONOHYDRATE SCH ML: 500; 334 SOLUTION ORAL at 12:20

## 2021-02-04 RX ADMIN — Medication SCH MG: at 09:06

## 2021-02-04 RX ADMIN — SODIUM CHLORIDE PRN MLS/HR: 9 INJECTION, SOLUTION INTRAVENOUS at 23:21

## 2021-02-04 RX ADMIN — INSULIN HUMAN PRN UNIT: 100 INJECTION, SOLUTION PARENTERAL at 12:20

## 2021-02-04 RX ADMIN — Medication SCH MG: at 20:02

## 2021-02-04 RX ADMIN — Medication PRN MLS/HR: at 15:59

## 2021-02-04 RX ADMIN — SODIUM CHLORIDE PRN MLS/HR: 9 INJECTION, SOLUTION INTRAVENOUS at 02:14

## 2021-02-04 RX ADMIN — SODIUM CHLORIDE SCH MG: 9 INJECTION, SOLUTION INTRAVENOUS at 09:30

## 2021-02-04 RX ADMIN — Medication SCH EACH: at 12:15

## 2021-02-04 RX ADMIN — Medication SCH MG: at 01:30

## 2021-02-04 RX ADMIN — Medication SCH MG: at 13:30

## 2021-02-04 RX ADMIN — Medication SCH OZ: at 09:33

## 2021-02-04 RX ADMIN — Medication SCH EACH: at 23:53

## 2021-02-04 NOTE — NUR
Patient switched from SIMV setting to cool aerosol at 10 liters. No sob noted at this time. 
Patient did not show any s/s of respiratory distress. Will monitor closely.

## 2021-02-04 NOTE — NUR
Patient resting vs remains stable.FSBS monitored.Tolerating NGT feeding.Turned and 
repositioned.

No distress noted.

-------------------------------------------------------------------------------

Addendum: 02/04/21 at 0651 by KAMRYN TORRES RN

-------------------------------------------------------------------------------

HD done. 600 ml out.Tolerated procedure well.

## 2021-02-04 NOTE — NUR
Patient showed s/s of respiratory distress with labored breathing. Dr Robertson aware and 
assessed the patient and ordered for patient to be put back on vent setting. RT aware and 
patient switched to vent.

## 2021-02-04 NOTE — NUR
Received patient eyes open but not interactive.Non verbal ,intubated on full vent support on 
AC

mode.Tolerating vent settings well..SR.VSS.Tube feeding via R NGT infusing well.Placement 
verified.

Maintain HOB elevated.No vomiting noted.FC to gravity drainage.Flexi seal intact with liquid 
stool.

Turned and repositioned.No acute distress noted.

## 2021-02-04 NOTE — NUR
AM CARE done.Wound care done.VS remains stable.Tolerating vent settings.Plan for 

Cool Aerosol today.Turned and repositioned.No significant change noted during the shift.

All need met.

## 2021-02-04 NOTE — NUR
ICU RN OPENING NOTES

Patient  was received in bed and not in any respiratory distress. Patient is on mechanical 
vent SIMV 4, Pressure support 15, Peep 5 and Fi02 30. Patient's holm cath intact and 
hanging to gravity with clear yellow urine. NGT running well at 45 ml/hour. NGT patent and 
flushing well. Patient noted with triple lumen catheter to right femoral. HD cath to right 
IJ. Head of bed kept elevated to prevent aspiration. Will continue to monitor. Call light 
with in reach.

## 2021-02-04 NOTE — NUR
ICU RN CLOSING NOTES

Patient  currently does not show any s/s of respiratory distress. Patient is  back on 
mechanical vent setting per MD Pelelba orders  on tidal volume 400 cc , AC 16, FI02 %, 
Peep of 5, with o2 saturation of 93%.Patient's holm cath intact and hanging to gravity with 
clear yellow urine. NGT running well at 45 ml/hour. NGT patent and flushing well. Patient 
noted with triple lumen catheter to right femoral. HD cath to right IJ. Head of bed kept 
elevated to prevent aspiration. Patient had 1500 cc output for hemodialysis done today 
Patient kept clean and dry during shift. Wound care provided and will endorse to next shift 
for DASHAWN.

## 2021-02-05 VITALS — DIASTOLIC BLOOD PRESSURE: 50 MMHG | SYSTOLIC BLOOD PRESSURE: 110 MMHG

## 2021-02-05 VITALS — DIASTOLIC BLOOD PRESSURE: 56 MMHG | SYSTOLIC BLOOD PRESSURE: 126 MMHG

## 2021-02-05 VITALS — DIASTOLIC BLOOD PRESSURE: 48 MMHG | SYSTOLIC BLOOD PRESSURE: 119 MMHG

## 2021-02-05 VITALS — SYSTOLIC BLOOD PRESSURE: 118 MMHG | DIASTOLIC BLOOD PRESSURE: 52 MMHG

## 2021-02-05 VITALS — DIASTOLIC BLOOD PRESSURE: 61 MMHG | SYSTOLIC BLOOD PRESSURE: 125 MMHG

## 2021-02-05 VITALS — SYSTOLIC BLOOD PRESSURE: 136 MMHG | DIASTOLIC BLOOD PRESSURE: 57 MMHG

## 2021-02-05 VITALS — SYSTOLIC BLOOD PRESSURE: 119 MMHG | DIASTOLIC BLOOD PRESSURE: 56 MMHG

## 2021-02-05 VITALS — DIASTOLIC BLOOD PRESSURE: 60 MMHG | SYSTOLIC BLOOD PRESSURE: 123 MMHG

## 2021-02-05 VITALS — DIASTOLIC BLOOD PRESSURE: 53 MMHG | SYSTOLIC BLOOD PRESSURE: 127 MMHG

## 2021-02-05 VITALS — SYSTOLIC BLOOD PRESSURE: 136 MMHG | DIASTOLIC BLOOD PRESSURE: 63 MMHG

## 2021-02-05 VITALS — SYSTOLIC BLOOD PRESSURE: 124 MMHG | DIASTOLIC BLOOD PRESSURE: 60 MMHG

## 2021-02-05 VITALS — SYSTOLIC BLOOD PRESSURE: 118 MMHG | DIASTOLIC BLOOD PRESSURE: 48 MMHG

## 2021-02-05 VITALS — DIASTOLIC BLOOD PRESSURE: 52 MMHG | SYSTOLIC BLOOD PRESSURE: 127 MMHG

## 2021-02-05 VITALS — SYSTOLIC BLOOD PRESSURE: 137 MMHG | DIASTOLIC BLOOD PRESSURE: 60 MMHG

## 2021-02-05 VITALS — SYSTOLIC BLOOD PRESSURE: 126 MMHG | DIASTOLIC BLOOD PRESSURE: 51 MMHG

## 2021-02-05 VITALS — SYSTOLIC BLOOD PRESSURE: 127 MMHG | DIASTOLIC BLOOD PRESSURE: 57 MMHG

## 2021-02-05 VITALS — SYSTOLIC BLOOD PRESSURE: 125 MMHG | DIASTOLIC BLOOD PRESSURE: 59 MMHG

## 2021-02-05 VITALS — SYSTOLIC BLOOD PRESSURE: 131 MMHG | DIASTOLIC BLOOD PRESSURE: 61 MMHG

## 2021-02-05 VITALS — SYSTOLIC BLOOD PRESSURE: 91 MMHG | DIASTOLIC BLOOD PRESSURE: 51 MMHG

## 2021-02-05 VITALS — DIASTOLIC BLOOD PRESSURE: 48 MMHG | SYSTOLIC BLOOD PRESSURE: 117 MMHG

## 2021-02-05 VITALS — SYSTOLIC BLOOD PRESSURE: 133 MMHG | DIASTOLIC BLOOD PRESSURE: 61 MMHG

## 2021-02-05 VITALS — DIASTOLIC BLOOD PRESSURE: 63 MMHG | SYSTOLIC BLOOD PRESSURE: 122 MMHG

## 2021-02-05 VITALS — SYSTOLIC BLOOD PRESSURE: 132 MMHG | DIASTOLIC BLOOD PRESSURE: 53 MMHG

## 2021-02-05 VITALS — SYSTOLIC BLOOD PRESSURE: 129 MMHG | DIASTOLIC BLOOD PRESSURE: 54 MMHG

## 2021-02-05 VITALS — SYSTOLIC BLOOD PRESSURE: 121 MMHG | DIASTOLIC BLOOD PRESSURE: 52 MMHG

## 2021-02-05 VITALS — DIASTOLIC BLOOD PRESSURE: 51 MMHG | SYSTOLIC BLOOD PRESSURE: 91 MMHG

## 2021-02-05 VITALS — SYSTOLIC BLOOD PRESSURE: 132 MMHG | DIASTOLIC BLOOD PRESSURE: 60 MMHG

## 2021-02-05 VITALS — SYSTOLIC BLOOD PRESSURE: 109 MMHG | DIASTOLIC BLOOD PRESSURE: 48 MMHG

## 2021-02-05 VITALS — DIASTOLIC BLOOD PRESSURE: 60 MMHG | SYSTOLIC BLOOD PRESSURE: 132 MMHG

## 2021-02-05 LAB
BASOPHILS # BLD AUTO: 0 /CMM (ref 0–0.2)
BASOPHILS NFR BLD AUTO: 0.6 % (ref 0–2)
BUN SERPL-MCNC: 47 MG/DL (ref 7–18)
CALCIUM SERPL-MCNC: 8.2 MG/DL (ref 8.5–10.1)
CHLORIDE SERPL-SCNC: 101 MMOL/L (ref 98–107)
CO2 SERPL-SCNC: 34 MMOL/L (ref 21–32)
CREAT SERPL-MCNC: 2.4 MG/DL (ref 0.6–1.3)
EOSINOPHIL NFR BLD AUTO: 2.1 % (ref 0–6)
GLUCOSE SERPL-MCNC: 154 MG/DL (ref 74–106)
HCT VFR BLD AUTO: 25 % (ref 33–45)
HGB BLD-MCNC: 8.2 G/DL (ref 11.5–14.8)
LYMPHOCYTES NFR BLD AUTO: 0.5 /CMM (ref 0.8–4.8)
LYMPHOCYTES NFR BLD AUTO: 7.7 % (ref 20–44)
MCHC RBC AUTO-ENTMCNC: 33 G/DL (ref 31–36)
MCV RBC AUTO: 94 FL (ref 82–100)
MONOCYTES NFR BLD AUTO: 0.5 /CMM (ref 0.1–1.3)
MONOCYTES NFR BLD AUTO: 8.2 % (ref 2–12)
NEUTROPHILS # BLD AUTO: 4.9 /CMM (ref 1.8–8.9)
NEUTROPHILS NFR BLD AUTO: 81.4 % (ref 43–81)
PLATELET # BLD AUTO: 115 /CMM (ref 150–450)
POTASSIUM SERPL-SCNC: 4.6 MMOL/L (ref 3.5–5.1)
RBC # BLD AUTO: 2.64 MIL/UL (ref 4–5.2)
SODIUM SERPL-SCNC: 140 MMOL/L (ref 136–145)
WBC NRBC COR # BLD AUTO: 6 K/UL (ref 4.3–11)

## 2021-02-05 RX ADMIN — Medication SCH MG: at 19:55

## 2021-02-05 RX ADMIN — MUPIROCIN SCH APPLIC: 20 OINTMENT TOPICAL at 20:35

## 2021-02-05 RX ADMIN — Medication SCH OZ: at 20:35

## 2021-02-05 RX ADMIN — Medication SCH MG: at 13:08

## 2021-02-05 RX ADMIN — Medication SCH OZ: at 09:37

## 2021-02-05 RX ADMIN — Medication SCH EACH: at 18:29

## 2021-02-05 RX ADMIN — ACETYLCYSTEINE SCH MG: 100 INHALANT RESPIRATORY (INHALATION) at 15:21

## 2021-02-05 RX ADMIN — CEFEPIME HYDROCHLORIDE SCH MLS/HR: 1 INJECTION, POWDER, FOR SOLUTION INTRAMUSCULAR; INTRAVENOUS at 15:03

## 2021-02-05 RX ADMIN — INSULIN HUMAN PRN UNIT: 100 INJECTION, SOLUTION PARENTERAL at 23:30

## 2021-02-05 RX ADMIN — Medication SCH EACH: at 23:28

## 2021-02-05 RX ADMIN — SODIUM CITRATE AND CITRIC ACID MONOHYDRATE SCH ML: 500; 334 SOLUTION ORAL at 20:32

## 2021-02-05 RX ADMIN — Medication PRN MLS/HR: at 13:47

## 2021-02-05 RX ADMIN — FAMOTIDINE SCH MG: 10 INJECTION INTRAVENOUS at 09:14

## 2021-02-05 RX ADMIN — Medication SCH EACH: at 11:35

## 2021-02-05 RX ADMIN — SODIUM CITRATE AND CITRIC ACID MONOHYDRATE SCH ML: 500; 334 SOLUTION ORAL at 13:11

## 2021-02-05 RX ADMIN — SODIUM CHLORIDE SCH MG: 9 INJECTION, SOLUTION INTRAVENOUS at 01:36

## 2021-02-05 RX ADMIN — Medication SCH MG: at 01:26

## 2021-02-05 RX ADMIN — Medication SCH GM: at 19:06

## 2021-02-05 RX ADMIN — MUPIROCIN SCH APPLIC: 20 OINTMENT TOPICAL at 09:36

## 2021-02-05 RX ADMIN — SODIUM CITRATE AND CITRIC ACID MONOHYDRATE SCH ML: 500; 334 SOLUTION ORAL at 09:14

## 2021-02-05 RX ADMIN — Medication SCH MG: at 07:55

## 2021-02-05 RX ADMIN — ACETYLCYSTEINE SCH MG: 100 INHALANT RESPIRATORY (INHALATION) at 23:24

## 2021-02-05 RX ADMIN — SODIUM CHLORIDE SCH MG: 9 INJECTION, SOLUTION INTRAVENOUS at 18:29

## 2021-02-05 RX ADMIN — INSULIN HUMAN PRN UNIT: 100 INJECTION, SOLUTION PARENTERAL at 19:05

## 2021-02-05 RX ADMIN — INSULIN HUMAN PRN UNIT: 100 INJECTION, SOLUTION PARENTERAL at 06:35

## 2021-02-05 RX ADMIN — SODIUM CITRATE AND CITRIC ACID MONOHYDRATE SCH ML: 500; 334 SOLUTION ORAL at 16:29

## 2021-02-05 RX ADMIN — Medication PRN ML: at 20:32

## 2021-02-05 RX ADMIN — ACETYLCYSTEINE SCH MG: 100 INHALANT RESPIRATORY (INHALATION) at 07:55

## 2021-02-05 RX ADMIN — SODIUM CHLORIDE SCH MG: 9 INJECTION, SOLUTION INTRAVENOUS at 09:14

## 2021-02-05 RX ADMIN — Medication SCH EACH: at 06:33

## 2021-02-05 RX ADMIN — Medication SCH GM: at 09:37

## 2021-02-05 RX ADMIN — INSULIN HUMAN PRN UNIT: 100 INJECTION, SOLUTION PARENTERAL at 11:36

## 2021-02-05 NOTE — NUR
MD Robertson ordered to decrease Fi02 from 70% to 50 %, RT made aware and settings changed. Will 
monitor patient for tolerance. Current 02 96%.

## 2021-02-05 NOTE — NUR
ICU RN OPENING NOTES

Patient is in bed, and no s/s of respiratory distress. Patient noted with eyes opening at 
times but does not follow command. No c/o pain or discomfort. Patient is  on mechanical vent 
setting Tidal volume 400 , fi02 70 %,  Peep of 5 and AC 16 with 02 saturation of 98%. 
Patient noted with right femoral triple lumen with tko running at 10 cc/hour. Right IJ cath 
noted and did not have any s/s of bleeding. Head of bed kept elevated. Rectal tube noted, 
patient noted with holm cath draining clear yellow urine, draining well. Ngtube noted, 
placement and patency checked with feeding running at 45 cc/ hour. Will continue to monitor. 
Call light with in reach.

## 2021-02-05 NOTE — NUR
Patient resting in no acute distress.VSS.SR.AM care done.Skin care done.Tolerating vent 

settings and tube feeding.All needs met.Turned and repositioned.No significant change 

noted during the shift.Report given to day shift  for DASHAWN.

## 2021-02-05 NOTE — NUR
ICU RN CLOSING NOTES

Patient is in bed, and no s/s of respiratory distress. Patient blinks eyes when name being 
called and acknowledges.No c/o pain or discomfort. Patient is  on mechanical vent setting 
Tidal volume 400 , fi02 50 %,  Peep of 5 and AC 16 with 02 saturation of 96%. Patient noted 
with right femoral triple lumen with tko running at 10 cc/hour. Right IJ cath noted and did 
not have any s/s of bleeding. Head of bed kept elevated. Ngtube noted, feeding running at 45 
cc/ hour. Endorsed to next shift for romeo

## 2021-02-05 NOTE — NUR
ICU RN OPENING NOTES:



Rec'd pt in bed, intubated 7.5/22cm at the lip. Pt sedated. ST on tele monitor. NGT in place 
patent and infusing Glucerna at 45ml/hr. Minimal residual noted. Right femoral TLC patent 
and flushed. Dressing c/d/i. Right IJ HD cath in place. Schultz catheter in place. Rectal tube 
in place. Safety measures in place. Will continue to monitor.

## 2021-02-06 VITALS — SYSTOLIC BLOOD PRESSURE: 120 MMHG | DIASTOLIC BLOOD PRESSURE: 63 MMHG

## 2021-02-06 VITALS — DIASTOLIC BLOOD PRESSURE: 59 MMHG | SYSTOLIC BLOOD PRESSURE: 127 MMHG

## 2021-02-06 VITALS — SYSTOLIC BLOOD PRESSURE: 120 MMHG | DIASTOLIC BLOOD PRESSURE: 53 MMHG

## 2021-02-06 VITALS — SYSTOLIC BLOOD PRESSURE: 129 MMHG | DIASTOLIC BLOOD PRESSURE: 61 MMHG

## 2021-02-06 VITALS — SYSTOLIC BLOOD PRESSURE: 142 MMHG | DIASTOLIC BLOOD PRESSURE: 65 MMHG

## 2021-02-06 VITALS — SYSTOLIC BLOOD PRESSURE: 130 MMHG | DIASTOLIC BLOOD PRESSURE: 58 MMHG

## 2021-02-06 VITALS — DIASTOLIC BLOOD PRESSURE: 60 MMHG | SYSTOLIC BLOOD PRESSURE: 132 MMHG

## 2021-02-06 VITALS — DIASTOLIC BLOOD PRESSURE: 52 MMHG | SYSTOLIC BLOOD PRESSURE: 120 MMHG

## 2021-02-06 VITALS — DIASTOLIC BLOOD PRESSURE: 67 MMHG | SYSTOLIC BLOOD PRESSURE: 134 MMHG

## 2021-02-06 VITALS — DIASTOLIC BLOOD PRESSURE: 53 MMHG | SYSTOLIC BLOOD PRESSURE: 112 MMHG

## 2021-02-06 VITALS — DIASTOLIC BLOOD PRESSURE: 59 MMHG | SYSTOLIC BLOOD PRESSURE: 125 MMHG

## 2021-02-06 VITALS — SYSTOLIC BLOOD PRESSURE: 111 MMHG | DIASTOLIC BLOOD PRESSURE: 66 MMHG

## 2021-02-06 VITALS — DIASTOLIC BLOOD PRESSURE: 64 MMHG | SYSTOLIC BLOOD PRESSURE: 134 MMHG

## 2021-02-06 VITALS — DIASTOLIC BLOOD PRESSURE: 59 MMHG | SYSTOLIC BLOOD PRESSURE: 126 MMHG

## 2021-02-06 VITALS — SYSTOLIC BLOOD PRESSURE: 127 MMHG | DIASTOLIC BLOOD PRESSURE: 56 MMHG

## 2021-02-06 VITALS — SYSTOLIC BLOOD PRESSURE: 129 MMHG | DIASTOLIC BLOOD PRESSURE: 59 MMHG

## 2021-02-06 VITALS — DIASTOLIC BLOOD PRESSURE: 59 MMHG | SYSTOLIC BLOOD PRESSURE: 124 MMHG

## 2021-02-06 VITALS — DIASTOLIC BLOOD PRESSURE: 55 MMHG | SYSTOLIC BLOOD PRESSURE: 110 MMHG

## 2021-02-06 VITALS — DIASTOLIC BLOOD PRESSURE: 55 MMHG | SYSTOLIC BLOOD PRESSURE: 116 MMHG

## 2021-02-06 VITALS — SYSTOLIC BLOOD PRESSURE: 137 MMHG | DIASTOLIC BLOOD PRESSURE: 63 MMHG

## 2021-02-06 VITALS — SYSTOLIC BLOOD PRESSURE: 147 MMHG | DIASTOLIC BLOOD PRESSURE: 87 MMHG

## 2021-02-06 VITALS — DIASTOLIC BLOOD PRESSURE: 62 MMHG | SYSTOLIC BLOOD PRESSURE: 123 MMHG

## 2021-02-06 VITALS — SYSTOLIC BLOOD PRESSURE: 118 MMHG | DIASTOLIC BLOOD PRESSURE: 59 MMHG

## 2021-02-06 VITALS — SYSTOLIC BLOOD PRESSURE: 126 MMHG | DIASTOLIC BLOOD PRESSURE: 58 MMHG

## 2021-02-06 VITALS — DIASTOLIC BLOOD PRESSURE: 60 MMHG | SYSTOLIC BLOOD PRESSURE: 123 MMHG

## 2021-02-06 VITALS — DIASTOLIC BLOOD PRESSURE: 61 MMHG | SYSTOLIC BLOOD PRESSURE: 127 MMHG

## 2021-02-06 VITALS — DIASTOLIC BLOOD PRESSURE: 57 MMHG | SYSTOLIC BLOOD PRESSURE: 116 MMHG

## 2021-02-06 VITALS — DIASTOLIC BLOOD PRESSURE: 58 MMHG | SYSTOLIC BLOOD PRESSURE: 128 MMHG

## 2021-02-06 VITALS — DIASTOLIC BLOOD PRESSURE: 62 MMHG | SYSTOLIC BLOOD PRESSURE: 129 MMHG

## 2021-02-06 VITALS — DIASTOLIC BLOOD PRESSURE: 54 MMHG | SYSTOLIC BLOOD PRESSURE: 110 MMHG

## 2021-02-06 VITALS — DIASTOLIC BLOOD PRESSURE: 62 MMHG | SYSTOLIC BLOOD PRESSURE: 136 MMHG

## 2021-02-06 VITALS — SYSTOLIC BLOOD PRESSURE: 115 MMHG | DIASTOLIC BLOOD PRESSURE: 55 MMHG

## 2021-02-06 VITALS — DIASTOLIC BLOOD PRESSURE: 60 MMHG | SYSTOLIC BLOOD PRESSURE: 129 MMHG

## 2021-02-06 VITALS — SYSTOLIC BLOOD PRESSURE: 128 MMHG | DIASTOLIC BLOOD PRESSURE: 58 MMHG

## 2021-02-06 VITALS — DIASTOLIC BLOOD PRESSURE: 61 MMHG | SYSTOLIC BLOOD PRESSURE: 121 MMHG

## 2021-02-06 VITALS — DIASTOLIC BLOOD PRESSURE: 57 MMHG | SYSTOLIC BLOOD PRESSURE: 114 MMHG

## 2021-02-06 VITALS — SYSTOLIC BLOOD PRESSURE: 131 MMHG | DIASTOLIC BLOOD PRESSURE: 59 MMHG

## 2021-02-06 VITALS — SYSTOLIC BLOOD PRESSURE: 112 MMHG | DIASTOLIC BLOOD PRESSURE: 52 MMHG

## 2021-02-06 VITALS — SYSTOLIC BLOOD PRESSURE: 126 MMHG | DIASTOLIC BLOOD PRESSURE: 57 MMHG

## 2021-02-06 VITALS — DIASTOLIC BLOOD PRESSURE: 58 MMHG | SYSTOLIC BLOOD PRESSURE: 135 MMHG

## 2021-02-06 VITALS — DIASTOLIC BLOOD PRESSURE: 51 MMHG | SYSTOLIC BLOOD PRESSURE: 116 MMHG

## 2021-02-06 VITALS — SYSTOLIC BLOOD PRESSURE: 119 MMHG | DIASTOLIC BLOOD PRESSURE: 54 MMHG

## 2021-02-06 VITALS — SYSTOLIC BLOOD PRESSURE: 128 MMHG | DIASTOLIC BLOOD PRESSURE: 62 MMHG

## 2021-02-06 VITALS — DIASTOLIC BLOOD PRESSURE: 60 MMHG | SYSTOLIC BLOOD PRESSURE: 133 MMHG

## 2021-02-06 VITALS — SYSTOLIC BLOOD PRESSURE: 137 MMHG | DIASTOLIC BLOOD PRESSURE: 65 MMHG

## 2021-02-06 VITALS — SYSTOLIC BLOOD PRESSURE: 130 MMHG | DIASTOLIC BLOOD PRESSURE: 65 MMHG

## 2021-02-06 VITALS — SYSTOLIC BLOOD PRESSURE: 118 MMHG | DIASTOLIC BLOOD PRESSURE: 56 MMHG

## 2021-02-06 VITALS — DIASTOLIC BLOOD PRESSURE: 57 MMHG | SYSTOLIC BLOOD PRESSURE: 122 MMHG

## 2021-02-06 VITALS — DIASTOLIC BLOOD PRESSURE: 55 MMHG | SYSTOLIC BLOOD PRESSURE: 125 MMHG

## 2021-02-06 VITALS — DIASTOLIC BLOOD PRESSURE: 60 MMHG | SYSTOLIC BLOOD PRESSURE: 131 MMHG

## 2021-02-06 VITALS — SYSTOLIC BLOOD PRESSURE: 118 MMHG | DIASTOLIC BLOOD PRESSURE: 51 MMHG

## 2021-02-06 VITALS — SYSTOLIC BLOOD PRESSURE: 134 MMHG | DIASTOLIC BLOOD PRESSURE: 63 MMHG

## 2021-02-06 VITALS — SYSTOLIC BLOOD PRESSURE: 120 MMHG | DIASTOLIC BLOOD PRESSURE: 58 MMHG

## 2021-02-06 VITALS — SYSTOLIC BLOOD PRESSURE: 134 MMHG | DIASTOLIC BLOOD PRESSURE: 64 MMHG

## 2021-02-06 VITALS — SYSTOLIC BLOOD PRESSURE: 117 MMHG | DIASTOLIC BLOOD PRESSURE: 59 MMHG

## 2021-02-06 VITALS — DIASTOLIC BLOOD PRESSURE: 56 MMHG | SYSTOLIC BLOOD PRESSURE: 124 MMHG

## 2021-02-06 LAB
BASOPHILS # BLD AUTO: 0 /CMM (ref 0–0.2)
BASOPHILS NFR BLD AUTO: 0.7 % (ref 0–2)
BUN SERPL-MCNC: 36 MG/DL (ref 7–18)
CALCIUM SERPL-MCNC: 8.4 MG/DL (ref 8.5–10.1)
CHLORIDE SERPL-SCNC: 105 MMOL/L (ref 98–107)
CO2 SERPL-SCNC: 37 MMOL/L (ref 21–32)
CREAT SERPL-MCNC: 1.9 MG/DL (ref 0.6–1.3)
EOSINOPHIL NFR BLD AUTO: 2.6 % (ref 0–6)
GLUCOSE SERPL-MCNC: 131 MG/DL (ref 74–106)
HCT VFR BLD AUTO: 21 % (ref 33–45)
HGB BLD-MCNC: 7.1 G/DL (ref 11.5–14.8)
LYMPHOCYTES NFR BLD AUTO: 0.4 /CMM (ref 0.8–4.8)
LYMPHOCYTES NFR BLD AUTO: 6.7 % (ref 20–44)
MAGNESIUM SERPL-MCNC: 1.8 MG/DL (ref 1.8–2.4)
MCHC RBC AUTO-ENTMCNC: 34 G/DL (ref 31–36)
MCV RBC AUTO: 94 FL (ref 82–100)
MONOCYTES NFR BLD AUTO: 0.5 /CMM (ref 0.1–1.3)
MONOCYTES NFR BLD AUTO: 7.9 % (ref 2–12)
NEUTROPHILS # BLD AUTO: 5.3 /CMM (ref 1.8–8.9)
NEUTROPHILS NFR BLD AUTO: 82.1 % (ref 43–81)
PHOSPHATE SERPL-MCNC: 3.1 MG/DL (ref 2.5–4.9)
PLATELET # BLD AUTO: 148 /CMM (ref 150–450)
POTASSIUM SERPL-SCNC: 4.4 MMOL/L (ref 3.5–5.1)
RBC # BLD AUTO: 2.27 MIL/UL (ref 4–5.2)
SODIUM SERPL-SCNC: 143 MMOL/L (ref 136–145)
WBC NRBC COR # BLD AUTO: 6.5 K/UL (ref 4.3–11)

## 2021-02-06 RX ADMIN — INSULIN HUMAN PRN UNIT: 100 INJECTION, SOLUTION PARENTERAL at 23:30

## 2021-02-06 RX ADMIN — SODIUM CITRATE AND CITRIC ACID MONOHYDRATE SCH ML: 500; 334 SOLUTION ORAL at 09:24

## 2021-02-06 RX ADMIN — Medication SCH MG: at 07:58

## 2021-02-06 RX ADMIN — INSULIN HUMAN PRN UNIT: 100 INJECTION, SOLUTION PARENTERAL at 13:25

## 2021-02-06 RX ADMIN — Medication SCH EACH: at 23:33

## 2021-02-06 RX ADMIN — Medication SCH OZ: at 20:47

## 2021-02-06 RX ADMIN — SODIUM CHLORIDE SCH MG: 9 INJECTION, SOLUTION INTRAVENOUS at 09:24

## 2021-02-06 RX ADMIN — MUPIROCIN SCH APPLIC: 20 OINTMENT TOPICAL at 09:25

## 2021-02-06 RX ADMIN — MUPIROCIN SCH APPLIC: 20 OINTMENT TOPICAL at 20:48

## 2021-02-06 RX ADMIN — SODIUM CITRATE AND CITRIC ACID MONOHYDRATE SCH ML: 500; 334 SOLUTION ORAL at 13:21

## 2021-02-06 RX ADMIN — SODIUM CHLORIDE SCH MG: 9 INJECTION, SOLUTION INTRAVENOUS at 17:43

## 2021-02-06 RX ADMIN — SODIUM CHLORIDE SCH MG: 9 INJECTION, SOLUTION INTRAVENOUS at 01:34

## 2021-02-06 RX ADMIN — ACETYLCYSTEINE SCH MG: 100 INHALANT RESPIRATORY (INHALATION) at 23:56

## 2021-02-06 RX ADMIN — Medication SCH EACH: at 05:28

## 2021-02-06 RX ADMIN — Medication SCH MG: at 01:39

## 2021-02-06 RX ADMIN — INSULIN HUMAN PRN UNIT: 100 INJECTION, SOLUTION PARENTERAL at 17:44

## 2021-02-06 RX ADMIN — INSULIN HUMAN PRN UNIT: 100 INJECTION, SOLUTION PARENTERAL at 05:29

## 2021-02-06 RX ADMIN — SODIUM CITRATE AND CITRIC ACID MONOHYDRATE SCH ML: 500; 334 SOLUTION ORAL at 20:45

## 2021-02-06 RX ADMIN — Medication PRN ML: at 13:22

## 2021-02-06 RX ADMIN — Medication SCH GM: at 17:39

## 2021-02-06 RX ADMIN — FAMOTIDINE SCH MG: 10 INJECTION INTRAVENOUS at 09:24

## 2021-02-06 RX ADMIN — Medication SCH EACH: at 13:10

## 2021-02-06 RX ADMIN — SODIUM CITRATE AND CITRIC ACID MONOHYDRATE SCH ML: 500; 334 SOLUTION ORAL at 17:43

## 2021-02-06 RX ADMIN — Medication SCH MG: at 19:53

## 2021-02-06 RX ADMIN — Medication SCH OZ: at 09:26

## 2021-02-06 RX ADMIN — SODIUM CHLORIDE PRN MLS/HR: 9 INJECTION, SOLUTION INTRAVENOUS at 03:06

## 2021-02-06 RX ADMIN — ACETYLCYSTEINE SCH MG: 100 INHALANT RESPIRATORY (INHALATION) at 15:22

## 2021-02-06 RX ADMIN — ACETYLCYSTEINE SCH MG: 100 INHALANT RESPIRATORY (INHALATION) at 07:58

## 2021-02-06 RX ADMIN — Medication SCH EACH: at 17:38

## 2021-02-06 RX ADMIN — Medication SCH MG: at 13:12

## 2021-02-06 RX ADMIN — CEFEPIME HYDROCHLORIDE SCH MLS/HR: 1 INJECTION, POWDER, FOR SOLUTION INTRAMUSCULAR; INTRAVENOUS at 15:07

## 2021-02-06 RX ADMIN — Medication SCH GM: at 09:25

## 2021-02-06 NOTE — NUR
ICU RN OPENING NOTES:



Rec'd pt in bed, intubated 7.5/22cm at the lip. Pt sedated. ST on tele monitor. Right NGT in 
place patent and infusing Glucerna at 45ml/hr. Minimal residuals noted. Right femoral TLC 
patent and flushed w/ TKO infusing. Dressing c/d/i. Right IJ HD cath in place. Dressing 
c/d/i. Schultz catheter in place patent and draining urine via gravity. Flexiseal in place. 
Safety measures in place. Will continue to monitor.

## 2021-02-06 NOTE — NUR
rn notes

 am care done, bs134 mg/dl coverage given, due medication administered, assist turn and 
reposition q 2 hr.

## 2021-02-06 NOTE — NUR
RN NOTES

Received  patient  in the bed,  no sedation, intubated 7.5/22cm at the [facility]. ST 118on tele 
monitor. NGT in place patent and infusing Glucerna at 45ml/hr, 10cc of residual, and 
placement also checked.   Right femoral TLC patent and flushed, TKO NS 10ml/hr.  Dressing  
changed, Right IJ HD cath in place. Schultz catheter in place. Rectal tube in place. Safety 
measures in place. assist turn and repostion q 2 hr.Will continue to monitor.

## 2021-02-06 NOTE — NUR
RN NOTES

 BS-176 MG/DL COVERAGE GIVEN, V/S STABLE, DUE MEDICATION ADMINISTERED, ASHBY DRAINING 
WHITISH COLOR OUTPUT, FLIXISEAL INTACT, OUTPUT  ML. ASSIST TURN AND REPOSTION  Q 2 
HR, RUNNING GLUCERNA 45 ML/HR INTACT. ENDORSED ONCOMING NURSE FOLLOW ADSHAWN.

## 2021-02-07 VITALS — DIASTOLIC BLOOD PRESSURE: 65 MMHG | SYSTOLIC BLOOD PRESSURE: 135 MMHG

## 2021-02-07 VITALS — SYSTOLIC BLOOD PRESSURE: 127 MMHG | DIASTOLIC BLOOD PRESSURE: 64 MMHG

## 2021-02-07 VITALS — DIASTOLIC BLOOD PRESSURE: 54 MMHG | SYSTOLIC BLOOD PRESSURE: 109 MMHG

## 2021-02-07 VITALS — DIASTOLIC BLOOD PRESSURE: 62 MMHG | SYSTOLIC BLOOD PRESSURE: 125 MMHG

## 2021-02-07 VITALS — DIASTOLIC BLOOD PRESSURE: 56 MMHG | SYSTOLIC BLOOD PRESSURE: 125 MMHG

## 2021-02-07 VITALS — SYSTOLIC BLOOD PRESSURE: 133 MMHG | DIASTOLIC BLOOD PRESSURE: 63 MMHG

## 2021-02-07 VITALS — SYSTOLIC BLOOD PRESSURE: 114 MMHG | DIASTOLIC BLOOD PRESSURE: 53 MMHG

## 2021-02-07 VITALS — DIASTOLIC BLOOD PRESSURE: 58 MMHG | SYSTOLIC BLOOD PRESSURE: 108 MMHG

## 2021-02-07 VITALS — SYSTOLIC BLOOD PRESSURE: 114 MMHG | DIASTOLIC BLOOD PRESSURE: 57 MMHG

## 2021-02-07 VITALS — DIASTOLIC BLOOD PRESSURE: 64 MMHG | SYSTOLIC BLOOD PRESSURE: 128 MMHG

## 2021-02-07 VITALS — SYSTOLIC BLOOD PRESSURE: 120 MMHG | DIASTOLIC BLOOD PRESSURE: 57 MMHG

## 2021-02-07 VITALS — SYSTOLIC BLOOD PRESSURE: 112 MMHG | DIASTOLIC BLOOD PRESSURE: 57 MMHG

## 2021-02-07 VITALS — SYSTOLIC BLOOD PRESSURE: 128 MMHG | DIASTOLIC BLOOD PRESSURE: 58 MMHG

## 2021-02-07 VITALS — SYSTOLIC BLOOD PRESSURE: 103 MMHG | DIASTOLIC BLOOD PRESSURE: 52 MMHG

## 2021-02-07 VITALS — SYSTOLIC BLOOD PRESSURE: 103 MMHG | DIASTOLIC BLOOD PRESSURE: 56 MMHG

## 2021-02-07 VITALS — SYSTOLIC BLOOD PRESSURE: 132 MMHG | DIASTOLIC BLOOD PRESSURE: 62 MMHG

## 2021-02-07 VITALS — SYSTOLIC BLOOD PRESSURE: 122 MMHG | DIASTOLIC BLOOD PRESSURE: 54 MMHG

## 2021-02-07 VITALS — SYSTOLIC BLOOD PRESSURE: 112 MMHG | DIASTOLIC BLOOD PRESSURE: 49 MMHG

## 2021-02-07 VITALS — SYSTOLIC BLOOD PRESSURE: 119 MMHG | DIASTOLIC BLOOD PRESSURE: 58 MMHG

## 2021-02-07 VITALS — DIASTOLIC BLOOD PRESSURE: 54 MMHG | SYSTOLIC BLOOD PRESSURE: 101 MMHG

## 2021-02-07 VITALS — DIASTOLIC BLOOD PRESSURE: 60 MMHG | SYSTOLIC BLOOD PRESSURE: 122 MMHG

## 2021-02-07 VITALS — DIASTOLIC BLOOD PRESSURE: 56 MMHG | SYSTOLIC BLOOD PRESSURE: 116 MMHG

## 2021-02-07 VITALS — SYSTOLIC BLOOD PRESSURE: 115 MMHG | DIASTOLIC BLOOD PRESSURE: 62 MMHG

## 2021-02-07 VITALS — DIASTOLIC BLOOD PRESSURE: 55 MMHG | SYSTOLIC BLOOD PRESSURE: 94 MMHG

## 2021-02-07 VITALS — DIASTOLIC BLOOD PRESSURE: 56 MMHG | SYSTOLIC BLOOD PRESSURE: 124 MMHG

## 2021-02-07 VITALS — SYSTOLIC BLOOD PRESSURE: 98 MMHG | DIASTOLIC BLOOD PRESSURE: 54 MMHG

## 2021-02-07 VITALS — SYSTOLIC BLOOD PRESSURE: 123 MMHG | DIASTOLIC BLOOD PRESSURE: 57 MMHG

## 2021-02-07 VITALS — SYSTOLIC BLOOD PRESSURE: 127 MMHG | DIASTOLIC BLOOD PRESSURE: 61 MMHG

## 2021-02-07 VITALS — DIASTOLIC BLOOD PRESSURE: 59 MMHG | SYSTOLIC BLOOD PRESSURE: 120 MMHG

## 2021-02-07 VITALS — DIASTOLIC BLOOD PRESSURE: 58 MMHG | SYSTOLIC BLOOD PRESSURE: 122 MMHG

## 2021-02-07 VITALS — SYSTOLIC BLOOD PRESSURE: 118 MMHG | DIASTOLIC BLOOD PRESSURE: 60 MMHG

## 2021-02-07 VITALS — DIASTOLIC BLOOD PRESSURE: 61 MMHG | SYSTOLIC BLOOD PRESSURE: 125 MMHG

## 2021-02-07 VITALS — SYSTOLIC BLOOD PRESSURE: 127 MMHG | DIASTOLIC BLOOD PRESSURE: 59 MMHG

## 2021-02-07 VITALS — DIASTOLIC BLOOD PRESSURE: 55 MMHG | SYSTOLIC BLOOD PRESSURE: 105 MMHG

## 2021-02-07 VITALS — SYSTOLIC BLOOD PRESSURE: 96 MMHG | DIASTOLIC BLOOD PRESSURE: 53 MMHG

## 2021-02-07 VITALS — SYSTOLIC BLOOD PRESSURE: 122 MMHG | DIASTOLIC BLOOD PRESSURE: 58 MMHG

## 2021-02-07 VITALS — DIASTOLIC BLOOD PRESSURE: 62 MMHG | SYSTOLIC BLOOD PRESSURE: 130 MMHG

## 2021-02-07 VITALS — DIASTOLIC BLOOD PRESSURE: 54 MMHG | SYSTOLIC BLOOD PRESSURE: 133 MMHG

## 2021-02-07 VITALS — SYSTOLIC BLOOD PRESSURE: 110 MMHG | DIASTOLIC BLOOD PRESSURE: 52 MMHG

## 2021-02-07 VITALS — SYSTOLIC BLOOD PRESSURE: 129 MMHG | DIASTOLIC BLOOD PRESSURE: 63 MMHG

## 2021-02-07 VITALS — SYSTOLIC BLOOD PRESSURE: 116 MMHG | DIASTOLIC BLOOD PRESSURE: 61 MMHG

## 2021-02-07 VITALS — SYSTOLIC BLOOD PRESSURE: 94 MMHG | DIASTOLIC BLOOD PRESSURE: 55 MMHG

## 2021-02-07 VITALS — DIASTOLIC BLOOD PRESSURE: 68 MMHG | SYSTOLIC BLOOD PRESSURE: 109 MMHG

## 2021-02-07 VITALS — SYSTOLIC BLOOD PRESSURE: 119 MMHG | DIASTOLIC BLOOD PRESSURE: 53 MMHG

## 2021-02-07 VITALS — DIASTOLIC BLOOD PRESSURE: 55 MMHG | SYSTOLIC BLOOD PRESSURE: 125 MMHG

## 2021-02-07 VITALS — DIASTOLIC BLOOD PRESSURE: 63 MMHG | SYSTOLIC BLOOD PRESSURE: 127 MMHG

## 2021-02-07 VITALS — SYSTOLIC BLOOD PRESSURE: 119 MMHG | DIASTOLIC BLOOD PRESSURE: 57 MMHG

## 2021-02-07 VITALS — DIASTOLIC BLOOD PRESSURE: 62 MMHG | SYSTOLIC BLOOD PRESSURE: 121 MMHG

## 2021-02-07 VITALS — SYSTOLIC BLOOD PRESSURE: 126 MMHG | DIASTOLIC BLOOD PRESSURE: 63 MMHG

## 2021-02-07 VITALS — DIASTOLIC BLOOD PRESSURE: 52 MMHG | SYSTOLIC BLOOD PRESSURE: 122 MMHG

## 2021-02-07 VITALS — DIASTOLIC BLOOD PRESSURE: 60 MMHG | SYSTOLIC BLOOD PRESSURE: 120 MMHG

## 2021-02-07 VITALS — DIASTOLIC BLOOD PRESSURE: 53 MMHG | SYSTOLIC BLOOD PRESSURE: 113 MMHG

## 2021-02-07 VITALS — DIASTOLIC BLOOD PRESSURE: 52 MMHG | SYSTOLIC BLOOD PRESSURE: 110 MMHG

## 2021-02-07 VITALS — DIASTOLIC BLOOD PRESSURE: 55 MMHG | SYSTOLIC BLOOD PRESSURE: 115 MMHG

## 2021-02-07 VITALS — SYSTOLIC BLOOD PRESSURE: 111 MMHG | DIASTOLIC BLOOD PRESSURE: 65 MMHG

## 2021-02-07 VITALS — DIASTOLIC BLOOD PRESSURE: 62 MMHG | SYSTOLIC BLOOD PRESSURE: 128 MMHG

## 2021-02-07 VITALS — SYSTOLIC BLOOD PRESSURE: 118 MMHG | DIASTOLIC BLOOD PRESSURE: 54 MMHG

## 2021-02-07 VITALS — SYSTOLIC BLOOD PRESSURE: 139 MMHG | DIASTOLIC BLOOD PRESSURE: 73 MMHG

## 2021-02-07 VITALS — DIASTOLIC BLOOD PRESSURE: 66 MMHG | SYSTOLIC BLOOD PRESSURE: 127 MMHG

## 2021-02-07 VITALS — DIASTOLIC BLOOD PRESSURE: 58 MMHG | SYSTOLIC BLOOD PRESSURE: 118 MMHG

## 2021-02-07 VITALS — SYSTOLIC BLOOD PRESSURE: 129 MMHG | DIASTOLIC BLOOD PRESSURE: 61 MMHG

## 2021-02-07 LAB
BASOPHILS # BLD AUTO: 0.1 /CMM (ref 0–0.2)
BASOPHILS NFR BLD AUTO: 0.6 % (ref 0–2)
BUN SERPL-MCNC: 50 MG/DL (ref 7–18)
CALCIUM SERPL-MCNC: 8.5 MG/DL (ref 8.5–10.1)
CHLORIDE SERPL-SCNC: 104 MMOL/L (ref 98–107)
CO2 SERPL-SCNC: 37 MMOL/L (ref 21–32)
CREAT SERPL-MCNC: 2.4 MG/DL (ref 0.6–1.3)
EOSINOPHIL NFR BLD AUTO: 2.7 % (ref 0–6)
GLUCOSE SERPL-MCNC: 135 MG/DL (ref 74–106)
HCT VFR BLD AUTO: 21 % (ref 33–45)
HGB BLD-MCNC: 6.8 G/DL (ref 11.5–14.8)
LYMPHOCYTES NFR BLD AUTO: 0.6 /CMM (ref 0.8–4.8)
LYMPHOCYTES NFR BLD AUTO: 7 % (ref 20–44)
LYMPHOCYTES NFR BLD MANUAL: 5 % (ref 16–48)
MCHC RBC AUTO-ENTMCNC: 33 G/DL (ref 31–36)
MCV RBC AUTO: 96 FL (ref 82–100)
MONOCYTES NFR BLD AUTO: 0.8 /CMM (ref 0.1–1.3)
MONOCYTES NFR BLD AUTO: 9.2 % (ref 2–12)
MONOCYTES NFR BLD MANUAL: 1 % (ref 0–11)
NEUTROPHILS # BLD AUTO: 7 /CMM (ref 1.8–8.9)
NEUTROPHILS NFR BLD AUTO: 80.5 % (ref 43–81)
NEUTS BAND NFR BLD MANUAL: 14 % (ref 0–5)
NEUTS SEG NFR BLD MANUAL: 80 % (ref 42–76)
PLATELET # BLD AUTO: 188 /CMM (ref 150–450)
POTASSIUM SERPL-SCNC: 4.9 MMOL/L (ref 3.5–5.1)
RBC # BLD AUTO: 2.16 MIL/UL (ref 4–5.2)
SODIUM SERPL-SCNC: 142 MMOL/L (ref 136–145)
WBC NRBC COR # BLD AUTO: 8.7 K/UL (ref 4.3–11)

## 2021-02-07 RX ADMIN — Medication SCH MG: at 01:30

## 2021-02-07 RX ADMIN — Medication SCH GM: at 10:28

## 2021-02-07 RX ADMIN — Medication SCH GM: at 16:55

## 2021-02-07 RX ADMIN — ACETYLCYSTEINE SCH MG: 100 INHALANT RESPIRATORY (INHALATION) at 14:30

## 2021-02-07 RX ADMIN — SODIUM CHLORIDE SCH MG: 9 INJECTION, SOLUTION INTRAVENOUS at 10:27

## 2021-02-07 RX ADMIN — Medication SCH EACH: at 23:26

## 2021-02-07 RX ADMIN — Medication SCH EACH: at 12:04

## 2021-02-07 RX ADMIN — ACETYLCYSTEINE SCH MG: 100 INHALANT RESPIRATORY (INHALATION) at 23:52

## 2021-02-07 RX ADMIN — SODIUM CITRATE AND CITRIC ACID MONOHYDRATE SCH ML: 500; 334 SOLUTION ORAL at 10:27

## 2021-02-07 RX ADMIN — FAMOTIDINE SCH MG: 10 INJECTION INTRAVENOUS at 10:27

## 2021-02-07 RX ADMIN — MUPIROCIN SCH APPLIC: 20 OINTMENT TOPICAL at 10:27

## 2021-02-07 RX ADMIN — INSULIN HUMAN PRN UNIT: 100 INJECTION, SOLUTION PARENTERAL at 17:20

## 2021-02-07 RX ADMIN — Medication SCH EACH: at 16:57

## 2021-02-07 RX ADMIN — Medication SCH MG: at 14:30

## 2021-02-07 RX ADMIN — Medication PRN ML: at 16:53

## 2021-02-07 RX ADMIN — Medication SCH OZ: at 20:07

## 2021-02-07 RX ADMIN — Medication SCH OZ: at 10:28

## 2021-02-07 RX ADMIN — INSULIN HUMAN PRN UNIT: 100 INJECTION, SOLUTION PARENTERAL at 06:02

## 2021-02-07 RX ADMIN — Medication SCH EACH: at 06:01

## 2021-02-07 RX ADMIN — MUPIROCIN SCH APPLIC: 20 OINTMENT TOPICAL at 20:06

## 2021-02-07 RX ADMIN — ACETYLCYSTEINE SCH MG: 100 INHALANT RESPIRATORY (INHALATION) at 07:41

## 2021-02-07 RX ADMIN — SODIUM CITRATE AND CITRIC ACID MONOHYDRATE SCH ML: 500; 334 SOLUTION ORAL at 13:10

## 2021-02-07 RX ADMIN — SODIUM CHLORIDE SCH MG: 9 INJECTION, SOLUTION INTRAVENOUS at 01:20

## 2021-02-07 RX ADMIN — SODIUM CITRATE AND CITRIC ACID MONOHYDRATE SCH ML: 500; 334 SOLUTION ORAL at 20:25

## 2021-02-07 RX ADMIN — Medication SCH MG: at 20:01

## 2021-02-07 RX ADMIN — SODIUM CHLORIDE SCH MG: 9 INJECTION, SOLUTION INTRAVENOUS at 17:00

## 2021-02-07 RX ADMIN — Medication SCH MG: at 07:41

## 2021-02-07 RX ADMIN — SODIUM CHLORIDE PRN MLS/HR: 9 INJECTION, SOLUTION INTRAVENOUS at 04:05

## 2021-02-07 RX ADMIN — INSULIN HUMAN PRN UNIT: 100 INJECTION, SOLUTION PARENTERAL at 23:28

## 2021-02-07 RX ADMIN — SODIUM CITRATE AND CITRIC ACID MONOHYDRATE SCH ML: 500; 334 SOLUTION ORAL at 16:54

## 2021-02-07 NOTE — NUR
RN NOTES

 PATIENT  HAS NO ACUTE RESPIRATORY DISTRESS ETT WITH VENT SETTING TOLERATING WELL, FIO2 40%, 
PEEP 5. NO SEDATION. PATIENT AWAKE, CALM AND COOPERATIVE, FLASHED 200 ML OF WATER VIA NGT, 
10CC OF RESIDUAL, ALSO CHECKED PLACEMENT, KEEP HOB ELEVATED, MONITORING LAB VALUES. PATIENT 
WILL GET ONE UNIT OF BLOOD ORDER SET ON THE COMPUTER . ASHBY AND FLEXESIAL INTACT. ASSIST 
TURN AND REPOSITION Q 2 HR.  SEEN PATIENT  BY HOSPITALIST, NO NEW ORDERS. WILL MONITORING.

## 2021-02-07 NOTE — NUR
RN NOTES

 HEMODIALYSIS FINISHED AT THIS TIME, NO OUTPUT ONLY CLEANING PER HD NURSE, BS-115 MG/DL NO 
COVERAGE GIVEN, DUE MEDICATION ADMINISTERED.

## 2021-02-07 NOTE — NUR
ICU RN NOTE:



Rec'd critical labs from Susan pt's H/H 6.8/20.6. Rosa Chew, paged. Rec'd orders to 
transfuse 1 unit. Order noted and carried out.

## 2021-02-07 NOTE — NUR
rn notes

 patient stable increased peep 10, fio2-100, now o2-93%, patient stable, no acute 
respiratory distress,  dressing changed, no sedation, awake. Due medication administered, 
running Glucerna 45 cc/hr,  intact, keep HOB elevated, assist turn and reposition q 2 hr. 
suction, mouth care done.  endorsed oncoming nurse follow romeo.

## 2021-02-07 NOTE — NUR
rn notes

 BLOOD TRANSFUSION FINISHED AT THIS TIME PATIENT STABLE NO ACUTE RESPIRATORY DISTRESS, V/S 
TAKEN BP 94/55, P-110, R-19, T-98.  WILL MONITORING.

## 2021-02-07 NOTE — NUR
RN NOTES

STARTED BLOOD TRANSFUSION ONE UNIT AT THIS TIME, GIVEN HD NURSE TEOFILO RN , WHICH IS PATIENT 
HAVING HEMODIALYSIS AT THIS TIME.  V/S TAKEN T-97.7, P-104, R-16, BP -110/52, 02-96 PATIENT 
EET WITH VENT, NO RESPIRATORY DISTRESS. ACCORDING DIALYSIS NURSE ONLY CLEANING TODAY, NO 
OUTPUT. WILL MONITORING.

## 2021-02-07 NOTE — NUR
RN ICU NOTE



RECEIVED PATIENT IN BED RESTING OPEN EYES ORALLY INTUBATED ON MECHANICAL VENT ETT 7.5/22 AC 
16  FIO2:100% PEEP 10 02:91-92% ON RIGHT NGT CHECKED PLACEMENT IN PLACE NO RESIDUAL,ON 
GLUCERNA 1.2 45CC/HR HEAD OF BED ELEVATED,IV SITE IS ON RIGHT FEMORAL,AND RIGHT IJ HD 
CATH,INTACT PATENT,ASHBY CATHETER IN PLACE NO URINE,ON FLEXSEAL FOR STOOL,SAFETY MEASURE 
IMPLEMENT,CONTINUE TO MONITOR.

## 2021-02-07 NOTE — NUR
RT



Dr. Baron contacted due to low SpO2 85-87% on 100% FiO2, waiting for response.

-------------------------------------------------------------------------------

Addendum: 02/07/21 at 1444 by RANDOLPH CAMPOS RT

-------------------------------------------------------------------------------

Amended: Links added.

## 2021-02-08 VITALS — SYSTOLIC BLOOD PRESSURE: 118 MMHG | DIASTOLIC BLOOD PRESSURE: 56 MMHG

## 2021-02-08 VITALS — DIASTOLIC BLOOD PRESSURE: 62 MMHG | SYSTOLIC BLOOD PRESSURE: 124 MMHG

## 2021-02-08 VITALS — DIASTOLIC BLOOD PRESSURE: 59 MMHG | SYSTOLIC BLOOD PRESSURE: 132 MMHG

## 2021-02-08 VITALS — SYSTOLIC BLOOD PRESSURE: 114 MMHG | DIASTOLIC BLOOD PRESSURE: 65 MMHG

## 2021-02-08 VITALS — SYSTOLIC BLOOD PRESSURE: 120 MMHG | DIASTOLIC BLOOD PRESSURE: 58 MMHG

## 2021-02-08 VITALS — DIASTOLIC BLOOD PRESSURE: 58 MMHG | SYSTOLIC BLOOD PRESSURE: 119 MMHG

## 2021-02-08 VITALS — SYSTOLIC BLOOD PRESSURE: 106 MMHG | DIASTOLIC BLOOD PRESSURE: 52 MMHG

## 2021-02-08 VITALS — DIASTOLIC BLOOD PRESSURE: 55 MMHG | SYSTOLIC BLOOD PRESSURE: 117 MMHG

## 2021-02-08 VITALS — SYSTOLIC BLOOD PRESSURE: 128 MMHG | DIASTOLIC BLOOD PRESSURE: 61 MMHG

## 2021-02-08 VITALS — SYSTOLIC BLOOD PRESSURE: 135 MMHG | DIASTOLIC BLOOD PRESSURE: 61 MMHG

## 2021-02-08 VITALS — DIASTOLIC BLOOD PRESSURE: 73 MMHG | SYSTOLIC BLOOD PRESSURE: 147 MMHG

## 2021-02-08 VITALS — SYSTOLIC BLOOD PRESSURE: 120 MMHG | DIASTOLIC BLOOD PRESSURE: 55 MMHG

## 2021-02-08 VITALS — DIASTOLIC BLOOD PRESSURE: 71 MMHG | SYSTOLIC BLOOD PRESSURE: 147 MMHG

## 2021-02-08 VITALS — SYSTOLIC BLOOD PRESSURE: 115 MMHG | DIASTOLIC BLOOD PRESSURE: 61 MMHG

## 2021-02-08 VITALS — SYSTOLIC BLOOD PRESSURE: 114 MMHG | DIASTOLIC BLOOD PRESSURE: 54 MMHG

## 2021-02-08 VITALS — DIASTOLIC BLOOD PRESSURE: 51 MMHG | SYSTOLIC BLOOD PRESSURE: 107 MMHG

## 2021-02-08 VITALS — DIASTOLIC BLOOD PRESSURE: 57 MMHG | SYSTOLIC BLOOD PRESSURE: 107 MMHG

## 2021-02-08 VITALS — DIASTOLIC BLOOD PRESSURE: 68 MMHG | SYSTOLIC BLOOD PRESSURE: 135 MMHG

## 2021-02-08 VITALS — SYSTOLIC BLOOD PRESSURE: 122 MMHG | DIASTOLIC BLOOD PRESSURE: 57 MMHG

## 2021-02-08 VITALS — DIASTOLIC BLOOD PRESSURE: 59 MMHG | SYSTOLIC BLOOD PRESSURE: 127 MMHG

## 2021-02-08 VITALS — SYSTOLIC BLOOD PRESSURE: 137 MMHG | DIASTOLIC BLOOD PRESSURE: 66 MMHG

## 2021-02-08 VITALS — DIASTOLIC BLOOD PRESSURE: 56 MMHG | SYSTOLIC BLOOD PRESSURE: 115 MMHG

## 2021-02-08 VITALS — SYSTOLIC BLOOD PRESSURE: 110 MMHG | DIASTOLIC BLOOD PRESSURE: 56 MMHG

## 2021-02-08 VITALS — SYSTOLIC BLOOD PRESSURE: 121 MMHG | DIASTOLIC BLOOD PRESSURE: 62 MMHG

## 2021-02-08 VITALS — SYSTOLIC BLOOD PRESSURE: 118 MMHG | DIASTOLIC BLOOD PRESSURE: 59 MMHG

## 2021-02-08 VITALS — SYSTOLIC BLOOD PRESSURE: 116 MMHG | DIASTOLIC BLOOD PRESSURE: 57 MMHG

## 2021-02-08 VITALS — SYSTOLIC BLOOD PRESSURE: 95 MMHG | DIASTOLIC BLOOD PRESSURE: 54 MMHG

## 2021-02-08 VITALS — SYSTOLIC BLOOD PRESSURE: 136 MMHG | DIASTOLIC BLOOD PRESSURE: 65 MMHG

## 2021-02-08 VITALS — SYSTOLIC BLOOD PRESSURE: 128 MMHG | DIASTOLIC BLOOD PRESSURE: 62 MMHG

## 2021-02-08 VITALS — SYSTOLIC BLOOD PRESSURE: 117 MMHG | DIASTOLIC BLOOD PRESSURE: 60 MMHG

## 2021-02-08 VITALS — SYSTOLIC BLOOD PRESSURE: 143 MMHG | DIASTOLIC BLOOD PRESSURE: 75 MMHG

## 2021-02-08 VITALS — DIASTOLIC BLOOD PRESSURE: 63 MMHG | SYSTOLIC BLOOD PRESSURE: 134 MMHG

## 2021-02-08 VITALS — DIASTOLIC BLOOD PRESSURE: 71 MMHG | SYSTOLIC BLOOD PRESSURE: 151 MMHG

## 2021-02-08 VITALS — DIASTOLIC BLOOD PRESSURE: 64 MMHG | SYSTOLIC BLOOD PRESSURE: 138 MMHG

## 2021-02-08 VITALS — SYSTOLIC BLOOD PRESSURE: 108 MMHG | DIASTOLIC BLOOD PRESSURE: 56 MMHG

## 2021-02-08 VITALS — DIASTOLIC BLOOD PRESSURE: 69 MMHG | SYSTOLIC BLOOD PRESSURE: 126 MMHG

## 2021-02-08 VITALS — DIASTOLIC BLOOD PRESSURE: 57 MMHG | SYSTOLIC BLOOD PRESSURE: 112 MMHG

## 2021-02-08 VITALS — DIASTOLIC BLOOD PRESSURE: 58 MMHG | SYSTOLIC BLOOD PRESSURE: 134 MMHG

## 2021-02-08 VITALS — DIASTOLIC BLOOD PRESSURE: 57 MMHG | SYSTOLIC BLOOD PRESSURE: 119 MMHG

## 2021-02-08 VITALS — DIASTOLIC BLOOD PRESSURE: 59 MMHG | SYSTOLIC BLOOD PRESSURE: 115 MMHG

## 2021-02-08 VITALS — SYSTOLIC BLOOD PRESSURE: 131 MMHG | DIASTOLIC BLOOD PRESSURE: 60 MMHG

## 2021-02-08 VITALS — DIASTOLIC BLOOD PRESSURE: 55 MMHG | SYSTOLIC BLOOD PRESSURE: 110 MMHG

## 2021-02-08 VITALS — SYSTOLIC BLOOD PRESSURE: 148 MMHG | DIASTOLIC BLOOD PRESSURE: 69 MMHG

## 2021-02-08 VITALS — DIASTOLIC BLOOD PRESSURE: 57 MMHG | SYSTOLIC BLOOD PRESSURE: 140 MMHG

## 2021-02-08 VITALS — SYSTOLIC BLOOD PRESSURE: 126 MMHG | DIASTOLIC BLOOD PRESSURE: 59 MMHG

## 2021-02-08 VITALS — DIASTOLIC BLOOD PRESSURE: 62 MMHG | SYSTOLIC BLOOD PRESSURE: 118 MMHG

## 2021-02-08 VITALS — SYSTOLIC BLOOD PRESSURE: 129 MMHG | DIASTOLIC BLOOD PRESSURE: 59 MMHG

## 2021-02-08 VITALS — DIASTOLIC BLOOD PRESSURE: 67 MMHG | SYSTOLIC BLOOD PRESSURE: 147 MMHG

## 2021-02-08 VITALS — SYSTOLIC BLOOD PRESSURE: 115 MMHG | DIASTOLIC BLOOD PRESSURE: 53 MMHG

## 2021-02-08 VITALS — DIASTOLIC BLOOD PRESSURE: 65 MMHG | SYSTOLIC BLOOD PRESSURE: 135 MMHG

## 2021-02-08 VITALS — SYSTOLIC BLOOD PRESSURE: 112 MMHG | DIASTOLIC BLOOD PRESSURE: 60 MMHG

## 2021-02-08 VITALS — SYSTOLIC BLOOD PRESSURE: 113 MMHG | DIASTOLIC BLOOD PRESSURE: 55 MMHG

## 2021-02-08 VITALS — DIASTOLIC BLOOD PRESSURE: 53 MMHG | SYSTOLIC BLOOD PRESSURE: 118 MMHG

## 2021-02-08 VITALS — DIASTOLIC BLOOD PRESSURE: 64 MMHG | SYSTOLIC BLOOD PRESSURE: 125 MMHG

## 2021-02-08 VITALS — SYSTOLIC BLOOD PRESSURE: 119 MMHG | DIASTOLIC BLOOD PRESSURE: 60 MMHG

## 2021-02-08 VITALS — SYSTOLIC BLOOD PRESSURE: 123 MMHG | DIASTOLIC BLOOD PRESSURE: 63 MMHG

## 2021-02-08 VITALS — SYSTOLIC BLOOD PRESSURE: 117 MMHG | DIASTOLIC BLOOD PRESSURE: 55 MMHG

## 2021-02-08 VITALS — DIASTOLIC BLOOD PRESSURE: 67 MMHG | SYSTOLIC BLOOD PRESSURE: 135 MMHG

## 2021-02-08 VITALS — DIASTOLIC BLOOD PRESSURE: 63 MMHG | SYSTOLIC BLOOD PRESSURE: 118 MMHG

## 2021-02-08 VITALS — DIASTOLIC BLOOD PRESSURE: 53 MMHG | SYSTOLIC BLOOD PRESSURE: 111 MMHG

## 2021-02-08 VITALS — SYSTOLIC BLOOD PRESSURE: 124 MMHG | DIASTOLIC BLOOD PRESSURE: 62 MMHG

## 2021-02-08 VITALS — SYSTOLIC BLOOD PRESSURE: 120 MMHG | DIASTOLIC BLOOD PRESSURE: 56 MMHG

## 2021-02-08 VITALS — DIASTOLIC BLOOD PRESSURE: 53 MMHG | SYSTOLIC BLOOD PRESSURE: 108 MMHG

## 2021-02-08 VITALS — SYSTOLIC BLOOD PRESSURE: 106 MMHG | DIASTOLIC BLOOD PRESSURE: 59 MMHG

## 2021-02-08 VITALS — SYSTOLIC BLOOD PRESSURE: 113 MMHG | DIASTOLIC BLOOD PRESSURE: 56 MMHG

## 2021-02-08 LAB
BASE EXCESS BLDA CALC-SCNC: 11.3 MMOL/L
BASOPHILS # BLD AUTO: 0 /CMM (ref 0–0.2)
BASOPHILS NFR BLD AUTO: 0.4 % (ref 0–2)
BUN SERPL-MCNC: 31 MG/DL (ref 7–18)
CALCIUM SERPL-MCNC: 8.1 MG/DL (ref 8.5–10.1)
CHLORIDE SERPL-SCNC: 100 MMOL/L (ref 98–107)
CO2 SERPL-SCNC: 34 MMOL/L (ref 21–32)
CREAT SERPL-MCNC: 1.5 MG/DL (ref 0.6–1.3)
DO-HGB MFR BLDA: 368.8 MMHG
EOSINOPHIL NFR BLD AUTO: 3.4 % (ref 0–6)
GLUCOSE SERPL-MCNC: 128 MG/DL (ref 74–106)
HCT VFR BLD AUTO: 24 % (ref 33–45)
HGB BLD-MCNC: 7.9 G/DL (ref 11.5–14.8)
INHALED O2 CONCENTRATION: 100 %
INTRINSIC PEEP RESPIRATORY: 10 CM H2O
LYMPHOCYTES NFR BLD AUTO: 0.4 /CMM (ref 0.8–4.8)
LYMPHOCYTES NFR BLD AUTO: 3.8 % (ref 20–44)
MCHC RBC AUTO-ENTMCNC: 33 G/DL (ref 31–36)
MCV RBC AUTO: 92 FL (ref 82–100)
MONOCYTES NFR BLD AUTO: 1.1 /CMM (ref 0.1–1.3)
MONOCYTES NFR BLD AUTO: 9.3 % (ref 2–12)
NEUTROPHILS # BLD AUTO: 9.5 /CMM (ref 1.8–8.9)
NEUTROPHILS NFR BLD AUTO: 83.1 % (ref 43–81)
PCO2 TEMP ADJ BLDA: 39.6 MMHG (ref 35–45)
PEEP SETTING VENT: 400 ML
PH TEMP ADJ BLDA: 7.56 [PH] (ref 7.35–7.45)
PLATELET # BLD AUTO: 209 /CMM (ref 150–450)
PO2 TEMP ADJ BLDA: 304.6 MMHG (ref 75–100)
POTASSIUM SERPL-SCNC: 3.9 MMOL/L (ref 3.5–5.1)
RBC # BLD AUTO: 2.57 MIL/UL (ref 4–5.2)
SAO2 % BLDA: 99.3 % (ref 92–98.5)
SET RATE, BG: 16
SODIUM SERPL-SCNC: 138 MMOL/L (ref 136–145)
VENTILATION MODE VENT: (no result)
WBC NRBC COR # BLD AUTO: 11.5 K/UL (ref 4.3–11)

## 2021-02-08 RX ADMIN — Medication SCH EACH: at 13:12

## 2021-02-08 RX ADMIN — MUPIROCIN SCH APPLIC: 20 OINTMENT TOPICAL at 21:19

## 2021-02-08 RX ADMIN — MUPIROCIN SCH APPLIC: 20 OINTMENT TOPICAL at 09:19

## 2021-02-08 RX ADMIN — Medication SCH GM: at 09:19

## 2021-02-08 RX ADMIN — SODIUM CHLORIDE SCH MG: 9 INJECTION, SOLUTION INTRAVENOUS at 01:43

## 2021-02-08 RX ADMIN — FAMOTIDINE SCH MG: 10 INJECTION INTRAVENOUS at 09:18

## 2021-02-08 RX ADMIN — Medication SCH EACH: at 05:50

## 2021-02-08 RX ADMIN — Medication SCH MG: at 07:31

## 2021-02-08 RX ADMIN — LORAZEPAM PRN MG: 2 INJECTION INTRAMUSCULAR; INTRAVENOUS at 18:29

## 2021-02-08 RX ADMIN — Medication SCH GM: at 16:27

## 2021-02-08 RX ADMIN — SODIUM CITRATE AND CITRIC ACID MONOHYDRATE SCH ML: 500; 334 SOLUTION ORAL at 09:18

## 2021-02-08 RX ADMIN — INSULIN HUMAN PRN UNIT: 100 INJECTION, SOLUTION PARENTERAL at 05:51

## 2021-02-08 RX ADMIN — INSULIN HUMAN PRN UNIT: 100 INJECTION, SOLUTION PARENTERAL at 23:56

## 2021-02-08 RX ADMIN — INSULIN HUMAN PRN UNIT: 100 INJECTION, SOLUTION PARENTERAL at 16:37

## 2021-02-08 RX ADMIN — SODIUM CITRATE AND CITRIC ACID MONOHYDRATE SCH ML: 500; 334 SOLUTION ORAL at 13:12

## 2021-02-08 RX ADMIN — Medication SCH MG: at 20:15

## 2021-02-08 RX ADMIN — INSULIN HUMAN PRN UNIT: 100 INJECTION, SOLUTION PARENTERAL at 13:17

## 2021-02-08 RX ADMIN — Medication SCH MG: at 13:25

## 2021-02-08 RX ADMIN — Medication PRN ML: at 16:11

## 2021-02-08 RX ADMIN — SODIUM CHLORIDE SCH MG: 9 INJECTION, SOLUTION INTRAVENOUS at 17:21

## 2021-02-08 RX ADMIN — ACETYLCYSTEINE SCH MG: 100 INHALANT RESPIRATORY (INHALATION) at 15:57

## 2021-02-08 RX ADMIN — ACETYLCYSTEINE SCH MG: 100 INHALANT RESPIRATORY (INHALATION) at 07:31

## 2021-02-08 RX ADMIN — Medication SCH EACH: at 23:51

## 2021-02-08 RX ADMIN — Medication SCH OZ: at 09:20

## 2021-02-08 RX ADMIN — SODIUM CHLORIDE SCH MG: 9 INJECTION, SOLUTION INTRAVENOUS at 09:18

## 2021-02-08 RX ADMIN — Medication SCH MG: at 02:05

## 2021-02-08 RX ADMIN — Medication SCH OZ: at 21:19

## 2021-02-08 RX ADMIN — Medication SCH EACH: at 16:26

## 2021-02-08 NOTE — NUR
RN ICU CLOSING NOTE





 PATIENT REMAINS ON ORALLY INTUBATED ON MECHANICAL VENT ETT 7.5/22 AC 16  FIO2:100% 
PEEP 10 02:91-92% ON RIGHT NGT CHECKED PLACEMENT IN PLACE NO RESIDUAL,ON GLUCERNA 1.2 
45CC/HR HEAD OF BED ELEVATED ALL THE TIME, IV SITE IS ON RIGHT FEMORAL 3 LUMEN ,AND RIGHT 
INTERNAL JUGULAR HD CATH,INTACT PATENT,ASHBY CATHETER IN PLACE NO URINE,ON FLEXSEAL FOR 
STOOL,KEPT CLEAN AND DRY ALL THE TIME,ENDORSE NEXT COMING SHIFT FOR CONTINUATION OF CARE.

## 2021-02-08 NOTE — NUR
RN NOTES

 PATIENT  HAS NO ACUTE RESPIRATORY DISTRESS ETT WITH VENT SETTING TOLERATING WELL, FIO2 85%, 
PEEP 10 NO SEDATION. PATIENT AWAKE, CALM AND COOPERATIVE, FLASHED 200 ML OF WATER VIA NGT, 
80CC OF RESIDUAL, ALSO CHECKED PLACEMENT, KEEP HOB ELEVATED, MONITORING LAB VALUES.  SUCTION 
, MOUTH CARE DONE, DUE MEDICATION ADMINISTERED VIS NGT. ASHBY AND FLEXESIAL INTACT. ASSIST 
TURN AND REPOSITION Q 2 HR.  SEEN PATIENT  BY HOSPITALIST, NO NEW ORDERS. WILL MONITORING

## 2021-02-08 NOTE — NUR
RN NOTES

 ADMINISTERED ATIVAN 0.5 MG/ML IV PUSH PRN FOR ANXIETY, INCREASING HR  128 , BP 
108/53, P-125. BS-149 MG/DL COVERAGE GIVEN. ALSO ADMINISTERED SCHEDULED MEDICATION, RUNNING 
GLUCERNA  45 CC/HR INTACT, ASSIST TURN AND REPOSTION Q 2 HR. ENDORSED ONCOMING  NURSE FOLLOW 
PLAN OF CARE.

## 2021-02-08 NOTE — NUR
RN NOTES

 PATIENT HR INCREASED 125  UP AND DOWN, NOTIFIED HOSPITALIST  AND GET ORDER ATIVAN 0.5 
GM/ML IV PUSH Q 6HR PRN. ORDER TAKEN AND CARRIED OUT.

## 2021-02-08 NOTE — NUR
RN NOTE



RECEIVED PT IN BED. ORALLY INTUBATED ON MECH VENT. WITH O2 SAT OF 94 %. PT OPEN EYES. NO 
SIGNS OF DISTRESS NOTED. PT ON TELE MONITORING SINUS TACH WITH HR . PER PREVIOUS NURSE 
PT HAVE BEEN ST AND MD AWARE. PT WITH DOMENICA NGT, CHECKED FOR PATENCY, PATENT AND IN PLACE 
WITH <30CC RESIDUAL, ON GLUCERNA 1.2 RUNNING AT 45 CC/HR. ABDOMEN SOFT AND NONTENDER. KEPT 
HOB ELEVATED. ASHBY CATH IN PLACE, DRAINING CLEAR YELLOW URINE OUTPUT. FLEXISEAL IN PLACE. 
RIGHT JUGULAR HD CATH, DRESSING INTACT. R FEMORAL PICC IN PLACE AND PATENT, FLUSHED.  WILL 
CONTINUE TO MONITOR. ALL SAFETY MEASURES IMPLEMENTED.

## 2021-02-09 VITALS — DIASTOLIC BLOOD PRESSURE: 52 MMHG | SYSTOLIC BLOOD PRESSURE: 106 MMHG

## 2021-02-09 VITALS — SYSTOLIC BLOOD PRESSURE: 86 MMHG | DIASTOLIC BLOOD PRESSURE: 48 MMHG

## 2021-02-09 VITALS — SYSTOLIC BLOOD PRESSURE: 132 MMHG | DIASTOLIC BLOOD PRESSURE: 55 MMHG

## 2021-02-09 VITALS — DIASTOLIC BLOOD PRESSURE: 56 MMHG | SYSTOLIC BLOOD PRESSURE: 117 MMHG

## 2021-02-09 VITALS — SYSTOLIC BLOOD PRESSURE: 124 MMHG | DIASTOLIC BLOOD PRESSURE: 58 MMHG

## 2021-02-09 VITALS — DIASTOLIC BLOOD PRESSURE: 67 MMHG | SYSTOLIC BLOOD PRESSURE: 140 MMHG

## 2021-02-09 VITALS — DIASTOLIC BLOOD PRESSURE: 56 MMHG | SYSTOLIC BLOOD PRESSURE: 106 MMHG

## 2021-02-09 VITALS — SYSTOLIC BLOOD PRESSURE: 121 MMHG | DIASTOLIC BLOOD PRESSURE: 54 MMHG

## 2021-02-09 VITALS — DIASTOLIC BLOOD PRESSURE: 61 MMHG | SYSTOLIC BLOOD PRESSURE: 125 MMHG

## 2021-02-09 VITALS — DIASTOLIC BLOOD PRESSURE: 59 MMHG | SYSTOLIC BLOOD PRESSURE: 109 MMHG

## 2021-02-09 VITALS — SYSTOLIC BLOOD PRESSURE: 124 MMHG | DIASTOLIC BLOOD PRESSURE: 48 MMHG

## 2021-02-09 VITALS — DIASTOLIC BLOOD PRESSURE: 59 MMHG | SYSTOLIC BLOOD PRESSURE: 119 MMHG

## 2021-02-09 VITALS — DIASTOLIC BLOOD PRESSURE: 56 MMHG | SYSTOLIC BLOOD PRESSURE: 130 MMHG

## 2021-02-09 VITALS — SYSTOLIC BLOOD PRESSURE: 121 MMHG | DIASTOLIC BLOOD PRESSURE: 55 MMHG

## 2021-02-09 VITALS — DIASTOLIC BLOOD PRESSURE: 58 MMHG | SYSTOLIC BLOOD PRESSURE: 129 MMHG

## 2021-02-09 VITALS — SYSTOLIC BLOOD PRESSURE: 117 MMHG | DIASTOLIC BLOOD PRESSURE: 54 MMHG

## 2021-02-09 VITALS — DIASTOLIC BLOOD PRESSURE: 52 MMHG | SYSTOLIC BLOOD PRESSURE: 131 MMHG

## 2021-02-09 VITALS — DIASTOLIC BLOOD PRESSURE: 61 MMHG | SYSTOLIC BLOOD PRESSURE: 137 MMHG

## 2021-02-09 VITALS — DIASTOLIC BLOOD PRESSURE: 59 MMHG | SYSTOLIC BLOOD PRESSURE: 124 MMHG

## 2021-02-09 VITALS — SYSTOLIC BLOOD PRESSURE: 122 MMHG | DIASTOLIC BLOOD PRESSURE: 58 MMHG

## 2021-02-09 VITALS — DIASTOLIC BLOOD PRESSURE: 52 MMHG | SYSTOLIC BLOOD PRESSURE: 117 MMHG

## 2021-02-09 VITALS — DIASTOLIC BLOOD PRESSURE: 53 MMHG | SYSTOLIC BLOOD PRESSURE: 121 MMHG

## 2021-02-09 VITALS — DIASTOLIC BLOOD PRESSURE: 56 MMHG | SYSTOLIC BLOOD PRESSURE: 125 MMHG

## 2021-02-09 VITALS — SYSTOLIC BLOOD PRESSURE: 123 MMHG | DIASTOLIC BLOOD PRESSURE: 55 MMHG

## 2021-02-09 VITALS — SYSTOLIC BLOOD PRESSURE: 125 MMHG | DIASTOLIC BLOOD PRESSURE: 52 MMHG

## 2021-02-09 VITALS — DIASTOLIC BLOOD PRESSURE: 64 MMHG | SYSTOLIC BLOOD PRESSURE: 145 MMHG

## 2021-02-09 VITALS — DIASTOLIC BLOOD PRESSURE: 56 MMHG | SYSTOLIC BLOOD PRESSURE: 126 MMHG

## 2021-02-09 VITALS — SYSTOLIC BLOOD PRESSURE: 122 MMHG | DIASTOLIC BLOOD PRESSURE: 52 MMHG

## 2021-02-09 VITALS — SYSTOLIC BLOOD PRESSURE: 122 MMHG | DIASTOLIC BLOOD PRESSURE: 61 MMHG

## 2021-02-09 VITALS — DIASTOLIC BLOOD PRESSURE: 54 MMHG | SYSTOLIC BLOOD PRESSURE: 127 MMHG

## 2021-02-09 VITALS — DIASTOLIC BLOOD PRESSURE: 49 MMHG | SYSTOLIC BLOOD PRESSURE: 116 MMHG

## 2021-02-09 VITALS — SYSTOLIC BLOOD PRESSURE: 127 MMHG | DIASTOLIC BLOOD PRESSURE: 48 MMHG

## 2021-02-09 VITALS — DIASTOLIC BLOOD PRESSURE: 64 MMHG | SYSTOLIC BLOOD PRESSURE: 140 MMHG

## 2021-02-09 VITALS — SYSTOLIC BLOOD PRESSURE: 134 MMHG | DIASTOLIC BLOOD PRESSURE: 62 MMHG

## 2021-02-09 VITALS — DIASTOLIC BLOOD PRESSURE: 56 MMHG | SYSTOLIC BLOOD PRESSURE: 132 MMHG

## 2021-02-09 VITALS — SYSTOLIC BLOOD PRESSURE: 131 MMHG | DIASTOLIC BLOOD PRESSURE: 55 MMHG

## 2021-02-09 VITALS — DIASTOLIC BLOOD PRESSURE: 52 MMHG | SYSTOLIC BLOOD PRESSURE: 119 MMHG

## 2021-02-09 VITALS — DIASTOLIC BLOOD PRESSURE: 54 MMHG | SYSTOLIC BLOOD PRESSURE: 104 MMHG

## 2021-02-09 VITALS — DIASTOLIC BLOOD PRESSURE: 58 MMHG | SYSTOLIC BLOOD PRESSURE: 137 MMHG

## 2021-02-09 VITALS — SYSTOLIC BLOOD PRESSURE: 131 MMHG | DIASTOLIC BLOOD PRESSURE: 58 MMHG

## 2021-02-09 VITALS — DIASTOLIC BLOOD PRESSURE: 56 MMHG | SYSTOLIC BLOOD PRESSURE: 118 MMHG

## 2021-02-09 VITALS — DIASTOLIC BLOOD PRESSURE: 53 MMHG | SYSTOLIC BLOOD PRESSURE: 122 MMHG

## 2021-02-09 VITALS — DIASTOLIC BLOOD PRESSURE: 67 MMHG | SYSTOLIC BLOOD PRESSURE: 132 MMHG

## 2021-02-09 VITALS — DIASTOLIC BLOOD PRESSURE: 59 MMHG | SYSTOLIC BLOOD PRESSURE: 128 MMHG

## 2021-02-09 VITALS — SYSTOLIC BLOOD PRESSURE: 116 MMHG | DIASTOLIC BLOOD PRESSURE: 54 MMHG

## 2021-02-09 VITALS — DIASTOLIC BLOOD PRESSURE: 49 MMHG | SYSTOLIC BLOOD PRESSURE: 81 MMHG

## 2021-02-09 VITALS — DIASTOLIC BLOOD PRESSURE: 52 MMHG | SYSTOLIC BLOOD PRESSURE: 111 MMHG

## 2021-02-09 VITALS — DIASTOLIC BLOOD PRESSURE: 58 MMHG | SYSTOLIC BLOOD PRESSURE: 122 MMHG

## 2021-02-09 VITALS — DIASTOLIC BLOOD PRESSURE: 52 MMHG | SYSTOLIC BLOOD PRESSURE: 127 MMHG

## 2021-02-09 VITALS — DIASTOLIC BLOOD PRESSURE: 56 MMHG | SYSTOLIC BLOOD PRESSURE: 127 MMHG

## 2021-02-09 VITALS — SYSTOLIC BLOOD PRESSURE: 120 MMHG | DIASTOLIC BLOOD PRESSURE: 55 MMHG

## 2021-02-09 VITALS — SYSTOLIC BLOOD PRESSURE: 109 MMHG | DIASTOLIC BLOOD PRESSURE: 52 MMHG

## 2021-02-09 VITALS — SYSTOLIC BLOOD PRESSURE: 131 MMHG | DIASTOLIC BLOOD PRESSURE: 54 MMHG

## 2021-02-09 VITALS — SYSTOLIC BLOOD PRESSURE: 129 MMHG | DIASTOLIC BLOOD PRESSURE: 57 MMHG

## 2021-02-09 VITALS — SYSTOLIC BLOOD PRESSURE: 112 MMHG | DIASTOLIC BLOOD PRESSURE: 55 MMHG

## 2021-02-09 VITALS — DIASTOLIC BLOOD PRESSURE: 62 MMHG | SYSTOLIC BLOOD PRESSURE: 123 MMHG

## 2021-02-09 VITALS — DIASTOLIC BLOOD PRESSURE: 52 MMHG | SYSTOLIC BLOOD PRESSURE: 120 MMHG

## 2021-02-09 VITALS — DIASTOLIC BLOOD PRESSURE: 55 MMHG | SYSTOLIC BLOOD PRESSURE: 112 MMHG

## 2021-02-09 VITALS — DIASTOLIC BLOOD PRESSURE: 62 MMHG | SYSTOLIC BLOOD PRESSURE: 116 MMHG

## 2021-02-09 VITALS — SYSTOLIC BLOOD PRESSURE: 110 MMHG | DIASTOLIC BLOOD PRESSURE: 55 MMHG

## 2021-02-09 VITALS — SYSTOLIC BLOOD PRESSURE: 129 MMHG | DIASTOLIC BLOOD PRESSURE: 54 MMHG

## 2021-02-09 VITALS — SYSTOLIC BLOOD PRESSURE: 122 MMHG | DIASTOLIC BLOOD PRESSURE: 53 MMHG

## 2021-02-09 VITALS — SYSTOLIC BLOOD PRESSURE: 114 MMHG | DIASTOLIC BLOOD PRESSURE: 52 MMHG

## 2021-02-09 VITALS — SYSTOLIC BLOOD PRESSURE: 137 MMHG | DIASTOLIC BLOOD PRESSURE: 61 MMHG

## 2021-02-09 VITALS — SYSTOLIC BLOOD PRESSURE: 111 MMHG | DIASTOLIC BLOOD PRESSURE: 59 MMHG

## 2021-02-09 VITALS — SYSTOLIC BLOOD PRESSURE: 120 MMHG | DIASTOLIC BLOOD PRESSURE: 73 MMHG

## 2021-02-09 VITALS — DIASTOLIC BLOOD PRESSURE: 54 MMHG | SYSTOLIC BLOOD PRESSURE: 110 MMHG

## 2021-02-09 VITALS — DIASTOLIC BLOOD PRESSURE: 52 MMHG | SYSTOLIC BLOOD PRESSURE: 103 MMHG

## 2021-02-09 VITALS — SYSTOLIC BLOOD PRESSURE: 129 MMHG | DIASTOLIC BLOOD PRESSURE: 61 MMHG

## 2021-02-09 VITALS — DIASTOLIC BLOOD PRESSURE: 70 MMHG | SYSTOLIC BLOOD PRESSURE: 135 MMHG

## 2021-02-09 VITALS — SYSTOLIC BLOOD PRESSURE: 112 MMHG | DIASTOLIC BLOOD PRESSURE: 60 MMHG

## 2021-02-09 VITALS — SYSTOLIC BLOOD PRESSURE: 116 MMHG | DIASTOLIC BLOOD PRESSURE: 53 MMHG

## 2021-02-09 VITALS — SYSTOLIC BLOOD PRESSURE: 118 MMHG | DIASTOLIC BLOOD PRESSURE: 62 MMHG

## 2021-02-09 VITALS — SYSTOLIC BLOOD PRESSURE: 122 MMHG | DIASTOLIC BLOOD PRESSURE: 50 MMHG

## 2021-02-09 VITALS — DIASTOLIC BLOOD PRESSURE: 61 MMHG | SYSTOLIC BLOOD PRESSURE: 135 MMHG

## 2021-02-09 VITALS — DIASTOLIC BLOOD PRESSURE: 58 MMHG | SYSTOLIC BLOOD PRESSURE: 121 MMHG

## 2021-02-09 VITALS — SYSTOLIC BLOOD PRESSURE: 126 MMHG | DIASTOLIC BLOOD PRESSURE: 56 MMHG

## 2021-02-09 VITALS — SYSTOLIC BLOOD PRESSURE: 126 MMHG | DIASTOLIC BLOOD PRESSURE: 51 MMHG

## 2021-02-09 LAB
BASE EXCESS BLDA CALC-SCNC: 9.9 MMOL/L
DO-HGB MFR BLDA: 156 MMHG
INHALED O2 CONCENTRATION: 50 %
INTRINSIC PEEP RESPIRATORY: 10 CM H2O
PCO2 TEMP ADJ BLDA: 41.6 MMHG (ref 35–45)
PEEP SETTING VENT: 400 ML
PH TEMP ADJ BLDA: 7.53 [PH] (ref 7.35–7.45)
PO2 TEMP ADJ BLDA: 153.7 MMHG (ref 75–100)
SAO2 % BLDA: 97.7 % (ref 92–98.5)
SET RATE, BG: 12
VENTILATION MODE VENT: (no result)

## 2021-02-09 RX ADMIN — Medication SCH EACH: at 23:41

## 2021-02-09 RX ADMIN — Medication SCH OZ: at 08:58

## 2021-02-09 RX ADMIN — Medication SCH EACH: at 11:50

## 2021-02-09 RX ADMIN — Medication SCH GM: at 08:58

## 2021-02-09 RX ADMIN — Medication PRN MLS/HR: at 06:54

## 2021-02-09 RX ADMIN — Medication PRN ML: at 11:53

## 2021-02-09 RX ADMIN — FAMOTIDINE SCH MG: 10 INJECTION INTRAVENOUS at 08:58

## 2021-02-09 RX ADMIN — Medication SCH MG: at 14:38

## 2021-02-09 RX ADMIN — SODIUM CHLORIDE SCH MG: 9 INJECTION, SOLUTION INTRAVENOUS at 09:03

## 2021-02-09 RX ADMIN — SODIUM CHLORIDE PRN MLS/HR: 9 INJECTION, SOLUTION INTRAVENOUS at 11:20

## 2021-02-09 RX ADMIN — Medication SCH MG: at 01:59

## 2021-02-09 RX ADMIN — MUPIROCIN SCH APPLIC: 20 OINTMENT TOPICAL at 20:31

## 2021-02-09 RX ADMIN — Medication SCH EACH: at 17:45

## 2021-02-09 RX ADMIN — Medication SCH MG: at 07:25

## 2021-02-09 RX ADMIN — INSULIN HUMAN PRN UNIT: 100 INJECTION, SOLUTION PARENTERAL at 17:48

## 2021-02-09 RX ADMIN — Medication SCH GM: at 17:36

## 2021-02-09 RX ADMIN — INSULIN HUMAN PRN UNIT: 100 INJECTION, SOLUTION PARENTERAL at 11:52

## 2021-02-09 RX ADMIN — ACETYLCYSTEINE SCH MG: 100 INHALANT RESPIRATORY (INHALATION) at 14:38

## 2021-02-09 RX ADMIN — Medication SCH EACH: at 06:23

## 2021-02-09 RX ADMIN — Medication SCH MG: at 20:27

## 2021-02-09 RX ADMIN — INSULIN HUMAN PRN UNIT: 100 INJECTION, SOLUTION PARENTERAL at 06:24

## 2021-02-09 RX ADMIN — ACETYLCYSTEINE SCH MG: 100 INHALANT RESPIRATORY (INHALATION) at 07:25

## 2021-02-09 RX ADMIN — SODIUM CHLORIDE SCH MG: 9 INJECTION, SOLUTION INTRAVENOUS at 01:48

## 2021-02-09 RX ADMIN — INSULIN HUMAN PRN UNIT: 100 INJECTION, SOLUTION PARENTERAL at 23:44

## 2021-02-09 RX ADMIN — Medication SCH OZ: at 20:32

## 2021-02-09 RX ADMIN — SODIUM CHLORIDE SCH MG: 9 INJECTION, SOLUTION INTRAVENOUS at 18:15

## 2021-02-09 RX ADMIN — MUPIROCIN SCH APPLIC: 20 OINTMENT TOPICAL at 08:58

## 2021-02-09 RX ADMIN — ACETYLCYSTEINE SCH MG: 100 INHALANT RESPIRATORY (INHALATION) at 00:23

## 2021-02-09 NOTE — NUR
RECEIVED PT IN BED. [facility] PER MD  WITH O2 SAT OF 94 %. PT 
OPEN EYES ON STIMULI. NO SIGNS OF DISTRESS NOTED. PT ON TELE MONITORING SINUS TACH WITH HR 
.  PT WITH RBOB NGT, CHECKED FOR PATENCY, WITH <30CC RESIDUAL, ON GLUCERNA 1.2 
RUNNING AT 45 CC/HR. ABDOMEN SOFT AND NONTENDER. KEPT HOB ELEVATED. ASHBY CATH IN PLACE, 
DRAINING CLEAR YELLOW URINE OUTPUT. FLEXISEAL IN PLACE. RIGHT JUGULAR HD CATH, DRESSING 
INTACT. R FEMORAL PICC IN PLACE AND PATENT, FLUSHED.  WILL CONTINUE TO MONITOR. ALL SAFETY 
MEASURES IMPLEMENTED.

## 2021-02-09 NOTE — NUR
RN NOTE



PT STARTED HD. BP WENT TO 84/48 . DIALYSIS NURSE AT BEDSIDE, ALBUMIN GIVEN BY DIALYSIS 
NURSE AS ORDERED.

## 2021-02-09 NOTE — NUR
ICU CLOSING NOTE



PATIENT CURRENTLY IN BED, RESTING. GLUCERNA RUNNING AT 45ML/HR AS ORDERED. ALL DUE MEDS 
GIVEN. WILL ENDORSE TO NIGHT SHIFT NURSE FOR DASHAWN.

## 2021-02-09 NOTE — NUR
RN NOTE



PT REMAINS IN BED. TOLERATING VENT SETTINGS. NO SIGNS OF DISTRESS O2 SAT AT 94%. REMAIN 
SINUS TACH. PT ABLE TO TOLERATE GT FEEDING. NO SIGNS OF ASPIRATION NOTED. KEPT HOB ELEVATED. 
WOUND TX DONE AS ORDERED. DIALYSIS NURSE STILL AT BEDSIDE. ASHBY IN PLACE WITH 50 CC URINE 
OUTPUT.  KEPT CLEAN AND DRY AT ALL TIMES. KEPT COMFORTABLE IN BED. WILL ENDORSE TO NEXT 
SHIFT NURSE FOR DASHAWN.

## 2021-02-09 NOTE — NUR
RN NOTE



PT'S HR BEEN -130. NO DISTRESS NOTED. RR AND BP STABLE AND NORMAL. NOTIFIED NP KAROLINA. 
ORDERED TO GIVE METOPROLOL 25MG ONCE. CHARGE NURSE MADE AWARE.

## 2021-02-09 NOTE — NUR
PATIENT CURRENTLY IN BED, EYES OPENING BUT NONVERBAL. NO S/S OF RESPIRATORY DISTRESS AT THIS 
TIME. PATIENT CURRENTLY INTUBATED WITH ETTUBE; VENT SETTINGS AS PRESCRIBED. TELE MONITOR 
SHOWING SINUS TACHYCARDIA. PATIENT HAS AN NG TUBE ON RIGHT NARES RUNNING GLUCERNA 1.2 AT 45 
ML/HR, TOLERATED WELL. PATIENT HAS A R FEMORAL TLC AND R INTERNAL JUGULAR HD CATH. NO 
REDNESS OR SWELLING AT THIS TIME. R FEMORAL TLC FLUSHING WELL. CURRENTLY RUNNING NS TKO AT 
10ML/HR. PATIENT HAS A ASHBY CATHETER WITH MINIMAL URINE OUTPUT AND A FLEXI-SEAL RECTAL 
TUBE. PATIENT CURRENTLY RECEIVING HEMODIALYSIS. ALBUMIN GIVEN VIA HEMODIALYSIS, PER 
HEMODIALYSIS NURSE; SAFETY MEASURES IN PLACE PER HOSPITAL POLICY. BED LOCKED IN LOWEST 
POSITION, CALL LIGHT WITHIN REACH, SIDERAILS UP X2. WILL CONTINUE TO MONITOR AND PROVIDE 
CARE.

## 2021-02-10 VITALS — SYSTOLIC BLOOD PRESSURE: 133 MMHG | DIASTOLIC BLOOD PRESSURE: 63 MMHG

## 2021-02-10 VITALS — DIASTOLIC BLOOD PRESSURE: 68 MMHG | SYSTOLIC BLOOD PRESSURE: 142 MMHG

## 2021-02-10 VITALS — SYSTOLIC BLOOD PRESSURE: 135 MMHG | DIASTOLIC BLOOD PRESSURE: 57 MMHG

## 2021-02-10 VITALS — SYSTOLIC BLOOD PRESSURE: 128 MMHG | DIASTOLIC BLOOD PRESSURE: 64 MMHG

## 2021-02-10 VITALS — SYSTOLIC BLOOD PRESSURE: 95 MMHG | DIASTOLIC BLOOD PRESSURE: 58 MMHG

## 2021-02-10 VITALS — DIASTOLIC BLOOD PRESSURE: 83 MMHG | SYSTOLIC BLOOD PRESSURE: 119 MMHG

## 2021-02-10 VITALS — DIASTOLIC BLOOD PRESSURE: 61 MMHG | SYSTOLIC BLOOD PRESSURE: 126 MMHG

## 2021-02-10 VITALS — SYSTOLIC BLOOD PRESSURE: 144 MMHG | DIASTOLIC BLOOD PRESSURE: 57 MMHG

## 2021-02-10 VITALS — DIASTOLIC BLOOD PRESSURE: 79 MMHG | SYSTOLIC BLOOD PRESSURE: 145 MMHG

## 2021-02-10 VITALS — DIASTOLIC BLOOD PRESSURE: 65 MMHG | SYSTOLIC BLOOD PRESSURE: 136 MMHG

## 2021-02-10 VITALS — SYSTOLIC BLOOD PRESSURE: 125 MMHG | DIASTOLIC BLOOD PRESSURE: 59 MMHG

## 2021-02-10 VITALS — DIASTOLIC BLOOD PRESSURE: 75 MMHG | SYSTOLIC BLOOD PRESSURE: 151 MMHG

## 2021-02-10 VITALS — SYSTOLIC BLOOD PRESSURE: 124 MMHG | DIASTOLIC BLOOD PRESSURE: 58 MMHG

## 2021-02-10 VITALS — DIASTOLIC BLOOD PRESSURE: 74 MMHG | SYSTOLIC BLOOD PRESSURE: 150 MMHG

## 2021-02-10 VITALS — SYSTOLIC BLOOD PRESSURE: 139 MMHG | DIASTOLIC BLOOD PRESSURE: 63 MMHG

## 2021-02-10 VITALS — DIASTOLIC BLOOD PRESSURE: 62 MMHG | SYSTOLIC BLOOD PRESSURE: 122 MMHG

## 2021-02-10 VITALS — DIASTOLIC BLOOD PRESSURE: 63 MMHG | SYSTOLIC BLOOD PRESSURE: 113 MMHG

## 2021-02-10 VITALS — DIASTOLIC BLOOD PRESSURE: 81 MMHG | SYSTOLIC BLOOD PRESSURE: 145 MMHG

## 2021-02-10 VITALS — SYSTOLIC BLOOD PRESSURE: 131 MMHG | DIASTOLIC BLOOD PRESSURE: 58 MMHG

## 2021-02-10 VITALS — SYSTOLIC BLOOD PRESSURE: 123 MMHG | DIASTOLIC BLOOD PRESSURE: 55 MMHG

## 2021-02-10 VITALS — DIASTOLIC BLOOD PRESSURE: 57 MMHG | SYSTOLIC BLOOD PRESSURE: 119 MMHG

## 2021-02-10 VITALS — DIASTOLIC BLOOD PRESSURE: 75 MMHG | SYSTOLIC BLOOD PRESSURE: 127 MMHG

## 2021-02-10 VITALS — DIASTOLIC BLOOD PRESSURE: 69 MMHG | SYSTOLIC BLOOD PRESSURE: 145 MMHG

## 2021-02-10 VITALS — SYSTOLIC BLOOD PRESSURE: 129 MMHG | DIASTOLIC BLOOD PRESSURE: 62 MMHG

## 2021-02-10 VITALS — SYSTOLIC BLOOD PRESSURE: 138 MMHG | DIASTOLIC BLOOD PRESSURE: 70 MMHG

## 2021-02-10 VITALS — SYSTOLIC BLOOD PRESSURE: 130 MMHG | DIASTOLIC BLOOD PRESSURE: 61 MMHG

## 2021-02-10 VITALS — DIASTOLIC BLOOD PRESSURE: 63 MMHG | SYSTOLIC BLOOD PRESSURE: 123 MMHG

## 2021-02-10 VITALS — SYSTOLIC BLOOD PRESSURE: 119 MMHG | DIASTOLIC BLOOD PRESSURE: 60 MMHG

## 2021-02-10 VITALS — SYSTOLIC BLOOD PRESSURE: 127 MMHG | DIASTOLIC BLOOD PRESSURE: 63 MMHG

## 2021-02-10 VITALS — SYSTOLIC BLOOD PRESSURE: 119 MMHG | DIASTOLIC BLOOD PRESSURE: 58 MMHG

## 2021-02-10 VITALS — SYSTOLIC BLOOD PRESSURE: 130 MMHG | DIASTOLIC BLOOD PRESSURE: 62 MMHG

## 2021-02-10 VITALS — DIASTOLIC BLOOD PRESSURE: 59 MMHG | SYSTOLIC BLOOD PRESSURE: 118 MMHG

## 2021-02-10 VITALS — DIASTOLIC BLOOD PRESSURE: 58 MMHG | SYSTOLIC BLOOD PRESSURE: 118 MMHG

## 2021-02-10 VITALS — DIASTOLIC BLOOD PRESSURE: 60 MMHG | SYSTOLIC BLOOD PRESSURE: 114 MMHG

## 2021-02-10 VITALS — SYSTOLIC BLOOD PRESSURE: 119 MMHG | DIASTOLIC BLOOD PRESSURE: 57 MMHG

## 2021-02-10 VITALS — DIASTOLIC BLOOD PRESSURE: 68 MMHG | SYSTOLIC BLOOD PRESSURE: 143 MMHG

## 2021-02-10 VITALS — DIASTOLIC BLOOD PRESSURE: 60 MMHG | SYSTOLIC BLOOD PRESSURE: 119 MMHG

## 2021-02-10 VITALS — SYSTOLIC BLOOD PRESSURE: 147 MMHG | DIASTOLIC BLOOD PRESSURE: 76 MMHG

## 2021-02-10 VITALS — DIASTOLIC BLOOD PRESSURE: 59 MMHG | SYSTOLIC BLOOD PRESSURE: 123 MMHG

## 2021-02-10 VITALS — DIASTOLIC BLOOD PRESSURE: 65 MMHG | SYSTOLIC BLOOD PRESSURE: 133 MMHG

## 2021-02-10 VITALS — SYSTOLIC BLOOD PRESSURE: 127 MMHG | DIASTOLIC BLOOD PRESSURE: 78 MMHG

## 2021-02-10 VITALS — DIASTOLIC BLOOD PRESSURE: 74 MMHG | SYSTOLIC BLOOD PRESSURE: 156 MMHG

## 2021-02-10 VITALS — DIASTOLIC BLOOD PRESSURE: 60 MMHG | SYSTOLIC BLOOD PRESSURE: 122 MMHG

## 2021-02-10 VITALS — SYSTOLIC BLOOD PRESSURE: 131 MMHG | DIASTOLIC BLOOD PRESSURE: 65 MMHG

## 2021-02-10 LAB
BASOPHILS # BLD AUTO: 0.1 /CMM (ref 0–0.2)
BASOPHILS NFR BLD AUTO: 0.6 % (ref 0–2)
BUN SERPL-MCNC: 56 MG/DL (ref 7–18)
CALCIUM SERPL-MCNC: 8.6 MG/DL (ref 8.5–10.1)
CHLORIDE SERPL-SCNC: 101 MMOL/L (ref 98–107)
CO2 SERPL-SCNC: 36 MMOL/L (ref 21–32)
CREAT SERPL-MCNC: 2.3 MG/DL (ref 0.6–1.3)
EOSINOPHIL NFR BLD AUTO: 2.8 % (ref 0–6)
GLUCOSE SERPL-MCNC: 96 MG/DL (ref 74–106)
HCT VFR BLD AUTO: 18 % (ref 33–45)
HGB BLD-MCNC: 5.8 G/DL (ref 11.5–14.8)
LYMPHOCYTES NFR BLD AUTO: 0.8 /CMM (ref 0.8–4.8)
LYMPHOCYTES NFR BLD AUTO: 4.6 % (ref 20–44)
LYMPHOCYTES NFR BLD MANUAL: 5 % (ref 16–48)
MCHC RBC AUTO-ENTMCNC: 33 G/DL (ref 31–36)
MCV RBC AUTO: 96 FL (ref 82–100)
MONOCYTES NFR BLD AUTO: 10.6 % (ref 2–12)
MONOCYTES NFR BLD AUTO: 2 /CMM (ref 0.1–1.3)
MONOCYTES NFR BLD MANUAL: 8 % (ref 0–11)
NEUTROPHILS # BLD AUTO: 15 /CMM (ref 1.8–8.9)
NEUTROPHILS NFR BLD AUTO: 81.4 % (ref 43–81)
NEUTS BAND NFR BLD MANUAL: 10 % (ref 0–5)
NEUTS SEG NFR BLD MANUAL: 77 % (ref 42–76)
PLATELET # BLD AUTO: 328 /CMM (ref 150–450)
POTASSIUM SERPL-SCNC: 5.3 MMOL/L (ref 3.5–5.1)
RBC # BLD AUTO: 1.87 MIL/UL (ref 4–5.2)
SODIUM SERPL-SCNC: 137 MMOL/L (ref 136–145)
WBC NRBC COR # BLD AUTO: 18.5 K/UL (ref 4.3–11)

## 2021-02-10 RX ADMIN — INSULIN HUMAN PRN UNIT: 100 INJECTION, SOLUTION PARENTERAL at 05:37

## 2021-02-10 RX ADMIN — Medication SCH MG: at 14:31

## 2021-02-10 RX ADMIN — CEFEPIME HYDROCHLORIDE SCH MLS/HR: 1 INJECTION, POWDER, FOR SOLUTION INTRAMUSCULAR; INTRAVENOUS at 13:22

## 2021-02-10 RX ADMIN — Medication SCH OZ: at 20:59

## 2021-02-10 RX ADMIN — Medication SCH EACH: at 18:20

## 2021-02-10 RX ADMIN — MUPIROCIN SCH APPLIC: 20 OINTMENT TOPICAL at 08:32

## 2021-02-10 RX ADMIN — Medication PRN ML: at 06:25

## 2021-02-10 RX ADMIN — ACETYLCYSTEINE SCH MG: 100 INHALANT RESPIRATORY (INHALATION) at 00:25

## 2021-02-10 RX ADMIN — Medication SCH EACH: at 05:37

## 2021-02-10 RX ADMIN — FAMOTIDINE SCH MG: 10 INJECTION INTRAVENOUS at 08:32

## 2021-02-10 RX ADMIN — Medication SCH GM: at 17:18

## 2021-02-10 RX ADMIN — MUPIROCIN SCH APPLIC: 20 OINTMENT TOPICAL at 20:59

## 2021-02-10 RX ADMIN — Medication SCH EACH: at 11:29

## 2021-02-10 RX ADMIN — INSULIN HUMAN PRN UNIT: 100 INJECTION, SOLUTION PARENTERAL at 18:22

## 2021-02-10 RX ADMIN — Medication SCH OZ: at 08:33

## 2021-02-10 RX ADMIN — ACETYLCYSTEINE SCH MG: 100 INHALANT RESPIRATORY (INHALATION) at 14:31

## 2021-02-10 RX ADMIN — ACETYLCYSTEINE SCH MG: 100 INHALANT RESPIRATORY (INHALATION) at 07:24

## 2021-02-10 RX ADMIN — Medication SCH MG: at 01:52

## 2021-02-10 RX ADMIN — SODIUM CHLORIDE SCH MG: 9 INJECTION, SOLUTION INTRAVENOUS at 18:20

## 2021-02-10 RX ADMIN — Medication PRN MLS/HR: at 15:34

## 2021-02-10 RX ADMIN — SODIUM CHLORIDE SCH MG: 9 INJECTION, SOLUTION INTRAVENOUS at 02:25

## 2021-02-10 RX ADMIN — ACETYLCYSTEINE SCH MG: 100 INHALANT RESPIRATORY (INHALATION) at 23:24

## 2021-02-10 RX ADMIN — SODIUM CHLORIDE SCH MG: 9 INJECTION, SOLUTION INTRAVENOUS at 10:15

## 2021-02-10 RX ADMIN — INSULIN HUMAN PRN UNIT: 100 INJECTION, SOLUTION PARENTERAL at 11:28

## 2021-02-10 RX ADMIN — Medication SCH MG: at 07:24

## 2021-02-10 RX ADMIN — Medication SCH MG: at 20:05

## 2021-02-10 RX ADMIN — Medication SCH GM: at 08:33

## 2021-02-10 NOTE — NUR
HD RN REPORTS 1000 ML OUTPUT. NO CLOTTING ISSUES WITH IJ HD CATH. I U PRBC AND ALBUMIN GIVEN 
AS ORDERED

## 2021-02-10 NOTE — NUR
PATIENT REC'D ORALLY INTUBATED WITH ETT 7.5 SECURED @ [facility] 
ORDERED SETTINGS. ALARMS CHECKED + AUDIBLE. ETT SECURE AND IN PROPER POSITION. BREATHING TX 
GIVEN PER MD'S ORDER. NO ADVERSE REACTION NOTED. SX DONE, AMBU BAG AT [facility]. WILL CONTINUE TO 
MONITOR PT T/O SHIFT.

## 2021-02-10 NOTE — NUR
PT REMAINS IN BED NO ACUTE CHANGES IN CONDITION. PT VENT CHANGED % FIO2 FROM 50%. RT 
CHANGED IT TEMPORARILY WITHOUT RESETTING IT BACK TO ORIGINAL SETTINGS AFTER HD. ENDORSED TO 
ONCOMING RN TO HAVE RT CHANGE BACK TO 50%. ALL WOUND CARE COMPLETED THIS SHIFT. PT RIGHT 
FEMORAL TLC TKO AND FLUSHED WELL, AND RIGHT IJ INTACT. ALL SAFETY MEASURES IN PLACE. REPORT 
GIVEN TO ONCOMING RN FOR DASHAWN

## 2021-02-10 NOTE — NUR
PT ON BED ASLEEP EASY TO AWAKE, STILL ON ETT/VENT SETTING PER MD FIO2 50% SPO2 98% TELE 
MONITOR READS SINUS TACHY 100'S NO SIGNIFICANT CHANGES ON CONDITION NOTED ALL NEEDS ATTENDED 
WOUND TREATMENT WAS DONE, BED ON LOWEST POSITION AND LOCKED SIDE RAILS UP X2 CALL LIGHT 
WITHIN REACH WILL ENDORSED TO AM SHIFT NURSE

## 2021-02-10 NOTE — NUR
PER MD MATHIS, PT FAMILY CONTACTED REGARDING TRACHEOSTOMY DUE TO PATIENT UNABLE TO WEAN OFF 
VENT. PT FAMILY TO CALL BACK AT A LATER TIME

## 2021-02-10 NOTE — NUR
REPORTED TO DR. DELTA ROWAN ABOUT THE HGB 5.8 AND HCT 18 WITH ORDER TO TRANSFUSE 1PRBC 
NOTED AND CARRIED OUT

## 2021-02-10 NOTE — NUR
RN NOTES



RECEIVED PATIENT AWAKE ON BED CALM AND COOPERATIVE  ORALLY INTUBATED WITH  ETT 7.5 POSITION 
AT22 CM AT LIP WITH VEENT SETTING  AC 12  FIO2 100% AND NOW TITRATING TO 80% BY RT AND 
PEEP  10. MOUTHING  WORDS BUT HARDLY UNDERSTAND DDUE TO  TUBE.  ST ON TELE MONITOR 
SATURATION 96%.  WITH RIGHT  NGTF  INTACT AND PATENT  WITH MINIMAL RESIDUAL 10 CC.  IV SITE 
ON RIGHT FEMORAL  TLC INTACT AND PATENT  AND RIJ  HD CATH INTACT CLEANED AND DRY.  S/  
DIALYSIS  TODAY NO SIGNIFICANT CHANGES AT THIS TIME,.  KEPT PT CLEAN AND DRY. REPOSITION FOR 
 SKIN  MANAGEMENT.  HOB KEPT ELEVATED, ASPIRATION PRECAUTION STRICTLYOBSERVED. KEPT  PT 
CLEAN AND DRY.

## 2021-02-10 NOTE — NUR
PT ON BED AWAKE, EYES OPEN STILL ON ETT/VENT SETTING PER MD FIO2 50% SPO2 98% NO SIGNS OF 
DISTRESS NOTED STILL SINUS TACHY 110'S MINIMAL URINE OUTPUT NOTED WILL CONT TO MONITOR

## 2021-02-11 VITALS — SYSTOLIC BLOOD PRESSURE: 162 MMHG | DIASTOLIC BLOOD PRESSURE: 78 MMHG

## 2021-02-11 VITALS — SYSTOLIC BLOOD PRESSURE: 137 MMHG | DIASTOLIC BLOOD PRESSURE: 61 MMHG

## 2021-02-11 VITALS — SYSTOLIC BLOOD PRESSURE: 125 MMHG | DIASTOLIC BLOOD PRESSURE: 57 MMHG

## 2021-02-11 VITALS — SYSTOLIC BLOOD PRESSURE: 126 MMHG | DIASTOLIC BLOOD PRESSURE: 61 MMHG

## 2021-02-11 VITALS — SYSTOLIC BLOOD PRESSURE: 139 MMHG | DIASTOLIC BLOOD PRESSURE: 60 MMHG

## 2021-02-11 VITALS — SYSTOLIC BLOOD PRESSURE: 143 MMHG | DIASTOLIC BLOOD PRESSURE: 72 MMHG

## 2021-02-11 VITALS — SYSTOLIC BLOOD PRESSURE: 151 MMHG | DIASTOLIC BLOOD PRESSURE: 75 MMHG

## 2021-02-11 VITALS — DIASTOLIC BLOOD PRESSURE: 69 MMHG | SYSTOLIC BLOOD PRESSURE: 120 MMHG

## 2021-02-11 VITALS — DIASTOLIC BLOOD PRESSURE: 61 MMHG | SYSTOLIC BLOOD PRESSURE: 139 MMHG

## 2021-02-11 VITALS — DIASTOLIC BLOOD PRESSURE: 61 MMHG | SYSTOLIC BLOOD PRESSURE: 130 MMHG

## 2021-02-11 VITALS — DIASTOLIC BLOOD PRESSURE: 58 MMHG | SYSTOLIC BLOOD PRESSURE: 124 MMHG

## 2021-02-11 VITALS — SYSTOLIC BLOOD PRESSURE: 125 MMHG | DIASTOLIC BLOOD PRESSURE: 45 MMHG

## 2021-02-11 VITALS — DIASTOLIC BLOOD PRESSURE: 62 MMHG | SYSTOLIC BLOOD PRESSURE: 136 MMHG

## 2021-02-11 VITALS — DIASTOLIC BLOOD PRESSURE: 62 MMHG | SYSTOLIC BLOOD PRESSURE: 118 MMHG

## 2021-02-11 VITALS — SYSTOLIC BLOOD PRESSURE: 135 MMHG | DIASTOLIC BLOOD PRESSURE: 64 MMHG

## 2021-02-11 VITALS — DIASTOLIC BLOOD PRESSURE: 76 MMHG | SYSTOLIC BLOOD PRESSURE: 145 MMHG

## 2021-02-11 VITALS — DIASTOLIC BLOOD PRESSURE: 74 MMHG | SYSTOLIC BLOOD PRESSURE: 157 MMHG

## 2021-02-11 VITALS — DIASTOLIC BLOOD PRESSURE: 60 MMHG | SYSTOLIC BLOOD PRESSURE: 126 MMHG

## 2021-02-11 VITALS — DIASTOLIC BLOOD PRESSURE: 52 MMHG | SYSTOLIC BLOOD PRESSURE: 122 MMHG

## 2021-02-11 VITALS — DIASTOLIC BLOOD PRESSURE: 53 MMHG | SYSTOLIC BLOOD PRESSURE: 122 MMHG

## 2021-02-11 VITALS — SYSTOLIC BLOOD PRESSURE: 123 MMHG | DIASTOLIC BLOOD PRESSURE: 48 MMHG

## 2021-02-11 VITALS — DIASTOLIC BLOOD PRESSURE: 66 MMHG | SYSTOLIC BLOOD PRESSURE: 147 MMHG

## 2021-02-11 VITALS — SYSTOLIC BLOOD PRESSURE: 138 MMHG | DIASTOLIC BLOOD PRESSURE: 63 MMHG

## 2021-02-11 VITALS — DIASTOLIC BLOOD PRESSURE: 78 MMHG | SYSTOLIC BLOOD PRESSURE: 154 MMHG

## 2021-02-11 VITALS — DIASTOLIC BLOOD PRESSURE: 60 MMHG | SYSTOLIC BLOOD PRESSURE: 118 MMHG

## 2021-02-11 VITALS — SYSTOLIC BLOOD PRESSURE: 141 MMHG | DIASTOLIC BLOOD PRESSURE: 63 MMHG

## 2021-02-11 VITALS — SYSTOLIC BLOOD PRESSURE: 144 MMHG | DIASTOLIC BLOOD PRESSURE: 68 MMHG

## 2021-02-11 VITALS — DIASTOLIC BLOOD PRESSURE: 61 MMHG | SYSTOLIC BLOOD PRESSURE: 138 MMHG

## 2021-02-11 VITALS — SYSTOLIC BLOOD PRESSURE: 132 MMHG | DIASTOLIC BLOOD PRESSURE: 58 MMHG

## 2021-02-11 VITALS — DIASTOLIC BLOOD PRESSURE: 58 MMHG | SYSTOLIC BLOOD PRESSURE: 132 MMHG

## 2021-02-11 VITALS — SYSTOLIC BLOOD PRESSURE: 129 MMHG | DIASTOLIC BLOOD PRESSURE: 61 MMHG

## 2021-02-11 VITALS — SYSTOLIC BLOOD PRESSURE: 150 MMHG | DIASTOLIC BLOOD PRESSURE: 71 MMHG

## 2021-02-11 VITALS — DIASTOLIC BLOOD PRESSURE: 81 MMHG | SYSTOLIC BLOOD PRESSURE: 141 MMHG

## 2021-02-11 VITALS — DIASTOLIC BLOOD PRESSURE: 64 MMHG | SYSTOLIC BLOOD PRESSURE: 139 MMHG

## 2021-02-11 VITALS — SYSTOLIC BLOOD PRESSURE: 152 MMHG | DIASTOLIC BLOOD PRESSURE: 72 MMHG

## 2021-02-11 VITALS — DIASTOLIC BLOOD PRESSURE: 72 MMHG | SYSTOLIC BLOOD PRESSURE: 135 MMHG

## 2021-02-11 VITALS — DIASTOLIC BLOOD PRESSURE: 62 MMHG | SYSTOLIC BLOOD PRESSURE: 129 MMHG

## 2021-02-11 VITALS — SYSTOLIC BLOOD PRESSURE: 140 MMHG | DIASTOLIC BLOOD PRESSURE: 67 MMHG

## 2021-02-11 VITALS — SYSTOLIC BLOOD PRESSURE: 130 MMHG | DIASTOLIC BLOOD PRESSURE: 61 MMHG

## 2021-02-11 VITALS — SYSTOLIC BLOOD PRESSURE: 121 MMHG | DIASTOLIC BLOOD PRESSURE: 53 MMHG

## 2021-02-11 VITALS — DIASTOLIC BLOOD PRESSURE: 72 MMHG | SYSTOLIC BLOOD PRESSURE: 149 MMHG

## 2021-02-11 VITALS — DIASTOLIC BLOOD PRESSURE: 72 MMHG | SYSTOLIC BLOOD PRESSURE: 140 MMHG

## 2021-02-11 VITALS — DIASTOLIC BLOOD PRESSURE: 60 MMHG | SYSTOLIC BLOOD PRESSURE: 123 MMHG

## 2021-02-11 VITALS — DIASTOLIC BLOOD PRESSURE: 52 MMHG | SYSTOLIC BLOOD PRESSURE: 124 MMHG

## 2021-02-11 VITALS — SYSTOLIC BLOOD PRESSURE: 128 MMHG | DIASTOLIC BLOOD PRESSURE: 74 MMHG

## 2021-02-11 VITALS — SYSTOLIC BLOOD PRESSURE: 152 MMHG | DIASTOLIC BLOOD PRESSURE: 71 MMHG

## 2021-02-11 VITALS — DIASTOLIC BLOOD PRESSURE: 65 MMHG | SYSTOLIC BLOOD PRESSURE: 138 MMHG

## 2021-02-11 VITALS — DIASTOLIC BLOOD PRESSURE: 61 MMHG | SYSTOLIC BLOOD PRESSURE: 128 MMHG

## 2021-02-11 VITALS — SYSTOLIC BLOOD PRESSURE: 129 MMHG | DIASTOLIC BLOOD PRESSURE: 60 MMHG

## 2021-02-11 VITALS — DIASTOLIC BLOOD PRESSURE: 43 MMHG | SYSTOLIC BLOOD PRESSURE: 123 MMHG

## 2021-02-11 VITALS — DIASTOLIC BLOOD PRESSURE: 61 MMHG | SYSTOLIC BLOOD PRESSURE: 137 MMHG

## 2021-02-11 VITALS — DIASTOLIC BLOOD PRESSURE: 64 MMHG | SYSTOLIC BLOOD PRESSURE: 137 MMHG

## 2021-02-11 VITALS — SYSTOLIC BLOOD PRESSURE: 142 MMHG | DIASTOLIC BLOOD PRESSURE: 73 MMHG

## 2021-02-11 VITALS — SYSTOLIC BLOOD PRESSURE: 154 MMHG | DIASTOLIC BLOOD PRESSURE: 74 MMHG

## 2021-02-11 VITALS — SYSTOLIC BLOOD PRESSURE: 129 MMHG | DIASTOLIC BLOOD PRESSURE: 57 MMHG

## 2021-02-11 VITALS — DIASTOLIC BLOOD PRESSURE: 72 MMHG | SYSTOLIC BLOOD PRESSURE: 145 MMHG

## 2021-02-11 VITALS — SYSTOLIC BLOOD PRESSURE: 149 MMHG | DIASTOLIC BLOOD PRESSURE: 73 MMHG

## 2021-02-11 VITALS — SYSTOLIC BLOOD PRESSURE: 154 MMHG | DIASTOLIC BLOOD PRESSURE: 69 MMHG

## 2021-02-11 VITALS — DIASTOLIC BLOOD PRESSURE: 72 MMHG | SYSTOLIC BLOOD PRESSURE: 152 MMHG

## 2021-02-11 VITALS — DIASTOLIC BLOOD PRESSURE: 66 MMHG | SYSTOLIC BLOOD PRESSURE: 142 MMHG

## 2021-02-11 VITALS — DIASTOLIC BLOOD PRESSURE: 59 MMHG | SYSTOLIC BLOOD PRESSURE: 130 MMHG

## 2021-02-11 VITALS — DIASTOLIC BLOOD PRESSURE: 52 MMHG | SYSTOLIC BLOOD PRESSURE: 116 MMHG

## 2021-02-11 VITALS — SYSTOLIC BLOOD PRESSURE: 149 MMHG | DIASTOLIC BLOOD PRESSURE: 71 MMHG

## 2021-02-11 VITALS — SYSTOLIC BLOOD PRESSURE: 137 MMHG | DIASTOLIC BLOOD PRESSURE: 64 MMHG

## 2021-02-11 VITALS — SYSTOLIC BLOOD PRESSURE: 130 MMHG | DIASTOLIC BLOOD PRESSURE: 64 MMHG

## 2021-02-11 LAB
ALBUMIN SERPL BCP-MCNC: 1.8 G/DL (ref 3.4–5)
ALP SERPL-CCNC: 248 U/L (ref 46–116)
ALT SERPL W P-5'-P-CCNC: 27 U/L (ref 12–78)
AST SERPL W P-5'-P-CCNC: 51 U/L (ref 15–37)
BASOPHILS # BLD AUTO: 0.1 /CMM (ref 0–0.2)
BASOPHILS NFR BLD AUTO: 0.6 % (ref 0–2)
BILIRUB SERPL-MCNC: 3.3 MG/DL (ref 0.2–1)
BUN SERPL-MCNC: 49 MG/DL (ref 7–18)
CALCIUM SERPL-MCNC: 8.8 MG/DL (ref 8.5–10.1)
CHLORIDE SERPL-SCNC: 99 MMOL/L (ref 98–107)
CO2 SERPL-SCNC: 37 MMOL/L (ref 21–32)
CREAT SERPL-MCNC: 2.1 MG/DL (ref 0.6–1.3)
EOSINOPHIL NFR BLD AUTO: 3.5 % (ref 0–6)
GLUCOSE SERPL-MCNC: 116 MG/DL (ref 74–106)
HCT VFR BLD AUTO: 20 % (ref 33–45)
HGB BLD-MCNC: 6.5 G/DL (ref 11.5–14.8)
LYMPHOCYTES NFR BLD AUTO: 0.7 /CMM (ref 0.8–4.8)
LYMPHOCYTES NFR BLD AUTO: 3.7 % (ref 20–44)
MAGNESIUM SERPL-MCNC: 1.8 MG/DL (ref 1.8–2.4)
MCHC RBC AUTO-ENTMCNC: 33 G/DL (ref 31–36)
MCV RBC AUTO: 91 FL (ref 82–100)
MONOCYTES NFR BLD AUTO: 11.8 % (ref 2–12)
MONOCYTES NFR BLD AUTO: 2.2 /CMM (ref 0.1–1.3)
NEUTROPHILS # BLD AUTO: 14.8 /CMM (ref 1.8–8.9)
NEUTROPHILS NFR BLD AUTO: 80.4 % (ref 43–81)
PHOSPHATE SERPL-MCNC: 3 MG/DL (ref 2.5–4.9)
PLATELET # BLD AUTO: 320 /CMM (ref 150–450)
POTASSIUM SERPL-SCNC: 5.1 MMOL/L (ref 3.5–5.1)
PROT SERPL-MCNC: 5.9 G/DL (ref 6.4–8.2)
RBC # BLD AUTO: 2.18 MIL/UL (ref 4–5.2)
SODIUM SERPL-SCNC: 137 MMOL/L (ref 136–145)
WBC NRBC COR # BLD AUTO: 18.5 K/UL (ref 4.3–11)

## 2021-02-11 RX ADMIN — MISOPROSTOL SCH MCG: 100 TABLET ORAL at 18:09

## 2021-02-11 RX ADMIN — CEFEPIME HYDROCHLORIDE SCH MLS/HR: 1 INJECTION, POWDER, FOR SOLUTION INTRAMUSCULAR; INTRAVENOUS at 11:52

## 2021-02-11 RX ADMIN — SODIUM CHLORIDE SCH MG: 9 INJECTION, SOLUTION INTRAVENOUS at 01:36

## 2021-02-11 RX ADMIN — Medication SCH EACH: at 23:53

## 2021-02-11 RX ADMIN — ACETYLCYSTEINE SCH MG: 100 INHALANT RESPIRATORY (INHALATION) at 07:34

## 2021-02-11 RX ADMIN — Medication SCH MG: at 00:58

## 2021-02-11 RX ADMIN — Medication SCH GM: at 08:18

## 2021-02-11 RX ADMIN — Medication SCH GM: at 18:04

## 2021-02-11 RX ADMIN — MISOPROSTOL SCH MCG: 100 TABLET ORAL at 12:17

## 2021-02-11 RX ADMIN — Medication PRN ML: at 18:23

## 2021-02-11 RX ADMIN — Medication SCH EACH: at 11:52

## 2021-02-11 RX ADMIN — Medication SCH MG: at 20:22

## 2021-02-11 RX ADMIN — ACETYLCYSTEINE SCH MG: 100 INHALANT RESPIRATORY (INHALATION) at 23:19

## 2021-02-11 RX ADMIN — ACETYLCYSTEINE SCH MG: 100 INHALANT RESPIRATORY (INHALATION) at 15:00

## 2021-02-11 RX ADMIN — Medication SCH OZ: at 21:40

## 2021-02-11 RX ADMIN — Medication SCH MG: at 13:07

## 2021-02-11 RX ADMIN — Medication SCH MG: at 07:34

## 2021-02-11 RX ADMIN — MUPIROCIN SCH APPLIC: 20 OINTMENT TOPICAL at 21:40

## 2021-02-11 RX ADMIN — FAMOTIDINE SCH MG: 10 INJECTION INTRAVENOUS at 08:17

## 2021-02-11 RX ADMIN — SODIUM CHLORIDE PRN MLS/HR: 9 INJECTION, SOLUTION INTRAVENOUS at 12:30

## 2021-02-11 RX ADMIN — Medication SCH EACH: at 06:28

## 2021-02-11 RX ADMIN — INSULIN HUMAN PRN UNIT: 100 INJECTION, SOLUTION PARENTERAL at 18:19

## 2021-02-11 RX ADMIN — MUPIROCIN SCH APPLIC: 20 OINTMENT TOPICAL at 08:18

## 2021-02-11 RX ADMIN — SODIUM CHLORIDE SCH MG: 9 INJECTION, SOLUTION INTRAVENOUS at 18:08

## 2021-02-11 RX ADMIN — MISOPROSTOL SCH MCG: 100 TABLET ORAL at 21:40

## 2021-02-11 RX ADMIN — SODIUM CHLORIDE SCH MG: 9 INJECTION, SOLUTION INTRAVENOUS at 09:32

## 2021-02-11 RX ADMIN — Medication SCH EACH: at 00:43

## 2021-02-11 RX ADMIN — Medication SCH OZ: at 08:19

## 2021-02-11 RX ADMIN — Medication PRN ML: at 05:34

## 2021-02-11 RX ADMIN — Medication SCH EACH: at 18:09

## 2021-02-11 RX ADMIN — MISOPROSTOL SCH MCG: 100 TABLET ORAL at 09:32

## 2021-02-11 NOTE — NUR
rn notes

 patient stable t-98, r-116, bp 137/64, r-23 patient stable,  increased infusion 75 ml/hr 
intact, no acute respiratory distress.

## 2021-02-11 NOTE — NUR
RN NOTES

 PATIENT V/S TAKEN /63, P-112, R-12, T-98.2, NO ACUTE RESPIRATORY DISTRESS,  INCREASED 
INFUSION 125 ML/HR, PATIENT TOLERATING WELL. WILL MONITORING.

## 2021-02-11 NOTE — NUR
RN NOTES



REMAINED IN STABLE CONDITION. ETT AND VENT SETTING FIO2 50% TOLERATED WELL. SATURATION >92%. 
AFEBRILE.  VSS. NSR/ST ON TELE MONITOR.  NO SIGNIFICANT CHANGES TROUGHOUT THE  SHIFT.  WOUND 
CARE PROVIDED,  NO ADVERSE REACTION  FROM  DIALYSIS.  IV SITE AND NGT REMAINED INTACT AND 
PATENT. BED BATH DONE AND TOLERATED WELL.

## 2021-02-11 NOTE — NUR
rn notes

PATIENT RECEIVED  WITH INTUBATED WITH  ETT 7.5 / 22 CM LIP SIZE ON [facility] VENT SETTING. NO 
SEDATION. PATIENT SLEEPING AT THIS TIME. NO ACUTE RESPIRATORY DISTRESS. PATIENT HAS IV 
ACCESS ON RIGHT FEMORAL INTACT INFUSIN TKO 10CC/HR, HD CATH ON RIGHT UPPER CHEST. PATIENT 
WILL RECEIVED ONE  UNIT OF BLOOD PER LAB RESULT. RUNNING  GLUCERNA 45 ML/HR INTACT NO 
RESIDUAL.   ASSIST TURN ND REPOSTION Q 2 HR. WILL MONITORING.

## 2021-02-11 NOTE — NUR
RT NOTE



pt received on mechanical vent with current vent settings. orally intubated, ETT 7.5, 
22@lip. alarms on and audible. vent plugged in to red outlet. no sob, no resp distress 
noted. ambu bag at hob. PT KEEPS NODING NO  will continue to monitor t/o shift.

## 2021-02-11 NOTE — NUR
RN NOTES

 ONE UNIT OF BLOOD FINISHED AT THIS TIME, PATIENT STABLE AWAKE, NO ACUTE RESPIRATORY 
DISTRESS, T -98, P-120, /72, R-21. WILL MONITORING.

## 2021-02-11 NOTE — NUR
rn notes

 bs-174 mg/dl, coverage given, suction, administered due medication vis ngt, assist turn and 
reposition q 2 hr. patient eyes open, no sedation. endorsed oncoming nurse follow plan of 
care.

## 2021-02-11 NOTE — NUR
Received patient orally intubated to the ventilator on AC mode, awake,alert,with good eye 
contact,tries to talk, unable to aasess if patient is coherent.Slightly able to move upper 
extremities but still very weak.Not in any respiratory distress,breathing regular and non 
labored,no sedation but with good ventilator synchrony. Generaliezd edema,with weeping of 
both arms. Triple lumen catheter via right femoral area intact, HD catheter michael right IJ. On 
going tube feeding via NGT ,tolerating well.

## 2021-02-11 NOTE — NUR
rn notes

 started une units of blood transfusion at this time volium 345ml,  at 50ml/hr  ml on right 
femoral central line.  Patient EET, no sedation, no acute respiratory distress, v/s taken bp 
144/68, p-117, r-23, o2-100, t-98.  will  monitoring.

## 2021-02-12 VITALS — DIASTOLIC BLOOD PRESSURE: 58 MMHG | SYSTOLIC BLOOD PRESSURE: 125 MMHG

## 2021-02-12 VITALS — DIASTOLIC BLOOD PRESSURE: 68 MMHG | SYSTOLIC BLOOD PRESSURE: 138 MMHG

## 2021-02-12 VITALS — SYSTOLIC BLOOD PRESSURE: 122 MMHG | DIASTOLIC BLOOD PRESSURE: 58 MMHG

## 2021-02-12 VITALS — SYSTOLIC BLOOD PRESSURE: 136 MMHG | DIASTOLIC BLOOD PRESSURE: 58 MMHG

## 2021-02-12 VITALS — SYSTOLIC BLOOD PRESSURE: 143 MMHG | DIASTOLIC BLOOD PRESSURE: 67 MMHG

## 2021-02-12 VITALS — DIASTOLIC BLOOD PRESSURE: 66 MMHG | SYSTOLIC BLOOD PRESSURE: 138 MMHG

## 2021-02-12 VITALS — DIASTOLIC BLOOD PRESSURE: 64 MMHG | SYSTOLIC BLOOD PRESSURE: 136 MMHG

## 2021-02-12 VITALS — DIASTOLIC BLOOD PRESSURE: 76 MMHG | SYSTOLIC BLOOD PRESSURE: 158 MMHG

## 2021-02-12 VITALS — DIASTOLIC BLOOD PRESSURE: 57 MMHG | SYSTOLIC BLOOD PRESSURE: 123 MMHG

## 2021-02-12 VITALS — DIASTOLIC BLOOD PRESSURE: 60 MMHG | SYSTOLIC BLOOD PRESSURE: 139 MMHG

## 2021-02-12 VITALS — SYSTOLIC BLOOD PRESSURE: 132 MMHG | DIASTOLIC BLOOD PRESSURE: 60 MMHG

## 2021-02-12 VITALS — SYSTOLIC BLOOD PRESSURE: 139 MMHG | DIASTOLIC BLOOD PRESSURE: 63 MMHG

## 2021-02-12 VITALS — SYSTOLIC BLOOD PRESSURE: 130 MMHG | DIASTOLIC BLOOD PRESSURE: 63 MMHG

## 2021-02-12 VITALS — DIASTOLIC BLOOD PRESSURE: 68 MMHG | SYSTOLIC BLOOD PRESSURE: 137 MMHG

## 2021-02-12 VITALS — DIASTOLIC BLOOD PRESSURE: 70 MMHG | SYSTOLIC BLOOD PRESSURE: 144 MMHG

## 2021-02-12 VITALS — DIASTOLIC BLOOD PRESSURE: 57 MMHG | SYSTOLIC BLOOD PRESSURE: 140 MMHG

## 2021-02-12 VITALS — DIASTOLIC BLOOD PRESSURE: 62 MMHG | SYSTOLIC BLOOD PRESSURE: 132 MMHG

## 2021-02-12 VITALS — SYSTOLIC BLOOD PRESSURE: 116 MMHG | DIASTOLIC BLOOD PRESSURE: 56 MMHG

## 2021-02-12 VITALS — SYSTOLIC BLOOD PRESSURE: 137 MMHG | DIASTOLIC BLOOD PRESSURE: 62 MMHG

## 2021-02-12 VITALS — SYSTOLIC BLOOD PRESSURE: 144 MMHG | DIASTOLIC BLOOD PRESSURE: 68 MMHG

## 2021-02-12 VITALS — SYSTOLIC BLOOD PRESSURE: 128 MMHG | DIASTOLIC BLOOD PRESSURE: 56 MMHG

## 2021-02-12 VITALS — SYSTOLIC BLOOD PRESSURE: 132 MMHG | DIASTOLIC BLOOD PRESSURE: 63 MMHG

## 2021-02-12 VITALS — SYSTOLIC BLOOD PRESSURE: 133 MMHG | DIASTOLIC BLOOD PRESSURE: 62 MMHG

## 2021-02-12 VITALS — SYSTOLIC BLOOD PRESSURE: 150 MMHG | DIASTOLIC BLOOD PRESSURE: 69 MMHG

## 2021-02-12 VITALS — SYSTOLIC BLOOD PRESSURE: 138 MMHG | DIASTOLIC BLOOD PRESSURE: 59 MMHG

## 2021-02-12 VITALS — DIASTOLIC BLOOD PRESSURE: 60 MMHG | SYSTOLIC BLOOD PRESSURE: 136 MMHG

## 2021-02-12 VITALS — DIASTOLIC BLOOD PRESSURE: 37 MMHG | SYSTOLIC BLOOD PRESSURE: 114 MMHG

## 2021-02-12 VITALS — DIASTOLIC BLOOD PRESSURE: 68 MMHG | SYSTOLIC BLOOD PRESSURE: 139 MMHG

## 2021-02-12 VITALS — SYSTOLIC BLOOD PRESSURE: 116 MMHG | DIASTOLIC BLOOD PRESSURE: 50 MMHG

## 2021-02-12 VITALS — DIASTOLIC BLOOD PRESSURE: 55 MMHG | SYSTOLIC BLOOD PRESSURE: 116 MMHG

## 2021-02-12 VITALS — DIASTOLIC BLOOD PRESSURE: 65 MMHG | SYSTOLIC BLOOD PRESSURE: 139 MMHG

## 2021-02-12 VITALS — DIASTOLIC BLOOD PRESSURE: 58 MMHG | SYSTOLIC BLOOD PRESSURE: 124 MMHG

## 2021-02-12 VITALS — DIASTOLIC BLOOD PRESSURE: 70 MMHG | SYSTOLIC BLOOD PRESSURE: 141 MMHG

## 2021-02-12 VITALS — DIASTOLIC BLOOD PRESSURE: 65 MMHG | SYSTOLIC BLOOD PRESSURE: 149 MMHG

## 2021-02-12 VITALS — DIASTOLIC BLOOD PRESSURE: 65 MMHG | SYSTOLIC BLOOD PRESSURE: 152 MMHG

## 2021-02-12 VITALS — DIASTOLIC BLOOD PRESSURE: 66 MMHG | SYSTOLIC BLOOD PRESSURE: 136 MMHG

## 2021-02-12 VITALS — SYSTOLIC BLOOD PRESSURE: 138 MMHG | DIASTOLIC BLOOD PRESSURE: 70 MMHG

## 2021-02-12 VITALS — DIASTOLIC BLOOD PRESSURE: 115 MMHG | SYSTOLIC BLOOD PRESSURE: 157 MMHG

## 2021-02-12 VITALS — SYSTOLIC BLOOD PRESSURE: 134 MMHG | DIASTOLIC BLOOD PRESSURE: 63 MMHG

## 2021-02-12 VITALS — SYSTOLIC BLOOD PRESSURE: 127 MMHG | DIASTOLIC BLOOD PRESSURE: 58 MMHG

## 2021-02-12 VITALS — DIASTOLIC BLOOD PRESSURE: 56 MMHG | SYSTOLIC BLOOD PRESSURE: 116 MMHG

## 2021-02-12 VITALS — DIASTOLIC BLOOD PRESSURE: 58 MMHG | SYSTOLIC BLOOD PRESSURE: 126 MMHG

## 2021-02-12 VITALS — SYSTOLIC BLOOD PRESSURE: 128 MMHG | DIASTOLIC BLOOD PRESSURE: 61 MMHG

## 2021-02-12 VITALS — DIASTOLIC BLOOD PRESSURE: 66 MMHG | SYSTOLIC BLOOD PRESSURE: 143 MMHG

## 2021-02-12 VITALS — DIASTOLIC BLOOD PRESSURE: 67 MMHG | SYSTOLIC BLOOD PRESSURE: 136 MMHG

## 2021-02-12 VITALS — DIASTOLIC BLOOD PRESSURE: 60 MMHG | SYSTOLIC BLOOD PRESSURE: 134 MMHG

## 2021-02-12 VITALS — SYSTOLIC BLOOD PRESSURE: 162 MMHG | DIASTOLIC BLOOD PRESSURE: 93 MMHG

## 2021-02-12 LAB
BASOPHILS # BLD AUTO: 0.1 /CMM (ref 0–0.2)
BASOPHILS NFR BLD AUTO: 0.4 % (ref 0–2)
BUN SERPL-MCNC: 59 MG/DL (ref 7–18)
CALCIUM SERPL-MCNC: 8.6 MG/DL (ref 8.5–10.1)
CHLORIDE SERPL-SCNC: 98 MMOL/L (ref 98–107)
CO2 SERPL-SCNC: 33 MMOL/L (ref 21–32)
CREAT SERPL-MCNC: 2.4 MG/DL (ref 0.6–1.3)
EOSINOPHIL NFR BLD AUTO: 1.7 % (ref 0–6)
GLUCOSE SERPL-MCNC: 212 MG/DL (ref 74–106)
HCT VFR BLD AUTO: 29 % (ref 33–45)
HGB BLD-MCNC: 9.7 G/DL (ref 11.5–14.8)
LYMPHOCYTES NFR BLD AUTO: 1 /CMM (ref 0.8–4.8)
LYMPHOCYTES NFR BLD AUTO: 5.9 % (ref 20–44)
MCHC RBC AUTO-ENTMCNC: 34 G/DL (ref 31–36)
MCV RBC AUTO: 91 FL (ref 82–100)
MONOCYTES NFR BLD AUTO: 13.4 % (ref 2–12)
MONOCYTES NFR BLD AUTO: 2.3 /CMM (ref 0.1–1.3)
NEUTROPHILS # BLD AUTO: 13.6 /CMM (ref 1.8–8.9)
NEUTROPHILS NFR BLD AUTO: 78.6 % (ref 43–81)
PLATELET # BLD AUTO: 383 /CMM (ref 150–450)
POTASSIUM SERPL-SCNC: 5.4 MMOL/L (ref 3.5–5.1)
RBC # BLD AUTO: 3.21 MIL/UL (ref 4–5.2)
SODIUM SERPL-SCNC: 134 MMOL/L (ref 136–145)
WBC NRBC COR # BLD AUTO: 17.3 K/UL (ref 4.3–11)

## 2021-02-12 RX ADMIN — ACETYLCYSTEINE SCH MG: 100 INHALANT RESPIRATORY (INHALATION) at 08:37

## 2021-02-12 RX ADMIN — Medication SCH MG: at 20:17

## 2021-02-12 RX ADMIN — Medication SCH EACH: at 17:39

## 2021-02-12 RX ADMIN — FAMOTIDINE SCH MG: 10 INJECTION INTRAVENOUS at 09:26

## 2021-02-12 RX ADMIN — SODIUM CHLORIDE SCH MG: 9 INJECTION, SOLUTION INTRAVENOUS at 17:39

## 2021-02-12 RX ADMIN — CEFEPIME HYDROCHLORIDE SCH MLS/HR: 1 INJECTION, POWDER, FOR SOLUTION INTRAMUSCULAR; INTRAVENOUS at 12:18

## 2021-02-12 RX ADMIN — SODIUM CHLORIDE SCH MG: 9 INJECTION, SOLUTION INTRAVENOUS at 03:10

## 2021-02-12 RX ADMIN — SODIUM CHLORIDE SCH MG: 9 INJECTION, SOLUTION INTRAVENOUS at 09:25

## 2021-02-12 RX ADMIN — Medication SCH GM: at 17:43

## 2021-02-12 RX ADMIN — Medication SCH MG: at 12:41

## 2021-02-12 RX ADMIN — Medication SCH MG: at 08:37

## 2021-02-12 RX ADMIN — Medication SCH EACH: at 12:12

## 2021-02-12 RX ADMIN — Medication SCH EACH: at 06:44

## 2021-02-12 RX ADMIN — Medication SCH GM: at 09:30

## 2021-02-12 RX ADMIN — MISOPROSTOL SCH MCG: 100 TABLET ORAL at 17:37

## 2021-02-12 RX ADMIN — MUPIROCIN SCH APPLIC: 20 OINTMENT TOPICAL at 20:23

## 2021-02-12 RX ADMIN — Medication PRN ML: at 15:42

## 2021-02-12 RX ADMIN — Medication SCH OZ: at 09:30

## 2021-02-12 RX ADMIN — INSULIN HUMAN PRN UNIT: 100 INJECTION, SOLUTION PARENTERAL at 12:15

## 2021-02-12 RX ADMIN — MISOPROSTOL SCH MCG: 100 TABLET ORAL at 12:18

## 2021-02-12 RX ADMIN — Medication SCH MG: at 00:35

## 2021-02-12 RX ADMIN — MISOPROSTOL SCH MCG: 100 TABLET ORAL at 20:22

## 2021-02-12 RX ADMIN — Medication SCH OZ: at 20:23

## 2021-02-12 RX ADMIN — MUPIROCIN SCH APPLIC: 20 OINTMENT TOPICAL at 09:30

## 2021-02-12 RX ADMIN — MISOPROSTOL SCH MCG: 100 TABLET ORAL at 09:24

## 2021-02-12 RX ADMIN — INSULIN HUMAN PRN UNIT: 100 INJECTION, SOLUTION PARENTERAL at 06:46

## 2021-02-12 RX ADMIN — ACETYLCYSTEINE SCH MG: 100 INHALANT RESPIRATORY (INHALATION) at 15:50

## 2021-02-12 NOTE — NUR
PER FRANCI MATHIS, PT TO BE CHANGED TO COMFORT CARE ONCE FAMILY (SON) COMES TO VISIT, VERSUS 
TRACHEOSTOMY AS WISHED MY PT SON. PT FULL CODE FOR THE TIME BEING

## 2021-02-12 NOTE — NUR
RT



PT REC'D ORALLY INTUBATED WITH ETT 7.5 SECURED @ 22 CM LIPLINE ON [facility] VENT WITH ORDERED 
SETTINGS. ALARMS ON AUDIBLE. ETT SECURE AND PATENT. BREATHING TX GIVEN PER MD'S ORDER. NO 
ADVERSE REACTION NOTED. SX DONE, AMBU BAG AT [facility]. VENT PLUGGED INTO RED OUTLET. WILL 
CONTINUE TO MONITOR PT T/O SHIFT.

-------------------------------------------------------------------------------

Addendum: 02/12/21 at 2216 by ALONDRA LIZARRAGA RT

-------------------------------------------------------------------------------

Amended: Links added.

## 2021-02-12 NOTE — NUR
PT REMAINS ON VENT, NO ACUTE CHANGES THIS SHIFT. PT RIGHT FEM TLC INTACT AND FLUSHED WELL. 
PT RIGHT IJ HD CATH IN PLACE. PT HAS NGT RUNNING GLUCERNA AT 45 ML/HR, MINIMAL RESIDUAL.  PT 
WOUND CARE IN SACRAL AND PERINEAL AREA COMPLETED, UNABLE TO FINISH WOUND CARE FOR BILATERAL 
UPPER EXTREMITIES DUE TO TIME CONSTRAINT, ENDORSED TO ONCOMING RN.ALL SAFETY MEASURES IN 
PLACE. REPORT GIVEN TO ONCOMING RN FOR DASHAWN

## 2021-02-13 VITALS — DIASTOLIC BLOOD PRESSURE: 70 MMHG | SYSTOLIC BLOOD PRESSURE: 151 MMHG

## 2021-02-13 VITALS — SYSTOLIC BLOOD PRESSURE: 121 MMHG | DIASTOLIC BLOOD PRESSURE: 57 MMHG

## 2021-02-13 VITALS — SYSTOLIC BLOOD PRESSURE: 164 MMHG | DIASTOLIC BLOOD PRESSURE: 80 MMHG

## 2021-02-13 VITALS — DIASTOLIC BLOOD PRESSURE: 73 MMHG | SYSTOLIC BLOOD PRESSURE: 150 MMHG

## 2021-02-13 VITALS — SYSTOLIC BLOOD PRESSURE: 146 MMHG | DIASTOLIC BLOOD PRESSURE: 71 MMHG

## 2021-02-13 VITALS — SYSTOLIC BLOOD PRESSURE: 145 MMHG | DIASTOLIC BLOOD PRESSURE: 68 MMHG

## 2021-02-13 VITALS — SYSTOLIC BLOOD PRESSURE: 116 MMHG | DIASTOLIC BLOOD PRESSURE: 55 MMHG

## 2021-02-13 VITALS — SYSTOLIC BLOOD PRESSURE: 147 MMHG | DIASTOLIC BLOOD PRESSURE: 69 MMHG

## 2021-02-13 VITALS — SYSTOLIC BLOOD PRESSURE: 157 MMHG | DIASTOLIC BLOOD PRESSURE: 64 MMHG

## 2021-02-13 VITALS — DIASTOLIC BLOOD PRESSURE: 60 MMHG | SYSTOLIC BLOOD PRESSURE: 132 MMHG

## 2021-02-13 VITALS — DIASTOLIC BLOOD PRESSURE: 76 MMHG | SYSTOLIC BLOOD PRESSURE: 162 MMHG

## 2021-02-13 VITALS — SYSTOLIC BLOOD PRESSURE: 135 MMHG | DIASTOLIC BLOOD PRESSURE: 60 MMHG

## 2021-02-13 VITALS — DIASTOLIC BLOOD PRESSURE: 67 MMHG | SYSTOLIC BLOOD PRESSURE: 152 MMHG

## 2021-02-13 VITALS — DIASTOLIC BLOOD PRESSURE: 93 MMHG | SYSTOLIC BLOOD PRESSURE: 154 MMHG

## 2021-02-13 VITALS — DIASTOLIC BLOOD PRESSURE: 57 MMHG | SYSTOLIC BLOOD PRESSURE: 121 MMHG

## 2021-02-13 VITALS — DIASTOLIC BLOOD PRESSURE: 58 MMHG | SYSTOLIC BLOOD PRESSURE: 129 MMHG

## 2021-02-13 VITALS — SYSTOLIC BLOOD PRESSURE: 123 MMHG | DIASTOLIC BLOOD PRESSURE: 58 MMHG

## 2021-02-13 VITALS — SYSTOLIC BLOOD PRESSURE: 122 MMHG | DIASTOLIC BLOOD PRESSURE: 63 MMHG

## 2021-02-13 VITALS — DIASTOLIC BLOOD PRESSURE: 48 MMHG | SYSTOLIC BLOOD PRESSURE: 82 MMHG

## 2021-02-13 VITALS — SYSTOLIC BLOOD PRESSURE: 151 MMHG | DIASTOLIC BLOOD PRESSURE: 54 MMHG

## 2021-02-13 VITALS — SYSTOLIC BLOOD PRESSURE: 129 MMHG | DIASTOLIC BLOOD PRESSURE: 57 MMHG

## 2021-02-13 VITALS — DIASTOLIC BLOOD PRESSURE: 56 MMHG | SYSTOLIC BLOOD PRESSURE: 119 MMHG

## 2021-02-13 VITALS — SYSTOLIC BLOOD PRESSURE: 151 MMHG | DIASTOLIC BLOOD PRESSURE: 70 MMHG

## 2021-02-13 VITALS — SYSTOLIC BLOOD PRESSURE: 112 MMHG | DIASTOLIC BLOOD PRESSURE: 54 MMHG

## 2021-02-13 VITALS — SYSTOLIC BLOOD PRESSURE: 151 MMHG | DIASTOLIC BLOOD PRESSURE: 66 MMHG

## 2021-02-13 VITALS — SYSTOLIC BLOOD PRESSURE: 127 MMHG | DIASTOLIC BLOOD PRESSURE: 57 MMHG

## 2021-02-13 VITALS — SYSTOLIC BLOOD PRESSURE: 148 MMHG | DIASTOLIC BLOOD PRESSURE: 65 MMHG

## 2021-02-13 VITALS — SYSTOLIC BLOOD PRESSURE: 127 MMHG | DIASTOLIC BLOOD PRESSURE: 63 MMHG

## 2021-02-13 VITALS — DIASTOLIC BLOOD PRESSURE: 71 MMHG | SYSTOLIC BLOOD PRESSURE: 149 MMHG

## 2021-02-13 VITALS — SYSTOLIC BLOOD PRESSURE: 151 MMHG | DIASTOLIC BLOOD PRESSURE: 52 MMHG

## 2021-02-13 VITALS — SYSTOLIC BLOOD PRESSURE: 129 MMHG | DIASTOLIC BLOOD PRESSURE: 59 MMHG

## 2021-02-13 VITALS — SYSTOLIC BLOOD PRESSURE: 125 MMHG | DIASTOLIC BLOOD PRESSURE: 59 MMHG

## 2021-02-13 VITALS — SYSTOLIC BLOOD PRESSURE: 147 MMHG | DIASTOLIC BLOOD PRESSURE: 70 MMHG

## 2021-02-13 VITALS — DIASTOLIC BLOOD PRESSURE: 71 MMHG | SYSTOLIC BLOOD PRESSURE: 145 MMHG

## 2021-02-13 VITALS — SYSTOLIC BLOOD PRESSURE: 162 MMHG | DIASTOLIC BLOOD PRESSURE: 74 MMHG

## 2021-02-13 VITALS — DIASTOLIC BLOOD PRESSURE: 90 MMHG | SYSTOLIC BLOOD PRESSURE: 151 MMHG

## 2021-02-13 VITALS — DIASTOLIC BLOOD PRESSURE: 69 MMHG | SYSTOLIC BLOOD PRESSURE: 146 MMHG

## 2021-02-13 VITALS — SYSTOLIC BLOOD PRESSURE: 147 MMHG | DIASTOLIC BLOOD PRESSURE: 74 MMHG

## 2021-02-13 VITALS — DIASTOLIC BLOOD PRESSURE: 57 MMHG | SYSTOLIC BLOOD PRESSURE: 117 MMHG

## 2021-02-13 VITALS — DIASTOLIC BLOOD PRESSURE: 78 MMHG | SYSTOLIC BLOOD PRESSURE: 157 MMHG

## 2021-02-13 VITALS — SYSTOLIC BLOOD PRESSURE: 152 MMHG | DIASTOLIC BLOOD PRESSURE: 69 MMHG

## 2021-02-13 VITALS — DIASTOLIC BLOOD PRESSURE: 68 MMHG | SYSTOLIC BLOOD PRESSURE: 145 MMHG

## 2021-02-13 VITALS — SYSTOLIC BLOOD PRESSURE: 152 MMHG | DIASTOLIC BLOOD PRESSURE: 67 MMHG

## 2021-02-13 VITALS — DIASTOLIC BLOOD PRESSURE: 75 MMHG | SYSTOLIC BLOOD PRESSURE: 156 MMHG

## 2021-02-13 VITALS — SYSTOLIC BLOOD PRESSURE: 156 MMHG | DIASTOLIC BLOOD PRESSURE: 76 MMHG

## 2021-02-13 VITALS — DIASTOLIC BLOOD PRESSURE: 80 MMHG | SYSTOLIC BLOOD PRESSURE: 142 MMHG

## 2021-02-13 VITALS — SYSTOLIC BLOOD PRESSURE: 153 MMHG | DIASTOLIC BLOOD PRESSURE: 70 MMHG

## 2021-02-13 VITALS — SYSTOLIC BLOOD PRESSURE: 149 MMHG | DIASTOLIC BLOOD PRESSURE: 59 MMHG

## 2021-02-13 VITALS — DIASTOLIC BLOOD PRESSURE: 55 MMHG | SYSTOLIC BLOOD PRESSURE: 126 MMHG

## 2021-02-13 VITALS — DIASTOLIC BLOOD PRESSURE: 62 MMHG | SYSTOLIC BLOOD PRESSURE: 133 MMHG

## 2021-02-13 VITALS — SYSTOLIC BLOOD PRESSURE: 72 MMHG | DIASTOLIC BLOOD PRESSURE: 45 MMHG

## 2021-02-13 VITALS — DIASTOLIC BLOOD PRESSURE: 55 MMHG | SYSTOLIC BLOOD PRESSURE: 101 MMHG

## 2021-02-13 VITALS — SYSTOLIC BLOOD PRESSURE: 136 MMHG | DIASTOLIC BLOOD PRESSURE: 60 MMHG

## 2021-02-13 VITALS — SYSTOLIC BLOOD PRESSURE: 136 MMHG | DIASTOLIC BLOOD PRESSURE: 49 MMHG

## 2021-02-13 VITALS — SYSTOLIC BLOOD PRESSURE: 133 MMHG | DIASTOLIC BLOOD PRESSURE: 63 MMHG

## 2021-02-13 VITALS — DIASTOLIC BLOOD PRESSURE: 74 MMHG | SYSTOLIC BLOOD PRESSURE: 147 MMHG

## 2021-02-13 VITALS — DIASTOLIC BLOOD PRESSURE: 73 MMHG | SYSTOLIC BLOOD PRESSURE: 152 MMHG

## 2021-02-13 VITALS — DIASTOLIC BLOOD PRESSURE: 69 MMHG | SYSTOLIC BLOOD PRESSURE: 148 MMHG

## 2021-02-13 VITALS — DIASTOLIC BLOOD PRESSURE: 71 MMHG | SYSTOLIC BLOOD PRESSURE: 154 MMHG

## 2021-02-13 VITALS — DIASTOLIC BLOOD PRESSURE: 73 MMHG | SYSTOLIC BLOOD PRESSURE: 160 MMHG

## 2021-02-13 LAB
BUN SERPL-MCNC: 70 MG/DL (ref 7–18)
CALCIUM SERPL-MCNC: 8.9 MG/DL (ref 8.5–10.1)
CHLORIDE SERPL-SCNC: 99 MMOL/L (ref 98–107)
CO2 SERPL-SCNC: 33 MMOL/L (ref 21–32)
CREAT SERPL-MCNC: 2.8 MG/DL (ref 0.6–1.3)
GLUCOSE SERPL-MCNC: 110 MG/DL (ref 74–106)
POTASSIUM SERPL-SCNC: 5.4 MMOL/L (ref 3.5–5.1)
SODIUM SERPL-SCNC: 134 MMOL/L (ref 136–145)

## 2021-02-13 RX ADMIN — ACETYLCYSTEINE SCH MG: 100 INHALANT RESPIRATORY (INHALATION) at 00:08

## 2021-02-13 RX ADMIN — MUPIROCIN SCH APPLIC: 20 OINTMENT TOPICAL at 21:00

## 2021-02-13 RX ADMIN — Medication SCH EACH: at 12:28

## 2021-02-13 RX ADMIN — MISOPROSTOL SCH MCG: 100 TABLET ORAL at 12:50

## 2021-02-13 RX ADMIN — ACETYLCYSTEINE SCH MG: 100 INHALANT RESPIRATORY (INHALATION) at 15:05

## 2021-02-13 RX ADMIN — ACETYLCYSTEINE SCH MG: 100 INHALANT RESPIRATORY (INHALATION) at 23:15

## 2021-02-13 RX ADMIN — Medication SCH EACH: at 17:51

## 2021-02-13 RX ADMIN — INSULIN HUMAN PRN UNIT: 100 INJECTION, SOLUTION PARENTERAL at 17:54

## 2021-02-13 RX ADMIN — MISOPROSTOL SCH MCG: 100 TABLET ORAL at 23:18

## 2021-02-13 RX ADMIN — INSULIN HUMAN PRN UNIT: 100 INJECTION, SOLUTION PARENTERAL at 12:29

## 2021-02-13 RX ADMIN — SODIUM CHLORIDE PRN MLS/HR: 9 INJECTION, SOLUTION INTRAVENOUS at 05:58

## 2021-02-13 RX ADMIN — Medication SCH MG: at 13:12

## 2021-02-13 RX ADMIN — Medication SCH MG: at 08:15

## 2021-02-13 RX ADMIN — Medication SCH MG: at 19:30

## 2021-02-13 RX ADMIN — Medication SCH GM: at 18:45

## 2021-02-13 RX ADMIN — Medication SCH OZ: at 21:00

## 2021-02-13 RX ADMIN — MUPIROCIN SCH APPLIC: 20 OINTMENT TOPICAL at 08:58

## 2021-02-13 RX ADMIN — SODIUM CHLORIDE SCH MG: 9 INJECTION, SOLUTION INTRAVENOUS at 09:01

## 2021-02-13 RX ADMIN — Medication SCH GM: at 08:58

## 2021-02-13 RX ADMIN — FAMOTIDINE SCH MG: 10 INJECTION INTRAVENOUS at 08:57

## 2021-02-13 RX ADMIN — Medication SCH EACH: at 00:02

## 2021-02-13 RX ADMIN — Medication SCH MG: at 01:33

## 2021-02-13 RX ADMIN — Medication SCH OZ: at 08:59

## 2021-02-13 RX ADMIN — MISOPROSTOL SCH MCG: 100 TABLET ORAL at 08:57

## 2021-02-13 RX ADMIN — Medication SCH EACH: at 05:57

## 2021-02-13 RX ADMIN — ACETYLCYSTEINE SCH MG: 100 INHALANT RESPIRATORY (INHALATION) at 08:15

## 2021-02-13 RX ADMIN — CEFEPIME HYDROCHLORIDE SCH MLS/HR: 1 INJECTION, POWDER, FOR SOLUTION INTRAMUSCULAR; INTRAVENOUS at 12:36

## 2021-02-13 RX ADMIN — SODIUM CHLORIDE SCH MG: 9 INJECTION, SOLUTION INTRAVENOUS at 17:51

## 2021-02-13 RX ADMIN — SODIUM CHLORIDE SCH MG: 9 INJECTION, SOLUTION INTRAVENOUS at 00:58

## 2021-02-13 RX ADMIN — MISOPROSTOL SCH MCG: 100 TABLET ORAL at 17:51

## 2021-02-13 NOTE — NUR
ICU RN OPENING NOTES

Patient received in bed and does not show any s/s of respiratory distress. Patient  on 
mechanical vent setting on 7.5/22,  400 TD, FI02 50 PEEP 5 WITH  02 saturation of 97%. 
Patient noted with rectal tube and holm cath, both draining well. urine noted to be clear 
and yellow with no sediments. Vital signs WNL. Head of the bed kept elevated for aspiration 
precaution. Will continue to monitor. Call light with in reach. Bed is in lowest and locked 
position.

------------------------------------

## 2021-02-13 NOTE — NUR
ICU RN CLOSING NOTES

Patient  is in bed and does not show any s/s of respiratory distress. Patient  on mechanical 
vent setting on 7.5/22,  400 TD, FI02 50 PEEP 5 WITH  02 saturation of 99 %. Patient noted 
with rectal tube and holm cath, both draining well. urine noted to be clear and yellow with 
no sediments. Patient kept clean and dry and wound care provided. Noted with 400 cc of 
rectal tube stool output. Patient had hemodialysis done with output of 1100 cc. Call light 
with in reach. Bed is in lowest and locked position.Endorsed to next shift for DASHAWN.

## 2021-02-13 NOTE — NUR
Patient's son spoke to Dr MATHIS and decided to keep the patient full code and authorized to 
have tracheostomy done. MD spoke to writer that to  patient remains full code at this time

## 2021-02-14 VITALS — SYSTOLIC BLOOD PRESSURE: 130 MMHG | DIASTOLIC BLOOD PRESSURE: 66 MMHG

## 2021-02-14 VITALS — SYSTOLIC BLOOD PRESSURE: 143 MMHG | DIASTOLIC BLOOD PRESSURE: 69 MMHG

## 2021-02-14 VITALS — DIASTOLIC BLOOD PRESSURE: 62 MMHG | SYSTOLIC BLOOD PRESSURE: 137 MMHG

## 2021-02-14 VITALS — SYSTOLIC BLOOD PRESSURE: 139 MMHG | DIASTOLIC BLOOD PRESSURE: 62 MMHG

## 2021-02-14 VITALS — SYSTOLIC BLOOD PRESSURE: 141 MMHG | DIASTOLIC BLOOD PRESSURE: 70 MMHG

## 2021-02-14 VITALS — DIASTOLIC BLOOD PRESSURE: 61 MMHG | SYSTOLIC BLOOD PRESSURE: 140 MMHG

## 2021-02-14 VITALS — SYSTOLIC BLOOD PRESSURE: 150 MMHG | DIASTOLIC BLOOD PRESSURE: 70 MMHG

## 2021-02-14 VITALS — DIASTOLIC BLOOD PRESSURE: 75 MMHG | SYSTOLIC BLOOD PRESSURE: 158 MMHG

## 2021-02-14 VITALS — DIASTOLIC BLOOD PRESSURE: 77 MMHG | SYSTOLIC BLOOD PRESSURE: 165 MMHG

## 2021-02-14 VITALS — DIASTOLIC BLOOD PRESSURE: 75 MMHG | SYSTOLIC BLOOD PRESSURE: 150 MMHG

## 2021-02-14 VITALS — SYSTOLIC BLOOD PRESSURE: 158 MMHG | DIASTOLIC BLOOD PRESSURE: 79 MMHG

## 2021-02-14 VITALS — DIASTOLIC BLOOD PRESSURE: 71 MMHG | SYSTOLIC BLOOD PRESSURE: 156 MMHG

## 2021-02-14 VITALS — DIASTOLIC BLOOD PRESSURE: 70 MMHG | SYSTOLIC BLOOD PRESSURE: 141 MMHG

## 2021-02-14 VITALS — SYSTOLIC BLOOD PRESSURE: 158 MMHG | DIASTOLIC BLOOD PRESSURE: 72 MMHG

## 2021-02-14 VITALS — SYSTOLIC BLOOD PRESSURE: 145 MMHG | DIASTOLIC BLOOD PRESSURE: 68 MMHG

## 2021-02-14 VITALS — DIASTOLIC BLOOD PRESSURE: 65 MMHG | SYSTOLIC BLOOD PRESSURE: 137 MMHG

## 2021-02-14 VITALS — DIASTOLIC BLOOD PRESSURE: 64 MMHG | SYSTOLIC BLOOD PRESSURE: 143 MMHG

## 2021-02-14 VITALS — SYSTOLIC BLOOD PRESSURE: 145 MMHG | DIASTOLIC BLOOD PRESSURE: 66 MMHG

## 2021-02-14 VITALS — DIASTOLIC BLOOD PRESSURE: 68 MMHG | SYSTOLIC BLOOD PRESSURE: 145 MMHG

## 2021-02-14 VITALS — SYSTOLIC BLOOD PRESSURE: 137 MMHG | DIASTOLIC BLOOD PRESSURE: 57 MMHG

## 2021-02-14 VITALS — DIASTOLIC BLOOD PRESSURE: 73 MMHG | SYSTOLIC BLOOD PRESSURE: 146 MMHG

## 2021-02-14 VITALS — DIASTOLIC BLOOD PRESSURE: 73 MMHG | SYSTOLIC BLOOD PRESSURE: 156 MMHG

## 2021-02-14 VITALS — SYSTOLIC BLOOD PRESSURE: 135 MMHG | DIASTOLIC BLOOD PRESSURE: 55 MMHG

## 2021-02-14 VITALS — DIASTOLIC BLOOD PRESSURE: 78 MMHG | SYSTOLIC BLOOD PRESSURE: 155 MMHG

## 2021-02-14 VITALS — DIASTOLIC BLOOD PRESSURE: 72 MMHG | SYSTOLIC BLOOD PRESSURE: 148 MMHG

## 2021-02-14 VITALS — DIASTOLIC BLOOD PRESSURE: 79 MMHG | SYSTOLIC BLOOD PRESSURE: 156 MMHG

## 2021-02-14 VITALS — DIASTOLIC BLOOD PRESSURE: 71 MMHG | SYSTOLIC BLOOD PRESSURE: 157 MMHG

## 2021-02-14 VITALS — SYSTOLIC BLOOD PRESSURE: 162 MMHG | DIASTOLIC BLOOD PRESSURE: 78 MMHG

## 2021-02-14 VITALS — SYSTOLIC BLOOD PRESSURE: 154 MMHG | DIASTOLIC BLOOD PRESSURE: 72 MMHG

## 2021-02-14 VITALS — SYSTOLIC BLOOD PRESSURE: 146 MMHG | DIASTOLIC BLOOD PRESSURE: 66 MMHG

## 2021-02-14 VITALS — SYSTOLIC BLOOD PRESSURE: 134 MMHG | DIASTOLIC BLOOD PRESSURE: 54 MMHG

## 2021-02-14 VITALS — SYSTOLIC BLOOD PRESSURE: 146 MMHG | DIASTOLIC BLOOD PRESSURE: 77 MMHG

## 2021-02-14 VITALS — SYSTOLIC BLOOD PRESSURE: 130 MMHG | DIASTOLIC BLOOD PRESSURE: 58 MMHG

## 2021-02-14 VITALS — SYSTOLIC BLOOD PRESSURE: 153 MMHG | DIASTOLIC BLOOD PRESSURE: 71 MMHG

## 2021-02-14 VITALS — SYSTOLIC BLOOD PRESSURE: 133 MMHG | DIASTOLIC BLOOD PRESSURE: 60 MMHG

## 2021-02-14 VITALS — SYSTOLIC BLOOD PRESSURE: 144 MMHG | DIASTOLIC BLOOD PRESSURE: 72 MMHG

## 2021-02-14 VITALS — DIASTOLIC BLOOD PRESSURE: 78 MMHG | SYSTOLIC BLOOD PRESSURE: 157 MMHG

## 2021-02-14 VITALS — SYSTOLIC BLOOD PRESSURE: 147 MMHG | DIASTOLIC BLOOD PRESSURE: 65 MMHG

## 2021-02-14 VITALS — DIASTOLIC BLOOD PRESSURE: 62 MMHG | SYSTOLIC BLOOD PRESSURE: 162 MMHG

## 2021-02-14 VITALS — DIASTOLIC BLOOD PRESSURE: 62 MMHG | SYSTOLIC BLOOD PRESSURE: 163 MMHG

## 2021-02-14 VITALS — SYSTOLIC BLOOD PRESSURE: 154 MMHG | DIASTOLIC BLOOD PRESSURE: 73 MMHG

## 2021-02-14 VITALS — SYSTOLIC BLOOD PRESSURE: 149 MMHG | DIASTOLIC BLOOD PRESSURE: 65 MMHG

## 2021-02-14 VITALS — DIASTOLIC BLOOD PRESSURE: 67 MMHG | SYSTOLIC BLOOD PRESSURE: 140 MMHG

## 2021-02-14 VITALS — DIASTOLIC BLOOD PRESSURE: 64 MMHG | SYSTOLIC BLOOD PRESSURE: 131 MMHG

## 2021-02-14 VITALS — DIASTOLIC BLOOD PRESSURE: 78 MMHG | SYSTOLIC BLOOD PRESSURE: 158 MMHG

## 2021-02-14 VITALS — DIASTOLIC BLOOD PRESSURE: 71 MMHG | SYSTOLIC BLOOD PRESSURE: 146 MMHG

## 2021-02-14 VITALS — DIASTOLIC BLOOD PRESSURE: 62 MMHG | SYSTOLIC BLOOD PRESSURE: 138 MMHG

## 2021-02-14 VITALS — DIASTOLIC BLOOD PRESSURE: 64 MMHG | SYSTOLIC BLOOD PRESSURE: 148 MMHG

## 2021-02-14 VITALS — DIASTOLIC BLOOD PRESSURE: 71 MMHG | SYSTOLIC BLOOD PRESSURE: 144 MMHG

## 2021-02-14 VITALS — DIASTOLIC BLOOD PRESSURE: 80 MMHG | SYSTOLIC BLOOD PRESSURE: 165 MMHG

## 2021-02-14 VITALS — SYSTOLIC BLOOD PRESSURE: 156 MMHG | DIASTOLIC BLOOD PRESSURE: 77 MMHG

## 2021-02-14 VITALS — SYSTOLIC BLOOD PRESSURE: 158 MMHG | DIASTOLIC BLOOD PRESSURE: 78 MMHG

## 2021-02-14 VITALS — DIASTOLIC BLOOD PRESSURE: 67 MMHG | SYSTOLIC BLOOD PRESSURE: 148 MMHG

## 2021-02-14 VITALS — SYSTOLIC BLOOD PRESSURE: 147 MMHG | DIASTOLIC BLOOD PRESSURE: 69 MMHG

## 2021-02-14 VITALS — DIASTOLIC BLOOD PRESSURE: 72 MMHG | SYSTOLIC BLOOD PRESSURE: 149 MMHG

## 2021-02-14 VITALS — DIASTOLIC BLOOD PRESSURE: 87 MMHG | SYSTOLIC BLOOD PRESSURE: 186 MMHG

## 2021-02-14 VITALS — DIASTOLIC BLOOD PRESSURE: 68 MMHG | SYSTOLIC BLOOD PRESSURE: 149 MMHG

## 2021-02-14 VITALS — DIASTOLIC BLOOD PRESSURE: 75 MMHG | SYSTOLIC BLOOD PRESSURE: 170 MMHG

## 2021-02-14 VITALS — SYSTOLIC BLOOD PRESSURE: 138 MMHG | DIASTOLIC BLOOD PRESSURE: 66 MMHG

## 2021-02-14 VITALS — SYSTOLIC BLOOD PRESSURE: 152 MMHG | DIASTOLIC BLOOD PRESSURE: 68 MMHG

## 2021-02-14 VITALS — DIASTOLIC BLOOD PRESSURE: 62 MMHG | SYSTOLIC BLOOD PRESSURE: 144 MMHG

## 2021-02-14 VITALS — DIASTOLIC BLOOD PRESSURE: 66 MMHG | SYSTOLIC BLOOD PRESSURE: 148 MMHG

## 2021-02-14 VITALS — SYSTOLIC BLOOD PRESSURE: 134 MMHG | DIASTOLIC BLOOD PRESSURE: 67 MMHG

## 2021-02-14 VITALS — SYSTOLIC BLOOD PRESSURE: 151 MMHG | DIASTOLIC BLOOD PRESSURE: 75 MMHG

## 2021-02-14 VITALS — DIASTOLIC BLOOD PRESSURE: 67 MMHG | SYSTOLIC BLOOD PRESSURE: 149 MMHG

## 2021-02-14 VITALS — DIASTOLIC BLOOD PRESSURE: 68 MMHG | SYSTOLIC BLOOD PRESSURE: 154 MMHG

## 2021-02-14 VITALS — DIASTOLIC BLOOD PRESSURE: 57 MMHG | SYSTOLIC BLOOD PRESSURE: 128 MMHG

## 2021-02-14 VITALS — SYSTOLIC BLOOD PRESSURE: 139 MMHG | DIASTOLIC BLOOD PRESSURE: 64 MMHG

## 2021-02-14 VITALS — SYSTOLIC BLOOD PRESSURE: 161 MMHG | DIASTOLIC BLOOD PRESSURE: 73 MMHG

## 2021-02-14 VITALS — DIASTOLIC BLOOD PRESSURE: 64 MMHG | SYSTOLIC BLOOD PRESSURE: 138 MMHG

## 2021-02-14 VITALS — SYSTOLIC BLOOD PRESSURE: 150 MMHG | DIASTOLIC BLOOD PRESSURE: 68 MMHG

## 2021-02-14 VITALS — SYSTOLIC BLOOD PRESSURE: 139 MMHG | DIASTOLIC BLOOD PRESSURE: 63 MMHG

## 2021-02-14 VITALS — DIASTOLIC BLOOD PRESSURE: 81 MMHG | SYSTOLIC BLOOD PRESSURE: 156 MMHG

## 2021-02-14 LAB
BASOPHILS # BLD AUTO: 0.2 /CMM (ref 0–0.2)
BASOPHILS NFR BLD AUTO: 1.2 % (ref 0–2)
BUN SERPL-MCNC: 59 MG/DL (ref 7–18)
CALCIUM SERPL-MCNC: 8.5 MG/DL (ref 8.5–10.1)
CHLORIDE SERPL-SCNC: 96 MMOL/L (ref 98–107)
CO2 SERPL-SCNC: 31 MMOL/L (ref 21–32)
CREAT SERPL-MCNC: 2.5 MG/DL (ref 0.6–1.3)
EOSINOPHIL NFR BLD AUTO: 1.4 % (ref 0–6)
EOSINOPHIL NFR BLD MANUAL: 1 % (ref 0–4)
GLUCOSE SERPL-MCNC: 104 MG/DL (ref 74–106)
HCT VFR BLD AUTO: 28 % (ref 33–45)
HGB BLD-MCNC: 9.1 G/DL (ref 11.5–14.8)
LYMPHOCYTES NFR BLD AUTO: 0.8 /CMM (ref 0.8–4.8)
LYMPHOCYTES NFR BLD AUTO: 4.2 % (ref 20–44)
LYMPHOCYTES NFR BLD MANUAL: 2 % (ref 16–48)
MCHC RBC AUTO-ENTMCNC: 32 G/DL (ref 31–36)
MCV RBC AUTO: 94 FL (ref 82–100)
METAMYELOCYTES NFR BLD MANUAL: 1 % (ref 0–0)
MONOCYTES NFR BLD AUTO: 12.3 % (ref 2–12)
MONOCYTES NFR BLD AUTO: 2.2 /CMM (ref 0.1–1.3)
MONOCYTES NFR BLD MANUAL: 11 % (ref 0–11)
MYELOCYTES NFR BLD MANUAL: 2 % (ref 0–0)
NEUTROPHILS # BLD AUTO: 14.5 /CMM (ref 1.8–8.9)
NEUTROPHILS NFR BLD AUTO: 80.9 % (ref 43–81)
NEUTS BAND NFR BLD MANUAL: 4 % (ref 0–5)
NEUTS SEG NFR BLD MANUAL: 79 % (ref 42–76)
PLATELET # BLD AUTO: 416 /CMM (ref 150–450)
POTASSIUM SERPL-SCNC: 5.7 MMOL/L (ref 3.5–5.1)
RBC # BLD AUTO: 3.02 MIL/UL (ref 4–5.2)
SODIUM SERPL-SCNC: 132 MMOL/L (ref 136–145)
WBC NRBC COR # BLD AUTO: 17.9 K/UL (ref 4.3–11)

## 2021-02-14 RX ADMIN — ACETYLCYSTEINE SCH MG: 100 INHALANT RESPIRATORY (INHALATION) at 14:00

## 2021-02-14 RX ADMIN — Medication SCH MG: at 19:45

## 2021-02-14 RX ADMIN — INSULIN HUMAN PRN UNIT: 100 INJECTION, SOLUTION PARENTERAL at 12:26

## 2021-02-14 RX ADMIN — ACETYLCYSTEINE SCH MG: 100 INHALANT RESPIRATORY (INHALATION) at 07:22

## 2021-02-14 RX ADMIN — SODIUM CHLORIDE SCH MG: 9 INJECTION, SOLUTION INTRAVENOUS at 02:13

## 2021-02-14 RX ADMIN — Medication SCH EACH: at 12:25

## 2021-02-14 RX ADMIN — MISOPROSTOL SCH MCG: 100 TABLET ORAL at 21:46

## 2021-02-14 RX ADMIN — ACETAMINOPHEN PRN MG: 160 SOLUTION ORAL at 06:01

## 2021-02-14 RX ADMIN — Medication SCH EACH: at 17:55

## 2021-02-14 RX ADMIN — Medication SCH EACH: at 00:18

## 2021-02-14 RX ADMIN — SODIUM CHLORIDE PRN MLS/HR: 9 INJECTION, SOLUTION INTRAVENOUS at 02:54

## 2021-02-14 RX ADMIN — Medication SCH GM: at 17:55

## 2021-02-14 RX ADMIN — MUPIROCIN SCH APPLIC: 20 OINTMENT TOPICAL at 21:00

## 2021-02-14 RX ADMIN — SODIUM CHLORIDE SCH MG: 9 INJECTION, SOLUTION INTRAVENOUS at 17:55

## 2021-02-14 RX ADMIN — Medication SCH OZ: at 21:43

## 2021-02-14 RX ADMIN — ACETYLCYSTEINE SCH MG: 100 INHALANT RESPIRATORY (INHALATION) at 23:58

## 2021-02-14 RX ADMIN — MISOPROSTOL SCH MCG: 100 TABLET ORAL at 12:25

## 2021-02-14 RX ADMIN — CEFEPIME HYDROCHLORIDE SCH MLS/HR: 1 INJECTION, POWDER, FOR SOLUTION INTRAMUSCULAR; INTRAVENOUS at 12:24

## 2021-02-14 RX ADMIN — SODIUM CHLORIDE SCH MG: 9 INJECTION, SOLUTION INTRAVENOUS at 09:11

## 2021-02-14 RX ADMIN — Medication SCH GM: at 09:09

## 2021-02-14 RX ADMIN — HEPARIN SODIUM SCH UNITS: 5000 INJECTION INTRAVENOUS; SUBCUTANEOUS at 21:49

## 2021-02-14 RX ADMIN — HEPARIN SODIUM SCH UNITS: 5000 INJECTION INTRAVENOUS; SUBCUTANEOUS at 10:56

## 2021-02-14 RX ADMIN — MISOPROSTOL SCH MCG: 100 TABLET ORAL at 09:11

## 2021-02-14 RX ADMIN — MUPIROCIN SCH APPLIC: 20 OINTMENT TOPICAL at 09:09

## 2021-02-14 RX ADMIN — Medication PRN ML: at 02:55

## 2021-02-14 RX ADMIN — Medication SCH MG: at 07:22

## 2021-02-14 RX ADMIN — Medication SCH MG: at 14:00

## 2021-02-14 RX ADMIN — FAMOTIDINE SCH MG: 10 INJECTION INTRAVENOUS at 09:11

## 2021-02-14 RX ADMIN — Medication SCH MG: at 01:58

## 2021-02-14 RX ADMIN — Medication SCH OZ: at 09:09

## 2021-02-14 RX ADMIN — MISOPROSTOL SCH MCG: 100 TABLET ORAL at 17:55

## 2021-02-14 RX ADMIN — INSULIN HUMAN PRN UNIT: 100 INJECTION, SOLUTION PARENTERAL at 00:20

## 2021-02-14 RX ADMIN — Medication SCH EACH: at 06:03

## 2021-02-14 NOTE — NUR
ICU OPENING NOTE



PATIENT CURRENTLY IN BED, EYES OPENING BUT NONVERBAL. NO S/S OF RESPIRATORY DISTRESS AT THIS 
TIME. PATIENT CURRENTLY INTUBATED WITH ET TUBE; VENT SETTINGS AS PRESCRIBED. TELE MONITOR 
SHOWING SINUS TACHYCARDIA. PATIENT HAS AN NG TUBE ON RIGHT NARES RUNNING GLUCERNA 1.2 AT 45 
ML/HR, TOLERATED WELL. PATIENT HAS A R FEMORAL TLC AND R INTERNAL JUGULAR HD CATH. NO 
REDNESS OR SWELLING AT THIS TIME. R FEMORAL TLC FLUSHING WELL. CURRENTLY RUNNING NS TKO AT 
10ML/HR. PATIENT HAS A ASHBY CATHETER WITH MINIMAL URINE OUTPUT AND A FLEXI-SEAL RECTAL 
TUBE. SAFETY MEASURES IN PLACE PER HOSPITAL POLICY. BED LOCKED IN LOWEST POSITION, CALL 
LIGHT WITHIN REACH, SIDERAILS UP X2. WILL CONTINUE TO MONITOR AND PROVIDE CARE.

## 2021-02-14 NOTE — NUR
ICU CLOSING NOTE



PATIENT CURRENTLY IN BED, EYES OPENING BUT NONVERBAL. NO S/S OF RESPIRATORY DISTRESS AT THIS 
TIME. PATIENT CURRENTLY INTUBATED WITH ET TUBE; VENT SETTINGS AS PRESCRIBED. TELE MONITOR 
SHOWING SINUS TACHYCARDIA. PATIENT HAS AN NG TUBE ON RIGHT NARES RUNNING GLUCERNA 1.2 AT 45 
ML/HR, TOLERATED WELL. PATIENT HAS A R FEMORAL TLC AND R INTERNAL JUGULAR HD CATH. NO 
REDNESS OR SWELLING AT THIS TIME. R FEMORAL TLC FLUSHING WELL. CURRENTLY RUNNING NS TKO AT 
10ML/HR. PATIENT HAS A ASHBY CATHETER WITH MINIMAL URINE OUTPUT AND A FLEXI-SEAL RECTAL 
TUBE. CONSENT RECEIVED BY RESPONSIBLE PARTY OVER THE PHONE FOR GASTROSTOMY TUBE PLACEMENT 
AND TRACHEOSTOMY PLACEMENT. ALL MEDS GIVEN AS ORDERED. SAFETY MEASURES IN PLACE PER HOSPITAL 
POLICY. BED LOCKED IN LOWEST POSITION, CALL LIGHT WITHIN REACH, SIDERAILS UP X2. WILL 
ENDORSE TO NIGHT SHIFT NURSE FOR CONTINUATION OF CARE.

## 2021-02-15 VITALS — SYSTOLIC BLOOD PRESSURE: 134 MMHG | DIASTOLIC BLOOD PRESSURE: 60 MMHG

## 2021-02-15 VITALS — DIASTOLIC BLOOD PRESSURE: 68 MMHG | SYSTOLIC BLOOD PRESSURE: 140 MMHG

## 2021-02-15 VITALS — DIASTOLIC BLOOD PRESSURE: 48 MMHG | SYSTOLIC BLOOD PRESSURE: 109 MMHG

## 2021-02-15 VITALS — SYSTOLIC BLOOD PRESSURE: 107 MMHG | DIASTOLIC BLOOD PRESSURE: 53 MMHG

## 2021-02-15 VITALS — DIASTOLIC BLOOD PRESSURE: 54 MMHG | SYSTOLIC BLOOD PRESSURE: 116 MMHG

## 2021-02-15 VITALS — SYSTOLIC BLOOD PRESSURE: 135 MMHG | DIASTOLIC BLOOD PRESSURE: 73 MMHG

## 2021-02-15 VITALS — SYSTOLIC BLOOD PRESSURE: 114 MMHG | DIASTOLIC BLOOD PRESSURE: 57 MMHG

## 2021-02-15 VITALS — DIASTOLIC BLOOD PRESSURE: 64 MMHG | SYSTOLIC BLOOD PRESSURE: 131 MMHG

## 2021-02-15 VITALS — SYSTOLIC BLOOD PRESSURE: 141 MMHG | DIASTOLIC BLOOD PRESSURE: 68 MMHG

## 2021-02-15 VITALS — DIASTOLIC BLOOD PRESSURE: 78 MMHG | SYSTOLIC BLOOD PRESSURE: 159 MMHG

## 2021-02-15 VITALS — SYSTOLIC BLOOD PRESSURE: 103 MMHG | DIASTOLIC BLOOD PRESSURE: 53 MMHG

## 2021-02-15 VITALS — SYSTOLIC BLOOD PRESSURE: 147 MMHG | DIASTOLIC BLOOD PRESSURE: 76 MMHG

## 2021-02-15 VITALS — DIASTOLIC BLOOD PRESSURE: 64 MMHG | SYSTOLIC BLOOD PRESSURE: 134 MMHG

## 2021-02-15 VITALS — DIASTOLIC BLOOD PRESSURE: 52 MMHG | SYSTOLIC BLOOD PRESSURE: 112 MMHG

## 2021-02-15 VITALS — SYSTOLIC BLOOD PRESSURE: 95 MMHG | DIASTOLIC BLOOD PRESSURE: 47 MMHG

## 2021-02-15 VITALS — SYSTOLIC BLOOD PRESSURE: 122 MMHG | DIASTOLIC BLOOD PRESSURE: 58 MMHG

## 2021-02-15 VITALS — SYSTOLIC BLOOD PRESSURE: 140 MMHG | DIASTOLIC BLOOD PRESSURE: 65 MMHG

## 2021-02-15 VITALS — DIASTOLIC BLOOD PRESSURE: 51 MMHG | SYSTOLIC BLOOD PRESSURE: 106 MMHG

## 2021-02-15 VITALS — SYSTOLIC BLOOD PRESSURE: 157 MMHG | DIASTOLIC BLOOD PRESSURE: 80 MMHG

## 2021-02-15 VITALS — DIASTOLIC BLOOD PRESSURE: 70 MMHG | SYSTOLIC BLOOD PRESSURE: 129 MMHG

## 2021-02-15 VITALS — SYSTOLIC BLOOD PRESSURE: 122 MMHG | DIASTOLIC BLOOD PRESSURE: 57 MMHG

## 2021-02-15 VITALS — SYSTOLIC BLOOD PRESSURE: 131 MMHG | DIASTOLIC BLOOD PRESSURE: 68 MMHG

## 2021-02-15 VITALS — DIASTOLIC BLOOD PRESSURE: 75 MMHG | SYSTOLIC BLOOD PRESSURE: 148 MMHG

## 2021-02-15 VITALS — DIASTOLIC BLOOD PRESSURE: 52 MMHG | SYSTOLIC BLOOD PRESSURE: 115 MMHG

## 2021-02-15 VITALS — SYSTOLIC BLOOD PRESSURE: 108 MMHG | DIASTOLIC BLOOD PRESSURE: 52 MMHG

## 2021-02-15 VITALS — SYSTOLIC BLOOD PRESSURE: 100 MMHG | DIASTOLIC BLOOD PRESSURE: 50 MMHG

## 2021-02-15 VITALS — DIASTOLIC BLOOD PRESSURE: 55 MMHG | SYSTOLIC BLOOD PRESSURE: 114 MMHG

## 2021-02-15 VITALS — SYSTOLIC BLOOD PRESSURE: 110 MMHG | DIASTOLIC BLOOD PRESSURE: 55 MMHG

## 2021-02-15 VITALS — SYSTOLIC BLOOD PRESSURE: 118 MMHG | DIASTOLIC BLOOD PRESSURE: 60 MMHG

## 2021-02-15 VITALS — SYSTOLIC BLOOD PRESSURE: 126 MMHG | DIASTOLIC BLOOD PRESSURE: 62 MMHG

## 2021-02-15 VITALS — SYSTOLIC BLOOD PRESSURE: 108 MMHG | DIASTOLIC BLOOD PRESSURE: 51 MMHG

## 2021-02-15 VITALS — SYSTOLIC BLOOD PRESSURE: 133 MMHG | DIASTOLIC BLOOD PRESSURE: 61 MMHG

## 2021-02-15 VITALS — SYSTOLIC BLOOD PRESSURE: 135 MMHG | DIASTOLIC BLOOD PRESSURE: 71 MMHG

## 2021-02-15 VITALS — DIASTOLIC BLOOD PRESSURE: 78 MMHG | SYSTOLIC BLOOD PRESSURE: 155 MMHG

## 2021-02-15 VITALS — DIASTOLIC BLOOD PRESSURE: 78 MMHG | SYSTOLIC BLOOD PRESSURE: 154 MMHG

## 2021-02-15 VITALS — SYSTOLIC BLOOD PRESSURE: 118 MMHG | DIASTOLIC BLOOD PRESSURE: 56 MMHG

## 2021-02-15 VITALS — SYSTOLIC BLOOD PRESSURE: 146 MMHG | DIASTOLIC BLOOD PRESSURE: 73 MMHG

## 2021-02-15 VITALS — SYSTOLIC BLOOD PRESSURE: 158 MMHG | DIASTOLIC BLOOD PRESSURE: 76 MMHG

## 2021-02-15 VITALS — SYSTOLIC BLOOD PRESSURE: 140 MMHG | DIASTOLIC BLOOD PRESSURE: 73 MMHG

## 2021-02-15 VITALS — SYSTOLIC BLOOD PRESSURE: 113 MMHG | DIASTOLIC BLOOD PRESSURE: 50 MMHG

## 2021-02-15 VITALS — DIASTOLIC BLOOD PRESSURE: 65 MMHG | SYSTOLIC BLOOD PRESSURE: 135 MMHG

## 2021-02-15 VITALS — SYSTOLIC BLOOD PRESSURE: 128 MMHG | DIASTOLIC BLOOD PRESSURE: 64 MMHG

## 2021-02-15 VITALS — SYSTOLIC BLOOD PRESSURE: 149 MMHG | DIASTOLIC BLOOD PRESSURE: 72 MMHG

## 2021-02-15 VITALS — SYSTOLIC BLOOD PRESSURE: 120 MMHG | DIASTOLIC BLOOD PRESSURE: 51 MMHG

## 2021-02-15 VITALS — SYSTOLIC BLOOD PRESSURE: 123 MMHG | DIASTOLIC BLOOD PRESSURE: 57 MMHG

## 2021-02-15 VITALS — DIASTOLIC BLOOD PRESSURE: 72 MMHG | SYSTOLIC BLOOD PRESSURE: 141 MMHG

## 2021-02-15 VITALS — SYSTOLIC BLOOD PRESSURE: 115 MMHG | DIASTOLIC BLOOD PRESSURE: 56 MMHG

## 2021-02-15 VITALS — SYSTOLIC BLOOD PRESSURE: 130 MMHG | DIASTOLIC BLOOD PRESSURE: 73 MMHG

## 2021-02-15 VITALS — DIASTOLIC BLOOD PRESSURE: 54 MMHG | SYSTOLIC BLOOD PRESSURE: 121 MMHG

## 2021-02-15 VITALS — SYSTOLIC BLOOD PRESSURE: 146 MMHG | DIASTOLIC BLOOD PRESSURE: 71 MMHG

## 2021-02-15 VITALS — SYSTOLIC BLOOD PRESSURE: 83 MMHG | DIASTOLIC BLOOD PRESSURE: 43 MMHG

## 2021-02-15 VITALS — SYSTOLIC BLOOD PRESSURE: 137 MMHG | DIASTOLIC BLOOD PRESSURE: 71 MMHG

## 2021-02-15 LAB
BASOPHILS # BLD AUTO: 0.3 /CMM (ref 0–0.2)
BASOPHILS NFR BLD AUTO: 1.8 % (ref 0–2)
BUN SERPL-MCNC: 66 MG/DL (ref 7–18)
CALCIUM SERPL-MCNC: 9 MG/DL (ref 8.5–10.1)
CHLORIDE SERPL-SCNC: 99 MMOL/L (ref 98–107)
CO2 SERPL-SCNC: 27 MMOL/L (ref 21–32)
CREAT SERPL-MCNC: 2.9 MG/DL (ref 0.6–1.3)
EOSINOPHIL NFR BLD AUTO: 2 % (ref 0–6)
GLUCOSE SERPL-MCNC: 156 MG/DL (ref 74–106)
HCT VFR BLD AUTO: 27 % (ref 33–45)
HGB BLD-MCNC: 8.6 G/DL (ref 11.5–14.8)
LYMPHOCYTES NFR BLD AUTO: 0.8 /CMM (ref 0.8–4.8)
LYMPHOCYTES NFR BLD AUTO: 4.6 % (ref 20–44)
MCHC RBC AUTO-ENTMCNC: 32 G/DL (ref 31–36)
MCV RBC AUTO: 92 FL (ref 82–100)
MONOCYTES NFR BLD AUTO: 1.9 /CMM (ref 0.1–1.3)
MONOCYTES NFR BLD AUTO: 10.9 % (ref 2–12)
NEUTROPHILS # BLD AUTO: 13.9 /CMM (ref 1.8–8.9)
NEUTROPHILS NFR BLD AUTO: 80.7 % (ref 43–81)
PLATELET # BLD AUTO: 484 /CMM (ref 150–450)
POTASSIUM SERPL-SCNC: 6 MMOL/L (ref 3.5–5.1)
RBC # BLD AUTO: 2.91 MIL/UL (ref 4–5.2)
SODIUM SERPL-SCNC: 133 MMOL/L (ref 136–145)
WBC NRBC COR # BLD AUTO: 17.2 K/UL (ref 4.3–11)

## 2021-02-15 RX ADMIN — SODIUM CHLORIDE SCH MG: 9 INJECTION, SOLUTION INTRAVENOUS at 17:20

## 2021-02-15 RX ADMIN — MISOPROSTOL SCH MCG: 100 TABLET ORAL at 09:12

## 2021-02-15 RX ADMIN — Medication SCH MG: at 14:37

## 2021-02-15 RX ADMIN — Medication PRN MLS/HR: at 12:12

## 2021-02-15 RX ADMIN — Medication SCH EACH: at 05:53

## 2021-02-15 RX ADMIN — HEPARIN SODIUM SCH UNITS: 5000 INJECTION INTRAVENOUS; SUBCUTANEOUS at 09:11

## 2021-02-15 RX ADMIN — MISOPROSTOL SCH MCG: 100 TABLET ORAL at 17:20

## 2021-02-15 RX ADMIN — FAMOTIDINE SCH MG: 10 INJECTION INTRAVENOUS at 09:12

## 2021-02-15 RX ADMIN — Medication SCH EACH: at 00:56

## 2021-02-15 RX ADMIN — ACETYLCYSTEINE SCH MG: 100 INHALANT RESPIRATORY (INHALATION) at 08:10

## 2021-02-15 RX ADMIN — Medication SCH MG: at 08:09

## 2021-02-15 RX ADMIN — ACETYLCYSTEINE SCH MG: 100 INHALANT RESPIRATORY (INHALATION) at 23:38

## 2021-02-15 RX ADMIN — CEFEPIME HYDROCHLORIDE SCH MLS/HR: 1 INJECTION, POWDER, FOR SOLUTION INTRAMUSCULAR; INTRAVENOUS at 12:11

## 2021-02-15 RX ADMIN — MISOPROSTOL SCH MCG: 100 TABLET ORAL at 12:11

## 2021-02-15 RX ADMIN — MUPIROCIN SCH APPLIC: 20 OINTMENT TOPICAL at 09:12

## 2021-02-15 RX ADMIN — SODIUM CHLORIDE SCH MG: 9 INJECTION, SOLUTION INTRAVENOUS at 03:15

## 2021-02-15 RX ADMIN — INSULIN HUMAN PRN UNIT: 100 INJECTION, SOLUTION PARENTERAL at 05:55

## 2021-02-15 RX ADMIN — PROPOFOL PRN MLS/HR: 10 INJECTION, EMULSION INTRAVENOUS at 21:15

## 2021-02-15 RX ADMIN — LORAZEPAM PRN MG: 2 INJECTION INTRAMUSCULAR; INTRAVENOUS at 03:35

## 2021-02-15 RX ADMIN — PROPOFOL PRN MLS/HR: 10 INJECTION, EMULSION INTRAVENOUS at 11:41

## 2021-02-15 RX ADMIN — Medication SCH MG: at 19:53

## 2021-02-15 RX ADMIN — INSULIN HUMAN PRN UNIT: 100 INJECTION, SOLUTION PARENTERAL at 17:22

## 2021-02-15 RX ADMIN — Medication SCH EACH: at 12:11

## 2021-02-15 RX ADMIN — HEPARIN SODIUM SCH UNITS: 5000 INJECTION INTRAVENOUS; SUBCUTANEOUS at 22:33

## 2021-02-15 RX ADMIN — MISOPROSTOL SCH MCG: 100 TABLET ORAL at 22:32

## 2021-02-15 RX ADMIN — Medication SCH EACH: at 17:21

## 2021-02-15 RX ADMIN — PROPOFOL PRN MLS/HR: 10 INJECTION, EMULSION INTRAVENOUS at 16:22

## 2021-02-15 RX ADMIN — Medication PRN ML: at 03:36

## 2021-02-15 RX ADMIN — Medication SCH GM: at 17:20

## 2021-02-15 RX ADMIN — Medication SCH OZ: at 09:13

## 2021-02-15 RX ADMIN — Medication PRN MLS/HR: at 13:31

## 2021-02-15 RX ADMIN — Medication SCH MG: at 01:46

## 2021-02-15 RX ADMIN — Medication SCH OZ: at 22:32

## 2021-02-15 RX ADMIN — SODIUM CHLORIDE SCH MG: 9 INJECTION, SOLUTION INTRAVENOUS at 09:13

## 2021-02-15 RX ADMIN — MUPIROCIN SCH APPLIC: 20 OINTMENT TOPICAL at 22:32

## 2021-02-15 RX ADMIN — ACETYLCYSTEINE SCH MG: 100 INHALANT RESPIRATORY (INHALATION) at 14:37

## 2021-02-15 RX ADMIN — Medication SCH GM: at 09:13

## 2021-02-15 NOTE — NUR
ICU OPENING NOTE



PATIENT CURRENTLY IN BED, EYES OPENING BUT NONVERBAL. NO S/S OF RESPIRATORY DISTRESS AT THIS 
TIME. PATIENT CURRENTLY INTUBATED WITH ET TUBE; VENT SETTINGS AS PRESCRIBED. TELE MONITOR 
SHOWING SINUS TACHYCARDIA, HEART RATE 134. PATIENT HAS AN NG TUBE ON RIGHT NARES RUNNING 
GLUCERNA 1.2 AT 45 ML/HR, TOLERATED WELL. PATIENT HAS A R FEMORAL TLC AND R INTERNAL JUGULAR 
HD CATH. NO REDNESS OR SWELLING AT THIS TIME. R FEMORAL TLC FLUSHING WELL. CURRENTLY RUNNING 
NS TKO AT 10ML/HR & PROPOFOL 15. PATIENT HAS A ASHBY CATHETER WITH MINIMAL URINE OUTPUT AND 
A FLEXI-SEAL RECTAL TUBE. SAFETY MEASURES IN PLACE PER HOSPITAL POLICY. BED LOCKED IN LOWEST 
POSITION, CALL LIGHT WITHIN REACH, SIDERAILS UP X2. WILL CONTINUE TO MONITOR AND PROVIDE 
CARE.

## 2021-02-15 NOTE — NUR
patient Hr slowly increasing. baseline HR is 115. Currently baseline is at 135. WIll 
continue to monitor.

## 2021-02-15 NOTE — NUR
HR Continues to elevate. HR at 140



6095" Paged on call DR. Coreas. Notified about patients status. Wants to begin propofol.

## 2021-02-16 VITALS — DIASTOLIC BLOOD PRESSURE: 68 MMHG | SYSTOLIC BLOOD PRESSURE: 148 MMHG

## 2021-02-16 VITALS — SYSTOLIC BLOOD PRESSURE: 147 MMHG | DIASTOLIC BLOOD PRESSURE: 79 MMHG

## 2021-02-16 VITALS — SYSTOLIC BLOOD PRESSURE: 97 MMHG | DIASTOLIC BLOOD PRESSURE: 46 MMHG

## 2021-02-16 VITALS — SYSTOLIC BLOOD PRESSURE: 115 MMHG | DIASTOLIC BLOOD PRESSURE: 56 MMHG

## 2021-02-16 VITALS — SYSTOLIC BLOOD PRESSURE: 113 MMHG | DIASTOLIC BLOOD PRESSURE: 58 MMHG

## 2021-02-16 VITALS — DIASTOLIC BLOOD PRESSURE: 55 MMHG | SYSTOLIC BLOOD PRESSURE: 112 MMHG

## 2021-02-16 VITALS — SYSTOLIC BLOOD PRESSURE: 116 MMHG | DIASTOLIC BLOOD PRESSURE: 57 MMHG

## 2021-02-16 VITALS — DIASTOLIC BLOOD PRESSURE: 60 MMHG | SYSTOLIC BLOOD PRESSURE: 120 MMHG

## 2021-02-16 VITALS — SYSTOLIC BLOOD PRESSURE: 103 MMHG | DIASTOLIC BLOOD PRESSURE: 58 MMHG

## 2021-02-16 VITALS — SYSTOLIC BLOOD PRESSURE: 97 MMHG | DIASTOLIC BLOOD PRESSURE: 45 MMHG

## 2021-02-16 VITALS — SYSTOLIC BLOOD PRESSURE: 128 MMHG | DIASTOLIC BLOOD PRESSURE: 65 MMHG

## 2021-02-16 VITALS — SYSTOLIC BLOOD PRESSURE: 143 MMHG | DIASTOLIC BLOOD PRESSURE: 73 MMHG

## 2021-02-16 VITALS — SYSTOLIC BLOOD PRESSURE: 140 MMHG | DIASTOLIC BLOOD PRESSURE: 66 MMHG

## 2021-02-16 VITALS — SYSTOLIC BLOOD PRESSURE: 112 MMHG | DIASTOLIC BLOOD PRESSURE: 57 MMHG

## 2021-02-16 VITALS — DIASTOLIC BLOOD PRESSURE: 70 MMHG | SYSTOLIC BLOOD PRESSURE: 134 MMHG

## 2021-02-16 VITALS — DIASTOLIC BLOOD PRESSURE: 55 MMHG | SYSTOLIC BLOOD PRESSURE: 108 MMHG

## 2021-02-16 VITALS — DIASTOLIC BLOOD PRESSURE: 59 MMHG | SYSTOLIC BLOOD PRESSURE: 127 MMHG

## 2021-02-16 VITALS — DIASTOLIC BLOOD PRESSURE: 57 MMHG | SYSTOLIC BLOOD PRESSURE: 116 MMHG

## 2021-02-16 VITALS — SYSTOLIC BLOOD PRESSURE: 134 MMHG | DIASTOLIC BLOOD PRESSURE: 67 MMHG

## 2021-02-16 VITALS — DIASTOLIC BLOOD PRESSURE: 62 MMHG | SYSTOLIC BLOOD PRESSURE: 140 MMHG

## 2021-02-16 VITALS — SYSTOLIC BLOOD PRESSURE: 174 MMHG | DIASTOLIC BLOOD PRESSURE: 80 MMHG

## 2021-02-16 VITALS — DIASTOLIC BLOOD PRESSURE: 59 MMHG | SYSTOLIC BLOOD PRESSURE: 112 MMHG

## 2021-02-16 VITALS — DIASTOLIC BLOOD PRESSURE: 59 MMHG | SYSTOLIC BLOOD PRESSURE: 122 MMHG

## 2021-02-16 VITALS — DIASTOLIC BLOOD PRESSURE: 53 MMHG | SYSTOLIC BLOOD PRESSURE: 103 MMHG

## 2021-02-16 VITALS — SYSTOLIC BLOOD PRESSURE: 152 MMHG | DIASTOLIC BLOOD PRESSURE: 71 MMHG

## 2021-02-16 VITALS — DIASTOLIC BLOOD PRESSURE: 57 MMHG | SYSTOLIC BLOOD PRESSURE: 112 MMHG

## 2021-02-16 VITALS — SYSTOLIC BLOOD PRESSURE: 137 MMHG | DIASTOLIC BLOOD PRESSURE: 64 MMHG

## 2021-02-16 VITALS — SYSTOLIC BLOOD PRESSURE: 105 MMHG | DIASTOLIC BLOOD PRESSURE: 54 MMHG

## 2021-02-16 VITALS — SYSTOLIC BLOOD PRESSURE: 116 MMHG | DIASTOLIC BLOOD PRESSURE: 59 MMHG

## 2021-02-16 VITALS — DIASTOLIC BLOOD PRESSURE: 63 MMHG | SYSTOLIC BLOOD PRESSURE: 128 MMHG

## 2021-02-16 VITALS — DIASTOLIC BLOOD PRESSURE: 54 MMHG | SYSTOLIC BLOOD PRESSURE: 120 MMHG

## 2021-02-16 VITALS — DIASTOLIC BLOOD PRESSURE: 51 MMHG | SYSTOLIC BLOOD PRESSURE: 108 MMHG

## 2021-02-16 VITALS — DIASTOLIC BLOOD PRESSURE: 67 MMHG | SYSTOLIC BLOOD PRESSURE: 149 MMHG

## 2021-02-16 VITALS — SYSTOLIC BLOOD PRESSURE: 169 MMHG | DIASTOLIC BLOOD PRESSURE: 80 MMHG

## 2021-02-16 VITALS — DIASTOLIC BLOOD PRESSURE: 46 MMHG | SYSTOLIC BLOOD PRESSURE: 86 MMHG

## 2021-02-16 VITALS — DIASTOLIC BLOOD PRESSURE: 65 MMHG | SYSTOLIC BLOOD PRESSURE: 130 MMHG

## 2021-02-16 VITALS — DIASTOLIC BLOOD PRESSURE: 50 MMHG | SYSTOLIC BLOOD PRESSURE: 106 MMHG

## 2021-02-16 VITALS — SYSTOLIC BLOOD PRESSURE: 116 MMHG | DIASTOLIC BLOOD PRESSURE: 61 MMHG

## 2021-02-16 VITALS — DIASTOLIC BLOOD PRESSURE: 70 MMHG | SYSTOLIC BLOOD PRESSURE: 142 MMHG

## 2021-02-16 VITALS — SYSTOLIC BLOOD PRESSURE: 103 MMHG | DIASTOLIC BLOOD PRESSURE: 51 MMHG

## 2021-02-16 VITALS — DIASTOLIC BLOOD PRESSURE: 56 MMHG | SYSTOLIC BLOOD PRESSURE: 125 MMHG

## 2021-02-16 VITALS — DIASTOLIC BLOOD PRESSURE: 50 MMHG | SYSTOLIC BLOOD PRESSURE: 107 MMHG

## 2021-02-16 VITALS — SYSTOLIC BLOOD PRESSURE: 102 MMHG | DIASTOLIC BLOOD PRESSURE: 51 MMHG

## 2021-02-16 VITALS — DIASTOLIC BLOOD PRESSURE: 52 MMHG | SYSTOLIC BLOOD PRESSURE: 106 MMHG

## 2021-02-16 VITALS — SYSTOLIC BLOOD PRESSURE: 129 MMHG | DIASTOLIC BLOOD PRESSURE: 56 MMHG

## 2021-02-16 VITALS — SYSTOLIC BLOOD PRESSURE: 139 MMHG | DIASTOLIC BLOOD PRESSURE: 68 MMHG

## 2021-02-16 VITALS — SYSTOLIC BLOOD PRESSURE: 117 MMHG | DIASTOLIC BLOOD PRESSURE: 59 MMHG

## 2021-02-16 VITALS — DIASTOLIC BLOOD PRESSURE: 51 MMHG | SYSTOLIC BLOOD PRESSURE: 107 MMHG

## 2021-02-16 VITALS — SYSTOLIC BLOOD PRESSURE: 118 MMHG | DIASTOLIC BLOOD PRESSURE: 57 MMHG

## 2021-02-16 VITALS — DIASTOLIC BLOOD PRESSURE: 75 MMHG | SYSTOLIC BLOOD PRESSURE: 145 MMHG

## 2021-02-16 LAB
BASOPHILS # BLD AUTO: 0.1 /CMM (ref 0–0.2)
BASOPHILS # BLD AUTO: 0.2 /CMM (ref 0–0.2)
BASOPHILS NFR BLD AUTO: 0.6 % (ref 0–2)
BASOPHILS NFR BLD AUTO: 1 % (ref 0–2)
BUN SERPL-MCNC: 49 MG/DL (ref 7–18)
CALCIUM SERPL-MCNC: 8.2 MG/DL (ref 8.5–10.1)
CHLORIDE SERPL-SCNC: 97 MMOL/L (ref 98–107)
CO2 SERPL-SCNC: 28 MMOL/L (ref 21–32)
CREAT SERPL-MCNC: 2.2 MG/DL (ref 0.6–1.3)
EOSINOPHIL NFR BLD AUTO: 1.7 % (ref 0–6)
EOSINOPHIL NFR BLD AUTO: 2 % (ref 0–6)
EOSINOPHIL NFR BLD MANUAL: 2 % (ref 0–4)
EOSINOPHIL NFR BLD MANUAL: 3 % (ref 0–4)
GLUCOSE SERPL-MCNC: 106 MG/DL (ref 74–106)
HCT VFR BLD AUTO: 22 % (ref 33–45)
HCT VFR BLD AUTO: 23 % (ref 33–45)
HGB BLD-MCNC: 7 G/DL (ref 11.5–14.8)
HGB BLD-MCNC: 7.3 G/DL (ref 11.5–14.8)
LYMPHOCYTES NFR BLD AUTO: 0.7 /CMM (ref 0.8–4.8)
LYMPHOCYTES NFR BLD AUTO: 0.9 /CMM (ref 0.8–4.8)
LYMPHOCYTES NFR BLD AUTO: 3.9 % (ref 20–44)
LYMPHOCYTES NFR BLD AUTO: 5.1 % (ref 20–44)
LYMPHOCYTES NFR BLD MANUAL: 5 % (ref 16–48)
LYMPHOCYTES NFR BLD MANUAL: 5 % (ref 16–48)
MCHC RBC AUTO-ENTMCNC: 32 G/DL (ref 31–36)
MCHC RBC AUTO-ENTMCNC: 32 G/DL (ref 31–36)
MCV RBC AUTO: 94 FL (ref 82–100)
MCV RBC AUTO: 94 FL (ref 82–100)
METAMYELOCYTES NFR BLD MANUAL: 1 % (ref 0–0)
METAMYELOCYTES NFR BLD MANUAL: 1 % (ref 0–0)
MONOCYTES NFR BLD AUTO: 1.8 /CMM (ref 0.1–1.3)
MONOCYTES NFR BLD AUTO: 1.9 /CMM (ref 0.1–1.3)
MONOCYTES NFR BLD AUTO: 11.4 % (ref 2–12)
MONOCYTES NFR BLD AUTO: 9.3 % (ref 2–12)
MONOCYTES NFR BLD MANUAL: 13 % (ref 0–11)
MONOCYTES NFR BLD MANUAL: 5 % (ref 0–11)
MYELOCYTES NFR BLD MANUAL: 1 % (ref 0–0)
MYELOCYTES NFR BLD MANUAL: 2 % (ref 0–0)
NEUTROPHILS # BLD AUTO: 13.8 /CMM (ref 1.8–8.9)
NEUTROPHILS # BLD AUTO: 16.1 /CMM (ref 1.8–8.9)
NEUTROPHILS NFR BLD AUTO: 80.8 % (ref 43–81)
NEUTROPHILS NFR BLD AUTO: 84.2 % (ref 43–81)
NEUTS BAND NFR BLD MANUAL: 2 % (ref 0–5)
NEUTS BAND NFR BLD MANUAL: 5 % (ref 0–5)
NEUTS SEG NFR BLD MANUAL: 76 % (ref 42–76)
NEUTS SEG NFR BLD MANUAL: 79 % (ref 42–76)
PLATELET # BLD AUTO: 410 /CMM (ref 150–450)
PLATELET # BLD AUTO: 448 /CMM (ref 150–450)
POTASSIUM SERPL-SCNC: 4.9 MMOL/L (ref 3.5–5.1)
RBC # BLD AUTO: 2.34 MIL/UL (ref 4–5.2)
RBC # BLD AUTO: 2.41 MIL/UL (ref 4–5.2)
SODIUM SERPL-SCNC: 131 MMOL/L (ref 136–145)
WBC NRBC COR # BLD AUTO: 17 K/UL (ref 4.3–11)
WBC NRBC COR # BLD AUTO: 19.1 K/UL (ref 4.3–11)

## 2021-02-16 PROCEDURE — 0B113F4 BYPASS TRACHEA TO CUTANEOUS WITH TRACHEOSTOMY DEVICE, PERCUTANEOUS APPROACH: ICD-10-PCS | Performed by: THORACIC SURGERY (CARDIOTHORACIC VASCULAR SURGERY)

## 2021-02-16 PROCEDURE — 0BJ08ZZ INSPECTION OF TRACHEOBRONCHIAL TREE, VIA NATURAL OR ARTIFICIAL OPENING ENDOSCOPIC: ICD-10-PCS

## 2021-02-16 RX ADMIN — Medication SCH EACH: at 11:32

## 2021-02-16 RX ADMIN — SODIUM CHLORIDE SCH MG: 9 INJECTION, SOLUTION INTRAVENOUS at 17:40

## 2021-02-16 RX ADMIN — Medication SCH MG: at 20:11

## 2021-02-16 RX ADMIN — SODIUM CHLORIDE SCH MG: 9 INJECTION, SOLUTION INTRAVENOUS at 09:17

## 2021-02-16 RX ADMIN — Medication SCH OZ: at 21:58

## 2021-02-16 RX ADMIN — Medication SCH MG: at 01:27

## 2021-02-16 RX ADMIN — HEPARIN SODIUM SCH UNITS: 5000 INJECTION INTRAVENOUS; SUBCUTANEOUS at 21:56

## 2021-02-16 RX ADMIN — PROPOFOL PRN MLS/HR: 10 INJECTION, EMULSION INTRAVENOUS at 05:12

## 2021-02-16 RX ADMIN — Medication SCH GM: at 08:27

## 2021-02-16 RX ADMIN — Medication SCH MG: at 09:58

## 2021-02-16 RX ADMIN — FAMOTIDINE SCH MG: 10 INJECTION INTRAVENOUS at 08:26

## 2021-02-16 RX ADMIN — Medication SCH MG: at 14:11

## 2021-02-16 RX ADMIN — PROPOFOL PRN MLS/HR: 10 INJECTION, EMULSION INTRAVENOUS at 10:16

## 2021-02-16 RX ADMIN — HEPARIN SODIUM SCH UNITS: 5000 INJECTION INTRAVENOUS; SUBCUTANEOUS at 09:00

## 2021-02-16 RX ADMIN — Medication PRN ML: at 02:40

## 2021-02-16 RX ADMIN — ACETYLCYSTEINE SCH MG: 100 INHALANT RESPIRATORY (INHALATION) at 09:59

## 2021-02-16 RX ADMIN — PROPOFOL PRN MLS/HR: 10 INJECTION, EMULSION INTRAVENOUS at 14:49

## 2021-02-16 RX ADMIN — MISOPROSTOL SCH MCG: 100 TABLET ORAL at 13:00

## 2021-02-16 RX ADMIN — MISOPROSTOL SCH MCG: 100 TABLET ORAL at 21:56

## 2021-02-16 RX ADMIN — SODIUM CHLORIDE PRN MLS/HR: 9 INJECTION, SOLUTION INTRAVENOUS at 06:41

## 2021-02-16 RX ADMIN — Medication SCH EACH: at 18:32

## 2021-02-16 RX ADMIN — MISOPROSTOL SCH MCG: 100 TABLET ORAL at 08:26

## 2021-02-16 RX ADMIN — Medication SCH GM: at 17:32

## 2021-02-16 RX ADMIN — ACETYLCYSTEINE SCH MG: 100 INHALANT RESPIRATORY (INHALATION) at 14:12

## 2021-02-16 RX ADMIN — Medication SCH OZ: at 08:27

## 2021-02-16 RX ADMIN — CEFEPIME HYDROCHLORIDE SCH MLS/HR: 1 INJECTION, POWDER, FOR SOLUTION INTRAMUSCULAR; INTRAVENOUS at 11:14

## 2021-02-16 RX ADMIN — MUPIROCIN SCH APPLIC: 20 OINTMENT TOPICAL at 08:26

## 2021-02-16 RX ADMIN — PROPOFOL PRN MLS/HR: 10 INJECTION, EMULSION INTRAVENOUS at 01:05

## 2021-02-16 RX ADMIN — PROPOFOL PRN MLS/HR: 10 INJECTION, EMULSION INTRAVENOUS at 18:40

## 2021-02-16 RX ADMIN — MISOPROSTOL SCH MCG: 100 TABLET ORAL at 17:31

## 2021-02-16 RX ADMIN — Medication SCH EACH: at 05:51

## 2021-02-16 RX ADMIN — Medication SCH EACH: at 00:13

## 2021-02-16 RX ADMIN — SODIUM CHLORIDE SCH MG: 9 INJECTION, SOLUTION INTRAVENOUS at 01:33

## 2021-02-16 RX ADMIN — MUPIROCIN SCH APPLIC: 20 OINTMENT TOPICAL at 21:58

## 2021-02-16 NOTE — NUR
ICU NOTES



STARTED BLOOD TRANSFUSION WITH 1 UNIT PRBC, CONSENT SIGNED, VSS, WILL CONTINUE TO MONITOR.

## 2021-02-16 NOTE — NUR
RN NOTES



HGB OF 7, DR. STAHL NOTIFIED WITH ORDER TO GIVE 1 UNIT PRBC, ORDERS MADE AND CARRIED OUT, 
WILL CONTINUE TO MONITOR.

## 2021-02-16 NOTE — NUR
ICU NOTE



RECEIVED PATIENT IN BED, EYES OPENING BUT NONVERBAL. NO S/S OF RESPIRATORY DISTRESS AT THIS 
TIME. PATIENT CURRENTLY INTUBATED WITH ET TUBE; VENT SETTINGS AS PRESCRIBED. TELE MONITOR 
SHOWING SR. PATIENT HAS AN NG TUBE ON RIGHT NARES RUNNING GLUCERNA 1.2 AT 45 ML/HR, 
TOLERATED WELL. PATIENT HAS A R FEMORAL TLC AND R INTERNAL JUGULAR HD CATH. NO REDNESS OR 
SWELLING AT THIS TIME. R FEMORAL TLC FLUSHING WELL. CURRENTLY RUNNING NS TKO AT 10ML/HR & 
PROPOFOL 65. PATIENT HAS A ASHBY CATHETER WITH MINIMAL URINE OUTPUT AND A FLEXI-SEAL RECTAL 
TUBE. SAFETY MEASURES IN PLACE. BED LOCKED IN LOWEST POSITION, CALL LIGHT WITHIN REACH, 
SIDERAILS UP X2. WILL CONTINUE TO MONITOR.

## 2021-02-16 NOTE — NUR
Received patient on the ventilator ,S/P Tracheostomy today, on AC mode, sedated on Propofol 
drip,RASS-2 ,opens eyes to pain,+ cough ,+ gag.Breathing regular and non labored. With on 
going PRBC transfusion ,no S/S of any transfusion reaction so far.+ generalized,weeping 
edema.Tube feeding on going via NGT, tolerating well .For possible Peg tube insertion in am.

## 2021-02-16 NOTE — NUR
ICU  NOTES



SURGEONS ON UNIT TO DO TRACHEOSTOMY AT BEDSIDE,  CONSENT SIGNED, DR ROLDAN ORDERED 50 MG OF 
ROCORONIUM, ORDERS MADE AND CARRIED OUT, WILL CONTINUE TO MONITOR.

## 2021-02-16 NOTE — NUR
ICU NOTE



PATIENT IN BED, EYES OPENING BUT NONVERBAL. NO S/S OF RESPIRATORY DISTRESS AT THIS TIME. S/P 
PLACEMENT OF TRACH, VENT SETTINGS AS PRESCRIBED. TELE MONITOR SHOWING SR. PATIENT HAS AN NG 
TUBE ON RIGHT NARES RUNNING GLUCERNA 1.2 AT 45 ML/HR, TOLERATED WELL. PATIENT HAS A R 
FEMORAL TLC AND R INTERNAL JUGULAR HD CATH. NO REDNESS OR SWELLING AT THIS TIME. R FEMORAL 
TLC FLUSHING WELL. CURRENTLY RUNNING NS TKO AT 10ML/HR & PROPOFOL 65, ALSO CURRENTLY 
TRANSFUSING BLOOD @120ML/HR, PATIENT HAS A ASHBY CATHETER WITH MINIMAL URINE OUTPUT AND A 
FLEXI-SEAL RECTAL TUBE. SAFETY MEASURES IN PLACE. BED LOCKED IN LOWEST POSITION, CALL LIGHT 
WITHIN REACH, SIDERAILS UP X2. ENDORSED TO NIGHT SHIFT NURSE FOR DASHAWN.

## 2021-02-16 NOTE — NUR
ICU NOTE



SPOKE TO DR. ROLDAN, PER MD HE WILL DO TRACHEOSTOMY LATER THIS AFTERNOON, ORDER TO STOP TF, 
CONSENT SIGNED, WILL CONTINUE TO MONITOR.

## 2021-02-16 NOTE — NUR
RN NOTES



HGB OF 7.3, DR. STAHL NOTIFIED WITH ORDERS TO HOLD HEPARIN SQ AND TO REPEAT CBC AT 12, WILL 
CONTINUE TO MONITOR.

## 2021-02-17 VITALS — DIASTOLIC BLOOD PRESSURE: 53 MMHG | SYSTOLIC BLOOD PRESSURE: 119 MMHG

## 2021-02-17 VITALS — DIASTOLIC BLOOD PRESSURE: 46 MMHG | SYSTOLIC BLOOD PRESSURE: 90 MMHG

## 2021-02-17 VITALS — SYSTOLIC BLOOD PRESSURE: 127 MMHG | DIASTOLIC BLOOD PRESSURE: 60 MMHG

## 2021-02-17 VITALS — SYSTOLIC BLOOD PRESSURE: 155 MMHG | DIASTOLIC BLOOD PRESSURE: 101 MMHG

## 2021-02-17 VITALS — SYSTOLIC BLOOD PRESSURE: 88 MMHG | DIASTOLIC BLOOD PRESSURE: 43 MMHG

## 2021-02-17 VITALS — SYSTOLIC BLOOD PRESSURE: 167 MMHG | DIASTOLIC BLOOD PRESSURE: 86 MMHG

## 2021-02-17 VITALS — SYSTOLIC BLOOD PRESSURE: 159 MMHG | DIASTOLIC BLOOD PRESSURE: 74 MMHG

## 2021-02-17 VITALS — SYSTOLIC BLOOD PRESSURE: 117 MMHG | DIASTOLIC BLOOD PRESSURE: 62 MMHG

## 2021-02-17 VITALS — SYSTOLIC BLOOD PRESSURE: 166 MMHG | DIASTOLIC BLOOD PRESSURE: 75 MMHG

## 2021-02-17 VITALS — SYSTOLIC BLOOD PRESSURE: 126 MMHG | DIASTOLIC BLOOD PRESSURE: 54 MMHG

## 2021-02-17 VITALS — DIASTOLIC BLOOD PRESSURE: 81 MMHG | SYSTOLIC BLOOD PRESSURE: 164 MMHG

## 2021-02-17 VITALS — SYSTOLIC BLOOD PRESSURE: 122 MMHG | DIASTOLIC BLOOD PRESSURE: 53 MMHG

## 2021-02-17 VITALS — SYSTOLIC BLOOD PRESSURE: 99 MMHG | DIASTOLIC BLOOD PRESSURE: 51 MMHG

## 2021-02-17 VITALS — SYSTOLIC BLOOD PRESSURE: 160 MMHG | DIASTOLIC BLOOD PRESSURE: 77 MMHG

## 2021-02-17 VITALS — SYSTOLIC BLOOD PRESSURE: 107 MMHG | DIASTOLIC BLOOD PRESSURE: 52 MMHG

## 2021-02-17 VITALS — SYSTOLIC BLOOD PRESSURE: 136 MMHG | DIASTOLIC BLOOD PRESSURE: 68 MMHG

## 2021-02-17 VITALS — SYSTOLIC BLOOD PRESSURE: 142 MMHG | DIASTOLIC BLOOD PRESSURE: 75 MMHG

## 2021-02-17 VITALS — DIASTOLIC BLOOD PRESSURE: 63 MMHG | SYSTOLIC BLOOD PRESSURE: 129 MMHG

## 2021-02-17 VITALS — DIASTOLIC BLOOD PRESSURE: 75 MMHG | SYSTOLIC BLOOD PRESSURE: 160 MMHG

## 2021-02-17 VITALS — DIASTOLIC BLOOD PRESSURE: 51 MMHG | SYSTOLIC BLOOD PRESSURE: 97 MMHG

## 2021-02-17 VITALS — DIASTOLIC BLOOD PRESSURE: 57 MMHG | SYSTOLIC BLOOD PRESSURE: 118 MMHG

## 2021-02-17 VITALS — SYSTOLIC BLOOD PRESSURE: 131 MMHG | DIASTOLIC BLOOD PRESSURE: 66 MMHG

## 2021-02-17 VITALS — DIASTOLIC BLOOD PRESSURE: 47 MMHG | SYSTOLIC BLOOD PRESSURE: 97 MMHG

## 2021-02-17 VITALS — DIASTOLIC BLOOD PRESSURE: 63 MMHG | SYSTOLIC BLOOD PRESSURE: 132 MMHG

## 2021-02-17 VITALS — SYSTOLIC BLOOD PRESSURE: 155 MMHG | DIASTOLIC BLOOD PRESSURE: 71 MMHG

## 2021-02-17 VITALS — SYSTOLIC BLOOD PRESSURE: 142 MMHG | DIASTOLIC BLOOD PRESSURE: 64 MMHG

## 2021-02-17 VITALS — SYSTOLIC BLOOD PRESSURE: 127 MMHG | DIASTOLIC BLOOD PRESSURE: 59 MMHG

## 2021-02-17 VITALS — DIASTOLIC BLOOD PRESSURE: 62 MMHG | SYSTOLIC BLOOD PRESSURE: 123 MMHG

## 2021-02-17 VITALS — SYSTOLIC BLOOD PRESSURE: 112 MMHG | DIASTOLIC BLOOD PRESSURE: 57 MMHG

## 2021-02-17 VITALS — SYSTOLIC BLOOD PRESSURE: 116 MMHG | DIASTOLIC BLOOD PRESSURE: 57 MMHG

## 2021-02-17 VITALS — DIASTOLIC BLOOD PRESSURE: 57 MMHG | SYSTOLIC BLOOD PRESSURE: 109 MMHG

## 2021-02-17 VITALS — SYSTOLIC BLOOD PRESSURE: 118 MMHG | DIASTOLIC BLOOD PRESSURE: 52 MMHG

## 2021-02-17 VITALS — SYSTOLIC BLOOD PRESSURE: 115 MMHG | DIASTOLIC BLOOD PRESSURE: 50 MMHG

## 2021-02-17 VITALS — SYSTOLIC BLOOD PRESSURE: 158 MMHG | DIASTOLIC BLOOD PRESSURE: 75 MMHG

## 2021-02-17 VITALS — DIASTOLIC BLOOD PRESSURE: 54 MMHG | SYSTOLIC BLOOD PRESSURE: 106 MMHG

## 2021-02-17 VITALS — SYSTOLIC BLOOD PRESSURE: 126 MMHG | DIASTOLIC BLOOD PRESSURE: 59 MMHG

## 2021-02-17 VITALS — SYSTOLIC BLOOD PRESSURE: 122 MMHG | DIASTOLIC BLOOD PRESSURE: 59 MMHG

## 2021-02-17 VITALS — SYSTOLIC BLOOD PRESSURE: 118 MMHG | DIASTOLIC BLOOD PRESSURE: 55 MMHG

## 2021-02-17 VITALS — SYSTOLIC BLOOD PRESSURE: 103 MMHG | DIASTOLIC BLOOD PRESSURE: 53 MMHG

## 2021-02-17 VITALS — DIASTOLIC BLOOD PRESSURE: 53 MMHG | SYSTOLIC BLOOD PRESSURE: 107 MMHG

## 2021-02-17 VITALS — SYSTOLIC BLOOD PRESSURE: 167 MMHG | DIASTOLIC BLOOD PRESSURE: 78 MMHG

## 2021-02-17 VITALS — DIASTOLIC BLOOD PRESSURE: 59 MMHG | SYSTOLIC BLOOD PRESSURE: 128 MMHG

## 2021-02-17 VITALS — DIASTOLIC BLOOD PRESSURE: 52 MMHG | SYSTOLIC BLOOD PRESSURE: 110 MMHG

## 2021-02-17 VITALS — SYSTOLIC BLOOD PRESSURE: 100 MMHG | DIASTOLIC BLOOD PRESSURE: 49 MMHG

## 2021-02-17 VITALS — SYSTOLIC BLOOD PRESSURE: 115 MMHG | DIASTOLIC BLOOD PRESSURE: 53 MMHG

## 2021-02-17 VITALS — DIASTOLIC BLOOD PRESSURE: 90 MMHG | SYSTOLIC BLOOD PRESSURE: 176 MMHG

## 2021-02-17 VITALS — DIASTOLIC BLOOD PRESSURE: 56 MMHG | SYSTOLIC BLOOD PRESSURE: 120 MMHG

## 2021-02-17 VITALS — DIASTOLIC BLOOD PRESSURE: 47 MMHG | SYSTOLIC BLOOD PRESSURE: 91 MMHG

## 2021-02-17 VITALS — SYSTOLIC BLOOD PRESSURE: 132 MMHG | DIASTOLIC BLOOD PRESSURE: 62 MMHG

## 2021-02-17 VITALS — SYSTOLIC BLOOD PRESSURE: 116 MMHG | DIASTOLIC BLOOD PRESSURE: 61 MMHG

## 2021-02-17 VITALS — DIASTOLIC BLOOD PRESSURE: 58 MMHG | SYSTOLIC BLOOD PRESSURE: 117 MMHG

## 2021-02-17 VITALS — DIASTOLIC BLOOD PRESSURE: 56 MMHG | SYSTOLIC BLOOD PRESSURE: 121 MMHG

## 2021-02-17 VITALS — DIASTOLIC BLOOD PRESSURE: 65 MMHG | SYSTOLIC BLOOD PRESSURE: 144 MMHG

## 2021-02-17 VITALS — SYSTOLIC BLOOD PRESSURE: 112 MMHG | DIASTOLIC BLOOD PRESSURE: 56 MMHG

## 2021-02-17 VITALS — DIASTOLIC BLOOD PRESSURE: 75 MMHG | SYSTOLIC BLOOD PRESSURE: 144 MMHG

## 2021-02-17 VITALS — SYSTOLIC BLOOD PRESSURE: 109 MMHG | DIASTOLIC BLOOD PRESSURE: 54 MMHG

## 2021-02-17 VITALS — DIASTOLIC BLOOD PRESSURE: 71 MMHG | SYSTOLIC BLOOD PRESSURE: 148 MMHG

## 2021-02-17 VITALS — DIASTOLIC BLOOD PRESSURE: 45 MMHG | SYSTOLIC BLOOD PRESSURE: 89 MMHG

## 2021-02-17 VITALS — DIASTOLIC BLOOD PRESSURE: 62 MMHG | SYSTOLIC BLOOD PRESSURE: 113 MMHG

## 2021-02-17 VITALS — SYSTOLIC BLOOD PRESSURE: 102 MMHG | DIASTOLIC BLOOD PRESSURE: 52 MMHG

## 2021-02-17 VITALS — DIASTOLIC BLOOD PRESSURE: 52 MMHG | SYSTOLIC BLOOD PRESSURE: 97 MMHG

## 2021-02-17 VITALS — DIASTOLIC BLOOD PRESSURE: 67 MMHG | SYSTOLIC BLOOD PRESSURE: 138 MMHG

## 2021-02-17 VITALS — DIASTOLIC BLOOD PRESSURE: 59 MMHG | SYSTOLIC BLOOD PRESSURE: 122 MMHG

## 2021-02-17 VITALS — SYSTOLIC BLOOD PRESSURE: 122 MMHG | DIASTOLIC BLOOD PRESSURE: 61 MMHG

## 2021-02-17 VITALS — SYSTOLIC BLOOD PRESSURE: 127 MMHG | DIASTOLIC BLOOD PRESSURE: 64 MMHG

## 2021-02-17 VITALS — SYSTOLIC BLOOD PRESSURE: 158 MMHG | DIASTOLIC BLOOD PRESSURE: 77 MMHG

## 2021-02-17 VITALS — DIASTOLIC BLOOD PRESSURE: 58 MMHG | SYSTOLIC BLOOD PRESSURE: 121 MMHG

## 2021-02-17 LAB
BASOPHILS # BLD AUTO: 0.1 /CMM (ref 0–0.2)
BASOPHILS NFR BLD AUTO: 0.6 % (ref 0–2)
BUN SERPL-MCNC: 57 MG/DL (ref 7–18)
CALCIUM SERPL-MCNC: 8.2 MG/DL (ref 8.5–10.1)
CHLORIDE SERPL-SCNC: 96 MMOL/L (ref 98–107)
CO2 SERPL-SCNC: 24 MMOL/L (ref 21–32)
CREAT SERPL-MCNC: 2.4 MG/DL (ref 0.6–1.3)
EOSINOPHIL NFR BLD AUTO: 1.1 % (ref 0–6)
GLUCOSE SERPL-MCNC: 147 MG/DL (ref 74–106)
HCT VFR BLD AUTO: 30 % (ref 33–45)
HGB BLD-MCNC: 10.1 G/DL (ref 11.5–14.8)
LYMPHOCYTES NFR BLD AUTO: 0.7 /CMM (ref 0.8–4.8)
LYMPHOCYTES NFR BLD AUTO: 4.1 % (ref 20–44)
MCHC RBC AUTO-ENTMCNC: 33 G/DL (ref 31–36)
MCV RBC AUTO: 92 FL (ref 82–100)
MONOCYTES NFR BLD AUTO: 1.2 /CMM (ref 0.1–1.3)
MONOCYTES NFR BLD AUTO: 7 % (ref 2–12)
NEUTROPHILS # BLD AUTO: 15.4 /CMM (ref 1.8–8.9)
NEUTROPHILS NFR BLD AUTO: 87.2 % (ref 43–81)
PLATELET # BLD AUTO: 475 /CMM (ref 150–450)
POTASSIUM SERPL-SCNC: 5.1 MMOL/L (ref 3.5–5.1)
RBC # BLD AUTO: 3.31 MIL/UL (ref 4–5.2)
SODIUM SERPL-SCNC: 130 MMOL/L (ref 136–145)
WBC NRBC COR # BLD AUTO: 17.6 K/UL (ref 4.3–11)

## 2021-02-17 RX ADMIN — HEPARIN SODIUM SCH UNITS: 5000 INJECTION INTRAVENOUS; SUBCUTANEOUS at 09:24

## 2021-02-17 RX ADMIN — SODIUM CHLORIDE SCH MG: 9 INJECTION, SOLUTION INTRAVENOUS at 02:31

## 2021-02-17 RX ADMIN — Medication SCH EACH: at 12:49

## 2021-02-17 RX ADMIN — INSULIN HUMAN PRN UNIT: 100 INJECTION, SOLUTION PARENTERAL at 01:20

## 2021-02-17 RX ADMIN — PROPOFOL PRN MLS/HR: 10 INJECTION, EMULSION INTRAVENOUS at 01:10

## 2021-02-17 RX ADMIN — FAMOTIDINE SCH MG: 10 INJECTION INTRAVENOUS at 09:15

## 2021-02-17 RX ADMIN — ACETAMINOPHEN PRN MG: 160 SOLUTION ORAL at 18:25

## 2021-02-17 RX ADMIN — Medication SCH MG: at 13:45

## 2021-02-17 RX ADMIN — ACETYLCYSTEINE SCH MG: 100 INHALANT RESPIRATORY (INHALATION) at 00:15

## 2021-02-17 RX ADMIN — MISOPROSTOL SCH MCG: 100 TABLET ORAL at 12:49

## 2021-02-17 RX ADMIN — PROPOFOL PRN MLS/HR: 10 INJECTION, EMULSION INTRAVENOUS at 04:51

## 2021-02-17 RX ADMIN — Medication SCH EACH: at 06:15

## 2021-02-17 RX ADMIN — Medication SCH EACH: at 01:18

## 2021-02-17 RX ADMIN — ACETYLCYSTEINE SCH MG: 100 INHALANT RESPIRATORY (INHALATION) at 23:20

## 2021-02-17 RX ADMIN — Medication SCH EACH: at 18:50

## 2021-02-17 RX ADMIN — SODIUM CHLORIDE SCH MG: 9 INJECTION, SOLUTION INTRAVENOUS at 09:42

## 2021-02-17 RX ADMIN — Medication SCH GM: at 17:17

## 2021-02-17 RX ADMIN — MISOPROSTOL SCH MCG: 100 TABLET ORAL at 09:15

## 2021-02-17 RX ADMIN — CEFEPIME HYDROCHLORIDE SCH MLS/HR: 1 INJECTION, POWDER, FOR SOLUTION INTRAMUSCULAR; INTRAVENOUS at 12:49

## 2021-02-17 RX ADMIN — ACETYLCYSTEINE SCH MG: 100 INHALANT RESPIRATORY (INHALATION) at 15:45

## 2021-02-17 RX ADMIN — PROPOFOL PRN MLS/HR: 10 INJECTION, EMULSION INTRAVENOUS at 09:22

## 2021-02-17 RX ADMIN — MISOPROSTOL SCH MCG: 100 TABLET ORAL at 21:47

## 2021-02-17 RX ADMIN — HEPARIN SODIUM SCH UNITS: 5000 INJECTION INTRAVENOUS; SUBCUTANEOUS at 21:48

## 2021-02-17 RX ADMIN — Medication SCH EACH: at 23:55

## 2021-02-17 RX ADMIN — ACETYLCYSTEINE SCH MG: 100 INHALANT RESPIRATORY (INHALATION) at 07:44

## 2021-02-17 RX ADMIN — Medication SCH OZ: at 09:23

## 2021-02-17 RX ADMIN — Medication SCH MG: at 01:47

## 2021-02-17 RX ADMIN — Medication SCH MG: at 07:44

## 2021-02-17 RX ADMIN — MUPIROCIN SCH APPLIC: 20 OINTMENT TOPICAL at 09:23

## 2021-02-17 RX ADMIN — Medication SCH MG: at 19:59

## 2021-02-17 RX ADMIN — Medication SCH OZ: at 21:41

## 2021-02-17 RX ADMIN — SODIUM CHLORIDE PRN MLS/HR: 9 INJECTION, SOLUTION INTRAVENOUS at 06:52

## 2021-02-17 RX ADMIN — SODIUM CHLORIDE SCH MG: 9 INJECTION, SOLUTION INTRAVENOUS at 18:19

## 2021-02-17 RX ADMIN — Medication SCH GM: at 09:23

## 2021-02-17 RX ADMIN — Medication PRN ML: at 12:01

## 2021-02-17 RX ADMIN — MISOPROSTOL SCH MCG: 100 TABLET ORAL at 17:00

## 2021-02-17 NOTE — NUR
ICU RN NOTES



PER HD NURSE, PATIENT WAS NOT ABLE TO COMPLETELY DIALYZED D/T HD ACCESS SITE CLOGGED. DR. FAJARDO INFORMED BY HD NURSE. NO HD OUTPUT PER HD NURSE.

## 2021-02-17 NOTE — NUR
ICU RN NOTES



RECEIVED PATIENT IN BED, SEDATED. HOB ELEVATED. TRACH TUBE INTACT. NGT INTACT, NO RESIDUAL 
OBSERVED. REMAIN ON PROPOFOL DRIP AS ORDERED, AMAYA WELL.

## 2021-02-17 NOTE — NUR
ICU RN NOTES



PATIENT ALERT AND AWAKE, NON VERBAL, RESPONSIVE TO VERBAL AND TACTILE STIMULI. TITRATED 
PROPOFOL DURING THE SHIFT AND COMPLETED WTHOUT COMPLICATIONS OBSERVED. PATIENT REMAINS CALM 
WITHOUT EPISODES OF PULLING OF TUBINGS.  RIGHT NARE NGT INTACT AND PATENT AMAYA GLUCERNA 1.2 
AT 45 ML/HR WELL, NO RESIDUAL OBSERVED. ASHBY CATHETER INTACT, OBSERVED WITH FOUL ODOR 
DURING END OF SHIFT, DR. STAHL AWARE. RECTAL TUBE IN PLACE, WOUND CARE DONE AMAYA WELL. RIGHT 
IJ HD CATH INTACT. RIGHT FEMORAL IV ACCESS INTACT AND PATENT. IN NO APPARENT DISTRESS.

## 2021-02-17 NOTE — NUR
ICU RN NOTES



RECEIVED A CALL FROM DR. ROSS AND STATED THAT PATIENT WILL BE SCHEDULED FOR PEG PLACEMENT 
IN AM. MAY RESUME NGT FEEDING AND NPO AT MIDNIGHT.

## 2021-02-17 NOTE — NUR
AM CARE DONE,SACRAL WOUND CARE, DIFFUSE WOUND /MASD AT SACRAL AND PERIANAL AREA, CLEANS WITH 
SOAP AND WATER/PAT DRY, COVERED WITH MEPILEX.

## 2021-02-18 VITALS — SYSTOLIC BLOOD PRESSURE: 156 MMHG | DIASTOLIC BLOOD PRESSURE: 79 MMHG

## 2021-02-18 VITALS — SYSTOLIC BLOOD PRESSURE: 117 MMHG | DIASTOLIC BLOOD PRESSURE: 55 MMHG

## 2021-02-18 VITALS — DIASTOLIC BLOOD PRESSURE: 56 MMHG | SYSTOLIC BLOOD PRESSURE: 127 MMHG

## 2021-02-18 VITALS — SYSTOLIC BLOOD PRESSURE: 150 MMHG | DIASTOLIC BLOOD PRESSURE: 57 MMHG

## 2021-02-18 VITALS — DIASTOLIC BLOOD PRESSURE: 63 MMHG | SYSTOLIC BLOOD PRESSURE: 138 MMHG

## 2021-02-18 VITALS — SYSTOLIC BLOOD PRESSURE: 120 MMHG | DIASTOLIC BLOOD PRESSURE: 56 MMHG

## 2021-02-18 VITALS — DIASTOLIC BLOOD PRESSURE: 54 MMHG | SYSTOLIC BLOOD PRESSURE: 113 MMHG

## 2021-02-18 VITALS — SYSTOLIC BLOOD PRESSURE: 139 MMHG | DIASTOLIC BLOOD PRESSURE: 58 MMHG

## 2021-02-18 VITALS — DIASTOLIC BLOOD PRESSURE: 67 MMHG | SYSTOLIC BLOOD PRESSURE: 127 MMHG

## 2021-02-18 VITALS — DIASTOLIC BLOOD PRESSURE: 70 MMHG | SYSTOLIC BLOOD PRESSURE: 146 MMHG

## 2021-02-18 VITALS — DIASTOLIC BLOOD PRESSURE: 53 MMHG | SYSTOLIC BLOOD PRESSURE: 118 MMHG

## 2021-02-18 VITALS — SYSTOLIC BLOOD PRESSURE: 153 MMHG | DIASTOLIC BLOOD PRESSURE: 71 MMHG

## 2021-02-18 VITALS — SYSTOLIC BLOOD PRESSURE: 149 MMHG | DIASTOLIC BLOOD PRESSURE: 71 MMHG

## 2021-02-18 VITALS — DIASTOLIC BLOOD PRESSURE: 63 MMHG | SYSTOLIC BLOOD PRESSURE: 131 MMHG

## 2021-02-18 VITALS — SYSTOLIC BLOOD PRESSURE: 114 MMHG | DIASTOLIC BLOOD PRESSURE: 56 MMHG

## 2021-02-18 VITALS — SYSTOLIC BLOOD PRESSURE: 144 MMHG | DIASTOLIC BLOOD PRESSURE: 78 MMHG

## 2021-02-18 VITALS — DIASTOLIC BLOOD PRESSURE: 58 MMHG | SYSTOLIC BLOOD PRESSURE: 128 MMHG

## 2021-02-18 VITALS — SYSTOLIC BLOOD PRESSURE: 122 MMHG | DIASTOLIC BLOOD PRESSURE: 57 MMHG

## 2021-02-18 VITALS — SYSTOLIC BLOOD PRESSURE: 114 MMHG | DIASTOLIC BLOOD PRESSURE: 78 MMHG

## 2021-02-18 VITALS — DIASTOLIC BLOOD PRESSURE: 68 MMHG | SYSTOLIC BLOOD PRESSURE: 139 MMHG

## 2021-02-18 VITALS — DIASTOLIC BLOOD PRESSURE: 49 MMHG | SYSTOLIC BLOOD PRESSURE: 108 MMHG

## 2021-02-18 VITALS — DIASTOLIC BLOOD PRESSURE: 80 MMHG | SYSTOLIC BLOOD PRESSURE: 118 MMHG

## 2021-02-18 VITALS — DIASTOLIC BLOOD PRESSURE: 74 MMHG | SYSTOLIC BLOOD PRESSURE: 131 MMHG

## 2021-02-18 VITALS — DIASTOLIC BLOOD PRESSURE: 63 MMHG | SYSTOLIC BLOOD PRESSURE: 116 MMHG

## 2021-02-18 VITALS — DIASTOLIC BLOOD PRESSURE: 83 MMHG | SYSTOLIC BLOOD PRESSURE: 142 MMHG

## 2021-02-18 VITALS — SYSTOLIC BLOOD PRESSURE: 151 MMHG | DIASTOLIC BLOOD PRESSURE: 81 MMHG

## 2021-02-18 VITALS — DIASTOLIC BLOOD PRESSURE: 61 MMHG | SYSTOLIC BLOOD PRESSURE: 131 MMHG

## 2021-02-18 VITALS — SYSTOLIC BLOOD PRESSURE: 120 MMHG | DIASTOLIC BLOOD PRESSURE: 57 MMHG

## 2021-02-18 VITALS — SYSTOLIC BLOOD PRESSURE: 145 MMHG | DIASTOLIC BLOOD PRESSURE: 67 MMHG

## 2021-02-18 VITALS — DIASTOLIC BLOOD PRESSURE: 62 MMHG | SYSTOLIC BLOOD PRESSURE: 129 MMHG

## 2021-02-18 VITALS — SYSTOLIC BLOOD PRESSURE: 128 MMHG | DIASTOLIC BLOOD PRESSURE: 66 MMHG

## 2021-02-18 VITALS — SYSTOLIC BLOOD PRESSURE: 133 MMHG | DIASTOLIC BLOOD PRESSURE: 64 MMHG

## 2021-02-18 VITALS — DIASTOLIC BLOOD PRESSURE: 52 MMHG | SYSTOLIC BLOOD PRESSURE: 110 MMHG

## 2021-02-18 VITALS — SYSTOLIC BLOOD PRESSURE: 143 MMHG | DIASTOLIC BLOOD PRESSURE: 68 MMHG

## 2021-02-18 VITALS — SYSTOLIC BLOOD PRESSURE: 133 MMHG | DIASTOLIC BLOOD PRESSURE: 69 MMHG

## 2021-02-18 VITALS — DIASTOLIC BLOOD PRESSURE: 73 MMHG | SYSTOLIC BLOOD PRESSURE: 150 MMHG

## 2021-02-18 VITALS — SYSTOLIC BLOOD PRESSURE: 129 MMHG | DIASTOLIC BLOOD PRESSURE: 65 MMHG

## 2021-02-18 VITALS — SYSTOLIC BLOOD PRESSURE: 132 MMHG | DIASTOLIC BLOOD PRESSURE: 65 MMHG

## 2021-02-18 VITALS — SYSTOLIC BLOOD PRESSURE: 129 MMHG | DIASTOLIC BLOOD PRESSURE: 61 MMHG

## 2021-02-18 VITALS — SYSTOLIC BLOOD PRESSURE: 144 MMHG | DIASTOLIC BLOOD PRESSURE: 70 MMHG

## 2021-02-18 VITALS — DIASTOLIC BLOOD PRESSURE: 59 MMHG | SYSTOLIC BLOOD PRESSURE: 128 MMHG

## 2021-02-18 VITALS — SYSTOLIC BLOOD PRESSURE: 103 MMHG | DIASTOLIC BLOOD PRESSURE: 62 MMHG

## 2021-02-18 LAB
BASOPHILS # BLD AUTO: 0.1 /CMM (ref 0–0.2)
BASOPHILS NFR BLD AUTO: 0.5 % (ref 0–2)
BUN SERPL-MCNC: 60 MG/DL (ref 7–18)
CALCIUM SERPL-MCNC: 8.4 MG/DL (ref 8.5–10.1)
CHLORIDE SERPL-SCNC: 98 MMOL/L (ref 98–107)
CO2 SERPL-SCNC: 25 MMOL/L (ref 21–32)
CREAT SERPL-MCNC: 2.6 MG/DL (ref 0.6–1.3)
EOSINOPHIL NFR BLD AUTO: 1.3 % (ref 0–6)
GLUCOSE SERPL-MCNC: 108 MG/DL (ref 74–106)
HCT VFR BLD AUTO: 28 % (ref 33–45)
HGB BLD-MCNC: 9.1 G/DL (ref 11.5–14.8)
LYMPHOCYTES NFR BLD AUTO: 1.5 /CMM (ref 0.8–4.8)
LYMPHOCYTES NFR BLD AUTO: 6.9 % (ref 20–44)
MCHC RBC AUTO-ENTMCNC: 32 G/DL (ref 31–36)
MCV RBC AUTO: 93 FL (ref 82–100)
MONOCYTES NFR BLD AUTO: 10.1 % (ref 2–12)
MONOCYTES NFR BLD AUTO: 2.2 /CMM (ref 0.1–1.3)
NEUTROPHILS # BLD AUTO: 17.8 /CMM (ref 1.8–8.9)
NEUTROPHILS NFR BLD AUTO: 81.2 % (ref 43–81)
PLATELET # BLD AUTO: 514 /CMM (ref 150–450)
POTASSIUM SERPL-SCNC: 5.6 MMOL/L (ref 3.5–5.1)
RBC # BLD AUTO: 3.03 MIL/UL (ref 4–5.2)
SODIUM SERPL-SCNC: 132 MMOL/L (ref 136–145)
WBC NRBC COR # BLD AUTO: 21.9 K/UL (ref 4.3–11)

## 2021-02-18 PROCEDURE — 0DH63UZ INSERTION OF FEEDING DEVICE INTO STOMACH, PERCUTANEOUS APPROACH: ICD-10-PCS | Performed by: INTERNAL MEDICINE

## 2021-02-18 RX ADMIN — Medication SCH EACH: at 05:25

## 2021-02-18 RX ADMIN — MISOPROSTOL SCH MCG: 100 TABLET ORAL at 21:05

## 2021-02-18 RX ADMIN — SODIUM CHLORIDE SCH MG: 9 INJECTION, SOLUTION INTRAVENOUS at 10:00

## 2021-02-18 RX ADMIN — Medication SCH EACH: at 12:20

## 2021-02-18 RX ADMIN — Medication SCH MG: at 01:42

## 2021-02-18 RX ADMIN — MISOPROSTOL SCH MCG: 100 TABLET ORAL at 09:00

## 2021-02-18 RX ADMIN — Medication SCH EACH: at 17:25

## 2021-02-18 RX ADMIN — Medication SCH GM: at 16:48

## 2021-02-18 RX ADMIN — FAMOTIDINE SCH MG: 10 INJECTION INTRAVENOUS at 09:00

## 2021-02-18 RX ADMIN — HEPARIN SODIUM SCH UNITS: 5000 INJECTION INTRAVENOUS; SUBCUTANEOUS at 21:06

## 2021-02-18 RX ADMIN — Medication SCH MG: at 07:42

## 2021-02-18 RX ADMIN — ACETYLCYSTEINE SCH MG: 100 INHALANT RESPIRATORY (INHALATION) at 23:35

## 2021-02-18 RX ADMIN — Medication SCH OZ: at 10:01

## 2021-02-18 RX ADMIN — MISOPROSTOL SCH MCG: 100 TABLET ORAL at 17:25

## 2021-02-18 RX ADMIN — ACETYLCYSTEINE SCH MG: 100 INHALANT RESPIRATORY (INHALATION) at 07:42

## 2021-02-18 RX ADMIN — LIDOCAINE HYDROCHLORIDE AND EPINEPHRINE ONE ML: 10; 10 INJECTION, SOLUTION INFILTRATION; PERINEURAL at 15:54

## 2021-02-18 RX ADMIN — SODIUM CHLORIDE SCH MG: 9 INJECTION, SOLUTION INTRAVENOUS at 02:07

## 2021-02-18 RX ADMIN — HEPARIN SODIUM SCH UNITS: 5000 INJECTION INTRAVENOUS; SUBCUTANEOUS at 07:24

## 2021-02-18 RX ADMIN — Medication SCH GM: at 10:01

## 2021-02-18 RX ADMIN — Medication SCH OZ: at 21:05

## 2021-02-18 RX ADMIN — ACETYLCYSTEINE SCH MG: 100 INHALANT RESPIRATORY (INHALATION) at 15:33

## 2021-02-18 RX ADMIN — MISOPROSTOL SCH MCG: 100 TABLET ORAL at 12:37

## 2021-02-18 RX ADMIN — Medication SCH MG: at 19:28

## 2021-02-18 RX ADMIN — SODIUM CHLORIDE SCH MG: 9 INJECTION, SOLUTION INTRAVENOUS at 17:26

## 2021-02-18 RX ADMIN — Medication SCH MG: at 13:47

## 2021-02-18 RX ADMIN — INSULIN HUMAN PRN UNIT: 100 INJECTION, SOLUTION PARENTERAL at 17:26

## 2021-02-18 RX ADMIN — LIDOCAINE HYDROCHLORIDE AND EPINEPHRINE ONE ML: 10; 10 INJECTION, SOLUTION INFILTRATION; PERINEURAL at 15:37

## 2021-02-18 RX ADMIN — INSULIN HUMAN PRN UNIT: 100 INJECTION, SOLUTION PARENTERAL at 12:20

## 2021-02-18 NOTE — NUR
Received patient awake alert non verbal non interactive.With trach to vent on 

full vent support.No respiratory distress noted.-118.vs wnl.GT clamped 

and patent.Site intact.NPO status.FC to gravity drainage with scanty urine output.

Felxi seal in place with liquid brown stool.HOB elevated.Turned and repositioned.

## 2021-02-18 NOTE — NUR
ICU RN OPENING NOTES

Patient is alert but aphasic, opens eyes to stimuli. No s/s of respiratory distress noted. 
On trach vent setting, elham well with 02 sat of 98%. Head of bed kept elevated. Patient 
remains NPO for gtube insertion. Noted with rectal tube and f/c , both draining well. Will 
continue to monitor. Bed is in lowest and locked position.

## 2021-02-18 NOTE — NUR
Patient had peg tube inserted by Dr Awan and received orders to start tube feeding on 
2/19/21 and h20 and medication through peg tube in 4 hours. Peg tube inserted at apprx 
1215pm. Patient's vitals [id number]/79. 100% 02 on vent trach setting pulse 104. Patient will be 
monitored. Call light with in reach.Bed is in lowest positon.

## 2021-02-18 NOTE — NUR
ICU RN CLOSING NOTES

Patient is alert but aphasic, opens eyes to stimuli. No s/s of respiratory distress noted. 
On trach vent setting, elham well with 02 sat of 99%. Head of bed kept elevated. Patient with 
peg tube, noted to be patent and  used for pm meds per md order. Gtube not to be used for 
feeding and no s/s of active bleeding noted at the site. Patient kept clean and dry during 
shift. Wound care provided. Writer called son to obtain consent for debridement that is to 
be done in am of 2/19 but unable to get hold. Endorsed to next shift to follow up. Patient 
noted with 250 cc of urine output during shift. Will continue to monitor. Bed is in lowest 
and locked position.Endorsed to next shift for romeo.

## 2021-02-19 VITALS — SYSTOLIC BLOOD PRESSURE: 135 MMHG | DIASTOLIC BLOOD PRESSURE: 64 MMHG

## 2021-02-19 VITALS — SYSTOLIC BLOOD PRESSURE: 126 MMHG | DIASTOLIC BLOOD PRESSURE: 56 MMHG

## 2021-02-19 VITALS — DIASTOLIC BLOOD PRESSURE: 64 MMHG | SYSTOLIC BLOOD PRESSURE: 114 MMHG

## 2021-02-19 VITALS — DIASTOLIC BLOOD PRESSURE: 54 MMHG | SYSTOLIC BLOOD PRESSURE: 126 MMHG

## 2021-02-19 VITALS — DIASTOLIC BLOOD PRESSURE: 60 MMHG | SYSTOLIC BLOOD PRESSURE: 129 MMHG

## 2021-02-19 VITALS — DIASTOLIC BLOOD PRESSURE: 63 MMHG | SYSTOLIC BLOOD PRESSURE: 128 MMHG

## 2021-02-19 VITALS — DIASTOLIC BLOOD PRESSURE: 62 MMHG | SYSTOLIC BLOOD PRESSURE: 135 MMHG

## 2021-02-19 VITALS — SYSTOLIC BLOOD PRESSURE: 135 MMHG | DIASTOLIC BLOOD PRESSURE: 65 MMHG

## 2021-02-19 VITALS — SYSTOLIC BLOOD PRESSURE: 130 MMHG | DIASTOLIC BLOOD PRESSURE: 54 MMHG

## 2021-02-19 VITALS — SYSTOLIC BLOOD PRESSURE: 141 MMHG | DIASTOLIC BLOOD PRESSURE: 82 MMHG

## 2021-02-19 VITALS — SYSTOLIC BLOOD PRESSURE: 130 MMHG | DIASTOLIC BLOOD PRESSURE: 65 MMHG

## 2021-02-19 VITALS — SYSTOLIC BLOOD PRESSURE: 141 MMHG | DIASTOLIC BLOOD PRESSURE: 62 MMHG

## 2021-02-19 VITALS — SYSTOLIC BLOOD PRESSURE: 138 MMHG | DIASTOLIC BLOOD PRESSURE: 62 MMHG

## 2021-02-19 VITALS — DIASTOLIC BLOOD PRESSURE: 66 MMHG | SYSTOLIC BLOOD PRESSURE: 135 MMHG

## 2021-02-19 VITALS — SYSTOLIC BLOOD PRESSURE: 149 MMHG | DIASTOLIC BLOOD PRESSURE: 75 MMHG

## 2021-02-19 VITALS — SYSTOLIC BLOOD PRESSURE: 142 MMHG | DIASTOLIC BLOOD PRESSURE: 71 MMHG

## 2021-02-19 VITALS — SYSTOLIC BLOOD PRESSURE: 134 MMHG | DIASTOLIC BLOOD PRESSURE: 62 MMHG

## 2021-02-19 VITALS — DIASTOLIC BLOOD PRESSURE: 54 MMHG | SYSTOLIC BLOOD PRESSURE: 130 MMHG

## 2021-02-19 VITALS — DIASTOLIC BLOOD PRESSURE: 57 MMHG | SYSTOLIC BLOOD PRESSURE: 119 MMHG

## 2021-02-19 VITALS — SYSTOLIC BLOOD PRESSURE: 129 MMHG | DIASTOLIC BLOOD PRESSURE: 60 MMHG

## 2021-02-19 VITALS — DIASTOLIC BLOOD PRESSURE: 75 MMHG | SYSTOLIC BLOOD PRESSURE: 128 MMHG

## 2021-02-19 LAB
BASOPHILS # BLD AUTO: 0.2 /CMM (ref 0–0.2)
BASOPHILS NFR BLD AUTO: 0.8 % (ref 0–2)
BUN SERPL-MCNC: 66 MG/DL (ref 7–18)
CALCIUM SERPL-MCNC: 8 MG/DL (ref 8.5–10.1)
CHLORIDE SERPL-SCNC: 97 MMOL/L (ref 98–107)
CO2 SERPL-SCNC: 24 MMOL/L (ref 21–32)
CREAT SERPL-MCNC: 2.9 MG/DL (ref 0.6–1.3)
EOSINOPHIL NFR BLD AUTO: 0.8 % (ref 0–6)
GLUCOSE SERPL-MCNC: 74 MG/DL (ref 74–106)
HCT VFR BLD AUTO: 25 % (ref 33–45)
HGB BLD-MCNC: 8.4 G/DL (ref 11.5–14.8)
LYMPHOCYTES NFR BLD AUTO: 1.5 /CMM (ref 0.8–4.8)
LYMPHOCYTES NFR BLD AUTO: 7.7 % (ref 20–44)
MCHC RBC AUTO-ENTMCNC: 33 G/DL (ref 31–36)
MCV RBC AUTO: 94 FL (ref 82–100)
MONOCYTES NFR BLD AUTO: 1.9 /CMM (ref 0.1–1.3)
MONOCYTES NFR BLD AUTO: 9.4 % (ref 2–12)
NEUTROPHILS # BLD AUTO: 16.1 /CMM (ref 1.8–8.9)
NEUTROPHILS NFR BLD AUTO: 81.3 % (ref 43–81)
PLATELET # BLD AUTO: 540 /CMM (ref 150–450)
POTASSIUM SERPL-SCNC: 5.8 MMOL/L (ref 3.5–5.1)
RBC # BLD AUTO: 2.72 MIL/UL (ref 4–5.2)
SODIUM SERPL-SCNC: 132 MMOL/L (ref 136–145)
WBC NRBC COR # BLD AUTO: 19.9 K/UL (ref 4.3–11)

## 2021-02-19 PROCEDURE — 0JBM0ZZ EXCISION OF LEFT UPPER LEG SUBCUTANEOUS TISSUE AND FASCIA, OPEN APPROACH: ICD-10-PCS

## 2021-02-19 PROCEDURE — 0JB70ZZ EXCISION OF BACK SUBCUTANEOUS TISSUE AND FASCIA, OPEN APPROACH: ICD-10-PCS

## 2021-02-19 PROCEDURE — 0JBL0ZZ EXCISION OF RIGHT UPPER LEG SUBCUTANEOUS TISSUE AND FASCIA, OPEN APPROACH: ICD-10-PCS

## 2021-02-19 RX ADMIN — HEPARIN SODIUM SCH UNITS: 5000 INJECTION INTRAVENOUS; SUBCUTANEOUS at 21:06

## 2021-02-19 RX ADMIN — MISOPROSTOL SCH MCG: 100 TABLET ORAL at 12:43

## 2021-02-19 RX ADMIN — Medication SCH MG: at 01:39

## 2021-02-19 RX ADMIN — HEPARIN SODIUM SCH UNITS: 5000 INJECTION INTRAVENOUS; SUBCUTANEOUS at 09:00

## 2021-02-19 RX ADMIN — Medication SCH OZ: at 08:37

## 2021-02-19 RX ADMIN — Medication SCH EACH: at 00:25

## 2021-02-19 RX ADMIN — ACETYLCYSTEINE SCH MG: 100 INHALANT RESPIRATORY (INHALATION) at 23:52

## 2021-02-19 RX ADMIN — Medication SCH MG: at 13:32

## 2021-02-19 RX ADMIN — Medication PRN MLS/HR: at 14:47

## 2021-02-19 RX ADMIN — Medication SCH OZ: at 21:12

## 2021-02-19 RX ADMIN — Medication SCH OZ: at 12:22

## 2021-02-19 RX ADMIN — Medication SCH EACH: at 06:06

## 2021-02-19 RX ADMIN — PIPERACILLIN SODIUM AND TAZOBACTAM SODIUM SCH MLS/HR: .25; 2 INJECTION, POWDER, LYOPHILIZED, FOR SOLUTION INTRAVENOUS at 18:32

## 2021-02-19 RX ADMIN — ACETYLCYSTEINE SCH MG: 100 INHALANT RESPIRATORY (INHALATION) at 16:19

## 2021-02-19 RX ADMIN — SODIUM CHLORIDE SCH MG: 9 INJECTION, SOLUTION INTRAVENOUS at 18:33

## 2021-02-19 RX ADMIN — Medication SCH GM: at 08:36

## 2021-02-19 RX ADMIN — PIPERACILLIN SODIUM AND TAZOBACTAM SODIUM SCH MLS/HR: .25; 2 INJECTION, POWDER, LYOPHILIZED, FOR SOLUTION INTRAVENOUS at 22:02

## 2021-02-19 RX ADMIN — Medication SCH MG: at 07:52

## 2021-02-19 RX ADMIN — FAMOTIDINE SCH MG: 10 INJECTION INTRAVENOUS at 08:35

## 2021-02-19 RX ADMIN — Medication PRN ML: at 19:07

## 2021-02-19 RX ADMIN — INSULIN HUMAN PRN UNIT: 100 INJECTION, SOLUTION PARENTERAL at 12:44

## 2021-02-19 RX ADMIN — Medication SCH EACH: at 18:28

## 2021-02-19 RX ADMIN — MISOPROSTOL SCH MCG: 100 TABLET ORAL at 16:34

## 2021-02-19 RX ADMIN — MISOPROSTOL SCH MCG: 100 TABLET ORAL at 08:35

## 2021-02-19 RX ADMIN — Medication SCH EACH: at 13:02

## 2021-02-19 RX ADMIN — MISOPROSTOL SCH MCG: 100 TABLET ORAL at 21:28

## 2021-02-19 RX ADMIN — ACETYLCYSTEINE SCH MG: 100 INHALANT RESPIRATORY (INHALATION) at 07:52

## 2021-02-19 RX ADMIN — SODIUM CHLORIDE SCH MG: 9 INJECTION, SOLUTION INTRAVENOUS at 01:34

## 2021-02-19 RX ADMIN — Medication SCH MG: at 19:56

## 2021-02-19 RX ADMIN — Medication SCH GM: at 16:35

## 2021-02-19 RX ADMIN — SODIUM CHLORIDE SCH MG: 9 INJECTION, SOLUTION INTRAVENOUS at 12:21

## 2021-02-19 NOTE — NUR
ICU RN OPENING NOTES

Patient is alert but aphasic, opens eyes to stimuli. No s/s of respiratory distress noted. 
On trach vent setting, elham well with 02 sat of 94%. Head of bed kept elevated. Patient 
remains NPO for gtube insertion. Noted with rectal tube and f/c , both draining well. Will 
continue to monitor. Bed is in lowest and locked position.

## 2021-02-19 NOTE — NUR
Patient's wound assessed by Wound nurse for [facility] , measured 10x10. Wound care done and 
kept clean and dry.

## 2021-02-19 NOTE — NUR
RN NOTES



PATIENT IS IN NO ACUTE DISTRESS. PATIENT IS ON VENT AND TRACH. NO SOB NOTED. PATIENT IS ON 
TELE READING SINUS TACHY 115. PATIENT HAS G-TUBE TOLERATING WELL. PATIENT HAS ASHBY CATH. 
RIGHT. RIGHT IJ SULEMA. SAFETY PRECAUTIONS FOLLOWED [facility] BED LOCKED IN POSITION. 
WILL CONTINUE TO MONITOR.

## 2021-02-19 NOTE — NUR
TELE RN CLOSING NOTE



PATIENT IS IN NO ACUTE DISTRESS. PATIENT IS ON VENT AND TRACH. NO SOB NOTED. PATIENT IS ON 
TELE READING SINUS TACHY 115. PATIENT HAS G-TUBE, GI RECTAL TUBE. PATIENT HAD DIALYSES DONE 
2/15 1 L OUT. PATIENT HAS MULTIPLE WOUNDS. PATIENT HAS ASHBY CATH. RIGHT FEMORAL TLC. RIGHT 
IJ SULEMA. VITAL SIGNS /71, PULSE 129. RESPIRATIONS 22, TEMP 97.0 O2 95%. PATIENT 
HAS A G-TUBE FEEDING STARTED NEPHRO 1.8 @ 35MLS/HR. ENDORSE PATIENT TO NIGHT SHIFT NURSE FOR 
DASHAWN.

## 2021-02-19 NOTE — NUR
Patient resting.VS remains stable.-116.Remain NPO for debridement of 

sacral and buttock wound today.Consent signed.AM personal hygiene done.

Wound care done.Turned and repositioned.No acute distress noted.Will endorse 

to day shift for DASHAWN.

## 2021-02-19 NOTE — NUR
RN NOTES





CALLED PHARMACY REGARDING CYTOTEC.AWAITING FOR PHARMACY TO DELIVER IT. WILL CONTINUE TO 
MONITOR.

## 2021-02-19 NOTE — NUR
Patient was transferred to tele first floor with RT and tele box. No c/o pain or discomfort. 
On trach vent with 02 sat of 98%. Report given to nurse. Patient did not have any s/s of 
respiratory ddepression. General

## 2021-02-19 NOTE — NUR
MS RN NOTE



RECEIVED PATIENT FROM ICU, PATIENT IS ALERT NON VERBAL, PATIENT IS IN NO ACUTE DISTRESS. 
PATIENT IS ON VENT AND TRACH. NO SOB NOTED. PATIENT HAS G-TUBE, GI RECTAL TUBE. PATIENT HAD 
DIALYSES DONE 2/15 1 L OUT. PATIENT HAS MULTIPLE WOUNDS. PATIENT HAS ASHBY CATH. RIGHT 
FEMORAL TLC. RIGHT IJ SULEMA. VITAL SIGNS /71, PULSE 129. RESPIRATIONS 22, TEMP 97.0 
O2 95%. PATIENT HAS A G-TUBE FEEDING NEPHRO 1.8 @ 35MLS/HR. WILL CONTINUE TO MONITOR 
CLOSELY.

## 2021-02-19 NOTE — NUR
WOUND CARE FOLLOW UP: PT SEEN FOR LEFT HEEL DEEP TISSUE INJURY WHICH IS NOW IN EVOLUTION. 
RECOMMENDATIONS MADE FOR SKIN PROTECTION AND WOUND CARE. DISCUSSED WITH NURSING STAFF. 
TREATMENT ORDERS UPDATED. MD IN AGREEMENT WITH PLAN OF CARE. 

-------------------------------------------------------------------------------

Addendum: 02/19/21 at 0807 by REGINA STRICKLAND WNDNU

-------------------------------------------------------------------------------

Amended: Links added.

## 2021-02-20 VITALS — DIASTOLIC BLOOD PRESSURE: 73 MMHG | SYSTOLIC BLOOD PRESSURE: 132 MMHG

## 2021-02-20 VITALS — DIASTOLIC BLOOD PRESSURE: 69 MMHG | SYSTOLIC BLOOD PRESSURE: 151 MMHG

## 2021-02-20 VITALS — SYSTOLIC BLOOD PRESSURE: 145 MMHG | DIASTOLIC BLOOD PRESSURE: 68 MMHG

## 2021-02-20 VITALS — DIASTOLIC BLOOD PRESSURE: 77 MMHG | SYSTOLIC BLOOD PRESSURE: 132 MMHG

## 2021-02-20 VITALS — SYSTOLIC BLOOD PRESSURE: 158 MMHG | DIASTOLIC BLOOD PRESSURE: 66 MMHG

## 2021-02-20 VITALS — SYSTOLIC BLOOD PRESSURE: 161 MMHG | DIASTOLIC BLOOD PRESSURE: 68 MMHG

## 2021-02-20 LAB
BUN SERPL-MCNC: 41 MG/DL (ref 7–18)
CALCIUM SERPL-MCNC: 8.6 MG/DL (ref 8.5–10.1)
CHLORIDE SERPL-SCNC: 98 MMOL/L (ref 98–107)
CO2 SERPL-SCNC: 25 MMOL/L (ref 21–32)
CREAT SERPL-MCNC: 2.3 MG/DL (ref 0.6–1.3)
GLUCOSE SERPL-MCNC: 152 MG/DL (ref 74–106)
POTASSIUM SERPL-SCNC: 4.4 MMOL/L (ref 3.5–5.1)
SODIUM SERPL-SCNC: 134 MMOL/L (ref 136–145)

## 2021-02-20 RX ADMIN — ACETYLCYSTEINE SCH MG: 100 INHALANT RESPIRATORY (INHALATION) at 14:25

## 2021-02-20 RX ADMIN — MISOPROSTOL SCH MCG: 100 TABLET ORAL at 16:02

## 2021-02-20 RX ADMIN — Medication SCH MG: at 01:41

## 2021-02-20 RX ADMIN — Medication SCH EACH: at 00:06

## 2021-02-20 RX ADMIN — SODIUM CHLORIDE SCH MG: 9 INJECTION, SOLUTION INTRAVENOUS at 17:27

## 2021-02-20 RX ADMIN — SODIUM CHLORIDE SCH MG: 9 INJECTION, SOLUTION INTRAVENOUS at 09:13

## 2021-02-20 RX ADMIN — Medication SCH OZ: at 09:12

## 2021-02-20 RX ADMIN — INSULIN HUMAN PRN UNIT: 100 INJECTION, SOLUTION PARENTERAL at 06:11

## 2021-02-20 RX ADMIN — FAMOTIDINE SCH MG: 10 INJECTION INTRAVENOUS at 09:13

## 2021-02-20 RX ADMIN — MISOPROSTOL SCH MCG: 100 TABLET ORAL at 09:12

## 2021-02-20 RX ADMIN — Medication SCH OZ: at 21:06

## 2021-02-20 RX ADMIN — Medication SCH MG: at 19:47

## 2021-02-20 RX ADMIN — SODIUM CHLORIDE SCH MG: 9 INJECTION, SOLUTION INTRAVENOUS at 02:03

## 2021-02-20 RX ADMIN — HEPARIN SODIUM SCH UNITS: 5000 INJECTION INTRAVENOUS; SUBCUTANEOUS at 21:00

## 2021-02-20 RX ADMIN — ACETYLCYSTEINE SCH MG: 100 INHALANT RESPIRATORY (INHALATION) at 07:59

## 2021-02-20 RX ADMIN — Medication SCH EACH: at 06:06

## 2021-02-20 RX ADMIN — ACETYLCYSTEINE SCH MG: 100 INHALANT RESPIRATORY (INHALATION) at 23:48

## 2021-02-20 RX ADMIN — Medication SCH EACH: at 12:10

## 2021-02-20 RX ADMIN — Medication SCH MG: at 14:25

## 2021-02-20 RX ADMIN — MISOPROSTOL SCH MCG: 100 TABLET ORAL at 12:11

## 2021-02-20 RX ADMIN — Medication SCH OZ: at 09:00

## 2021-02-20 RX ADMIN — Medication SCH MG: at 07:59

## 2021-02-20 RX ADMIN — Medication SCH GM: at 16:02

## 2021-02-20 RX ADMIN — Medication SCH EACH: at 17:27

## 2021-02-20 RX ADMIN — PIPERACILLIN SODIUM AND TAZOBACTAM SODIUM SCH MLS/HR: .25; 2 INJECTION, POWDER, LYOPHILIZED, FOR SOLUTION INTRAVENOUS at 22:58

## 2021-02-20 RX ADMIN — INSULIN HUMAN PRN UNIT: 100 INJECTION, SOLUTION PARENTERAL at 23:11

## 2021-02-20 RX ADMIN — Medication SCH EACH: at 22:58

## 2021-02-20 RX ADMIN — HEPARIN SODIUM SCH UNITS: 5000 INJECTION INTRAVENOUS; SUBCUTANEOUS at 09:00

## 2021-02-20 RX ADMIN — Medication SCH GM: at 09:13

## 2021-02-20 RX ADMIN — PIPERACILLIN SODIUM AND TAZOBACTAM SODIUM SCH MLS/HR: .25; 2 INJECTION, POWDER, LYOPHILIZED, FOR SOLUTION INTRAVENOUS at 11:41

## 2021-02-20 RX ADMIN — PIPERACILLIN SODIUM AND TAZOBACTAM SODIUM SCH MLS/HR: .25; 2 INJECTION, POWDER, LYOPHILIZED, FOR SOLUTION INTRAVENOUS at 04:03

## 2021-02-20 RX ADMIN — MISOPROSTOL SCH MCG: 100 TABLET ORAL at 21:03

## 2021-02-20 RX ADMIN — PIPERACILLIN SODIUM AND TAZOBACTAM SODIUM SCH MLS/HR: .25; 2 INJECTION, POWDER, LYOPHILIZED, FOR SOLUTION INTRAVENOUS at 16:02

## 2021-02-20 NOTE — NUR
TELE RN NOTES

RECEIVED ON LEFT SIDE POSITION,ON TRACH/VENT,SETTINGS TOLERATED WELL.ON GT FEEDING AT 
35ML/HR RATE VIA GT PUMP,NOTED 20ML RESIDUAL VOLUME.FLUSHED WITH WATER 150ML TO KEEP 
PATENT.WITH ASHBY CATH IN PLACE DRAINING YELLOWISH OUTPUT.WITH RIGHT I J SULEMA CATH FOR HD 
TREATMENT,.RIGHT FEMORAL CATH FOR MEDS.WITH RECTAL TUBE,DRAINS SCANTY LIQUID STOOL.

## 2021-02-20 NOTE — NUR
RN NOTES



PATIENT IS IN NO ACUTE DISTRESS. PATIENT IS ON VENT AND TRACH. NO SOB NOTED. PATIENT IS ON 
TELE READING SINUS TACHY 115. PATIENT HAS G-TUBE TOLERATING WELL. PATIENT HAS ASHBY CATH. 
RIGHT. RIGHT IJ SULEMA. SAFETY PRECAUTIONS FOLLOWED [facility]LEVATED [facility] BED LOCKED IN POSITION. 
WILL CONTINUE TO MONITOR.

## 2021-02-20 NOTE — NUR
TELE RN CLOSING NOTE



PATIENT IS IN NO ACUTE DISTRESS. PATIENT IS ON VENT AND TRACH. NO SOB NOTED. PATIENT IS ON 
TELE READING SINUS TACHY 117. PATIENT HAS G-TUBE, GI RECTAL TUBE. PATIENT HAS MULTIPLE 
WOUNDS. PATIENT HAS ASHBY CATH. RIGHT FEMORAL TLC. RIGHT IJ SULEMA.  RESPIRATIONS 22, TEMP 
97.0 O2 95%. ENDORSE PATIENT TO NIGHT SHIFT NURSE FOR DASHAWN.

## 2021-02-20 NOTE — NUR
TELE RN NOTE



DID NOT ADMINISTER Z-GUARD, PATIENTS SKIN IS VERY FRAGILE, AS PER ICU REPORT DO NOT APPLY 
Z-GUARD. DID NOT ADMINISTER HEPARIN PATIENTS HEMOGLOBIN IS TRENDING DOWN YESTERDAY WAS 8.4

## 2021-02-20 NOTE — NUR
TELE RN OPENING NOTE



PATIENT IS IN BED RESTING ,PATIENT IS IN NO ACUTE DISTRESS. PATIENT IS ON VENT AND TRACH. NO 
SOB NOTED. PATIENT IS ON TELE READING SINUS TACHY 112. PATIENT HAS G-TUBE TOLERATING WELL. 
PATIENT HAS ASHBY CATH. RIGHT. RIGHT IJ SULEMA. SAFETY PRECAUTIONS FOLLOWED [facility]LEVATED [facility] 
BED LOCKED IN POSITION. WILL CONTINUE TO MONITOR CLOSELY.

## 2021-02-21 VITALS — SYSTOLIC BLOOD PRESSURE: 147 MMHG | DIASTOLIC BLOOD PRESSURE: 71 MMHG

## 2021-02-21 VITALS — DIASTOLIC BLOOD PRESSURE: 77 MMHG | SYSTOLIC BLOOD PRESSURE: 184 MMHG

## 2021-02-21 VITALS — SYSTOLIC BLOOD PRESSURE: 149 MMHG | DIASTOLIC BLOOD PRESSURE: 70 MMHG

## 2021-02-21 VITALS — SYSTOLIC BLOOD PRESSURE: 123 MMHG | DIASTOLIC BLOOD PRESSURE: 56 MMHG

## 2021-02-21 VITALS — SYSTOLIC BLOOD PRESSURE: 153 MMHG | DIASTOLIC BLOOD PRESSURE: 66 MMHG

## 2021-02-21 LAB
BASOPHILS # BLD AUTO: 0.2 /CMM (ref 0–0.2)
BASOPHILS NFR BLD AUTO: 0.9 % (ref 0–2)
BUN SERPL-MCNC: 49 MG/DL (ref 7–18)
CALCIUM SERPL-MCNC: 8 MG/DL (ref 8.5–10.1)
CHLORIDE SERPL-SCNC: 96 MMOL/L (ref 98–107)
CO2 SERPL-SCNC: 26 MMOL/L (ref 21–32)
CREAT SERPL-MCNC: 2.4 MG/DL (ref 0.6–1.3)
EOSINOPHIL NFR BLD AUTO: 0.9 % (ref 0–6)
GLUCOSE SERPL-MCNC: 177 MG/DL (ref 74–106)
HCT VFR BLD AUTO: 24 % (ref 33–45)
HGB BLD-MCNC: 7.9 G/DL (ref 11.5–14.8)
LYMPHOCYTES NFR BLD AUTO: 0.8 /CMM (ref 0.8–4.8)
LYMPHOCYTES NFR BLD AUTO: 3.7 % (ref 20–44)
MCHC RBC AUTO-ENTMCNC: 33 G/DL (ref 31–36)
MCV RBC AUTO: 95 FL (ref 82–100)
MONOCYTES NFR BLD AUTO: 2.1 /CMM (ref 0.1–1.3)
MONOCYTES NFR BLD AUTO: 9.8 % (ref 2–12)
NEUTROPHILS # BLD AUTO: 18.5 /CMM (ref 1.8–8.9)
NEUTROPHILS NFR BLD AUTO: 84.7 % (ref 43–81)
PLATELET # BLD AUTO: 511 /CMM (ref 150–450)
POTASSIUM SERPL-SCNC: 3.8 MMOL/L (ref 3.5–5.1)
RBC # BLD AUTO: 2.54 MIL/UL (ref 4–5.2)
SODIUM SERPL-SCNC: 131 MMOL/L (ref 136–145)
WBC NRBC COR # BLD AUTO: 21.8 K/UL (ref 4.3–11)

## 2021-02-21 RX ADMIN — HEPARIN SODIUM SCH UNITS: 5000 INJECTION INTRAVENOUS; SUBCUTANEOUS at 09:21

## 2021-02-21 RX ADMIN — FAMOTIDINE SCH MG: 10 INJECTION INTRAVENOUS at 09:20

## 2021-02-21 RX ADMIN — INSULIN HUMAN PRN UNIT: 100 INJECTION, SOLUTION PARENTERAL at 12:32

## 2021-02-21 RX ADMIN — MISOPROSTOL SCH MCG: 100 TABLET ORAL at 09:20

## 2021-02-21 RX ADMIN — ACETYLCYSTEINE SCH MG: 100 INHALANT RESPIRATORY (INHALATION) at 07:56

## 2021-02-21 RX ADMIN — INSULIN HUMAN PRN UNIT: 100 INJECTION, SOLUTION PARENTERAL at 05:35

## 2021-02-21 RX ADMIN — SODIUM CHLORIDE SCH MG: 9 INJECTION, SOLUTION INTRAVENOUS at 10:50

## 2021-02-21 RX ADMIN — Medication SCH GM: at 09:20

## 2021-02-21 RX ADMIN — Medication SCH OZ: at 09:20

## 2021-02-21 RX ADMIN — SODIUM CHLORIDE SCH MG: 9 INJECTION, SOLUTION INTRAVENOUS at 17:33

## 2021-02-21 RX ADMIN — PIPERACILLIN SODIUM AND TAZOBACTAM SODIUM SCH MLS/HR: .25; 2 INJECTION, POWDER, LYOPHILIZED, FOR SOLUTION INTRAVENOUS at 22:47

## 2021-02-21 RX ADMIN — PIPERACILLIN SODIUM AND TAZOBACTAM SODIUM SCH MLS/HR: .25; 2 INJECTION, POWDER, LYOPHILIZED, FOR SOLUTION INTRAVENOUS at 04:49

## 2021-02-21 RX ADMIN — Medication SCH MG: at 20:33

## 2021-02-21 RX ADMIN — Medication SCH MG: at 13:58

## 2021-02-21 RX ADMIN — MISOPROSTOL SCH MCG: 100 TABLET ORAL at 17:32

## 2021-02-21 RX ADMIN — Medication SCH GM: at 17:29

## 2021-02-21 RX ADMIN — Medication SCH EACH: at 17:29

## 2021-02-21 RX ADMIN — Medication SCH MG: at 01:52

## 2021-02-21 RX ADMIN — Medication SCH EACH: at 12:27

## 2021-02-21 RX ADMIN — PIPERACILLIN SODIUM AND TAZOBACTAM SODIUM SCH MLS/HR: .25; 2 INJECTION, POWDER, LYOPHILIZED, FOR SOLUTION INTRAVENOUS at 17:41

## 2021-02-21 RX ADMIN — Medication SCH OZ: at 21:53

## 2021-02-21 RX ADMIN — Medication PRN ML: at 04:49

## 2021-02-21 RX ADMIN — Medication SCH MG: at 07:57

## 2021-02-21 RX ADMIN — ACETYLCYSTEINE SCH MG: 100 INHALANT RESPIRATORY (INHALATION) at 13:58

## 2021-02-21 RX ADMIN — Medication SCH EACH: at 05:34

## 2021-02-21 RX ADMIN — PIPERACILLIN SODIUM AND TAZOBACTAM SODIUM SCH MLS/HR: .25; 2 INJECTION, POWDER, LYOPHILIZED, FOR SOLUTION INTRAVENOUS at 11:23

## 2021-02-21 RX ADMIN — INSULIN HUMAN PRN UNIT: 100 INJECTION, SOLUTION PARENTERAL at 17:56

## 2021-02-21 RX ADMIN — SODIUM CHLORIDE SCH MG: 9 INJECTION, SOLUTION INTRAVENOUS at 02:00

## 2021-02-21 RX ADMIN — MISOPROSTOL SCH MCG: 100 TABLET ORAL at 21:53

## 2021-02-21 RX ADMIN — MISOPROSTOL SCH MCG: 100 TABLET ORAL at 12:33

## 2021-02-21 NOTE — NUR
TELE RN NOTES  ACCU-CHECK

BLOOD SUGAR CHECK 172,COVERED WITH HUMULIN R 4 UNITS PER SLIDING SCALE.GT FEEDING IN 
PROGRESS TOLERATED WELL.

## 2021-02-21 NOTE — NUR
TELE RN NOTES

DRESSING CHANGE DONE TO SACRAL AREA,RECTAL TUBE REPLACE,OLD ONE ACCIDENTALLY PULLED OUT.

## 2021-02-21 NOTE — NUR
RN CLOSING NOTE



PT IS AWAKE AND RESTING IN BED. CURRENTLY NONVERBAL, CAN NOD/SHAKE HEAD TO QUESTIONS. NO 
COMPLAINT OF PAIN. CURRENTLY ON TRACH. NO RESPIRATORY DISTRESS PRESENT. EXTERNAL MONITOR 
READS SINUS TACHYCARDIA. RECTAL TUBE PRESENT. F/C PRESENT. G-TUBE PRESENT. BEDBOUND. WOUNDS 
PRESENT ON SACRUM, HANDS, AND HIPS. R FEMORAL TRIPLE LUMEN CATH PRESENT. HD CATH PRESENT. 
SAFETY MEASURES IN PLACE. SIDE RAILS RAISED. BED LOWERED. CALL LIGHT WITHIN REACH. ROUTINE 
MEDS GIVEN. REPORT GIVEN TO NIGHT NURSE

## 2021-02-21 NOTE — NUR
RN NOTES

RECEIVED PT. AWAKE, NON-VERBAL, S/P HEMODIALYSIS , VENT DEPENDENT, ST ON TELE MONITOR 
HR-102, G-TUBE FEEDING , PT IS TOLERATING FAIRLY, FLEXISEAL IN PLACE, F/C DRAINING SMALL 
AMOUNT OF URINE, NON IN DISTRESS, NO PAIN NOTED, SIDERAILSUPX2, CONTINUE TO MONITOR

## 2021-02-21 NOTE — NUR
RN OPENING NOTE



PT IS AWAKE AND RESTING IN BED. CURRENTLY NONVERBAL, CAN NOD/SHAKE HEAD TO QUESTIONS. NO 
COMPLAINT OF PAIN. CURRENTLY ON TRACH. NO RESPIRATORY DISTRESS PRESENT. EXTERNAL MONITOR 
READS SINUS TACHYCARDIA. RECTAL TUBE PRESENT. F/C PRESENT. G-TUBE PRESENT. BEDBOUND. WOUNDS 
PRESENT ON SACRUM, HANDS, AND HIPS. R FEMORAL TRIPLE LUMEN CATH PRESENT. HD CATH PRESENT. 
SAFETY MEASURES IN PLACE. SIDE RAILS RAISED. BED LOWERED. CALL LIGHT WITHIN REACH. WILL 
CONTINUE TO MONITOR.

## 2021-02-22 VITALS — DIASTOLIC BLOOD PRESSURE: 46 MMHG | SYSTOLIC BLOOD PRESSURE: 101 MMHG

## 2021-02-22 VITALS — DIASTOLIC BLOOD PRESSURE: 61 MMHG | SYSTOLIC BLOOD PRESSURE: 134 MMHG

## 2021-02-22 VITALS — SYSTOLIC BLOOD PRESSURE: 126 MMHG | DIASTOLIC BLOOD PRESSURE: 40 MMHG

## 2021-02-22 VITALS — SYSTOLIC BLOOD PRESSURE: 147 MMHG | DIASTOLIC BLOOD PRESSURE: 68 MMHG

## 2021-02-22 VITALS — SYSTOLIC BLOOD PRESSURE: 140 MMHG | DIASTOLIC BLOOD PRESSURE: 86 MMHG

## 2021-02-22 VITALS — DIASTOLIC BLOOD PRESSURE: 88 MMHG | SYSTOLIC BLOOD PRESSURE: 161 MMHG

## 2021-02-22 LAB
BASOPHILS # BLD AUTO: 0.1 /CMM (ref 0–0.2)
BASOPHILS NFR BLD AUTO: 0.7 % (ref 0–2)
BUN SERPL-MCNC: 37 MG/DL (ref 7–18)
CALCIUM SERPL-MCNC: 8.3 MG/DL (ref 8.5–10.1)
CHLORIDE SERPL-SCNC: 96 MMOL/L (ref 98–107)
CO2 SERPL-SCNC: 29 MMOL/L (ref 21–32)
CREAT SERPL-MCNC: 2 MG/DL (ref 0.6–1.3)
EOSINOPHIL NFR BLD AUTO: 0.8 % (ref 0–6)
EOSINOPHIL NFR BLD MANUAL: 1 % (ref 0–4)
GLUCOSE SERPL-MCNC: 172 MG/DL (ref 74–106)
HCT VFR BLD AUTO: 22 % (ref 33–45)
HGB BLD-MCNC: 7 G/DL (ref 11.5–14.8)
LYMPHOCYTES NFR BLD AUTO: 1.1 /CMM (ref 0.8–4.8)
LYMPHOCYTES NFR BLD AUTO: 5.1 % (ref 20–44)
LYMPHOCYTES NFR BLD MANUAL: 4 % (ref 16–48)
MCHC RBC AUTO-ENTMCNC: 32 G/DL (ref 31–36)
MCV RBC AUTO: 96 FL (ref 82–100)
METAMYELOCYTES NFR BLD MANUAL: 4 % (ref 0–0)
MONOCYTES NFR BLD AUTO: 10.7 % (ref 2–12)
MONOCYTES NFR BLD AUTO: 2.2 /CMM (ref 0.1–1.3)
MONOCYTES NFR BLD MANUAL: 4 % (ref 0–11)
MYELOCYTES NFR BLD MANUAL: 4 % (ref 0–0)
NEUTROPHILS # BLD AUTO: 17.1 /CMM (ref 1.8–8.9)
NEUTROPHILS NFR BLD AUTO: 82.7 % (ref 43–81)
NEUTS BAND NFR BLD MANUAL: 4 % (ref 0–5)
NEUTS SEG NFR BLD MANUAL: 79 % (ref 42–76)
PLATELET # BLD AUTO: 500 /CMM (ref 150–450)
POTASSIUM SERPL-SCNC: 3.8 MMOL/L (ref 3.5–5.1)
RBC # BLD AUTO: 2.27 MIL/UL (ref 4–5.2)
SODIUM SERPL-SCNC: 131 MMOL/L (ref 136–145)
WBC NRBC COR # BLD AUTO: 20.7 K/UL (ref 4.3–11)

## 2021-02-22 RX ADMIN — SODIUM CHLORIDE SCH MG: 9 INJECTION, SOLUTION INTRAVENOUS at 02:05

## 2021-02-22 RX ADMIN — Medication SCH EACH: at 17:08

## 2021-02-22 RX ADMIN — MISOPROSTOL SCH MCG: 100 TABLET ORAL at 16:12

## 2021-02-22 RX ADMIN — Medication SCH MG: at 19:52

## 2021-02-22 RX ADMIN — Medication SCH MG: at 08:01

## 2021-02-22 RX ADMIN — MISOPROSTOL SCH MCG: 100 TABLET ORAL at 22:30

## 2021-02-22 RX ADMIN — Medication SCH MG: at 02:15

## 2021-02-22 RX ADMIN — INSULIN HUMAN PRN UNIT: 100 INJECTION, SOLUTION PARENTERAL at 05:52

## 2021-02-22 RX ADMIN — ACETYLCYSTEINE SCH MG: 100 INHALANT RESPIRATORY (INHALATION) at 08:01

## 2021-02-22 RX ADMIN — Medication SCH OZ: at 22:31

## 2021-02-22 RX ADMIN — Medication SCH EACH: at 01:31

## 2021-02-22 RX ADMIN — SODIUM CHLORIDE SCH MG: 9 INJECTION, SOLUTION INTRAVENOUS at 09:00

## 2021-02-22 RX ADMIN — Medication PRN ML: at 04:52

## 2021-02-22 RX ADMIN — ACETYLCYSTEINE SCH MG: 100 INHALANT RESPIRATORY (INHALATION) at 23:59

## 2021-02-22 RX ADMIN — PIPERACILLIN SODIUM AND TAZOBACTAM SODIUM SCH MLS/HR: .25; 2 INJECTION, POWDER, LYOPHILIZED, FOR SOLUTION INTRAVENOUS at 10:07

## 2021-02-22 RX ADMIN — PIPERACILLIN SODIUM AND TAZOBACTAM SODIUM SCH MLS/HR: .25; 2 INJECTION, POWDER, LYOPHILIZED, FOR SOLUTION INTRAVENOUS at 04:51

## 2021-02-22 RX ADMIN — PIPERACILLIN SODIUM AND TAZOBACTAM SODIUM SCH MLS/HR: .25; 2 INJECTION, POWDER, LYOPHILIZED, FOR SOLUTION INTRAVENOUS at 23:54

## 2021-02-22 RX ADMIN — Medication SCH APPLIC: at 08:49

## 2021-02-22 RX ADMIN — Medication SCH EACH: at 12:45

## 2021-02-22 RX ADMIN — INSULIN HUMAN PRN UNIT: 100 INJECTION, SOLUTION PARENTERAL at 12:47

## 2021-02-22 RX ADMIN — MISOPROSTOL SCH MCG: 100 TABLET ORAL at 08:51

## 2021-02-22 RX ADMIN — SODIUM CHLORIDE SCH MG: 9 INJECTION, SOLUTION INTRAVENOUS at 17:12

## 2021-02-22 RX ADMIN — Medication SCH GM: at 08:51

## 2021-02-22 RX ADMIN — Medication SCH EACH: at 05:50

## 2021-02-22 RX ADMIN — ACETYLCYSTEINE SCH MG: 100 INHALANT RESPIRATORY (INHALATION) at 15:56

## 2021-02-22 RX ADMIN — INSULIN HUMAN PRN UNIT: 100 INJECTION, SOLUTION PARENTERAL at 17:10

## 2021-02-22 RX ADMIN — MISOPROSTOL SCH MCG: 100 TABLET ORAL at 12:45

## 2021-02-22 RX ADMIN — FAMOTIDINE SCH MG: 10 INJECTION INTRAVENOUS at 08:48

## 2021-02-22 RX ADMIN — Medication SCH OZ: at 08:49

## 2021-02-22 RX ADMIN — Medication SCH GM: at 16:12

## 2021-02-22 RX ADMIN — Medication SCH MG: at 13:27

## 2021-02-22 RX ADMIN — ACETYLCYSTEINE SCH MG: 100 INHALANT RESPIRATORY (INHALATION) at 02:15

## 2021-02-22 RX ADMIN — PIPERACILLIN SODIUM AND TAZOBACTAM SODIUM SCH MLS/HR: .25; 2 INJECTION, POWDER, LYOPHILIZED, FOR SOLUTION INTRAVENOUS at 16:12

## 2021-02-22 NOTE — NUR
TELE RN OPENING NOTES



PATIENT IN BED. NON VERBAL, OPENS EYES. ON VENT ANA #8 WITH SETTINGS AC 16  FiO2 
40% PEEP 5. ASHBY CATHETER IN PLACE. RECTAL TUBE PRESENT. GTF NEPRO RUNNING @ 35 ML/HR. IV 
ACCESS ON R IJ SULEMA HD CATH, R FEMORAL TRIPLE LUMEN, INTACT. SAFETY MEASURES MAINTAINED. 
BED IN LOWEST POSITION, BRAKES LOCKED. SIDE RAILS UP X2. CALL LIGHT WITHIN REACH. WILL 
CONTINUE PLAN OF CARE.

## 2021-02-22 NOTE — NUR
PT RECEIVED IN BED, AOX1, ABLE TO OPEN EYES AND RESPONDS TO PAINFUL STIMULI. HOB ELEVATED 
AND ASPIRATION PRECAUTIONS ENFORCED. RECTAL TUBE IN PLACE. G- TUBE INFUSING NEPRO AT 35 
ML/HR.  ASHBY DRAINING YELLOW URINE WITH WHITE SEDIMENT NOTED. SMALL AMT OF BLOOD NOTED 
AROUND G- TUBE INSERTION SITE. WLL  CONTINUE TO MONITOR.

## 2021-02-22 NOTE — NUR
TELE RN NOTES



FORTINO CALLED FROM THE LAB WITH A CRITICAL LAB VALUE Hgb 7.0 MD NN IS MADE AWARE. ORDERED 1 
UNIT OF PRBC

## 2021-02-22 NOTE — NUR
TELE RN CLOSING NOTES



PATIENT IN BED. NON VERBAL, OPENS EYES. ON VENT SYDNEYLEY #8 WITH SETTINGS AC 16  FiO2 
40% PEEP 5. ASHBY CATHETER IN PLACE DRAINING YELLOW URINE, 100 CC OUTPUT. FLEXI SEAL IS 
INTACT. GTF NEPRO RUNNING @ 35 ML/HR, TOLERATING WELL. IV ACCESS ON R IJ SULEMA HD CATH, R 
FEMORAL TRIPLE LUMEN, INTACT. ROUTINE MEDS WERE GIVEN AS ORDERED. SAFETY MEASURES 
MAINTAINED. BED IN LOWEST POSITION, BRAKES LOCKED. SIDE RAILS UP X2. CALL LIGHT WITHIN 
REACH. WILL ENDORSE TO NIGHT SHIFT FOR CONTINUITY OF CARE.

## 2021-02-22 NOTE — NUR
TELE RN NOTES



SPOKE WITH DR. CROWDER, 1 UNIT OF PRBC TO BE TRANSFUSE TOMORROW 1/23 TOGETHER WITH HEMODIALYSIS

## 2021-02-23 ENCOUNTER — HOSPITAL ENCOUNTER (INPATIENT)
Dept: HOSPITAL 54 - SA | Age: 81
LOS: 7 days | Discharge: ANOTHER HOSPITAL | DRG: 207 | End: 2021-03-02
Attending: INTERNAL MEDICINE | Admitting: INTERNAL MEDICINE
Payer: MEDICARE

## 2021-02-23 VITALS — SYSTOLIC BLOOD PRESSURE: 158 MMHG | DIASTOLIC BLOOD PRESSURE: 75 MMHG

## 2021-02-23 VITALS — DIASTOLIC BLOOD PRESSURE: 72 MMHG | SYSTOLIC BLOOD PRESSURE: 147 MMHG

## 2021-02-23 VITALS — SYSTOLIC BLOOD PRESSURE: 101 MMHG | DIASTOLIC BLOOD PRESSURE: 80 MMHG

## 2021-02-23 VITALS — DIASTOLIC BLOOD PRESSURE: 44 MMHG | SYSTOLIC BLOOD PRESSURE: 88 MMHG

## 2021-02-23 VITALS — DIASTOLIC BLOOD PRESSURE: 53 MMHG | SYSTOLIC BLOOD PRESSURE: 92 MMHG

## 2021-02-23 VITALS — BODY MASS INDEX: 27.23 KG/M2 | WEIGHT: 148 LBS | HEIGHT: 62 IN

## 2021-02-23 DIAGNOSIS — Z22.322: ICD-10-CM

## 2021-02-23 DIAGNOSIS — Z99.11: ICD-10-CM

## 2021-02-23 DIAGNOSIS — N17.0: ICD-10-CM

## 2021-02-23 DIAGNOSIS — Z99.2: ICD-10-CM

## 2021-02-23 DIAGNOSIS — L97.429: ICD-10-CM

## 2021-02-23 DIAGNOSIS — I13.2: ICD-10-CM

## 2021-02-23 DIAGNOSIS — R00.1: ICD-10-CM

## 2021-02-23 DIAGNOSIS — E11.40: ICD-10-CM

## 2021-02-23 DIAGNOSIS — S71.112A: ICD-10-CM

## 2021-02-23 DIAGNOSIS — E11.621: ICD-10-CM

## 2021-02-23 DIAGNOSIS — J96.21: Primary | ICD-10-CM

## 2021-02-23 DIAGNOSIS — S51.812A: ICD-10-CM

## 2021-02-23 DIAGNOSIS — G93.41: ICD-10-CM

## 2021-02-23 DIAGNOSIS — L97.529: ICD-10-CM

## 2021-02-23 DIAGNOSIS — E11.51: ICD-10-CM

## 2021-02-23 DIAGNOSIS — M10.9: ICD-10-CM

## 2021-02-23 DIAGNOSIS — S71.111A: ICD-10-CM

## 2021-02-23 DIAGNOSIS — E87.5: ICD-10-CM

## 2021-02-23 DIAGNOSIS — E11.22: ICD-10-CM

## 2021-02-23 DIAGNOSIS — K44.9: ICD-10-CM

## 2021-02-23 DIAGNOSIS — Z93.1: ICD-10-CM

## 2021-02-23 DIAGNOSIS — L89.153: ICD-10-CM

## 2021-02-23 DIAGNOSIS — D64.9: ICD-10-CM

## 2021-02-23 DIAGNOSIS — R13.10: ICD-10-CM

## 2021-02-23 DIAGNOSIS — J96.22: ICD-10-CM

## 2021-02-23 DIAGNOSIS — Z79.899: ICD-10-CM
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
## 2021-02-23 DIAGNOSIS — S51.811A: ICD-10-CM

## 2021-02-23 DIAGNOSIS — I95.9: ICD-10-CM

## 2021-02-23 DIAGNOSIS — G92: ICD-10-CM

## 2021-02-23 DIAGNOSIS — N18.30: ICD-10-CM

## 2021-02-23 DIAGNOSIS — Z79.4: ICD-10-CM

## 2021-02-23 DIAGNOSIS — I50.42: ICD-10-CM

## 2021-02-23 DIAGNOSIS — J32.0: ICD-10-CM

## 2021-02-23 DIAGNOSIS — J90: ICD-10-CM

## 2021-02-23 DIAGNOSIS — Z79.82: ICD-10-CM

## 2021-02-23 DIAGNOSIS — G20: ICD-10-CM

## 2021-02-23 DIAGNOSIS — L30.4: ICD-10-CM

## 2021-02-23 DIAGNOSIS — F02.80: ICD-10-CM

## 2021-02-23 DIAGNOSIS — I70.0: ICD-10-CM

## 2021-02-23 DIAGNOSIS — L89.143: ICD-10-CM

## 2021-02-23 DIAGNOSIS — L89.626: ICD-10-CM

## 2021-02-23 DIAGNOSIS — K21.9: ICD-10-CM

## 2021-02-23 DIAGNOSIS — Z93.0: ICD-10-CM

## 2021-02-23 DIAGNOSIS — K29.40: ICD-10-CM

## 2021-02-23 DIAGNOSIS — Z88.5: ICD-10-CM

## 2021-02-23 LAB
BUN SERPL-MCNC: 46 MG/DL (ref 7–18)
CALCIUM SERPL-MCNC: 8.5 MG/DL (ref 8.5–10.1)
CHLORIDE SERPL-SCNC: 95 MMOL/L (ref 98–107)
CO2 SERPL-SCNC: 25 MMOL/L (ref 21–32)
CREAT SERPL-MCNC: 2.3 MG/DL (ref 0.6–1.3)
GLUCOSE SERPL-MCNC: 193 MG/DL (ref 74–106)
POTASSIUM SERPL-SCNC: 3.8 MMOL/L (ref 3.5–5.1)
SODIUM SERPL-SCNC: 130 MMOL/L (ref 136–145)

## 2021-02-23 PROCEDURE — A4623 TRACHEOSTOMY INNER CANNULA: HCPCS

## 2021-02-23 PROCEDURE — A7526 TRACHEOSTOMY TUBE COLLAR: HCPCS

## 2021-02-23 PROCEDURE — U0003 INFECTIOUS AGENT DETECTION BY NUCLEIC ACID (DNA OR RNA); SEVERE ACUTE RESPIRATORY SYNDROME CORONAVIRUS 2 (SARS-COV-2) (CORONAVIRUS DISEASE [COVID-19]), AMPLIFIED PROBE TECHNIQUE, MAKING USE OF HIGH THROUGHPUT TECHNOLOGIES AS DESCRIBED BY CMS-2020-01-R: HCPCS

## 2021-02-23 PROCEDURE — 5A1955Z RESPIRATORY VENTILATION, GREATER THAN 96 CONSECUTIVE HOURS: ICD-10-PCS | Performed by: INTERNAL MEDICINE

## 2021-02-23 PROCEDURE — A4217 STERILE WATER/SALINE, 500 ML: HCPCS

## 2021-02-23 PROCEDURE — A6248 HYDROGEL DRSG GEL FILLER: HCPCS

## 2021-02-23 PROCEDURE — A6253 ABSORPT DRG > 48 SQ IN W/O B: HCPCS

## 2021-02-23 RX ADMIN — Medication SCH APPLIC: at 09:00

## 2021-02-23 RX ADMIN — ACETYLCYSTEINE SCH MG: 100 INHALANT RESPIRATORY (INHALATION) at 13:40

## 2021-02-23 RX ADMIN — METOCLOPRAMIDE HYDROCHLORIDE SCH MG: 5 SOLUTION ORAL at 21:30

## 2021-02-23 RX ADMIN — INSULIN HUMAN PRN UNIT: 100 INJECTION, SOLUTION PARENTERAL at 18:10

## 2021-02-23 RX ADMIN — ACETYLCYSTEINE SCH MG: 100 INHALANT RESPIRATORY (INHALATION) at 02:14

## 2021-02-23 RX ADMIN — SODIUM CHLORIDE SCH MG: 9 INJECTION, SOLUTION INTRAVENOUS at 04:36

## 2021-02-23 RX ADMIN — MISOPROSTOL SCH MCG: 100 TABLET ORAL at 17:26

## 2021-02-23 RX ADMIN — Medication SCH EACH: at 00:19

## 2021-02-23 RX ADMIN — Medication SCH MG: at 13:40

## 2021-02-23 RX ADMIN — Medication SCH GM: at 17:26

## 2021-02-23 RX ADMIN — MISOPROSTOL SCH MCG: 100 TABLET ORAL at 13:00

## 2021-02-23 RX ADMIN — ATORVASTATIN CALCIUM SCH MG: 40 TABLET, FILM COATED ORAL at 22:35

## 2021-02-23 RX ADMIN — Medication SCH EACH: at 18:06

## 2021-02-23 RX ADMIN — PIPERACILLIN SODIUM AND TAZOBACTAM SODIUM SCH MLS/HR: .25; 2 INJECTION, POWDER, LYOPHILIZED, FOR SOLUTION INTRAVENOUS at 05:12

## 2021-02-23 RX ADMIN — Medication SCH MG: at 08:00

## 2021-02-23 RX ADMIN — INSULIN GLARGINE SCH UNIT: 100 INJECTION, SOLUTION SUBCUTANEOUS at 22:00

## 2021-02-23 RX ADMIN — Medication SCH OZ: at 09:00

## 2021-02-23 RX ADMIN — DONEPEZIL HYDROCHLORIDE SCH MG: 5 TABLET ORAL at 22:35

## 2021-02-23 RX ADMIN — Medication SCH GM: at 09:00

## 2021-02-23 RX ADMIN — ACETYLCYSTEINE SCH MG: 100 INHALANT RESPIRATORY (INHALATION) at 08:00

## 2021-02-23 RX ADMIN — MISOPROSTOL SCH MCG: 100 TABLET ORAL at 09:00

## 2021-02-23 RX ADMIN — MISOPROSTOL SCH MCG: 100 TABLET ORAL at 21:30

## 2021-02-23 RX ADMIN — SODIUM CHLORIDE SCH MG: 9 INJECTION, SOLUTION INTRAVENOUS at 10:00

## 2021-02-23 RX ADMIN — Medication SCH EACH: at 12:00

## 2021-02-23 RX ADMIN — PIPERACILLIN SODIUM AND TAZOBACTAM SODIUM SCH MLS/HR: .25; 2 INJECTION, POWDER, LYOPHILIZED, FOR SOLUTION INTRAVENOUS at 11:00

## 2021-02-23 RX ADMIN — SODIUM CHLORIDE SCH MG: 9 INJECTION, SOLUTION INTRAVENOUS at 18:23

## 2021-02-23 RX ADMIN — PIPERACILLIN SODIUM AND TAZOBACTAM SODIUM SCH MLS/HR: .25; 2 INJECTION, POWDER, LYOPHILIZED, FOR SOLUTION INTRAVENOUS at 17:50

## 2021-02-23 RX ADMIN — FAMOTIDINE SCH MG: 10 INJECTION INTRAVENOUS at 09:00

## 2021-02-23 RX ADMIN — Medication SCH MG: at 01:33

## 2021-02-23 RX ADMIN — Medication SCH EACH: at 06:54

## 2021-02-23 NOTE — NUR
Spoke with subacute nurse Rhea gave report on patient/ Patient stable for transport with 
vent. RT notified for patient readiness for transport.

## 2021-02-23 NOTE — NUR
RN NOTES

New admission, 80yr old female admitted from 3W via bed under the care of LINCOLN Almanza. 
Dx: hypoxemic and hypercarbic respiratory failure, tracheostomy, ventilator dependent, 
dysphagia, severe sepsis, PNA, UTI, ESRD, DM, anemia, GERD, Aortic atherosclerosis, heart 
failure, severe prtein malnutrition, COPD, encephalopathy, Parkinsons disease, dementia, 
HX: Covid-19 infection, Hx gout. Pt appears to be alert and oriented x2, able to nod yes and 
no when asked simple questions. On ventilator with prescribed settings. GTF Nepro @35ml/hr. 
F/C in place and draining clear yellow urine. Rectal tube in place, intact attached to 
drainage bag. Routine body assessment done. All orders reviewed and verified with Dr. Maki. Pt is a dialysis pt with IJ Ronny catheter. Right femoral triple lumen central 
line in place, intact and patent. Per nurse giving report from 3W, pt had dialysis today.  
HD 3/week T-TH-Sat, at  [facility], under the care of Nephrologist, Dr. aLra. On 
Zozyn 2.25gm IV q6hr for sepsis and Vancomycin 1gm IV q48hr for sepsis. Pt in stable 
condition. Will continue to monitor.

## 2021-02-23 NOTE — NUR
PRIMARY ASSESSMENT UNCHANGED. SMALL AMT OF RED BLOOD NOTED AT G- TUBE INSERTION SITE. 
CLEANED WITH NS AND APPLIED GAUZE DRESSING. NO S/S OF ACUTE DISTRESS. TOLERATED TUBE FEEDING 
WELL AND NO RESIDUAL NOTED.# 8 SHILEY IN PLACE. NO S/S OF ACUTE DISTRESS, SOB OR DYSPNEA.

## 2021-02-24 VITALS — SYSTOLIC BLOOD PRESSURE: 162 MMHG | DIASTOLIC BLOOD PRESSURE: 92 MMHG

## 2021-02-24 VITALS — DIASTOLIC BLOOD PRESSURE: 78 MMHG | SYSTOLIC BLOOD PRESSURE: 109 MMHG

## 2021-02-24 VITALS — DIASTOLIC BLOOD PRESSURE: 71 MMHG | SYSTOLIC BLOOD PRESSURE: 141 MMHG

## 2021-02-24 RX ADMIN — Medication SCH EACH: at 00:16

## 2021-02-24 RX ADMIN — DONEPEZIL HYDROCHLORIDE SCH MG: 5 TABLET ORAL at 22:00

## 2021-02-24 RX ADMIN — Medication SCH MG: at 19:32

## 2021-02-24 RX ADMIN — METOCLOPRAMIDE HYDROCHLORIDE SCH MG: 5 SOLUTION ORAL at 05:21

## 2021-02-24 RX ADMIN — HYDROGEN PEROXIDE SCH ML: 2.65 LIQUID TOPICAL at 19:32

## 2021-02-24 RX ADMIN — Medication SCH EACH: at 12:00

## 2021-02-24 RX ADMIN — INSULIN HUMAN PRN UNIT: 100 INJECTION, SOLUTION PARENTERAL at 19:07

## 2021-02-24 RX ADMIN — PIPERACILLIN SODIUM AND TAZOBACTAM SODIUM SCH MLS/HR: .25; 2 INJECTION, POWDER, LYOPHILIZED, FOR SOLUTION INTRAVENOUS at 12:00

## 2021-02-24 RX ADMIN — HYDROGEN PEROXIDE SCH ML: 2.65 LIQUID TOPICAL at 08:39

## 2021-02-24 RX ADMIN — MISOPROSTOL SCH MCG: 100 TABLET ORAL at 17:00

## 2021-02-24 RX ADMIN — ASPIRIN 81 MG SCH MG: 81 TABLET ORAL at 09:00

## 2021-02-24 RX ADMIN — ACETYLCYSTEINE SCH MG: 100 INHALANT RESPIRATORY (INHALATION) at 23:37

## 2021-02-24 RX ADMIN — DOCUSATE SODIUM SCH MG: 50 LIQUID ORAL at 17:00

## 2021-02-24 RX ADMIN — ACETYLCYSTEINE SCH MG: 100 INHALANT RESPIRATORY (INHALATION) at 15:13

## 2021-02-24 RX ADMIN — Medication SCH GM: at 09:00

## 2021-02-24 RX ADMIN — DULOXETINE HYDROCHLORIDE SCH MG: 30 CAPSULE, DELAYED RELEASE ORAL at 09:00

## 2021-02-24 RX ADMIN — PIPERACILLIN SODIUM AND TAZOBACTAM SODIUM SCH MLS/HR: .25; 2 INJECTION, POWDER, LYOPHILIZED, FOR SOLUTION INTRAVENOUS at 06:00

## 2021-02-24 RX ADMIN — FAMOTIDINE SCH MG: 20 TABLET, FILM COATED ORAL at 09:00

## 2021-02-24 RX ADMIN — ACETYLCYSTEINE SCH MG: 100 INHALANT RESPIRATORY (INHALATION) at 07:41

## 2021-02-24 RX ADMIN — INSULIN HUMAN PRN UNIT: 100 INJECTION, SOLUTION PARENTERAL at 00:20

## 2021-02-24 RX ADMIN — Medication SCH MG: at 01:30

## 2021-02-24 RX ADMIN — Medication SCH APPLIC: at 22:00

## 2021-02-24 RX ADMIN — PIPERACILLIN SODIUM AND TAZOBACTAM SODIUM SCH MLS/HR: .25; 2 INJECTION, POWDER, LYOPHILIZED, FOR SOLUTION INTRAVENOUS at 18:57

## 2021-02-24 RX ADMIN — Medication SCH EACH: at 05:21

## 2021-02-24 RX ADMIN — INSULIN HUMAN PRN UNIT: 100 INJECTION, SOLUTION PARENTERAL at 13:29

## 2021-02-24 RX ADMIN — DONEPEZIL HYDROCHLORIDE SCH MG: 5 TABLET ORAL at 21:33

## 2021-02-24 RX ADMIN — INSULIN HUMAN PRN UNIT: 100 INJECTION, SOLUTION PARENTERAL at 05:22

## 2021-02-24 RX ADMIN — MISOPROSTOL SCH MCG: 100 TABLET ORAL at 09:00

## 2021-02-24 RX ADMIN — MISOPROSTOL SCH MCG: 100 TABLET ORAL at 13:00

## 2021-02-24 RX ADMIN — MISOPROSTOL SCH MCG: 100 TABLET ORAL at 21:32

## 2021-02-24 RX ADMIN — Medication SCH MG: at 14:16

## 2021-02-24 RX ADMIN — INSULIN GLARGINE SCH UNIT: 100 INJECTION, SOLUTION SUBCUTANEOUS at 21:57

## 2021-02-24 RX ADMIN — ATORVASTATIN CALCIUM SCH MG: 40 TABLET, FILM COATED ORAL at 21:32

## 2021-02-24 RX ADMIN — PIPERACILLIN SODIUM AND TAZOBACTAM SODIUM SCH MLS/HR: .25; 2 INJECTION, POWDER, LYOPHILIZED, FOR SOLUTION INTRAVENOUS at 00:00

## 2021-02-24 RX ADMIN — Medication SCH EACH: at 18:00

## 2021-02-24 RX ADMIN — METOCLOPRAMIDE HYDROCHLORIDE SCH MG: 5 SOLUTION ORAL at 13:28

## 2021-02-24 RX ADMIN — METOCLOPRAMIDE HYDROCHLORIDE SCH MG: 5 SOLUTION ORAL at 21:32

## 2021-02-24 RX ADMIN — AMLODIPINE BESYLATE SCH MG: 10 TABLET ORAL at 09:00

## 2021-02-24 RX ADMIN — DOCUSATE SODIUM SCH MG: 50 LIQUID ORAL at 09:00

## 2021-02-24 RX ADMIN — Medication SCH MG: at 07:41

## 2021-02-24 NOTE — NUR
Resident in bed with eyes close but respond to stimulation and able to follow simple command 
in English but prefers Yoruba.  Resident on ventilator, well tolerated prescribed setting, 
GT feeding on going, with no s/s of nausea or vomiting, HOB elevated.  Resident with 
multiple skin issues.  Awaiting for wound consult/evaluation.  On contact/droplet isolation 
for MRSA nares and Covid-19 precaution.  Isolation precaution, good handwashing and required 
PPE use by all staff when entering patient's room.  SSD to arrange for dialysis transport.  
Dialysis access site in the R IJ with dressing, no bleeding noted.   R femoral line triple 
lumen, patent with dressing in place.

## 2021-02-24 NOTE — NUR
According to pharmacist Alexandr, Fosamax cannot be crushed, however he said that is available 
in liquid form and can be given daily with dose adjusted to 10 mg. daily.

## 2021-02-24 NOTE — NUR
Insulin Lantus 36 units not given, BS 70 ,  Charge nurse aware. No sign of hypoglycemia 
noted at this time.  Will closely monitor.

## 2021-02-24 NOTE — NUR
Intake Paperwork: 

REMA called & spoke to the pt.'s son, Ranjit Weston [phone] to complete biopsychosocial 
& gather collateral information. Per Ranjit, he is an only child and will be the responsible 
party. Per Ranjit he is not the conservator or power of . REMA emailed Ranjit 
informational packets for: patient's Bill of rights, Conservatorship, [employer] 
Directive, COVID -19 vaccine to [email or web address]. 



REMA mailed the intake paperwork: (Patient Right's Acknowledgement, Documentation of Preferred 
Intensity of Care, Conditions of Admission, CDPH Agreement, and Voluntary Prior Express 
Consent form, Important Message from Medicare) to Ranjit at [[address] [facility], [id number]]. Ranjit agreeable to complete paperwork & mail back with pre-paid 
envelope attached. Ranjit expressed that the pt.'s code status should be full Code: Maximum 
Treatment & CPR. Noted. REMA will be available as needed.

## 2021-02-24 NOTE — NUR
WOUND CARE CONSULT: PT SEEN FOR SKIN ASSESSMENT AND NOTED TO HAVE MULTIPLE WOUNDS AND SKIN 
ISSUES, PRESENT ON ADMISSION INCLUDING LEFT HEEL DEEP TISSUE INJURY IN EVOLUTION (MEASURING 
2CM X 2CM X 0.1CM, RED/PINK WITH SCANT SEROUS DRAINAGE, NO ODOR) DISCOLORATION TO DISTAL 
LEFT 1ST AND 2ND TOES AND TO LEFT DORSAL FOOT AND LOWER LEGS, OPEN AREAS OF SKIN TO LEFT 
POSTERIOR THIGH, INNER THIGHS, RT HIP AREA (MEASURING 3CMX 0.3CM X 0.1CM) AND SACRAL STAGE 3 
ULCER (MEASURING 10CM X 8CM X 0.1CM) AND LEFT LOWER BACK STAGE 3 ULCER (MEASURING 1.3CM X 
2CM X 0.1CM). PT ALSO NOTED TO HAVE RAISED DISCOLORED AREAS TO ARMS AND HANDS, PRESENT ON 
ADMISSION. SKIN IS FRAGILE AND TEARS EASILY. THERE IS LEAKAGE AT TIMES AROUND RECTAL TUBE. 
DR LEON NOTIFIED OF SURGICAL CONSULT REQUEST. RECOMMENDATIONS MADE FOR SKIN PROTECTION 
AND LEFT HEEL WOUND CARE. DISCUSSED WITH NURSING STAFF. DEFER TO SURGICAL TEAM FOR ALL OTHER 
WOUND TREATMENT. PT IS ON FIRST  STEP CIRRUS LOW AIRLOSS MATTRESS. RT HIP WOUND IS RED/PINK 
IN COLOR WITH SCANT PINK DRAINAGE, NO ODOR. SACRAL ULCER IS RED IN COLOR WITH SOME 
SEROSANGUINOUS DRAINAGE, LEFT LOWER BACK WOUND IS PINK IN COLOR, NO DRAINAGE. NO ODOR NOTED 
TO ANY WOUNDS UPON ASSESSMENT TODAY. MD IN AGREEMENT WITH PLAN OF CARE.

## 2021-02-24 NOTE — NUR
Initial Patient assessment: 



The pt. is a 80-year old [facility] female recently admitted to . SW met with pt. at 
bedside. The pt. was sleeping & rousable to physical touch. The pt. appears clean with 
bruising in both arms. Pt. made appropriate eye contact & SW observed pt. smiling. The pt. 
is non-verbal & communicates via mouthing and nodding "yes/no". The pt. is alert & oriented 
x0. Pt. is confused. Pt.'s memory appears to be impaired. However, pt. able to understand 
others. SW provided reality orientation. Pt. expressed understanding. Pt. primary language 
is Surinamese & son, Ranjit stated the pt. can understand some English.  Pt. recognized pt.'s 
son's name. The pt. denies hallucinations. Pt. denies SI/HI. Pt. agreeable to do video call 
with son at a later time. SW will monitor this pt.'s biopsychosocial needs & support patient 
and family as needed.

## 2021-02-24 NOTE — NUR
Seen and examined by Dr. Robertson with new order given to change vent setting to SIMV 4, Peep 
5, PS 15, ABG 1 hour post vent change and chest X-ray in AM.

## 2021-02-24 NOTE — NUR
Called son Ranjit,  and discussed [facility] Resident Care Plan over the phone, thankful for 
the information.

## 2021-02-24 NOTE — NUR
Health teachings provided to staff to observe contact droplet isolation per protocol, 
patient admitted last night from 3 W, all precautions observed, good hand washing and proper 
PPE.

## 2021-02-25 VITALS — DIASTOLIC BLOOD PRESSURE: 51 MMHG | SYSTOLIC BLOOD PRESSURE: 103 MMHG

## 2021-02-25 VITALS — DIASTOLIC BLOOD PRESSURE: 42 MMHG | SYSTOLIC BLOOD PRESSURE: 113 MMHG

## 2021-02-25 VITALS — SYSTOLIC BLOOD PRESSURE: 126 MMHG | DIASTOLIC BLOOD PRESSURE: 61 MMHG

## 2021-02-25 VITALS — SYSTOLIC BLOOD PRESSURE: 152 MMHG | DIASTOLIC BLOOD PRESSURE: 87 MMHG

## 2021-02-25 VITALS — SYSTOLIC BLOOD PRESSURE: 131 MMHG | DIASTOLIC BLOOD PRESSURE: 75 MMHG

## 2021-02-25 LAB
ALBUMIN SERPL BCP-MCNC: 1.3 G/DL (ref 3.4–5)
BUN SERPL-MCNC: 44 MG/DL (ref 7–18)
CALCIUM SERPL-MCNC: 8.6 MG/DL (ref 8.5–10.1)
CHLORIDE SERPL-SCNC: 94 MMOL/L (ref 98–107)
CO2 SERPL-SCNC: 29 MMOL/L (ref 21–32)
CREAT SERPL-MCNC: 2.3 MG/DL (ref 0.6–1.3)
GLUCOSE SERPL-MCNC: 75 MG/DL (ref 74–106)
POTASSIUM SERPL-SCNC: 3.5 MMOL/L (ref 3.5–5.1)
PREALB SERPL-MCNC: 11.8 MG/DL (ref 18–35.7)
SODIUM SERPL-SCNC: 131 MMOL/L (ref 136–145)

## 2021-02-25 RX ADMIN — DOCUSATE SODIUM SCH MG: 50 LIQUID ORAL at 09:53

## 2021-02-25 RX ADMIN — PIPERACILLIN SODIUM AND TAZOBACTAM SODIUM SCH MLS/HR: .25; 2 INJECTION, POWDER, LYOPHILIZED, FOR SOLUTION INTRAVENOUS at 00:08

## 2021-02-25 RX ADMIN — PIPERACILLIN SODIUM AND TAZOBACTAM SODIUM SCH MLS/HR: .25; 2 INJECTION, POWDER, LYOPHILIZED, FOR SOLUTION INTRAVENOUS at 12:27

## 2021-02-25 RX ADMIN — Medication SCH APPLIC: at 21:34

## 2021-02-25 RX ADMIN — MISOPROSTOL SCH MCG: 100 TABLET ORAL at 17:00

## 2021-02-25 RX ADMIN — INSULIN HUMAN PRN UNIT: 100 INJECTION, SOLUTION PARENTERAL at 00:24

## 2021-02-25 RX ADMIN — Medication SCH EACH: at 12:40

## 2021-02-25 RX ADMIN — DEXTROSE MONOHYDRATE SCH MLS/HR: 50 INJECTION, SOLUTION INTRAVENOUS at 20:59

## 2021-02-25 RX ADMIN — Medication SCH APPLIC: at 10:30

## 2021-02-25 RX ADMIN — INSULIN HUMAN PRN UNIT: 100 INJECTION, SOLUTION PARENTERAL at 13:07

## 2021-02-25 RX ADMIN — ACETYLCYSTEINE SCH MG: 100 INHALANT RESPIRATORY (INHALATION) at 15:05

## 2021-02-25 RX ADMIN — METOCLOPRAMIDE HYDROCHLORIDE SCH MG: 5 SOLUTION ORAL at 13:07

## 2021-02-25 RX ADMIN — Medication SCH MG: at 13:45

## 2021-02-25 RX ADMIN — AMLODIPINE BESYLATE SCH MG: 10 TABLET ORAL at 09:53

## 2021-02-25 RX ADMIN — Medication SCH MG: at 01:35

## 2021-02-25 RX ADMIN — MISOPROSTOL SCH MCG: 100 TABLET ORAL at 13:06

## 2021-02-25 RX ADMIN — PIPERACILLIN SODIUM AND TAZOBACTAM SODIUM SCH MLS/HR: .25; 2 INJECTION, POWDER, LYOPHILIZED, FOR SOLUTION INTRAVENOUS at 19:15

## 2021-02-25 RX ADMIN — ZINC OXIDE SCH GM: 200 OINTMENT TOPICAL at 21:34

## 2021-02-25 RX ADMIN — MISOPROSTOL SCH MCG: 100 TABLET ORAL at 09:53

## 2021-02-25 RX ADMIN — MISOPROSTOL SCH MCG: 100 TABLET ORAL at 21:34

## 2021-02-25 RX ADMIN — METOCLOPRAMIDE HYDROCHLORIDE SCH MG: 5 SOLUTION ORAL at 21:34

## 2021-02-25 RX ADMIN — METOCLOPRAMIDE HYDROCHLORIDE SCH MG: 5 SOLUTION ORAL at 05:34

## 2021-02-25 RX ADMIN — ACETYLCYSTEINE SCH MG: 100 INHALANT RESPIRATORY (INHALATION) at 23:20

## 2021-02-25 RX ADMIN — Medication SCH EACH: at 05:34

## 2021-02-25 RX ADMIN — ATORVASTATIN CALCIUM SCH MG: 40 TABLET, FILM COATED ORAL at 21:34

## 2021-02-25 RX ADMIN — DULOXETINE HYDROCHLORIDE SCH MG: 30 CAPSULE, DELAYED RELEASE ORAL at 09:53

## 2021-02-25 RX ADMIN — FAMOTIDINE SCH MG: 20 TABLET, FILM COATED ORAL at 09:53

## 2021-02-25 RX ADMIN — HYDROGEN PEROXIDE SCH ML: 2.65 LIQUID TOPICAL at 19:50

## 2021-02-25 RX ADMIN — Medication SCH EACH: at 00:24

## 2021-02-25 RX ADMIN — HYDROGEN PEROXIDE SCH ML: 2.65 LIQUID TOPICAL at 09:14

## 2021-02-25 RX ADMIN — DONEPEZIL HYDROCHLORIDE SCH MG: 5 TABLET ORAL at 21:34

## 2021-02-25 RX ADMIN — ASPIRIN 81 MG SCH MG: 81 TABLET ORAL at 09:53

## 2021-02-25 RX ADMIN — INSULIN GLARGINE SCH UNIT: 100 INJECTION, SOLUTION SUBCUTANEOUS at 21:36

## 2021-02-25 RX ADMIN — INSULIN HUMAN PRN UNIT: 100 INJECTION, SOLUTION PARENTERAL at 19:07

## 2021-02-25 RX ADMIN — DOCUSATE SODIUM SCH MG: 50 LIQUID ORAL at 17:00

## 2021-02-25 RX ADMIN — Medication SCH MG: at 07:31

## 2021-02-25 RX ADMIN — Medication SCH EACH: at 18:55

## 2021-02-25 RX ADMIN — Medication SCH MG: at 05:34

## 2021-02-25 RX ADMIN — Medication SCH MG: at 19:50

## 2021-02-25 RX ADMIN — PIPERACILLIN SODIUM AND TAZOBACTAM SODIUM SCH MLS/HR: .25; 2 INJECTION, POWDER, LYOPHILIZED, FOR SOLUTION INTRAVENOUS at 06:09

## 2021-02-25 RX ADMIN — INSULIN HUMAN PRN UNIT: 100 INJECTION, SOLUTION PARENTERAL at 05:34

## 2021-02-25 RX ADMIN — ACETYLCYSTEINE SCH MG: 100 INHALANT RESPIRATORY (INHALATION) at 07:31

## 2021-02-25 NOTE — NUR
2/25 & 2/27 HD Transport: 



REMA called  [facility] and spoke to Jamila who stated this pt. is scheduled for Tu/Th/Sat HD with 
chair time of 2:30pm -6 pm. 



REMA called [facility] [phone] as pt. has [facility] and set up [facility] via 
gurney, with RT & deep suctioning for 2/25 & 2/27.



 time will be  1:30 pm from [facility] and arrival time of 2:15 pm at  [facility] [[facility] [facility] [address], [place] [address]; [phone]]. 



Pt. will be picked up at  [facility] at 6 pm to return to [facility]. REMA notified charge nurse, Rhea. 



2/25 Confirmation #[id number]

2/27 Confirmation #[id number]

## 2021-02-25 NOTE — NUR
Asked Dr Robertson if pt should be placed on SIMV today since pt is going out for hemodialysis. 
Dr Robertson said to place pt on SIMV tomorrow. Notified RT Galenb.

## 2021-02-25 NOTE — NUR
Weaning order on hold after discussing with charge DON Dolan. Kelsi said to hold off on 
weaning since patient is going for dialysis treatment today. Patient will continue to be 
monitor.

## 2021-02-26 VITALS — SYSTOLIC BLOOD PRESSURE: 108 MMHG | DIASTOLIC BLOOD PRESSURE: 57 MMHG

## 2021-02-26 VITALS — SYSTOLIC BLOOD PRESSURE: 124 MMHG | DIASTOLIC BLOOD PRESSURE: 72 MMHG

## 2021-02-26 VITALS — DIASTOLIC BLOOD PRESSURE: 28 MMHG | SYSTOLIC BLOOD PRESSURE: 114 MMHG

## 2021-02-26 VITALS — SYSTOLIC BLOOD PRESSURE: 112 MMHG | DIASTOLIC BLOOD PRESSURE: 35 MMHG

## 2021-02-26 LAB
BASE EXCESS BLDA CALC-SCNC: 3.2 MMOL/L
BUN SERPL-MCNC: 27 MG/DL (ref 7–18)
CALCIUM SERPL-MCNC: 8.5 MG/DL (ref 8.5–10.1)
CHLORIDE SERPL-SCNC: 96 MMOL/L (ref 98–107)
CO2 SERPL-SCNC: 28 MMOL/L (ref 21–32)
CREAT SERPL-MCNC: 1.6 MG/DL (ref 0.6–1.3)
DO-HGB MFR BLDA: 178.9 MMHG
GLUCOSE SERPL-MCNC: 127 MG/DL (ref 74–106)
INHALED O2 CONCENTRATION: 40 %
INTRINSIC PEEP RESPIRATORY: 5 CM H2O
PCO2 TEMP ADJ BLDA: 40.8 MMHG (ref 35–45)
PEEP SETTING VENT: 400 ML
PH TEMP ADJ BLDA: 7.45 [PH] (ref 7.35–7.45)
PO2 TEMP ADJ BLDA: 59.4 MMHG (ref 75–100)
POTASSIUM SERPL-SCNC: 2.9 MMOL/L (ref 3.5–5.1)
SAO2 % BLDA: 91.9 % (ref 92–98.5)
SET RATE, BG: 4
SODIUM SERPL-SCNC: 134 MMOL/L (ref 136–145)
VENTILATION MODE VENT: (no result)

## 2021-02-26 RX ADMIN — ZINC OXIDE SCH APPLIC: 200 OINTMENT TOPICAL at 23:00

## 2021-02-26 RX ADMIN — ACETYLCYSTEINE SCH MG: 100 INHALANT RESPIRATORY (INHALATION) at 08:00

## 2021-02-26 RX ADMIN — HYDROGEN PEROXIDE SCH ML: 2.65 LIQUID TOPICAL at 21:00

## 2021-02-26 RX ADMIN — Medication SCH OZ: at 09:00

## 2021-02-26 RX ADMIN — PIPERACILLIN SODIUM AND TAZOBACTAM SODIUM SCH MLS/HR: .25; 2 INJECTION, POWDER, LYOPHILIZED, FOR SOLUTION INTRAVENOUS at 12:50

## 2021-02-26 RX ADMIN — HYDROMORPHONE HYDROCHLORIDE SCH MG: 2 TABLET ORAL at 22:30

## 2021-02-26 RX ADMIN — INSULIN GLARGINE SCH UNIT: 100 INJECTION, SOLUTION SUBCUTANEOUS at 21:50

## 2021-02-26 RX ADMIN — Medication SCH EACH: at 23:47

## 2021-02-26 RX ADMIN — Medication SCH MG: at 19:50

## 2021-02-26 RX ADMIN — POTASSIUM CHLORIDE SCH MEQ: 1.5 POWDER, FOR SOLUTION ORAL at 11:30

## 2021-02-26 RX ADMIN — DOCUSATE SODIUM SCH MG: 50 LIQUID ORAL at 09:32

## 2021-02-26 RX ADMIN — INSULIN HUMAN PRN UNIT: 100 INJECTION, SOLUTION PARENTERAL at 23:47

## 2021-02-26 RX ADMIN — Medication SCH APPLIC: at 23:00

## 2021-02-26 RX ADMIN — AMLODIPINE BESYLATE SCH MG: 10 TABLET ORAL at 09:00

## 2021-02-26 RX ADMIN — ATORVASTATIN CALCIUM SCH MG: 40 TABLET, FILM COATED ORAL at 21:33

## 2021-02-26 RX ADMIN — Medication SCH MG: at 13:56

## 2021-02-26 RX ADMIN — METOCLOPRAMIDE HYDROCHLORIDE SCH MG: 5 SOLUTION ORAL at 12:55

## 2021-02-26 RX ADMIN — INSULIN HUMAN PRN UNIT: 100 INJECTION, SOLUTION PARENTERAL at 18:57

## 2021-02-26 RX ADMIN — ACETYLCYSTEINE SCH MG: 100 INHALANT RESPIRATORY (INHALATION) at 23:30

## 2021-02-26 RX ADMIN — Medication SCH EACH: at 18:55

## 2021-02-26 RX ADMIN — DONEPEZIL HYDROCHLORIDE SCH MG: 5 TABLET ORAL at 21:33

## 2021-02-26 RX ADMIN — INSULIN HUMAN PRN UNIT: 100 INJECTION, SOLUTION PARENTERAL at 00:36

## 2021-02-26 RX ADMIN — Medication SCH APPLIC: at 09:00

## 2021-02-26 RX ADMIN — Medication SCH TAB: at 09:33

## 2021-02-26 RX ADMIN — INSULIN HUMAN PRN UNIT: 100 INJECTION, SOLUTION PARENTERAL at 12:56

## 2021-02-26 RX ADMIN — HYDROGEN PEROXIDE SCH ML: 2.65 LIQUID TOPICAL at 08:37

## 2021-02-26 RX ADMIN — ASPIRIN 81 MG SCH MG: 81 TABLET ORAL at 09:32

## 2021-02-26 RX ADMIN — PIPERACILLIN SODIUM AND TAZOBACTAM SODIUM SCH MLS/HR: .25; 2 INJECTION, POWDER, LYOPHILIZED, FOR SOLUTION INTRAVENOUS at 06:26

## 2021-02-26 RX ADMIN — Medication SCH MG: at 05:25

## 2021-02-26 RX ADMIN — DOCUSATE SODIUM SCH MG: 50 LIQUID ORAL at 17:31

## 2021-02-26 RX ADMIN — ACETYLCYSTEINE SCH MG: 100 INHALANT RESPIRATORY (INHALATION) at 15:57

## 2021-02-26 RX ADMIN — Medication SCH MG: at 08:00

## 2021-02-26 RX ADMIN — PIPERACILLIN SODIUM AND TAZOBACTAM SODIUM SCH MLS/HR: .25; 2 INJECTION, POWDER, LYOPHILIZED, FOR SOLUTION INTRAVENOUS at 17:55

## 2021-02-26 RX ADMIN — MISOPROSTOL SCH MCG: 100 TABLET ORAL at 17:31

## 2021-02-26 RX ADMIN — Medication SCH MG: at 01:19

## 2021-02-26 RX ADMIN — METOCLOPRAMIDE HYDROCHLORIDE SCH MG: 5 SOLUTION ORAL at 21:33

## 2021-02-26 RX ADMIN — POTASSIUM CHLORIDE SCH MEQ: 1.5 POWDER, FOR SOLUTION ORAL at 12:30

## 2021-02-26 RX ADMIN — Medication SCH EACH: at 00:36

## 2021-02-26 RX ADMIN — MISOPROSTOL SCH MCG: 100 TABLET ORAL at 09:33

## 2021-02-26 RX ADMIN — MISOPROSTOL SCH MCG: 100 TABLET ORAL at 12:55

## 2021-02-26 RX ADMIN — INSULIN HUMAN PRN UNIT: 100 INJECTION, SOLUTION PARENTERAL at 06:51

## 2021-02-26 RX ADMIN — FAMOTIDINE SCH MG: 20 TABLET, FILM COATED ORAL at 09:33

## 2021-02-26 RX ADMIN — Medication SCH PACKET: at 17:31

## 2021-02-26 RX ADMIN — PIPERACILLIN SODIUM AND TAZOBACTAM SODIUM SCH MLS/HR: .25; 2 INJECTION, POWDER, LYOPHILIZED, FOR SOLUTION INTRAVENOUS at 00:00

## 2021-02-26 RX ADMIN — METOCLOPRAMIDE HYDROCHLORIDE SCH MG: 5 SOLUTION ORAL at 05:25

## 2021-02-26 RX ADMIN — Medication SCH EACH: at 12:55

## 2021-02-26 RX ADMIN — MUPIROCIN SCH APPLIC: 20 OINTMENT TOPICAL at 21:33

## 2021-02-26 RX ADMIN — DULOXETINE HYDROCHLORIDE SCH MG: 30 CAPSULE, DELAYED RELEASE ORAL at 09:32

## 2021-02-26 RX ADMIN — MISOPROSTOL SCH MCG: 100 TABLET ORAL at 21:33

## 2021-02-26 RX ADMIN — Medication SCH EACH: at 06:51

## 2021-02-26 RX ADMIN — ZINC OXIDE SCH GM: 200 OINTMENT TOPICAL at 09:00

## 2021-02-26 NOTE — NUR
Left a message to Dr. Maki to clarify some of the medication's duration such as 
antibiotic and to get parameter for Norvasc.  Dr. Maki said he will be in later. Made a 
follow-up call to Radiology department informing that patient is in the room for BLE Doppler 
study order.

## 2021-02-26 NOTE — NUR
Relayed ABG result to Dr. Robertson with order to continue with current vent setting of SIMV 4 
Peep =15 PSA 15 but increase Fi02 45%.  Order carried out.  RT informed.

## 2021-02-26 NOTE — NUR
Patient seen and examined by Dr. Maki with new orders given,Dilaudid 2mg via gt prior 
to wound treatment q shift and 2mg  Q4 hours PRN for moderate to severe pain. Bactroban 
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 
less than 110. Zosyn and Vancomycin to complete dose x 14 days for sepsis.For CBC and CMP in 
AM.Will carry out orders.

-------------------------------------------------------------------------------

Addendum: 02/26/21 at 7367 by MICHELE GONG RN

-------------------------------------------------------------------------------

MD also made aware of arterial doppler of lower extremity awaiting for final results.

## 2021-02-26 NOTE — NUR
Facility Update:

REMA emailed the pt.'s son, Ranjit Weston ([email or web address]) informing him that: "No 
[facility] Sub-Acute residents or employees tested positive for COVID-19 this 
week. As recommended by [facility], all Sub-Acute residents & healthcare personnel will continue 
receiving surveillance testing. [facility] continues to follow 
infection control protocols and screen our residents and staff daily for symptoms.

## 2021-02-27 VITALS — DIASTOLIC BLOOD PRESSURE: 85 MMHG | SYSTOLIC BLOOD PRESSURE: 128 MMHG

## 2021-02-27 VITALS — SYSTOLIC BLOOD PRESSURE: 116 MMHG | DIASTOLIC BLOOD PRESSURE: 31 MMHG

## 2021-02-27 VITALS — DIASTOLIC BLOOD PRESSURE: 53 MMHG | SYSTOLIC BLOOD PRESSURE: 107 MMHG

## 2021-02-27 VITALS — DIASTOLIC BLOOD PRESSURE: 116 MMHG | SYSTOLIC BLOOD PRESSURE: 131 MMHG

## 2021-02-27 LAB
ALBUMIN SERPL BCP-MCNC: 1.3 G/DL (ref 3.4–5)
ALP SERPL-CCNC: 275 U/L (ref 46–116)
ALT SERPL W P-5'-P-CCNC: 24 U/L (ref 12–78)
AST SERPL W P-5'-P-CCNC: 38 U/L (ref 15–37)
BASOPHILS # BLD AUTO: 0.1 /CMM (ref 0–0.2)
BASOPHILS NFR BLD AUTO: 0.6 % (ref 0–2)
BILIRUB SERPL-MCNC: 0.8 MG/DL (ref 0.2–1)
BILIRUB UR QL STRIP: NEGATIVE
BUN SERPL-MCNC: 43 MG/DL (ref 7–18)
CALCIUM SERPL-MCNC: 8.7 MG/DL (ref 8.5–10.1)
CHLORIDE SERPL-SCNC: 97 MMOL/L (ref 98–107)
CO2 SERPL-SCNC: 27 MMOL/L (ref 21–32)
COLOR UR: YELLOW
CREAT SERPL-MCNC: 1.9 MG/DL (ref 0.6–1.3)
EOSINOPHIL NFR BLD AUTO: 2.4 % (ref 0–6)
EOSINOPHIL NFR BLD MANUAL: 3 % (ref 0–4)
GLUCOSE SERPL-MCNC: 71 MG/DL (ref 74–106)
GLUCOSE UR STRIP-MCNC: NEGATIVE MG/DL
HCT VFR BLD AUTO: 23 % (ref 33–45)
HGB BLD-MCNC: 7.7 G/DL (ref 11.5–14.8)
LEUKOCYTE ESTERASE UR QL STRIP: (no result)
LYMPHOCYTES NFR BLD AUTO: 1.2 /CMM (ref 0.8–4.8)
LYMPHOCYTES NFR BLD AUTO: 5.4 % (ref 20–44)
LYMPHOCYTES NFR BLD MANUAL: 5 % (ref 16–48)
MAGNESIUM SERPL-MCNC: 1.8 MG/DL (ref 1.8–2.4)
MCHC RBC AUTO-ENTMCNC: 33 G/DL (ref 31–36)
MCV RBC AUTO: 96 FL (ref 82–100)
METAMYELOCYTES NFR BLD MANUAL: 1 % (ref 0–0)
MONOCYTES NFR BLD AUTO: 11.6 % (ref 2–12)
MONOCYTES NFR BLD AUTO: 2.6 /CMM (ref 0.1–1.3)
MONOCYTES NFR BLD MANUAL: 12 % (ref 0–11)
MYELOCYTES NFR BLD MANUAL: 2 % (ref 0–0)
NEUTROPHILS # BLD AUTO: 17.9 /CMM (ref 1.8–8.9)
NEUTROPHILS NFR BLD AUTO: 80 % (ref 43–81)
NEUTS BAND NFR BLD MANUAL: 2 % (ref 0–5)
NEUTS SEG NFR BLD MANUAL: 75 % (ref 42–76)
NITRITE UR QL STRIP: NEGATIVE
PH UR STRIP: 8 [PH] (ref 5–8)
PHOSPHATE SERPL-MCNC: 2.5 MG/DL (ref 2.5–4.9)
PLATELET # BLD AUTO: 463 /CMM (ref 150–450)
POTASSIUM SERPL-SCNC: 4.1 MMOL/L (ref 3.5–5.1)
PROT SERPL-MCNC: 6.2 G/DL (ref 6.4–8.2)
PROT UR QL STRIP: (no result) MG/DL
RBC # BLD AUTO: 2.41 MIL/UL (ref 4–5.2)
RBC #/AREA URNS HPF: (no result) /HPF (ref 0–2)
SODIUM SERPL-SCNC: 133 MMOL/L (ref 136–145)
UROBILINOGEN UR STRIP-MCNC: 0.2 EU/DL
WBC #/AREA URNS HPF: (no result) /HPF (ref 0–3)
WBC NRBC COR # BLD AUTO: 22.4 K/UL (ref 4.3–11)

## 2021-02-27 RX ADMIN — Medication SCH TAB: at 08:24

## 2021-02-27 RX ADMIN — ZINC OXIDE SCH APPLIC: 200 OINTMENT TOPICAL at 08:27

## 2021-02-27 RX ADMIN — Medication SCH PACKET: at 08:24

## 2021-02-27 RX ADMIN — Medication SCH EACH: at 05:35

## 2021-02-27 RX ADMIN — ATORVASTATIN CALCIUM SCH MG: 40 TABLET, FILM COATED ORAL at 21:23

## 2021-02-27 RX ADMIN — MISOPROSTOL SCH MCG: 100 TABLET ORAL at 21:23

## 2021-02-27 RX ADMIN — PIPERACILLIN SODIUM AND TAZOBACTAM SODIUM SCH MLS/HR: .25; 2 INJECTION, POWDER, LYOPHILIZED, FOR SOLUTION INTRAVENOUS at 12:16

## 2021-02-27 RX ADMIN — Medication SCH EACH: at 12:16

## 2021-02-27 RX ADMIN — HYDROGEN PEROXIDE SCH ML: 2.65 LIQUID TOPICAL at 16:59

## 2021-02-27 RX ADMIN — Medication SCH PACKET: at 17:13

## 2021-02-27 RX ADMIN — MUPIROCIN SCH APPLIC: 20 OINTMENT TOPICAL at 08:26

## 2021-02-27 RX ADMIN — PIPERACILLIN SODIUM AND TAZOBACTAM SODIUM SCH MLS/HR: .25; 2 INJECTION, POWDER, LYOPHILIZED, FOR SOLUTION INTRAVENOUS at 00:17

## 2021-02-27 RX ADMIN — ACETYLCYSTEINE SCH MG: 100 INHALANT RESPIRATORY (INHALATION) at 15:35

## 2021-02-27 RX ADMIN — Medication SCH PACKET: at 12:16

## 2021-02-27 RX ADMIN — DONEPEZIL HYDROCHLORIDE SCH MG: 5 TABLET ORAL at 21:23

## 2021-02-27 RX ADMIN — Medication SCH APPLIC: at 23:00

## 2021-02-27 RX ADMIN — ASPIRIN 81 MG SCH MG: 81 TABLET ORAL at 08:23

## 2021-02-27 RX ADMIN — METOCLOPRAMIDE HYDROCHLORIDE SCH MG: 5 SOLUTION ORAL at 12:16

## 2021-02-27 RX ADMIN — PIPERACILLIN SODIUM AND TAZOBACTAM SODIUM SCH MLS/HR: .25; 2 INJECTION, POWDER, LYOPHILIZED, FOR SOLUTION INTRAVENOUS at 06:22

## 2021-02-27 RX ADMIN — Medication SCH MG: at 14:05

## 2021-02-27 RX ADMIN — DULOXETINE HYDROCHLORIDE SCH MG: 30 CAPSULE, DELAYED RELEASE ORAL at 08:23

## 2021-02-27 RX ADMIN — METOCLOPRAMIDE HYDROCHLORIDE SCH MG: 5 SOLUTION ORAL at 21:23

## 2021-02-27 RX ADMIN — FAMOTIDINE SCH MG: 20 TABLET, FILM COATED ORAL at 08:26

## 2021-02-27 RX ADMIN — Medication PRN ML: at 05:35

## 2021-02-27 RX ADMIN — HYDROMORPHONE HYDROCHLORIDE SCH MG: 2 TABLET ORAL at 08:24

## 2021-02-27 RX ADMIN — MISOPROSTOL SCH MCG: 100 TABLET ORAL at 17:13

## 2021-02-27 RX ADMIN — INSULIN HUMAN PRN UNIT: 100 INJECTION, SOLUTION PARENTERAL at 05:36

## 2021-02-27 RX ADMIN — DEXTROSE MONOHYDRATE SCH MLS/HR: 50 INJECTION, SOLUTION INTRAVENOUS at 21:00

## 2021-02-27 RX ADMIN — MISOPROSTOL SCH MCG: 100 TABLET ORAL at 12:16

## 2021-02-27 RX ADMIN — MISOPROSTOL SCH MCG: 100 TABLET ORAL at 08:23

## 2021-02-27 RX ADMIN — DOCUSATE SODIUM SCH MG: 50 LIQUID ORAL at 08:23

## 2021-02-27 RX ADMIN — ACETYLCYSTEINE SCH MG: 100 INHALANT RESPIRATORY (INHALATION) at 07:37

## 2021-02-27 RX ADMIN — Medication SCH MG: at 01:27

## 2021-02-27 RX ADMIN — DOCUSATE SODIUM SCH MG: 50 LIQUID ORAL at 17:12

## 2021-02-27 RX ADMIN — Medication SCH MG: at 07:37

## 2021-02-27 RX ADMIN — ZINC OXIDE SCH APPLIC: 200 OINTMENT TOPICAL at 23:00

## 2021-02-27 RX ADMIN — Medication SCH MG: at 05:35

## 2021-02-27 RX ADMIN — METOCLOPRAMIDE HYDROCHLORIDE SCH MG: 5 SOLUTION ORAL at 05:35

## 2021-02-27 RX ADMIN — HYDROMORPHONE HYDROCHLORIDE SCH MG: 2 TABLET ORAL at 22:00

## 2021-02-27 RX ADMIN — ACETYLCYSTEINE SCH MG: 100 INHALANT RESPIRATORY (INHALATION) at 23:39

## 2021-02-27 RX ADMIN — PIPERACILLIN SODIUM AND TAZOBACTAM SODIUM SCH MLS/HR: .25; 2 INJECTION, POWDER, LYOPHILIZED, FOR SOLUTION INTRAVENOUS at 17:13

## 2021-02-27 RX ADMIN — AMLODIPINE BESYLATE SCH MG: 10 TABLET ORAL at 08:32

## 2021-02-27 RX ADMIN — Medication SCH EACH: at 17:13

## 2021-02-27 RX ADMIN — HYDROGEN PEROXIDE SCH ML: 2.65 LIQUID TOPICAL at 19:40

## 2021-02-27 RX ADMIN — Medication SCH MG: at 19:40

## 2021-02-27 RX ADMIN — INSULIN GLARGINE SCH UNIT: 100 INJECTION, SOLUTION SUBCUTANEOUS at 22:09

## 2021-02-27 RX ADMIN — MUPIROCIN SCH APPLIC: 20 OINTMENT TOPICAL at 21:23

## 2021-02-27 NOTE — NUR
Informed Liana from [facility] pharmacist that patient was not dialyzed today and if Vancomycin 
dose is to be held.  She said she will put an order for Vanco random trough level in AM and 
hold dose tonight.

## 2021-02-27 NOTE — NUR
Received a call from Jocelynn RN,  [facility] informing this nurse that the 
center will be sending patient back as  patient's HR is consistently above 100 (118-120). 
According to Jocelynn, Dr. Lara is aware of elevated HR, he reviewed patient's labs 
then ordered to send patient back to the nursing home.  Awaiting for patient to return.

## 2021-02-27 NOTE — NUR
Spoke with Natalya from [employer] confirming dialysis transportation request for patient 
today.  She said that the vendor is [place] and  time at 1330.

## 2021-02-27 NOTE — NUR
Left a message to Dr. Lara informing him of today's CMP and CBC result and that 
patient was sent back without being dialyzed.

## 2021-02-27 NOTE — NUR
Spoke with resident's son Ranjit informing him that patient was not dialyzed today due to 
elevated HR, but according to the nurse taking care of her at [facility], the nephrologist 
gave the order to send her back to the facility after reviewing patient's latest lab result. 
 Updated Mr. Weston of recent orders from new pain medication, latest lab results (CBC, 
CMP), cultures ordered by ID due to elevated WBC.  Mr. Weston also gave consent for serial 
excisional wound debridement of the left lower extremity wound, witnessed by two licensed 
nurses.  Appreciated the call and update provided.

## 2021-02-27 NOTE — NUR
Left a message to Dr. Maki informing him that patient was not dialyzed due to 
consistently elevated HR above 100.  MD also made aware of VS after patient returned back 
from the center.  93/43, 116, 98.6, 16 99% at Fi02 45%.

## 2021-02-27 NOTE — NUR
Pt's holm catheter changed today, procedure done with sterile technique, pt tolerated 
procedure. Pt with 16fr holm cath draining cloudy, yellow/white urine. Urine sent out for 
urine cx as ordered.

## 2021-02-28 VITALS — DIASTOLIC BLOOD PRESSURE: 98 MMHG | SYSTOLIC BLOOD PRESSURE: 128 MMHG

## 2021-02-28 VITALS — DIASTOLIC BLOOD PRESSURE: 69 MMHG | SYSTOLIC BLOOD PRESSURE: 138 MMHG

## 2021-02-28 VITALS — SYSTOLIC BLOOD PRESSURE: 118 MMHG | DIASTOLIC BLOOD PRESSURE: 64 MMHG

## 2021-02-28 VITALS — DIASTOLIC BLOOD PRESSURE: 50 MMHG | SYSTOLIC BLOOD PRESSURE: 104 MMHG

## 2021-02-28 LAB
BASOPHILS # BLD AUTO: 0.1 /CMM (ref 0–0.2)
BASOPHILS NFR BLD AUTO: 0.6 % (ref 0–2)
BUN SERPL-MCNC: 57 MG/DL (ref 7–18)
CALCIUM SERPL-MCNC: 8.8 MG/DL (ref 8.5–10.1)
CHLORIDE SERPL-SCNC: 93 MMOL/L (ref 98–107)
CO2 SERPL-SCNC: 27 MMOL/L (ref 21–32)
CREAT SERPL-MCNC: 2.4 MG/DL (ref 0.6–1.3)
EOSINOPHIL NFR BLD AUTO: 1.6 % (ref 0–6)
EOSINOPHIL NFR BLD MANUAL: 1 % (ref 0–4)
GLUCOSE SERPL-MCNC: 124 MG/DL (ref 74–106)
HCT VFR BLD AUTO: 22 % (ref 33–45)
HGB BLD-MCNC: 7.1 G/DL (ref 11.5–14.8)
LYMPHOCYTES NFR BLD AUTO: 1.7 /CMM (ref 0.8–4.8)
LYMPHOCYTES NFR BLD AUTO: 7.5 % (ref 20–44)
LYMPHOCYTES NFR BLD MANUAL: 11 % (ref 16–48)
MCHC RBC AUTO-ENTMCNC: 33 G/DL (ref 31–36)
MCV RBC AUTO: 98 FL (ref 82–100)
METAMYELOCYTES NFR BLD MANUAL: 1 % (ref 0–0)
MONOCYTES NFR BLD AUTO: 10.8 % (ref 2–12)
MONOCYTES NFR BLD AUTO: 2.5 /CMM (ref 0.1–1.3)
MONOCYTES NFR BLD MANUAL: 7 % (ref 0–11)
MYELOCYTES NFR BLD MANUAL: 8 % (ref 0–0)
NEUTROPHILS # BLD AUTO: 18.5 /CMM (ref 1.8–8.9)
NEUTROPHILS NFR BLD AUTO: 79.5 % (ref 43–81)
NEUTS SEG NFR BLD MANUAL: 72 % (ref 42–76)
PLATELET # BLD AUTO: 453 /CMM (ref 150–450)
POTASSIUM SERPL-SCNC: 4.2 MMOL/L (ref 3.5–5.1)
RBC # BLD AUTO: 2.21 MIL/UL (ref 4–5.2)
SODIUM SERPL-SCNC: 131 MMOL/L (ref 136–145)
WBC NRBC COR # BLD AUTO: 23.2 K/UL (ref 4.3–11)

## 2021-02-28 RX ADMIN — Medication SCH PACKET: at 09:00

## 2021-02-28 RX ADMIN — Medication SCH MG: at 14:15

## 2021-02-28 RX ADMIN — PIPERACILLIN SODIUM AND TAZOBACTAM SODIUM SCH MLS/HR: .25; 2 INJECTION, POWDER, LYOPHILIZED, FOR SOLUTION INTRAVENOUS at 23:13

## 2021-02-28 RX ADMIN — PIPERACILLIN SODIUM AND TAZOBACTAM SODIUM SCH MLS/HR: .25; 2 INJECTION, POWDER, LYOPHILIZED, FOR SOLUTION INTRAVENOUS at 17:51

## 2021-02-28 RX ADMIN — Medication SCH EACH: at 17:37

## 2021-02-28 RX ADMIN — MUPIROCIN SCH APPLIC: 20 OINTMENT TOPICAL at 21:09

## 2021-02-28 RX ADMIN — ACETYLCYSTEINE SCH MG: 100 INHALANT RESPIRATORY (INHALATION) at 17:35

## 2021-02-28 RX ADMIN — MISOPROSTOL SCH MCG: 100 TABLET ORAL at 12:22

## 2021-02-28 RX ADMIN — MISOPROSTOL SCH MCG: 100 TABLET ORAL at 21:09

## 2021-02-28 RX ADMIN — DULOXETINE HYDROCHLORIDE SCH MG: 30 CAPSULE, DELAYED RELEASE ORAL at 09:00

## 2021-02-28 RX ADMIN — Medication SCH EACH: at 05:29

## 2021-02-28 RX ADMIN — Medication SCH EACH: at 00:02

## 2021-02-28 RX ADMIN — MUPIROCIN SCH APPLIC: 20 OINTMENT TOPICAL at 09:00

## 2021-02-28 RX ADMIN — Medication SCH MG: at 05:17

## 2021-02-28 RX ADMIN — HYDROMORPHONE HYDROCHLORIDE SCH MG: 2 TABLET ORAL at 21:09

## 2021-02-28 RX ADMIN — INSULIN HUMAN PRN UNIT: 100 INJECTION, SOLUTION PARENTERAL at 05:30

## 2021-02-28 RX ADMIN — Medication PRN ML: at 05:30

## 2021-02-28 RX ADMIN — INSULIN HUMAN PRN UNIT: 100 INJECTION, SOLUTION PARENTERAL at 00:03

## 2021-02-28 RX ADMIN — INSULIN HUMAN PRN UNIT: 100 INJECTION, SOLUTION PARENTERAL at 17:38

## 2021-02-28 RX ADMIN — Medication SCH TAB: at 09:00

## 2021-02-28 RX ADMIN — INSULIN GLARGINE SCH UNIT: 100 INJECTION, SOLUTION SUBCUTANEOUS at 21:10

## 2021-02-28 RX ADMIN — HYDROGEN PEROXIDE SCH ML: 2.65 LIQUID TOPICAL at 19:54

## 2021-02-28 RX ADMIN — DOCUSATE SODIUM SCH MG: 50 LIQUID ORAL at 17:37

## 2021-02-28 RX ADMIN — ACETYLCYSTEINE SCH MG: 100 INHALANT RESPIRATORY (INHALATION) at 08:53

## 2021-02-28 RX ADMIN — METOCLOPRAMIDE HYDROCHLORIDE SCH MG: 5 SOLUTION ORAL at 05:17

## 2021-02-28 RX ADMIN — ZINC OXIDE SCH APPLIC: 200 OINTMENT TOPICAL at 21:09

## 2021-02-28 RX ADMIN — AMLODIPINE BESYLATE SCH MG: 10 TABLET ORAL at 09:00

## 2021-02-28 RX ADMIN — PIPERACILLIN SODIUM AND TAZOBACTAM SODIUM SCH MLS/HR: .25; 2 INJECTION, POWDER, LYOPHILIZED, FOR SOLUTION INTRAVENOUS at 06:04

## 2021-02-28 RX ADMIN — ASPIRIN 81 MG SCH MG: 81 TABLET ORAL at 09:00

## 2021-02-28 RX ADMIN — Medication SCH MG: at 08:53

## 2021-02-28 RX ADMIN — HYDROGEN PEROXIDE SCH ML: 2.65 LIQUID TOPICAL at 09:05

## 2021-02-28 RX ADMIN — DOCUSATE SODIUM SCH MG: 50 LIQUID ORAL at 09:00

## 2021-02-28 RX ADMIN — INSULIN HUMAN PRN UNIT: 100 INJECTION, SOLUTION PARENTERAL at 12:23

## 2021-02-28 RX ADMIN — Medication SCH MG: at 19:54

## 2021-02-28 RX ADMIN — DONEPEZIL HYDROCHLORIDE SCH MG: 5 TABLET ORAL at 21:10

## 2021-02-28 RX ADMIN — METOCLOPRAMIDE HYDROCHLORIDE SCH MG: 5 SOLUTION ORAL at 21:09

## 2021-02-28 RX ADMIN — Medication SCH EACH: at 12:22

## 2021-02-28 RX ADMIN — MISOPROSTOL SCH MCG: 100 TABLET ORAL at 09:00

## 2021-02-28 RX ADMIN — Medication SCH MG: at 01:41

## 2021-02-28 RX ADMIN — Medication SCH PACKET: at 17:37

## 2021-02-28 RX ADMIN — ZINC OXIDE SCH APPLIC: 200 OINTMENT TOPICAL at 09:00

## 2021-02-28 RX ADMIN — HYDROMORPHONE HYDROCHLORIDE SCH MG: 2 TABLET ORAL at 09:00

## 2021-02-28 RX ADMIN — METOCLOPRAMIDE HYDROCHLORIDE SCH MG: 5 SOLUTION ORAL at 12:22

## 2021-02-28 RX ADMIN — Medication SCH PACKET: at 12:22

## 2021-02-28 RX ADMIN — ACETYLCYSTEINE SCH MG: 100 INHALANT RESPIRATORY (INHALATION) at 23:03

## 2021-02-28 RX ADMIN — FAMOTIDINE SCH MG: 20 TABLET, FILM COATED ORAL at 09:00

## 2021-02-28 RX ADMIN — PIPERACILLIN SODIUM AND TAZOBACTAM SODIUM SCH MLS/HR: .25; 2 INJECTION, POWDER, LYOPHILIZED, FOR SOLUTION INTRAVENOUS at 01:01

## 2021-02-28 RX ADMIN — MISOPROSTOL SCH MCG: 100 TABLET ORAL at 17:37

## 2021-02-28 RX ADMIN — PIPERACILLIN SODIUM AND TAZOBACTAM SODIUM SCH MLS/HR: .25; 2 INJECTION, POWDER, LYOPHILIZED, FOR SOLUTION INTRAVENOUS at 11:46

## 2021-02-28 RX ADMIN — ATORVASTATIN CALCIUM SCH MG: 40 TABLET, FILM COATED ORAL at 21:10

## 2021-02-28 NOTE — NUR
RT NOTE

pt rec'd trached on [facility] vent on AC mode settings as charted. Trach is patent and secured. 
Pt shows no signs of resp distress or sob. Pt sx'd for thick mod amt of pale yellow 
secretions. Alarms are set and audible. Vent plugged into red outlet. Ambu bag bedside. will 
continue to monitor.

-------------------------------------------------------------------------------

Addendum: 02/28/21 at 1014 by LEAH CAMPUZANO RT

-------------------------------------------------------------------------------

Amended: Links added.

-------------------------------------------------------------------------------

Addendum: 02/28/21 at 1015 by LEAH CAMPUZANO RT

-------------------------------------------------------------------------------

PT ON SIMV MODE

## 2021-02-28 NOTE — NUR
Informed Chloe Anaya NP regarding CBC results, procalcitonin and negative c.diff 
result. Also informed her of Vanco trough of 26, ordered to stop Vancomycin 750mg and that 
she will be stopping by later today.

## 2021-03-01 VITALS — DIASTOLIC BLOOD PRESSURE: 72 MMHG | SYSTOLIC BLOOD PRESSURE: 138 MMHG

## 2021-03-01 VITALS — SYSTOLIC BLOOD PRESSURE: 135 MMHG | DIASTOLIC BLOOD PRESSURE: 74 MMHG

## 2021-03-01 VITALS — SYSTOLIC BLOOD PRESSURE: 131 MMHG | DIASTOLIC BLOOD PRESSURE: 74 MMHG

## 2021-03-01 VITALS — DIASTOLIC BLOOD PRESSURE: 81 MMHG | SYSTOLIC BLOOD PRESSURE: 109 MMHG

## 2021-03-01 VITALS — DIASTOLIC BLOOD PRESSURE: 77 MMHG | SYSTOLIC BLOOD PRESSURE: 141 MMHG

## 2021-03-01 VITALS — DIASTOLIC BLOOD PRESSURE: 64 MMHG | SYSTOLIC BLOOD PRESSURE: 127 MMHG

## 2021-03-01 VITALS — DIASTOLIC BLOOD PRESSURE: 52 MMHG | SYSTOLIC BLOOD PRESSURE: 106 MMHG

## 2021-03-01 VITALS — DIASTOLIC BLOOD PRESSURE: 84 MMHG | SYSTOLIC BLOOD PRESSURE: 153 MMHG

## 2021-03-01 VITALS — SYSTOLIC BLOOD PRESSURE: 112 MMHG | DIASTOLIC BLOOD PRESSURE: 81 MMHG

## 2021-03-01 LAB
ALBUMIN SERPL BCP-MCNC: 1.2 G/DL (ref 3.4–5)
ALP SERPL-CCNC: 225 U/L (ref 46–116)
ALT SERPL W P-5'-P-CCNC: 22 U/L (ref 12–78)
AST SERPL W P-5'-P-CCNC: 30 U/L (ref 15–37)
BASOPHILS # BLD AUTO: 0.1 /CMM (ref 0–0.2)
BASOPHILS NFR BLD AUTO: 0.4 % (ref 0–2)
BILIRUB SERPL-MCNC: 0.8 MG/DL (ref 0.2–1)
BUN SERPL-MCNC: 65 MG/DL (ref 7–18)
CALCIUM SERPL-MCNC: 8.3 MG/DL (ref 8.5–10.1)
CHLORIDE SERPL-SCNC: 93 MMOL/L (ref 98–107)
CO2 SERPL-SCNC: 24 MMOL/L (ref 21–32)
CREAT SERPL-MCNC: 2.6 MG/DL (ref 0.6–1.3)
EOSINOPHIL NFR BLD AUTO: 2.2 % (ref 0–6)
GLUCOSE SERPL-MCNC: 169 MG/DL (ref 74–106)
HCT VFR BLD AUTO: 21 % (ref 33–45)
HGB BLD-MCNC: 7 G/DL (ref 11.5–14.8)
LYMPHOCYTES NFR BLD AUTO: 1.5 /CMM (ref 0.8–4.8)
LYMPHOCYTES NFR BLD AUTO: 6.9 % (ref 20–44)
LYMPHOCYTES NFR BLD MANUAL: 6 % (ref 16–48)
MCHC RBC AUTO-ENTMCNC: 34 G/DL (ref 31–36)
MCV RBC AUTO: 99 FL (ref 82–100)
METAMYELOCYTES NFR BLD MANUAL: 1 % (ref 0–0)
MONOCYTES NFR BLD AUTO: 2.1 /CMM (ref 0.1–1.3)
MONOCYTES NFR BLD AUTO: 9.8 % (ref 2–12)
MONOCYTES NFR BLD MANUAL: 8 % (ref 0–11)
MYELOCYTES NFR BLD MANUAL: 1 % (ref 0–0)
NEUTROPHILS # BLD AUTO: 17.1 /CMM (ref 1.8–8.9)
NEUTROPHILS NFR BLD AUTO: 80.7 % (ref 43–81)
NEUTS BAND NFR BLD MANUAL: 4 % (ref 0–5)
NEUTS SEG NFR BLD MANUAL: 80 % (ref 42–76)
PLATELET # BLD AUTO: 423 /CMM (ref 150–450)
POTASSIUM SERPL-SCNC: 4.6 MMOL/L (ref 3.5–5.1)
PROT SERPL-MCNC: 6.5 G/DL (ref 6.4–8.2)
RBC # BLD AUTO: 2.13 MIL/UL (ref 4–5.2)
SODIUM SERPL-SCNC: 129 MMOL/L (ref 136–145)
WBC NRBC COR # BLD AUTO: 21.2 K/UL (ref 4.3–11)

## 2021-03-01 RX ADMIN — MUPIROCIN SCH APPLIC: 20 OINTMENT TOPICAL at 21:08

## 2021-03-01 RX ADMIN — Medication SCH APPLIC: at 10:50

## 2021-03-01 RX ADMIN — MISOPROSTOL SCH MCG: 100 TABLET ORAL at 13:00

## 2021-03-01 RX ADMIN — FAMOTIDINE SCH MG: 20 TABLET, FILM COATED ORAL at 09:00

## 2021-03-01 RX ADMIN — AMLODIPINE BESYLATE SCH MG: 10 TABLET ORAL at 09:00

## 2021-03-01 RX ADMIN — MISOPROSTOL SCH MCG: 100 TABLET ORAL at 21:08

## 2021-03-01 RX ADMIN — PIPERACILLIN SODIUM AND TAZOBACTAM SODIUM SCH MLS/HR: .25; 2 INJECTION, POWDER, LYOPHILIZED, FOR SOLUTION INTRAVENOUS at 11:40

## 2021-03-01 RX ADMIN — INSULIN HUMAN PRN UNIT: 100 INJECTION, SOLUTION PARENTERAL at 00:16

## 2021-03-01 RX ADMIN — INSULIN HUMAN PRN UNIT: 100 INJECTION, SOLUTION PARENTERAL at 06:24

## 2021-03-01 RX ADMIN — HYDROGEN PEROXIDE SCH ML: 2.65 LIQUID TOPICAL at 22:03

## 2021-03-01 RX ADMIN — DOCUSATE SODIUM SCH MG: 50 LIQUID ORAL at 09:00

## 2021-03-01 RX ADMIN — Medication SCH EACH: at 06:23

## 2021-03-01 RX ADMIN — ATORVASTATIN CALCIUM SCH MG: 40 TABLET, FILM COATED ORAL at 21:08

## 2021-03-01 RX ADMIN — Medication SCH EACH: at 18:03

## 2021-03-01 RX ADMIN — METOCLOPRAMIDE HYDROCHLORIDE SCH MG: 5 SOLUTION ORAL at 13:14

## 2021-03-01 RX ADMIN — Medication SCH EACH: at 12:00

## 2021-03-01 RX ADMIN — Medication SCH MG: at 06:23

## 2021-03-01 RX ADMIN — DOCUSATE SODIUM SCH MG: 50 LIQUID ORAL at 16:50

## 2021-03-01 RX ADMIN — ACETYLCYSTEINE SCH MG: 100 INHALANT RESPIRATORY (INHALATION) at 07:35

## 2021-03-01 RX ADMIN — PIPERACILLIN SODIUM AND TAZOBACTAM SODIUM SCH MLS/HR: .25; 2 INJECTION, POWDER, LYOPHILIZED, FOR SOLUTION INTRAVENOUS at 17:40

## 2021-03-01 RX ADMIN — ASPIRIN 81 MG SCH MG: 81 TABLET ORAL at 09:00

## 2021-03-01 RX ADMIN — ZINC OXIDE SCH APPLIC: 200 OINTMENT TOPICAL at 10:50

## 2021-03-01 RX ADMIN — INSULIN GLARGINE SCH UNIT: 100 INJECTION, SOLUTION SUBCUTANEOUS at 21:09

## 2021-03-01 RX ADMIN — HYDROMORPHONE HYDROCHLORIDE SCH MG: 2 TABLET ORAL at 21:08

## 2021-03-01 RX ADMIN — METOCLOPRAMIDE HYDROCHLORIDE SCH MG: 5 SOLUTION ORAL at 21:08

## 2021-03-01 RX ADMIN — Medication SCH MG: at 13:49

## 2021-03-01 RX ADMIN — HYDROMORPHONE HYDROCHLORIDE SCH MG: 2 TABLET ORAL at 10:20

## 2021-03-01 RX ADMIN — Medication SCH MG: at 19:59

## 2021-03-01 RX ADMIN — Medication SCH EACH: at 00:15

## 2021-03-01 RX ADMIN — Medication SCH MG: at 07:35

## 2021-03-01 RX ADMIN — MISOPROSTOL SCH MCG: 100 TABLET ORAL at 09:00

## 2021-03-01 RX ADMIN — Medication SCH TAB: at 09:00

## 2021-03-01 RX ADMIN — MUPIROCIN SCH APPLIC: 20 OINTMENT TOPICAL at 09:00

## 2021-03-01 RX ADMIN — HYDROGEN PEROXIDE SCH ML: 2.65 LIQUID TOPICAL at 09:14

## 2021-03-01 RX ADMIN — ACETYLCYSTEINE SCH MG: 100 INHALANT RESPIRATORY (INHALATION) at 15:52

## 2021-03-01 RX ADMIN — PIPERACILLIN SODIUM AND TAZOBACTAM SODIUM SCH MLS/HR: .25; 2 INJECTION, POWDER, LYOPHILIZED, FOR SOLUTION INTRAVENOUS at 06:02

## 2021-03-01 RX ADMIN — Medication SCH MG: at 01:58

## 2021-03-01 RX ADMIN — Medication SCH PACKET: at 16:50

## 2021-03-01 RX ADMIN — METOCLOPRAMIDE HYDROCHLORIDE SCH MG: 5 SOLUTION ORAL at 05:00

## 2021-03-01 RX ADMIN — DULOXETINE HYDROCHLORIDE SCH MG: 30 CAPSULE, DELAYED RELEASE ORAL at 09:00

## 2021-03-01 RX ADMIN — MISOPROSTOL SCH MCG: 100 TABLET ORAL at 16:50

## 2021-03-01 RX ADMIN — Medication SCH PACKET: at 09:00

## 2021-03-01 RX ADMIN — Medication SCH PACKET: at 13:14

## 2021-03-01 RX ADMIN — ZINC OXIDE SCH APPLIC: 200 OINTMENT TOPICAL at 21:08

## 2021-03-01 RX ADMIN — DONEPEZIL HYDROCHLORIDE SCH MG: 5 TABLET ORAL at 21:08

## 2021-03-01 NOTE — NUR
Notified Dr Maki that pt's stools are soft and seems to be pushing out her rectal tube. 
Pt on Colace 100 mg BID. Also notified him that -121 today, that HR has been above 100 
bpm since admission. Pt was not dialyzed on Saturday 2/27/21 due to tachycardia and pt is 
scheduled for HD tomorrow. HR has remained over 100 bpm. 

-------------------------------------------------------------------------------

Addendum: 03/01/21 at 1718 by BYRON COREA RN

-------------------------------------------------------------------------------

Pt denies pain or discomfort. Pt afebrile, T 97.0 F.

## 2021-03-01 NOTE — NUR
HD Transport 3/2/2021:

REMA called [employer] [phone] to set up transportation for this pt. to go to [facility] 
on 3/2/2021 for HD miranda time 2:30 pm-6 pm. REMA spoke to Rosy who set up [facility] 
[facility] with RT for deep suctioning  TIME: 1:40 PM from [facility]. [employer] #[id number].

## 2021-03-01 NOTE — NUR
No adverse reactions to blood transfusion noted at this time. Pt said she is fine. Denied 
pain or any discomfort.

## 2021-03-02 ENCOUNTER — HOSPITAL ENCOUNTER (INPATIENT)
Dept: HOSPITAL 54 - MED | Age: 81
LOS: 20 days | Discharge: NURSING, REHAB OR LONG-TERM CARE | DRG: 853 | End: 2021-03-22
Attending: INTERNAL MEDICINE | Admitting: NURSE PRACTITIONER
Payer: MEDICARE

## 2021-03-02 VITALS — SYSTOLIC BLOOD PRESSURE: 154 MMHG | DIASTOLIC BLOOD PRESSURE: 84 MMHG

## 2021-03-02 VITALS — WEIGHT: 136.13 LBS | HEIGHT: 62 IN | BODY MASS INDEX: 25.05 KG/M2

## 2021-03-02 VITALS — SYSTOLIC BLOOD PRESSURE: 105 MMHG | DIASTOLIC BLOOD PRESSURE: 70 MMHG

## 2021-03-02 VITALS — SYSTOLIC BLOOD PRESSURE: 99 MMHG | DIASTOLIC BLOOD PRESSURE: 78 MMHG

## 2021-03-02 VITALS — DIASTOLIC BLOOD PRESSURE: 73 MMHG | SYSTOLIC BLOOD PRESSURE: 134 MMHG

## 2021-03-02 VITALS — DIASTOLIC BLOOD PRESSURE: 52 MMHG | SYSTOLIC BLOOD PRESSURE: 143 MMHG

## 2021-03-02 DIAGNOSIS — L97.111: ICD-10-CM

## 2021-03-02 DIAGNOSIS — I70.0: ICD-10-CM

## 2021-03-02 DIAGNOSIS — D62: ICD-10-CM

## 2021-03-02 DIAGNOSIS — L89.143: ICD-10-CM

## 2021-03-02 DIAGNOSIS — G92: ICD-10-CM

## 2021-03-02 DIAGNOSIS — K80.20: ICD-10-CM

## 2021-03-02 DIAGNOSIS — I50.42: ICD-10-CM

## 2021-03-02 DIAGNOSIS — L97.529: ICD-10-CM

## 2021-03-02 DIAGNOSIS — M81.0: ICD-10-CM

## 2021-03-02 DIAGNOSIS — K74.60: ICD-10-CM

## 2021-03-02 DIAGNOSIS — E03.9: ICD-10-CM

## 2021-03-02 DIAGNOSIS — K25.4: ICD-10-CM

## 2021-03-02 DIAGNOSIS — K21.9: ICD-10-CM

## 2021-03-02 DIAGNOSIS — S40.822A: ICD-10-CM

## 2021-03-02 DIAGNOSIS — D63.8: ICD-10-CM

## 2021-03-02 DIAGNOSIS — S40.821A: ICD-10-CM

## 2021-03-02 DIAGNOSIS — F02.80: ICD-10-CM

## 2021-03-02 DIAGNOSIS — E43: ICD-10-CM

## 2021-03-02 DIAGNOSIS — E87.1: ICD-10-CM

## 2021-03-02 DIAGNOSIS — K31.7: ICD-10-CM

## 2021-03-02 DIAGNOSIS — M62.561: ICD-10-CM

## 2021-03-02 DIAGNOSIS — Z79.899: ICD-10-CM

## 2021-03-02 DIAGNOSIS — Z93.0: ICD-10-CM

## 2021-03-02 DIAGNOSIS — J98.11: ICD-10-CM

## 2021-03-02 DIAGNOSIS — E87.6: ICD-10-CM

## 2021-03-02 DIAGNOSIS — K57.30: ICD-10-CM

## 2021-03-02 DIAGNOSIS — Z99.11: ICD-10-CM

## 2021-03-02 DIAGNOSIS — L89.626: ICD-10-CM

## 2021-03-02 DIAGNOSIS — J96.22: ICD-10-CM

## 2021-03-02 DIAGNOSIS — M62.562: ICD-10-CM

## 2021-03-02 DIAGNOSIS — Z86.16: ICD-10-CM

## 2021-03-02 DIAGNOSIS — N17.9: ICD-10-CM

## 2021-03-02 DIAGNOSIS — A41.9: Primary | ICD-10-CM

## 2021-03-02 DIAGNOSIS — Z79.82: ICD-10-CM

## 2021-03-02 DIAGNOSIS — G20: ICD-10-CM

## 2021-03-02 DIAGNOSIS — M89.9: ICD-10-CM

## 2021-03-02 DIAGNOSIS — E11.22: ICD-10-CM

## 2021-03-02 DIAGNOSIS — K29.40: ICD-10-CM

## 2021-03-02 DIAGNOSIS — J15.6: ICD-10-CM

## 2021-03-02 DIAGNOSIS — E11.621: ICD-10-CM

## 2021-03-02 DIAGNOSIS — E11.40: ICD-10-CM

## 2021-03-02 DIAGNOSIS — J96.21: ICD-10-CM

## 2021-03-02 DIAGNOSIS — L97.121: ICD-10-CM
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
## 2021-03-02 DIAGNOSIS — R18.8: ICD-10-CM

## 2021-03-02 DIAGNOSIS — Z79.51: ICD-10-CM

## 2021-03-02 DIAGNOSIS — L89.153: ICD-10-CM

## 2021-03-02 DIAGNOSIS — E27.40: ICD-10-CM

## 2021-03-02 DIAGNOSIS — N18.6: ICD-10-CM

## 2021-03-02 DIAGNOSIS — E11.51: ICD-10-CM

## 2021-03-02 DIAGNOSIS — R13.10: ICD-10-CM

## 2021-03-02 DIAGNOSIS — L97.429: ICD-10-CM

## 2021-03-02 DIAGNOSIS — Z93.1: ICD-10-CM

## 2021-03-02 DIAGNOSIS — J44.0: ICD-10-CM
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
## 2021-03-02 DIAGNOSIS — I31.3: ICD-10-CM

## 2021-03-02 DIAGNOSIS — I13.2: ICD-10-CM

## 2021-03-02 DIAGNOSIS — Z79.83: ICD-10-CM

## 2021-03-02 DIAGNOSIS — K44.9: ICD-10-CM

## 2021-03-02 DIAGNOSIS — Z79.4: ICD-10-CM

## 2021-03-02 DIAGNOSIS — R65.21: ICD-10-CM

## 2021-03-02 DIAGNOSIS — Z22.322: ICD-10-CM

## 2021-03-02 DIAGNOSIS — E87.2: ICD-10-CM
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
## 2021-03-02 DIAGNOSIS — L30.4: ICD-10-CM

## 2021-03-02 DIAGNOSIS — J90: ICD-10-CM

## 2021-03-02 DIAGNOSIS — Z99.2: ICD-10-CM

## 2021-03-02 LAB
BASE EXCESS BLDA CALC-SCNC: -4.6 MMOL/L
BASOPHILS # BLD AUTO: 0.1 /CMM (ref 0–0.2)
BASOPHILS NFR BLD AUTO: 0.5 % (ref 0–2)
BUN SERPL-MCNC: 79 MG/DL (ref 7–18)
CALCIUM SERPL-MCNC: 7.9 MG/DL (ref 8.5–10.1)
CHLORIDE SERPL-SCNC: 92 MMOL/L (ref 98–107)
CO2 SERPL-SCNC: 27 MMOL/L (ref 21–32)
CREAT SERPL-MCNC: 2.9 MG/DL (ref 0.6–1.3)
DO-HGB MFR BLDA: 31.9 MMHG
EOSINOPHIL NFR BLD AUTO: 1.5 % (ref 0–6)
EOSINOPHIL NFR BLD MANUAL: 1 % (ref 0–4)
GLUCOSE SERPL-MCNC: 68 MG/DL (ref 74–106)
HCT VFR BLD AUTO: 26 % (ref 33–45)
HGB BLD-MCNC: 8.6 G/DL (ref 11.5–14.8)
INHALED O2 CONCENTRATION: 40 %
LYMPHOCYTES NFR BLD AUTO: 1.2 /CMM (ref 0.8–4.8)
LYMPHOCYTES NFR BLD AUTO: 5.7 % (ref 20–44)
LYMPHOCYTES NFR BLD MANUAL: 4 % (ref 16–48)
MCHC RBC AUTO-ENTMCNC: 33 G/DL (ref 31–36)
MCV RBC AUTO: 96 FL (ref 82–100)
METAMYELOCYTES NFR BLD MANUAL: 2 % (ref 0–0)
MONOCYTES NFR BLD AUTO: 1.9 /CMM (ref 0.1–1.3)
MONOCYTES NFR BLD AUTO: 9 % (ref 2–12)
MONOCYTES NFR BLD MANUAL: 13 % (ref 0–11)
NEUTROPHILS # BLD AUTO: 17.6 /CMM (ref 1.8–8.9)
NEUTROPHILS NFR BLD AUTO: 83.3 % (ref 43–81)
NEUTS BAND NFR BLD MANUAL: 2 % (ref 0–5)
NEUTS SEG NFR BLD MANUAL: 78 % (ref 42–76)
PCO2 TEMP ADJ BLDA: 34.3 MMHG (ref 35–45)
PH TEMP ADJ BLDA: 7.38 [PH] (ref 7.35–7.45)
PLATELET # BLD AUTO: 403 /CMM (ref 150–450)
PO2 TEMP ADJ BLDA: 213.9 MMHG (ref 75–100)
POTASSIUM SERPL-SCNC: 4.6 MMOL/L (ref 3.5–5.1)
RBC # BLD AUTO: 2.69 MIL/UL (ref 4–5.2)
SAO2 % BLDA: 99.5 % (ref 92–98.5)
SODIUM SERPL-SCNC: 129 MMOL/L (ref 136–145)
VENTILATION MODE VENT: (no result)
WBC NRBC COR # BLD AUTO: 21.2 K/UL (ref 4.3–11)

## 2021-03-02 PROCEDURE — A6403 STERILE GAUZE>16 <= 48 SQ IN: HCPCS

## 2021-03-02 PROCEDURE — C9113 INJ PANTOPRAZOLE SODIUM, VIA: HCPCS

## 2021-03-02 PROCEDURE — C1750 CATH, HEMODIALYSIS,LONG-TERM: HCPCS

## 2021-03-02 PROCEDURE — A6248 HYDROGEL DRSG GEL FILLER: HCPCS

## 2021-03-02 PROCEDURE — A7526 TRACHEOSTOMY TUBE COLLAR: HCPCS

## 2021-03-02 PROCEDURE — A4216 STERILE WATER/SALINE, 10 ML: HCPCS

## 2021-03-02 PROCEDURE — 5A1955Z RESPIRATORY VENTILATION, GREATER THAN 96 CONSECUTIVE HOURS: ICD-10-PCS | Performed by: INTERNAL MEDICINE

## 2021-03-02 PROCEDURE — A6253 ABSORPT DRG > 48 SQ IN W/O B: HCPCS

## 2021-03-02 PROCEDURE — A4623 TRACHEOSTOMY INNER CANNULA: HCPCS

## 2021-03-02 PROCEDURE — G0378 HOSPITAL OBSERVATION PER HR: HCPCS

## 2021-03-02 PROCEDURE — P9047 ALBUMIN (HUMAN), 25%, 50ML: HCPCS

## 2021-03-02 RX ADMIN — ZINC OXIDE SCH GM: 200 OINTMENT TOPICAL at 21:23

## 2021-03-02 RX ADMIN — PIPERACILLIN SODIUM AND TAZOBACTAM SODIUM SCH MLS/HR: .25; 2 INJECTION, POWDER, LYOPHILIZED, FOR SOLUTION INTRAVENOUS at 06:45

## 2021-03-02 RX ADMIN — Medication SCH EACH: at 18:28

## 2021-03-02 RX ADMIN — Medication SCH MG: at 20:07

## 2021-03-02 RX ADMIN — PIPERACILLIN SODIUM AND TAZOBACTAM SODIUM SCH MLS/HR: .25; 2 INJECTION, POWDER, LYOPHILIZED, FOR SOLUTION INTRAVENOUS at 18:31

## 2021-03-02 RX ADMIN — Medication SCH MG: at 08:30

## 2021-03-02 RX ADMIN — DEXTROSE MONOHYDRATE PRN ML: 500 INJECTION PARENTERAL at 12:05

## 2021-03-02 RX ADMIN — INSULIN GLARGINE SCH UNIT: 100 INJECTION, SOLUTION SUBCUTANEOUS at 22:00

## 2021-03-02 RX ADMIN — Medication SCH EACH: at 12:39

## 2021-03-02 RX ADMIN — Medication SCH TAB: at 08:17

## 2021-03-02 RX ADMIN — ACETYLCYSTEINE SCH MG: 100 INHALANT RESPIRATORY (INHALATION) at 08:31

## 2021-03-02 RX ADMIN — Medication PRN ML: at 17:56

## 2021-03-02 RX ADMIN — Medication SCH PACKET: at 08:17

## 2021-03-02 RX ADMIN — METOCLOPRAMIDE HYDROCHLORIDE SCH MG: 5 SOLUTION ORAL at 05:00

## 2021-03-02 RX ADMIN — METOCLOPRAMIDE HYDROCHLORIDE SCH MG: 5 SOLUTION ORAL at 12:40

## 2021-03-02 RX ADMIN — MISOPROSTOL SCH MCG: 100 TABLET ORAL at 12:40

## 2021-03-02 RX ADMIN — HYDROGEN PEROXIDE SCH ML: 2.65 LIQUID TOPICAL at 08:31

## 2021-03-02 RX ADMIN — HEPARIN SODIUM SCH UNITS: 5000 INJECTION INTRAVENOUS; SUBCUTANEOUS at 21:28

## 2021-03-02 RX ADMIN — DEXTROSE MONOHYDRATE PRN ML: 500 INJECTION PARENTERAL at 06:05

## 2021-03-02 RX ADMIN — Medication SCH MG: at 06:33

## 2021-03-02 RX ADMIN — PIPERACILLIN SODIUM AND TAZOBACTAM SODIUM SCH MLS/HR: .25; 2 INJECTION, POWDER, LYOPHILIZED, FOR SOLUTION INTRAVENOUS at 00:14

## 2021-03-02 RX ADMIN — ASPIRIN 81 MG SCH MG: 81 TABLET ORAL at 08:15

## 2021-03-02 RX ADMIN — HYDROMORPHONE HYDROCHLORIDE SCH MG: 2 TABLET ORAL at 08:17

## 2021-03-02 RX ADMIN — Medication SCH EACH: at 22:06

## 2021-03-02 RX ADMIN — MUPIROCIN SCH GM: 20 OINTMENT TOPICAL at 21:23

## 2021-03-02 RX ADMIN — ATORVASTATIN CALCIUM SCH MG: 40 TABLET, FILM COATED ORAL at 21:24

## 2021-03-02 RX ADMIN — Medication SCH PACKET: at 12:40

## 2021-03-02 RX ADMIN — MUPIROCIN SCH APPLIC: 20 OINTMENT TOPICAL at 08:18

## 2021-03-02 RX ADMIN — DOCUSATE SODIUM SCH MG: 50 LIQUID ORAL at 08:15

## 2021-03-02 RX ADMIN — ACETYLCYSTEINE SCH MG: 100 INHALANT RESPIRATORY (INHALATION) at 23:49

## 2021-03-02 RX ADMIN — Medication SCH EACH: at 00:28

## 2021-03-02 RX ADMIN — DOCUSATE SODIUM SCH MG: 50 LIQUID ORAL at 17:00

## 2021-03-02 RX ADMIN — DEXTROSE MONOHYDRATE PRN MLS/HR: 50 INJECTION, SOLUTION INTRAVENOUS at 23:06

## 2021-03-02 RX ADMIN — DONEPEZIL HYDROCHLORIDE SCH MG: 5 TABLET ORAL at 21:27

## 2021-03-02 RX ADMIN — Medication SCH EACH: at 06:33

## 2021-03-02 RX ADMIN — PIPERACILLIN SODIUM AND TAZOBACTAM SODIUM SCH MLS/HR: .25; 2 INJECTION, POWDER, LYOPHILIZED, FOR SOLUTION INTRAVENOUS at 11:08

## 2021-03-02 RX ADMIN — HYDROMORPHONE HYDROCHLORIDE SCH MG: 2 TABLET ORAL at 21:26

## 2021-03-02 RX ADMIN — AMLODIPINE BESYLATE SCH MG: 10 TABLET ORAL at 08:17

## 2021-03-02 RX ADMIN — DULOXETINE HYDROCHLORIDE SCH MG: 30 CAPSULE, DELAYED RELEASE ORAL at 08:15

## 2021-03-02 RX ADMIN — METOCLOPRAMIDE HYDROCHLORIDE SCH MG: 5 SOLUTION ORAL at 21:23

## 2021-03-02 RX ADMIN — DEXTROSE MONOHYDRATE PRN ML: 500 INJECTION PARENTERAL at 06:48

## 2021-03-02 RX ADMIN — Medication SCH MG: at 13:30

## 2021-03-02 RX ADMIN — MISOPROSTOL SCH MCG: 100 TABLET ORAL at 21:24

## 2021-03-02 RX ADMIN — ACETYLCYSTEINE SCH MG: 100 INHALANT RESPIRATORY (INHALATION) at 14:48

## 2021-03-02 RX ADMIN — Medication SCH APPLIC: at 09:00

## 2021-03-02 RX ADMIN — DEXTROSE MONOHYDRATE PRN ML: 500 INJECTION PARENTERAL at 22:19

## 2021-03-02 RX ADMIN — Medication SCH MG: at 01:18

## 2021-03-02 RX ADMIN — ACETYLCYSTEINE SCH MG: 100 INHALANT RESPIRATORY (INHALATION) at 01:18

## 2021-03-02 RX ADMIN — MISOPROSTOL SCH MCG: 100 TABLET ORAL at 08:15

## 2021-03-02 RX ADMIN — FAMOTIDINE SCH MG: 20 TABLET, FILM COATED ORAL at 08:18

## 2021-03-02 RX ADMIN — DEXTROSE MONOHYDRATE PRN ML: 500 INJECTION PARENTERAL at 17:42

## 2021-03-02 RX ADMIN — ZINC OXIDE SCH APPLIC: 200 OINTMENT TOPICAL at 09:00

## 2021-03-02 NOTE — NUR
Dilaudid 2mg via Gt for generalized body pain was given, , no sob, no nasal flaring, 
abdomen distended, gastric residual 370ml, color yellow, will hold gtube feeding per 
protocol, HOB maintained elevated 30-45 degrees, rectal output 150ml, will continue to 
monitor patient.

## 2021-03-02 NOTE — NUR
MDS: 

This  completed the  portion of MDS assessment. The patients 
son, Ranjit Weston [phone] is the responsible party. The patient is alert & oriented x 
0 on with vent, trach and g-tube feeding (see appropriate disciplines notes for details). 
The pt. is pleasant & compliant, cooperative with care. Pt. has HD on Tu/Th/SAT 2:30 pm-6 
pm. SW to schedule optometry, dental & podiatry appointment as needed.

## 2021-03-02 NOTE — NUR
TELE RN OPENING NOTES:



PATIENT OBTUNDED; OPENS EYES TO TOUCH. ON TRACH AND TOLERATING VENT SETTINGS WELL WITH NO 
SOB. EXTERNAL CARDIAC MONITOR READS SINUS TACHY 'S; NO S/S OF DISTRESS AT THIS TIME. 
F/C DRAINING CLOUDY YELLOW URINE; PATENT AND INTACT. RECTAL TUBE DRAINING LIQUID BROWN BM; 
PATENT AND INTACT. GTUBE FEEDINGS NEPHRO @ 30ML/HR TOLERATING FEEDINGS WELL; PATENT AND 
INTACT. CENTRAL LINE TO R FEMORAL PATENT AND INTACT. PERMACATH TO R IJ PATENT AND INTACT; 
DRESSING C/D/I. WILL CONTINUE TO MONITOR BLOOD SUGAR. SAFETY MEASURES IN PLACE: BED IN 
LOWEST LOCKED POSITION, SIDERAILS UPX2, CALL LIGHT WITHIN REACH, BED ALARMS ON. PATIENT 
STABLE AT THIS TIME.

## 2021-03-02 NOTE — NUR
BS check @0530 Glucose reading 60.Rejected and repeated BS check.Reading 63. Advised charge 
Nurse. Dextrose given via IV. Blood sugar checked at 0645 reading 143.Resident alert awake. 
All GTF intact.

## 2021-03-02 NOTE — NUR
SS Note: 

SW completed bed hold & transfer notice and it will be placed in pt.'s chart. REMA also called 
the pt.'s son Ranjit [phone] and informed him that pt.'s insurance will pay for 7 days 
be hold. Ranjit expressed understanding and is agreeable to plan. 



REMA also asked Ranjit if he received intake paperwork. Ranjit stated he has not received it but 
will complete and will mail back once he does receive it. Noted.

## 2021-03-02 NOTE — NUR
Patients blood sugar 56, re-check 54, dextrose 50ml IVP was given for low blood sugar per 
protocol,  patient is awake, no signs of hypoglycemia noted, call light within reach.

## 2021-03-02 NOTE — NUR
RN TELE NOTES

PT IN BED, ASLEEP, EASY TO AROUSE, WITH EPISODE OF HYPOGLYCEMIA, BS 24, NO CHANGE IN LOC 
NOTED, D50 GIVEN AS ORDERED, RECHECKED AFTER 15 MIN, , STARTED ON TUBE FEEDING, WITH 
RESIDUALS OF 50ML, MD INFORMED, WILL CONTINUE TO MONITOR, WOUND DRESSING CHANGE DONE, PM 
CARE PROVIDED.

## 2021-03-02 NOTE — NUR
Notified pt's son Ranjit that pt will be transferred to telemetry Room 304-1. Informed him of 
pt's condition. He appreciated call.

## 2021-03-02 NOTE — NUR
TELE RN NOTES - BLOOD SUGAR



PATIENT NOTED WITH BLOODSUGAR OF 57 AT 2219. NO S/S OF HYPOGLYCEMIA, SKIN WARM AND DRY TO 
TOUCH. ADMINISTERED D50 AND NOTIFIED DR. PETERS. WILL REASSESS BLOOD SUGAR ACCORDINGLY

## 2021-03-02 NOTE — NUR
Called [facility] and spoke with Loida. Informed her of pt's vital signs and asked her 
if they can dialyze pt today. Pt was not dialyzed on Saturday due to tachycardia. Loida 
said she will speak with Dr Lara. Relayed lab results to Dr Lara. He called 
and ordered to transfer pt to acute hospital. He said pt needs to be dialyzed at acute 
hospital since pt is tachycardic and still septic. Notified Dr Maki.

## 2021-03-02 NOTE — NUR
After 1 hour blood sugar was rechecked, blood sugar 133, dextrose effective, call light 
within reach.

## 2021-03-02 NOTE — NUR
TELE RN NOTES - BLOOD SUGAR



REASSESED PATIENT'S BLOOD SUGAR AT 2315 AND WENT UP . DR. PETERS ORDERED FOR D5W @ 
100ML/HR. RN PUT ORDERS IN EMAR AND INITIATED INFUSION OF D5W.

## 2021-03-02 NOTE — NUR
RN TELE NOTES

RECEIVED PT FROM SUBACUTE STAFF VIA BED, PT IS AWAKE, NON VERBAL, ROOM SET UP ORIENTATION 
PROVIDED, NO SIGN OF PAIN OR DISTRESS, ON VENT/ TRACH, F/C IN PLACE, DRAINING MINIMALLY WITH 
YELLOW CLOUDY URINE, FLEXISEAL IN PLACE, SKIN CHECK DONE, VITALS TAKEN AND RECORDED, PT WITH 
GT, KEPT WARM AND COMFORTABLE.

## 2021-03-02 NOTE — NUR
Notified Dr Maki that pt's  BP 99/78, blood sugar this morning 60 and was given 
D50, gastric residual 370 mL. Dr Maki ordered to hold feeding, STAT CBC BMP ABG CXR.

## 2021-03-03 VITALS — DIASTOLIC BLOOD PRESSURE: 99 MMHG | SYSTOLIC BLOOD PRESSURE: 142 MMHG

## 2021-03-03 VITALS — DIASTOLIC BLOOD PRESSURE: 77 MMHG | SYSTOLIC BLOOD PRESSURE: 140 MMHG

## 2021-03-03 VITALS — SYSTOLIC BLOOD PRESSURE: 164 MMHG | DIASTOLIC BLOOD PRESSURE: 80 MMHG

## 2021-03-03 VITALS — DIASTOLIC BLOOD PRESSURE: 64 MMHG | SYSTOLIC BLOOD PRESSURE: 113 MMHG

## 2021-03-03 VITALS — SYSTOLIC BLOOD PRESSURE: 106 MMHG | DIASTOLIC BLOOD PRESSURE: 54 MMHG

## 2021-03-03 VITALS — DIASTOLIC BLOOD PRESSURE: 114 MMHG | SYSTOLIC BLOOD PRESSURE: 143 MMHG

## 2021-03-03 LAB
BASOPHILS # BLD AUTO: 0.1 /CMM (ref 0–0.2)
BASOPHILS NFR BLD AUTO: 0.3 % (ref 0–2)
BUN SERPL-MCNC: 88 MG/DL (ref 7–18)
CALCIUM SERPL-MCNC: 7.8 MG/DL (ref 8.5–10.1)
CHLORIDE SERPL-SCNC: 88 MMOL/L (ref 98–107)
CHOLEST SERPL-MCNC: 94 MG/DL (ref ?–200)
CO2 SERPL-SCNC: 22 MMOL/L (ref 21–32)
CREAT SERPL-MCNC: 3.3 MG/DL (ref 0.6–1.3)
EOSINOPHIL NFR BLD AUTO: 1.2 % (ref 0–6)
EOSINOPHIL NFR BLD MANUAL: 2 % (ref 0–4)
GLUCOSE SERPL-MCNC: 150 MG/DL (ref 74–106)
HCT VFR BLD AUTO: 26 % (ref 33–45)
HDLC SERPL-MCNC: 32 MG/DL (ref 40–60)
HGB BLD-MCNC: 8.8 G/DL (ref 11.5–14.8)
LDLC SERPL DIRECT ASSAY-MCNC: 55 MG/DL (ref 0–99)
LYMPHOCYTES NFR BLD AUTO: 1.4 /CMM (ref 0.8–4.8)
LYMPHOCYTES NFR BLD AUTO: 5.2 % (ref 20–44)
LYMPHOCYTES NFR BLD MANUAL: 3 % (ref 16–48)
MAGNESIUM SERPL-MCNC: 1.8 MG/DL (ref 1.8–2.4)
MCHC RBC AUTO-ENTMCNC: 34 G/DL (ref 31–36)
MCV RBC AUTO: 96 FL (ref 82–100)
MONOCYTES NFR BLD AUTO: 10.2 % (ref 2–12)
MONOCYTES NFR BLD AUTO: 2.8 /CMM (ref 0.1–1.3)
MONOCYTES NFR BLD MANUAL: 5 % (ref 0–11)
NEUTROPHILS # BLD AUTO: 22.4 /CMM (ref 1.8–8.9)
NEUTROPHILS NFR BLD AUTO: 83.1 % (ref 43–81)
NEUTS BAND NFR BLD MANUAL: 3 % (ref 0–5)
NEUTS SEG NFR BLD MANUAL: 87 % (ref 42–76)
PHOSPHATE SERPL-MCNC: 6.5 MG/DL (ref 2.5–4.9)
PLATELET # BLD AUTO: 407 /CMM (ref 150–450)
POTASSIUM SERPL-SCNC: 5.2 MMOL/L (ref 3.5–5.1)
RBC # BLD AUTO: 2.67 MIL/UL (ref 4–5.2)
SODIUM SERPL-SCNC: 125 MMOL/L (ref 136–145)
TRIGL SERPL-MCNC: 66 MG/DL (ref 30–150)
TSH SERPL DL<=0.005 MIU/L-ACNC: 33.29 UIU/ML (ref 0.36–3.74)
WBC NRBC COR # BLD AUTO: 26.9 K/UL (ref 4.3–11)

## 2021-03-03 RX ADMIN — HEPARIN SODIUM SCH UNITS: 5000 INJECTION INTRAVENOUS; SUBCUTANEOUS at 21:00

## 2021-03-03 RX ADMIN — Medication SCH TAB: at 08:31

## 2021-03-03 RX ADMIN — Medication SCH EACH: at 21:55

## 2021-03-03 RX ADMIN — MISOPROSTOL SCH MCG: 100 TABLET ORAL at 17:26

## 2021-03-03 RX ADMIN — ACETYLCYSTEINE SCH MG: 100 INHALANT RESPIRATORY (INHALATION) at 07:31

## 2021-03-03 RX ADMIN — Medication SCH MG: at 02:28

## 2021-03-03 RX ADMIN — Medication SCH MG: at 07:31

## 2021-03-03 RX ADMIN — METOCLOPRAMIDE HYDROCHLORIDE SCH MG: 5 SOLUTION ORAL at 05:08

## 2021-03-03 RX ADMIN — PIPERACILLIN SODIUM AND TAZOBACTAM SODIUM SCH MLS/HR: .25; 2 INJECTION, POWDER, LYOPHILIZED, FOR SOLUTION INTRAVENOUS at 23:59

## 2021-03-03 RX ADMIN — MUPIROCIN SCH GM: 20 OINTMENT TOPICAL at 08:53

## 2021-03-03 RX ADMIN — Medication SCH MG: at 13:56

## 2021-03-03 RX ADMIN — ACETYLCYSTEINE SCH MG: 100 INHALANT RESPIRATORY (INHALATION) at 22:54

## 2021-03-03 RX ADMIN — ZINC OXIDE SCH GM: 200 OINTMENT TOPICAL at 21:17

## 2021-03-03 RX ADMIN — PIPERACILLIN SODIUM AND TAZOBACTAM SODIUM SCH MLS/HR: .25; 2 INJECTION, POWDER, LYOPHILIZED, FOR SOLUTION INTRAVENOUS at 17:26

## 2021-03-03 RX ADMIN — MISOPROSTOL SCH MCG: 100 TABLET ORAL at 12:27

## 2021-03-03 RX ADMIN — Medication SCH EACH: at 17:42

## 2021-03-03 RX ADMIN — DOCUSATE SODIUM SCH MG: 50 LIQUID ORAL at 08:31

## 2021-03-03 RX ADMIN — Medication SCH EACH: at 06:33

## 2021-03-03 RX ADMIN — ACETYLCYSTEINE SCH MG: 100 INHALANT RESPIRATORY (INHALATION) at 15:35

## 2021-03-03 RX ADMIN — MUPIROCIN SCH GM: 20 OINTMENT TOPICAL at 21:17

## 2021-03-03 RX ADMIN — METOCLOPRAMIDE HYDROCHLORIDE SCH MG: 5 SOLUTION ORAL at 21:08

## 2021-03-03 RX ADMIN — AMLODIPINE BESYLATE SCH MG: 10 TABLET ORAL at 08:31

## 2021-03-03 RX ADMIN — Medication SCH EACH: at 12:28

## 2021-03-03 RX ADMIN — Medication SCH MG: at 19:23

## 2021-03-03 RX ADMIN — PIPERACILLIN SODIUM AND TAZOBACTAM SODIUM SCH MLS/HR: .25; 2 INJECTION, POWDER, LYOPHILIZED, FOR SOLUTION INTRAVENOUS at 05:08

## 2021-03-03 RX ADMIN — Medication PRN OZ: at 21:16

## 2021-03-03 RX ADMIN — MISOPROSTOL SCH MCG: 100 TABLET ORAL at 21:15

## 2021-03-03 RX ADMIN — PIPERACILLIN SODIUM AND TAZOBACTAM SODIUM SCH MLS/HR: .25; 2 INJECTION, POWDER, LYOPHILIZED, FOR SOLUTION INTRAVENOUS at 00:15

## 2021-03-03 RX ADMIN — PIPERACILLIN SODIUM AND TAZOBACTAM SODIUM SCH MLS/HR: .25; 2 INJECTION, POWDER, LYOPHILIZED, FOR SOLUTION INTRAVENOUS at 12:16

## 2021-03-03 RX ADMIN — ZINC OXIDE SCH GM: 200 OINTMENT TOPICAL at 08:30

## 2021-03-03 RX ADMIN — Medication SCH MG: at 08:19

## 2021-03-03 RX ADMIN — ATORVASTATIN CALCIUM SCH MG: 40 TABLET, FILM COATED ORAL at 21:09

## 2021-03-03 RX ADMIN — HYDROMORPHONE HYDROCHLORIDE SCH MG: 2 TABLET ORAL at 08:21

## 2021-03-03 RX ADMIN — INSULIN GLARGINE SCH UNIT: 100 INJECTION, SOLUTION SUBCUTANEOUS at 22:00

## 2021-03-03 RX ADMIN — INSULIN HUMAN PRN UNIT: 100 INJECTION, SOLUTION PARENTERAL at 06:23

## 2021-03-03 RX ADMIN — INSULIN HUMAN PRN UNIT: 100 INJECTION, SOLUTION PARENTERAL at 22:30

## 2021-03-03 RX ADMIN — Medication SCH OZ: at 10:10

## 2021-03-03 RX ADMIN — DULOXETINE HYDROCHLORIDE SCH MG: 30 CAPSULE, DELAYED RELEASE ORAL at 08:21

## 2021-03-03 RX ADMIN — MISOPROSTOL SCH MCG: 100 TABLET ORAL at 08:31

## 2021-03-03 RX ADMIN — HEPARIN SODIUM SCH UNITS: 5000 INJECTION INTRAVENOUS; SUBCUTANEOUS at 08:29

## 2021-03-03 RX ADMIN — INSULIN HUMAN PRN UNIT: 100 INJECTION, SOLUTION PARENTERAL at 12:18

## 2021-03-03 RX ADMIN — HYDROMORPHONE HYDROCHLORIDE SCH MG: 2 TABLET ORAL at 21:09

## 2021-03-03 RX ADMIN — DOCUSATE SODIUM SCH MG: 50 LIQUID ORAL at 17:26

## 2021-03-03 RX ADMIN — HEPARIN SODIUM SCH UNITS: 5000 INJECTION INTRAVENOUS; SUBCUTANEOUS at 21:11

## 2021-03-03 RX ADMIN — METOCLOPRAMIDE HYDROCHLORIDE SCH MG: 5 SOLUTION ORAL at 12:16

## 2021-03-03 RX ADMIN — FAMOTIDINE SCH MG: 20 TABLET, FILM COATED ORAL at 08:31

## 2021-03-03 RX ADMIN — ASPIRIN 81 MG SCH MG: 81 TABLET ORAL at 08:31

## 2021-03-03 RX ADMIN — INSULIN HUMAN PRN UNIT: 100 INJECTION, SOLUTION PARENTERAL at 17:28

## 2021-03-03 RX ADMIN — DONEPEZIL HYDROCHLORIDE SCH MG: 5 TABLET ORAL at 21:08

## 2021-03-03 NOTE — NUR
WOUND CARE CONSULT: PT PRESENTS WITH MULTIPLE SKIN ISSUES AND WOUNDS, PRESENT ON ADMISSION 
INCLUDING STAGE 3 ULCERS TO SACRUM AND LEFT LOWER BACK, LEFT HEEL DEEP TISSUE INJURY IN 
EVOLUTION AND DISCOLORATIONS/RAISED AREAS AND WOUNDS TO UPPER EXTREMITIES WITH WEEPING 
EDEMA. RECOMMEND SURGICAL CONSULT. DR LEON NOTIFIED OF CONSULT REQUEST. PT IS ON FIRST 
STEP [facility]. MD IN AGREEMENT WITH PLAN OF CARE. 

-------------------------------------------------------------------------------

Addendum: 03/03/21 at 0848 by REGINA STRICKLAND WNDNU

-------------------------------------------------------------------------------

Amended: Links added.

## 2021-03-03 NOTE — NUR
tele lvn notes

clarify  from Dr Pranay Maria regarding Heparin and  Lantus, insulin order if its ok to 
hold because pt going for Perma cath placement in the afternoon and wound debridement and pt 
will be NPO after MN. Per Md ordered is it ok to hold.  Blood sugar was 128 . no signs of 
hypo glycemia ordered. will continue monitoring.

## 2021-03-03 NOTE — NUR
TELE RN CLOSING NOTES:



PATIENT OBTUNDED; OPENS EYES TO TOUCH. ON TRACH AND TOLERATING VENT SETTINGS WELL WITH NO 
SOB. EXTERNAL CARDIAC MONITOR READS SINUS TACHY 'S; NO S/S OF DISTRESS AT THIS TIME. 
F/C DRAINING CLOUDY YELLOW URINE; PATENT AND INTACT. RECTAL TUBE DRAINING LIQUID BROWN BM; 
PATENT AND INTACT. GTUBE FEEDINGS NEPHRO @ 40ML/HR TOLERATING FEEDINGS WELL; PATENT AND 
INTACT. CENTRAL LINE TO R FEMORAL PATENT AND INTACT. PERMACATH TO R IJ PATENT AND INTACT; 
DRESSING C/D/I. SAFETY MEASURES IN PLACE: BED IN LOWEST LOCKED POSITION, SIDERAILS UPX2, 
CALL LIGHT WITHIN REACH, BED ALARMS ON. PATIENT STABLE AT THIS TIME. ENDORSED DASHAWN TO 
ONCOMING RN.

## 2021-03-03 NOTE — NUR
Tele RN Opening Notes



PATIENT OBTUNDED, EYES OPEN TO TOUCH. TOLERATING TRACH AND VENTILATOR WELL WITH CURRENT 
SETTINGS. NO S/S OF RESPIRATORY DISTRESS AT THIS TIME. F/C DRAINING CLOUDY YELLOW URINE, 
RECTAL TUBE DRAINING BROWN LIQUID EXCREMENT, BOTH PATENT AND INTACT. GTUBE FEEDINGS WITH 
NEPHRO CONTINUED AT 30ML/HR , TOLERATED WELL, PATENT AN DINTACT. SAFETY MEASURES IN PLACE, 
BED LOWEST POSITION. WILL CONTINUE TO MONITOR BLOOD SUGAR

## 2021-03-03 NOTE — NUR
TELE LVN INITIAL NOTES

 RECEIVED PATIENT IN BED OBTUNDED; OPENS EYES TO TOUCH. ON TRACH AND TOLERATING VENT 
SETTINGS WELL WITH NO SOB. EXTERNAL CARDIAC MONITOR READS SINUS TACHY 'S; NO S/S OF 
DISTRESS AT THIS TIME. F/C DRAINING  YELLOW URINE; PATENT AND INTACT. RECTAL TUBE DRAINING 
LIQUID BROWN BOWEL MOVEMENT; PATENT AND INTACT. G-TUBE FEEDINGS NEPHRO @ 40ML/HR TOLERATING 
FEEDINGS WELL; PATENT AND INTACT. CENTRAL LINE TO R FEMORAL PATENT AND INTACT. PERMA-CATH TO 
R IJ PATENT AND INTACT; DRESSING C/D/I. PT STILL ONGOING DIALYSIS AT THIS TIME. NO SIGNS OF 
ANY ACUTE DISTRESS NOTED. SAFETY MEASURES IN PLACE: BED IN LOWEST LOCKED POSITION, SIDE 
RAILS UPX2, CALL LIGHT WITHIN REACH, BED ALARMS ON. KEPT HER WARM AND COMFORTABLE AT ALL 
TIMES. WILL CONTINUE MONITORING.

## 2021-03-03 NOTE — NUR
RN TELE CLOSING NOTES



Patient more alert, opens eyes to sound, on trach and tolerating vent settings well, no SOB 
noted at this time. Consent for all procedures scheduled obtained from son Ranjit. External 
cardiac monitor reading sinus tachycardia 110s. no s/s of distress at this time, F/C patent 
an dintact draining cloudy, yellow urine. Rectal tube intact an dpatent draining brown 
liquid BM. Feedings and medication tolerated well. Safety measures in place, bed low and 
locked side rails up x2, call light within easy reach and all needs attended to.

## 2021-03-04 VITALS — SYSTOLIC BLOOD PRESSURE: 147 MMHG | DIASTOLIC BLOOD PRESSURE: 59 MMHG

## 2021-03-04 VITALS — DIASTOLIC BLOOD PRESSURE: 72 MMHG | SYSTOLIC BLOOD PRESSURE: 113 MMHG

## 2021-03-04 VITALS — SYSTOLIC BLOOD PRESSURE: 98 MMHG | DIASTOLIC BLOOD PRESSURE: 77 MMHG

## 2021-03-04 VITALS — DIASTOLIC BLOOD PRESSURE: 72 MMHG | SYSTOLIC BLOOD PRESSURE: 110 MMHG

## 2021-03-04 LAB
BASOPHILS # BLD AUTO: 0.1 /CMM (ref 0–0.2)
BASOPHILS NFR BLD AUTO: 0.3 % (ref 0–2)
BUN SERPL-MCNC: 62 MG/DL (ref 7–18)
CALCIUM SERPL-MCNC: 8 MG/DL (ref 8.5–10.1)
CHLORIDE SERPL-SCNC: 92 MMOL/L (ref 98–107)
CO2 SERPL-SCNC: 30 MMOL/L (ref 21–32)
CREAT SERPL-MCNC: 2.5 MG/DL (ref 0.6–1.3)
EOSINOPHIL NFR BLD AUTO: 0.7 % (ref 0–6)
GLUCOSE SERPL-MCNC: 175 MG/DL (ref 74–106)
HCT VFR BLD AUTO: 24 % (ref 33–45)
HGB BLD-MCNC: 8.3 G/DL (ref 11.5–14.8)
LYMPHOCYTES NFR BLD AUTO: 0.9 /CMM (ref 0.8–4.8)
LYMPHOCYTES NFR BLD AUTO: 3.7 % (ref 20–44)
MAGNESIUM SERPL-MCNC: 2 MG/DL (ref 1.8–2.4)
MCHC RBC AUTO-ENTMCNC: 34 G/DL (ref 31–36)
MCV RBC AUTO: 97 FL (ref 82–100)
MONOCYTES NFR BLD AUTO: 2.4 /CMM (ref 0.1–1.3)
MONOCYTES NFR BLD AUTO: 9.8 % (ref 2–12)
NEUTROPHILS # BLD AUTO: 21 /CMM (ref 1.8–8.9)
NEUTROPHILS NFR BLD AUTO: 85.5 % (ref 43–81)
PHOSPHATE SERPL-MCNC: 5 MG/DL (ref 2.5–4.9)
PLATELET # BLD AUTO: 421 /CMM (ref 150–450)
POTASSIUM SERPL-SCNC: 4.2 MMOL/L (ref 3.5–5.1)
RBC # BLD AUTO: 2.53 MIL/UL (ref 4–5.2)
SODIUM SERPL-SCNC: 129 MMOL/L (ref 136–145)
WBC NRBC COR # BLD AUTO: 24.6 K/UL (ref 4.3–11)

## 2021-03-04 PROCEDURE — 0JBL0ZZ EXCISION OF RIGHT UPPER LEG SUBCUTANEOUS TISSUE AND FASCIA, OPEN APPROACH: ICD-10-PCS

## 2021-03-04 PROCEDURE — 0J2SXYZ CHANGE OTHER DEVICE IN HEAD AND NECK SUBCUTANEOUS TISSUE AND FASCIA, EXTERNAL APPROACH: ICD-10-PCS | Performed by: SURGERY

## 2021-03-04 PROCEDURE — 0JB70ZZ EXCISION OF BACK SUBCUTANEOUS TISSUE AND FASCIA, OPEN APPROACH: ICD-10-PCS

## 2021-03-04 PROCEDURE — 5A1D70Z PERFORMANCE OF URINARY FILTRATION, INTERMITTENT, LESS THAN 6 HOURS PER DAY: ICD-10-PCS | Performed by: INTERNAL MEDICINE

## 2021-03-04 PROCEDURE — 0JBM0ZZ EXCISION OF LEFT UPPER LEG SUBCUTANEOUS TISSUE AND FASCIA, OPEN APPROACH: ICD-10-PCS

## 2021-03-04 RX ADMIN — HEPARIN SODIUM SCH UNITS: 5000 INJECTION INTRAVENOUS; SUBCUTANEOUS at 20:51

## 2021-03-04 RX ADMIN — INSULIN HUMAN PRN UNIT: 100 INJECTION, SOLUTION PARENTERAL at 22:04

## 2021-03-04 RX ADMIN — ACETYLCYSTEINE SCH MG: 100 INHALANT RESPIRATORY (INHALATION) at 23:38

## 2021-03-04 RX ADMIN — ACETYLCYSTEINE SCH MG: 100 INHALANT RESPIRATORY (INHALATION) at 15:57

## 2021-03-04 RX ADMIN — METOCLOPRAMIDE HYDROCHLORIDE SCH MG: 5 SOLUTION ORAL at 12:10

## 2021-03-04 RX ADMIN — METOCLOPRAMIDE HYDROCHLORIDE SCH MG: 5 SOLUTION ORAL at 20:50

## 2021-03-04 RX ADMIN — Medication SCH EACH: at 17:49

## 2021-03-04 RX ADMIN — Medication SCH MG: at 13:08

## 2021-03-04 RX ADMIN — ZINC OXIDE SCH GM: 200 OINTMENT TOPICAL at 08:16

## 2021-03-04 RX ADMIN — DOCUSATE SODIUM SCH MG: 50 LIQUID ORAL at 08:10

## 2021-03-04 RX ADMIN — PIPERACILLIN SODIUM AND TAZOBACTAM SODIUM SCH MLS/HR: .25; 2 INJECTION, POWDER, LYOPHILIZED, FOR SOLUTION INTRAVENOUS at 17:51

## 2021-03-04 RX ADMIN — METOCLOPRAMIDE HYDROCHLORIDE SCH MG: 5 SOLUTION ORAL at 05:29

## 2021-03-04 RX ADMIN — HYDROMORPHONE HYDROCHLORIDE SCH MG: 2 TABLET ORAL at 20:50

## 2021-03-04 RX ADMIN — LEVOTHYROXINE SODIUM SCH MCG: 100 TABLET ORAL at 07:30

## 2021-03-04 RX ADMIN — HYDROMORPHONE HYDROCHLORIDE PRN MG: 2 TABLET ORAL at 05:30

## 2021-03-04 RX ADMIN — INSULIN HUMAN PRN UNIT: 100 INJECTION, SOLUTION PARENTERAL at 06:37

## 2021-03-04 RX ADMIN — Medication SCH EACH: at 21:56

## 2021-03-04 RX ADMIN — ACETYLCYSTEINE SCH MG: 100 INHALANT RESPIRATORY (INHALATION) at 08:04

## 2021-03-04 RX ADMIN — Medication SCH EACH: at 06:36

## 2021-03-04 RX ADMIN — METOCLOPRAMIDE HYDROCHLORIDE SCH MG: 5 SOLUTION ORAL at 05:00

## 2021-03-04 RX ADMIN — HYDROMORPHONE HYDROCHLORIDE SCH MG: 2 TABLET ORAL at 08:11

## 2021-03-04 RX ADMIN — MISOPROSTOL SCH MCG: 100 TABLET ORAL at 12:10

## 2021-03-04 RX ADMIN — ZINC OXIDE SCH GM: 200 OINTMENT TOPICAL at 20:52

## 2021-03-04 RX ADMIN — AMLODIPINE BESYLATE SCH MG: 10 TABLET ORAL at 08:11

## 2021-03-04 RX ADMIN — MISOPROSTOL SCH MCG: 100 TABLET ORAL at 08:10

## 2021-03-04 RX ADMIN — DULOXETINE HYDROCHLORIDE SCH MG: 30 CAPSULE, DELAYED RELEASE ORAL at 08:10

## 2021-03-04 RX ADMIN — MUPIROCIN SCH GM: 20 OINTMENT TOPICAL at 20:52

## 2021-03-04 RX ADMIN — PIPERACILLIN SODIUM AND TAZOBACTAM SODIUM SCH MLS/HR: .25; 2 INJECTION, POWDER, LYOPHILIZED, FOR SOLUTION INTRAVENOUS at 12:13

## 2021-03-04 RX ADMIN — Medication SCH MG: at 01:03

## 2021-03-04 RX ADMIN — Medication PRN ML: at 17:34

## 2021-03-04 RX ADMIN — Medication SCH EACH: at 12:23

## 2021-03-04 RX ADMIN — Medication SCH OZ: at 08:16

## 2021-03-04 RX ADMIN — FAMOTIDINE SCH MG: 20 TABLET, FILM COATED ORAL at 08:11

## 2021-03-04 RX ADMIN — DOCUSATE SODIUM SCH MG: 50 LIQUID ORAL at 17:33

## 2021-03-04 RX ADMIN — ASPIRIN 81 MG SCH MG: 81 TABLET ORAL at 08:10

## 2021-03-04 RX ADMIN — Medication SCH MG: at 19:29

## 2021-03-04 RX ADMIN — INSULIN GLARGINE SCH UNIT: 100 INJECTION, SOLUTION SUBCUTANEOUS at 22:05

## 2021-03-04 RX ADMIN — MUPIROCIN SCH GM: 20 OINTMENT TOPICAL at 08:15

## 2021-03-04 RX ADMIN — DONEPEZIL HYDROCHLORIDE SCH MG: 5 TABLET ORAL at 21:29

## 2021-03-04 RX ADMIN — MISOPROSTOL SCH MCG: 100 TABLET ORAL at 17:33

## 2021-03-04 RX ADMIN — HEPARIN SODIUM SCH UNITS: 5000 INJECTION INTRAVENOUS; SUBCUTANEOUS at 08:13

## 2021-03-04 RX ADMIN — MISOPROSTOL SCH MCG: 100 TABLET ORAL at 20:54

## 2021-03-04 RX ADMIN — Medication SCH MG: at 08:04

## 2021-03-04 RX ADMIN — Medication PRN OZ: at 20:52

## 2021-03-04 RX ADMIN — Medication SCH MG: at 08:11

## 2021-03-04 RX ADMIN — PIPERACILLIN SODIUM AND TAZOBACTAM SODIUM SCH MLS/HR: .25; 2 INJECTION, POWDER, LYOPHILIZED, FOR SOLUTION INTRAVENOUS at 05:56

## 2021-03-04 RX ADMIN — ATORVASTATIN CALCIUM SCH MG: 40 TABLET, FILM COATED ORAL at 21:29

## 2021-03-04 RX ADMIN — Medication SCH TAB: at 08:11

## 2021-03-04 NOTE — NUR
ms lvn closing notes

 pt woke up with pain on his right foot , norco tablet given as ordered. Blood sugar also 
checked and came out 378, 15 units of insulin given ciera SQ as ordered. patient stated he ate 
sandwich last night. No signs of hyper glycemia noted. He's pleasant, cooperative and 
compliance with the treatment and meds. kept him warm and comfortable at all times. will 
continue monitoring and will endorse to am nurse for continuity of care place call light at 
reach. 

-------------------------------------------------------------------------------

Addendum: 03/04/21 at 0652 by SHERRI JOSE LVN

-------------------------------------------------------------------------------

disregards this notes belongs to other patient.

## 2021-03-04 NOTE — NUR
TELE RN NOTES



NOTED SOME SIGNS OF BLEEDING ON THE PROCEDURE SITE. APPLIED PRESSURE. WILL CONTINUE TO 
MONITOR FOR ANY CHANGES.

## 2021-03-04 NOTE — NUR
tele lvn closing notes

 pt resting at this time without any distress noted. all due meds given and all needs met. 
bed bath done as well as wound care treatment. stable throughout the night .NPO for surgery 
today and wound debridement. kept her warm and comfortable at all time.s tele sinus Tach 
heart rate 111 per monitor. Noticed rectal tube came out and seen hard stool like ball . 
after that pt seems more comfortable . will endorse to am shift for continuity of care.

## 2021-03-04 NOTE — NUR
TELE LVN INITIAL NOTES

 RECEIVED REPORT FROM AM NURSE AND SEEN PATIENT IN BED OBTUNDED; OPENS EYES TO TOUCH. ON 
TRACH AND TOLERATING VENT SETTINGS WELL WITH NO SOB. EXTERNAL CARDIAC MONITOR READS SINUS 
TACHY  NO S/S OF DISTRESS AT THIS TIME. . G-TUBE FEEDINGS NEPHRO @ 40ML/HR TOLERATING 
FEEDINGS WELL; PATENT AND INTACT. CENTRAL LINE TO R FEMORAL PATENT AND INTACT. PERMA-CATH TO 
R IJ PATENT AND INTACT; DRESSING C/D/I.  NO SIGNS OF ANY ACUTE DISTRESS NOTED. SAFETY 
MEASURES IN PLACE: BED IN LOWEST LOCKED POSITION, SIDE RAILS UPX2, CALL LIGHT WITHIN REACH, 
BED ALARMS ON. KEPT HER WARM AND COMFORTABLE AT ALL TIMES. WILL CONTINUE MONITORING.

## 2021-03-04 NOTE — NUR
TELE RN NOTES



PATIENT JUST GOT BACK FROM THE SURGERY. WITH VS /118  RR 15 SP02 100% OR NURSE 
AT THE BEDSIDE FOR CONTINUOUS MONITORING

-------------------------------------------------------------------------------

Addendum: 03/04/21 at 1624 by EDD GARIBAY RN

-------------------------------------------------------------------------------

1552 time patient came back from surgery

## 2021-03-04 NOTE — NUR
TELE RN OPENING NOTES



RECEIVED PATIENT IN BED. NON VERBAL. TRACH SHILEY #8 WITH VENT SETTINGS  FiO2 40% PEEP 
5. NO SOB NOTED. IN NO APPARENT DISTRESS. TELE READING OF , SPO2 100%. R IJ 
PERMA-CATH. R FEMORAL LINE, INTACT. PATIENT IS ON NPO FOR UPCOMING PROCEDURE. ASHBY CATHETER 
IN PLACE, DRAINING CLOUDY WHITISH-YELLOWISH URINE.  SAFETY MEASURES MAINTAINED. BED IN 
LOWEST POSITION. BRAKES, LOCKED. SIDE RAILS UP X2. CALL LIGHT WITHIN REACH. WILL CONTINUE 
PLAN OF CARE.

## 2021-03-04 NOTE — NUR
TELE LVN NOTES' 

 BLOOD SUGAR CHECKED 132, INSULIN GIVEN AS WELL AS HER LANTUS AS ORDERED. NO SIGNS OF ANY 
HYPO GLYCEMIA NOTED. DUE MEDS ADMINISTERED TEOFILO G-TUBE NO ASPIRATION NOTED AT THIS TIME. WILL 
CONTINUE MONITORING.

## 2021-03-04 NOTE — NUR
TELE RN CLOSING NOTES



PATIENT IN BED. TRACH SHIFRANSICO #8 WITH VENT SETTINGS  FiO2 40% PEEP 5. NO SOB NOTED. NO 
S/S OF RESPIRATORY DISTRESS, SAO2 100%. TELE READING OF . R IJ PERMA-CATH. R FEMORAL 
LINE, INTACT AND PATENT. GTF NEPRO @ 40 ML/HR, TOLERATING WELL. ASHBY CATHETER IN PLACE, 
DRAINING YELLOWISH-GREENISH URINE. MEDS WERE GIVEN AS ORDERED. SAFETY MEASURES MAINTAINED. 
BED IN LOWEST POSITION. BRAKES, LOCKED. SIDE RAILS UP X2. CALL LIGHT WITHIN REACH. WILL 
ENDORSE TO NIGHT SHIFT FOR CONTINUITY OF CARE.

## 2021-03-05 VITALS — SYSTOLIC BLOOD PRESSURE: 118 MMHG | DIASTOLIC BLOOD PRESSURE: 71 MMHG

## 2021-03-05 VITALS — SYSTOLIC BLOOD PRESSURE: 156 MMHG | DIASTOLIC BLOOD PRESSURE: 75 MMHG

## 2021-03-05 VITALS — SYSTOLIC BLOOD PRESSURE: 111 MMHG | DIASTOLIC BLOOD PRESSURE: 54 MMHG

## 2021-03-05 VITALS — DIASTOLIC BLOOD PRESSURE: 53 MMHG | SYSTOLIC BLOOD PRESSURE: 105 MMHG

## 2021-03-05 VITALS — DIASTOLIC BLOOD PRESSURE: 48 MMHG | SYSTOLIC BLOOD PRESSURE: 175 MMHG

## 2021-03-05 LAB
ALBUMIN SERPL BCP-MCNC: 1 G/DL (ref 3.4–5)
ALP SERPL-CCNC: 341 U/L (ref 46–116)
ALT SERPL W P-5'-P-CCNC: 17 U/L (ref 12–78)
AST SERPL W P-5'-P-CCNC: 38 U/L (ref 15–37)
BASOPHILS # BLD AUTO: 0.1 /CMM (ref 0–0.2)
BASOPHILS NFR BLD AUTO: 0.3 % (ref 0–2)
BILIRUB DIRECT SERPL-MCNC: 0.4 MG/DL (ref 0–0.2)
BILIRUB SERPL-MCNC: 0.6 MG/DL (ref 0.2–1)
BUN SERPL-MCNC: 68 MG/DL (ref 7–18)
CALCIUM SERPL-MCNC: 8.3 MG/DL (ref 8.5–10.1)
CHLORIDE SERPL-SCNC: 94 MMOL/L (ref 98–107)
CO2 SERPL-SCNC: 25 MMOL/L (ref 21–32)
CREAT SERPL-MCNC: 2.8 MG/DL (ref 0.6–1.3)
EOSINOPHIL NFR BLD AUTO: 1.2 % (ref 0–6)
EOSINOPHIL NFR BLD MANUAL: 2 % (ref 0–4)
GLUCOSE SERPL-MCNC: 185 MG/DL (ref 74–106)
HCT VFR BLD AUTO: 23 % (ref 33–45)
HGB BLD-MCNC: 7.6 G/DL (ref 11.5–14.8)
LYMPHOCYTES NFR BLD AUTO: 1.5 /CMM (ref 0.8–4.8)
LYMPHOCYTES NFR BLD AUTO: 5.8 % (ref 20–44)
LYMPHOCYTES NFR BLD MANUAL: 6 % (ref 16–48)
MAGNESIUM SERPL-MCNC: 1.8 MG/DL (ref 1.8–2.4)
MCHC RBC AUTO-ENTMCNC: 33 G/DL (ref 31–36)
MCV RBC AUTO: 99 FL (ref 82–100)
MONOCYTES NFR BLD AUTO: 2.5 /CMM (ref 0.1–1.3)
MONOCYTES NFR BLD AUTO: 9.9 % (ref 2–12)
MONOCYTES NFR BLD MANUAL: 5 % (ref 0–11)
MYELOCYTES NFR BLD MANUAL: 1 % (ref 0–0)
NEUTROPHILS # BLD AUTO: 20.8 /CMM (ref 1.8–8.9)
NEUTROPHILS NFR BLD AUTO: 82.8 % (ref 43–81)
NEUTS BAND NFR BLD MANUAL: 2 % (ref 0–5)
NEUTS SEG NFR BLD MANUAL: 84 % (ref 42–76)
PHOSPHATE SERPL-MCNC: 6 MG/DL (ref 2.5–4.9)
PLATELET # BLD AUTO: 465 /CMM (ref 150–450)
POTASSIUM SERPL-SCNC: 3.9 MMOL/L (ref 3.5–5.1)
PROT SERPL-MCNC: 6.6 G/DL (ref 6.4–8.2)
RBC # BLD AUTO: 2.35 MIL/UL (ref 4–5.2)
SODIUM SERPL-SCNC: 131 MMOL/L (ref 136–145)
WBC NRBC COR # BLD AUTO: 25.2 K/UL (ref 4.3–11)

## 2021-03-05 RX ADMIN — PIPERACILLIN SODIUM AND TAZOBACTAM SODIUM SCH MLS/HR: .25; 2 INJECTION, POWDER, LYOPHILIZED, FOR SOLUTION INTRAVENOUS at 01:06

## 2021-03-05 RX ADMIN — Medication SCH OZ: at 12:15

## 2021-03-05 RX ADMIN — Medication SCH MG: at 08:55

## 2021-03-05 RX ADMIN — ASPIRIN 81 MG SCH MG: 81 TABLET ORAL at 08:54

## 2021-03-05 RX ADMIN — HEPARIN SODIUM SCH UNITS: 5000 INJECTION INTRAVENOUS; SUBCUTANEOUS at 08:57

## 2021-03-05 RX ADMIN — MISOPROSTOL SCH MCG: 100 TABLET ORAL at 08:55

## 2021-03-05 RX ADMIN — AMLODIPINE BESYLATE SCH MG: 10 TABLET ORAL at 08:44

## 2021-03-05 RX ADMIN — HEPARIN SODIUM SCH UNITS: 5000 INJECTION INTRAVENOUS; SUBCUTANEOUS at 21:00

## 2021-03-05 RX ADMIN — Medication SCH MG: at 01:08

## 2021-03-05 RX ADMIN — ZINC OXIDE SCH GM: 200 OINTMENT TOPICAL at 21:27

## 2021-03-05 RX ADMIN — MISOPROSTOL SCH MCG: 100 TABLET ORAL at 17:16

## 2021-03-05 RX ADMIN — MISOPROSTOL SCH MCG: 100 TABLET ORAL at 13:41

## 2021-03-05 RX ADMIN — MISOPROSTOL SCH MCG: 100 TABLET ORAL at 21:20

## 2021-03-05 RX ADMIN — LEVOTHYROXINE SODIUM SCH MCG: 100 TABLET ORAL at 08:54

## 2021-03-05 RX ADMIN — MUPIROCIN SCH GM: 20 OINTMENT TOPICAL at 21:27

## 2021-03-05 RX ADMIN — MUPIROCIN SCH APPLIC: 20 OINTMENT TOPICAL at 09:00

## 2021-03-05 RX ADMIN — ACETYLCYSTEINE SCH MG: 100 INHALANT RESPIRATORY (INHALATION) at 15:10

## 2021-03-05 RX ADMIN — ACETYLCYSTEINE SCH MG: 100 INHALANT RESPIRATORY (INHALATION) at 07:26

## 2021-03-05 RX ADMIN — Medication SCH EACH: at 12:15

## 2021-03-05 RX ADMIN — PIPERACILLIN SODIUM AND TAZOBACTAM SODIUM SCH MLS/HR: .25; 2 INJECTION, POWDER, LYOPHILIZED, FOR SOLUTION INTRAVENOUS at 05:51

## 2021-03-05 RX ADMIN — AMLODIPINE BESYLATE SCH MG: 10 TABLET ORAL at 17:16

## 2021-03-05 RX ADMIN — INSULIN GLARGINE SCH UNIT: 100 INJECTION, SOLUTION SUBCUTANEOUS at 21:12

## 2021-03-05 RX ADMIN — INSULIN HUMAN PRN UNIT: 100 INJECTION, SOLUTION PARENTERAL at 12:18

## 2021-03-05 RX ADMIN — DOCUSATE SODIUM SCH MG: 50 LIQUID ORAL at 08:54

## 2021-03-05 RX ADMIN — Medication SCH MG: at 19:17

## 2021-03-05 RX ADMIN — DONEPEZIL HYDROCHLORIDE SCH MG: 5 TABLET ORAL at 21:20

## 2021-03-05 RX ADMIN — INSULIN HUMAN PRN UNIT: 100 INJECTION, SOLUTION PARENTERAL at 21:13

## 2021-03-05 RX ADMIN — METOCLOPRAMIDE HYDROCHLORIDE SCH MG: 5 SOLUTION ORAL at 13:41

## 2021-03-05 RX ADMIN — HYDROMORPHONE HYDROCHLORIDE SCH MG: 2 TABLET ORAL at 08:55

## 2021-03-05 RX ADMIN — FAMOTIDINE SCH MG: 20 TABLET, FILM COATED ORAL at 08:54

## 2021-03-05 RX ADMIN — INSULIN HUMAN PRN UNIT: 100 INJECTION, SOLUTION PARENTERAL at 06:20

## 2021-03-05 RX ADMIN — DOCUSATE SODIUM SCH MG: 50 LIQUID ORAL at 16:37

## 2021-03-05 RX ADMIN — Medication SCH TAB: at 08:55

## 2021-03-05 RX ADMIN — ACETYLCYSTEINE SCH MG: 100 INHALANT RESPIRATORY (INHALATION) at 22:55

## 2021-03-05 RX ADMIN — Medication SCH MG: at 13:15

## 2021-03-05 RX ADMIN — METOCLOPRAMIDE HYDROCHLORIDE SCH MG: 5 SOLUTION ORAL at 06:19

## 2021-03-05 RX ADMIN — HYDROMORPHONE HYDROCHLORIDE SCH MG: 2 TABLET ORAL at 21:20

## 2021-03-05 RX ADMIN — PIPERACILLIN SODIUM AND TAZOBACTAM SODIUM SCH MLS/HR: .25; 2 INJECTION, POWDER, LYOPHILIZED, FOR SOLUTION INTRAVENOUS at 12:20

## 2021-03-05 RX ADMIN — ATORVASTATIN CALCIUM SCH MG: 40 TABLET, FILM COATED ORAL at 21:20

## 2021-03-05 RX ADMIN — METOCLOPRAMIDE HYDROCHLORIDE SCH MG: 5 SOLUTION ORAL at 21:20

## 2021-03-05 RX ADMIN — PIPERACILLIN SODIUM AND TAZOBACTAM SODIUM SCH MLS/HR: .25; 2 INJECTION, POWDER, LYOPHILIZED, FOR SOLUTION INTRAVENOUS at 17:37

## 2021-03-05 RX ADMIN — Medication SCH EACH: at 21:27

## 2021-03-05 RX ADMIN — ZINC OXIDE SCH APPLIC: 200 OINTMENT TOPICAL at 12:15

## 2021-03-05 RX ADMIN — DULOXETINE HYDROCHLORIDE SCH MG: 30 CAPSULE, DELAYED RELEASE ORAL at 08:54

## 2021-03-05 RX ADMIN — Medication SCH EACH: at 06:19

## 2021-03-05 RX ADMIN — Medication SCH EACH: at 17:16

## 2021-03-05 RX ADMIN — Medication SCH MG: at 07:26

## 2021-03-05 NOTE — NUR
tele lvn closing notes

 pt resting comfortably after morning care done , not in any acute distress noted. wound 
care treatment also done . G-tube feeding tolerated well no aspiration  noted. 5 ml residual 
noted also. All due meds given and all needs met. Tele Sinus tach heart rate 113 per 
monitor. Still on mechanical ventilator , stable throughout the night. Blood sugar checked 
done 168, 3 units of insulin given ciera SQ as ordered No signs of hypo glycemia noted. kept 
her warm and comfortable at all times. Endorse to am nurse for continuity of care.

## 2021-03-05 NOTE — NUR
tele lvn: notes



hd completed with 1000ml removed per hd nurse. pt remains sinus tachycardia 130's. will 
continue to monitor.

## 2021-03-05 NOTE — NUR
noah lvn: notes



lab called re: albumin level of 1. larissa briones (np) notified and made aware and will consult 
a dietitian as stated.

-------------------------------------------------------------------------------

Addendum: 03/05/21 at 1552 by JOSIAH CARTAGENA LVN

-------------------------------------------------------------------------------

also informed amna briones (np) that pt has minimal urine output therefore unable to collect 
urine.

## 2021-03-05 NOTE — NUR
RN NOTE



PATIENT RECEIVED IN BED, OBTUNDED, OPENS EYES SPONTANEOUSLY. NO SIGNS OF DISTRESS NOTED, PT 
HAS TRACH, TOLERATING VENT SETTINGS. SINUS TACHY ON EXTERNAL CARDIAC MONITOR 'S, GTUBE 
FEEDING INFUSING - NEPRO AT 40ML/HR, WELL TOLERATED, 10ML RESIDUAL NOTED, GTUBE PATENT, 
FLUSHING WITHOUT DIFFICULTIES. TUBING CHANGED. PATIENT'S MEDICATIONS ADMINISTERED PER 
ORDERS, HGB 7.6, DR. PETERS NOTIFIED, RECEIVED ORDER TO HOLD HEPARIN SUBQ. SAFETY MEASURES IN 
PLACE: BED IN LOWEST LOCKED POSITION, SIDE RAILS UPX2, CALL LIGHT WITHIN REACH, BED ALARMS 
ON.  WILL CONTINUE MONITORING CLOSELY.

## 2021-03-05 NOTE — NUR
tele lvn: md visit



seen and examined by larissa briones (np) with verbal order to change her holm catheter and 
collect specimen. inserted new holm catheter [id number] via aseptic technique, elham. well.

## 2021-03-06 VITALS — DIASTOLIC BLOOD PRESSURE: 33 MMHG | SYSTOLIC BLOOD PRESSURE: 100 MMHG

## 2021-03-06 VITALS — SYSTOLIC BLOOD PRESSURE: 100 MMHG | DIASTOLIC BLOOD PRESSURE: 44 MMHG

## 2021-03-06 VITALS — DIASTOLIC BLOOD PRESSURE: 58 MMHG | SYSTOLIC BLOOD PRESSURE: 105 MMHG

## 2021-03-06 VITALS — DIASTOLIC BLOOD PRESSURE: 97 MMHG | SYSTOLIC BLOOD PRESSURE: 115 MMHG

## 2021-03-06 VITALS — DIASTOLIC BLOOD PRESSURE: 55 MMHG | SYSTOLIC BLOOD PRESSURE: 118 MMHG

## 2021-03-06 VITALS — SYSTOLIC BLOOD PRESSURE: 101 MMHG | DIASTOLIC BLOOD PRESSURE: 62 MMHG

## 2021-03-06 LAB
BASOPHILS # BLD AUTO: 0.1 /CMM (ref 0–0.2)
BASOPHILS NFR BLD AUTO: 0.4 % (ref 0–2)
BILIRUB UR QL STRIP: NEGATIVE
BUN SERPL-MCNC: 51 MG/DL (ref 7–18)
CALCIUM SERPL-MCNC: 8.3 MG/DL (ref 8.5–10.1)
CHLORIDE SERPL-SCNC: 94 MMOL/L (ref 98–107)
CO2 SERPL-SCNC: 29 MMOL/L (ref 21–32)
COLOR UR: YELLOW
CREAT SERPL-MCNC: 2.2 MG/DL (ref 0.6–1.3)
DEPRECATED SQUAMOUS URNS QL MICRO: (no result) /HPF
EOSINOPHIL NFR BLD AUTO: 0.9 % (ref 0–6)
GLUCOSE SERPL-MCNC: 188 MG/DL (ref 74–106)
GLUCOSE UR STRIP-MCNC: NEGATIVE MG/DL
HCT VFR BLD AUTO: 22 % (ref 33–45)
HGB BLD-MCNC: 7.1 G/DL (ref 11.5–14.8)
LEUKOCYTE ESTERASE UR QL STRIP: (no result)
LYMPHOCYTES NFR BLD AUTO: 1.5 /CMM (ref 0.8–4.8)
LYMPHOCYTES NFR BLD AUTO: 6.4 % (ref 20–44)
MAGNESIUM SERPL-MCNC: 1.9 MG/DL (ref 1.8–2.4)
MCHC RBC AUTO-ENTMCNC: 32 G/DL (ref 31–36)
MCV RBC AUTO: 100 FL (ref 82–100)
MONOCYTES NFR BLD AUTO: 12.4 % (ref 2–12)
MONOCYTES NFR BLD AUTO: 3 /CMM (ref 0.1–1.3)
NEUTROPHILS # BLD AUTO: 19.2 /CMM (ref 1.8–8.9)
NEUTROPHILS NFR BLD AUTO: 79.9 % (ref 43–81)
NITRITE UR QL STRIP: NEGATIVE
PH UR STRIP: 6.5 [PH] (ref 5–8)
PLATELET # BLD AUTO: 453 /CMM (ref 150–450)
POTASSIUM SERPL-SCNC: 3.6 MMOL/L (ref 3.5–5.1)
PROT UR QL STRIP: 100 MG/DL
RBC # BLD AUTO: 2.23 MIL/UL (ref 4–5.2)
RBC #/AREA URNS HPF: (no result) /HPF (ref 0–2)
SODIUM SERPL-SCNC: 130 MMOL/L (ref 136–145)
UROBILINOGEN UR STRIP-MCNC: 0.2 EU/DL
WBC #/AREA URNS HPF: (no result) /HPF
WBC #/AREA URNS HPF: (no result) /HPF (ref 0–3)
WBC NRBC COR # BLD AUTO: 24 K/UL (ref 4.3–11)

## 2021-03-06 RX ADMIN — ZINC OXIDE SCH GM: 200 OINTMENT TOPICAL at 21:17

## 2021-03-06 RX ADMIN — HYDROMORPHONE HYDROCHLORIDE SCH MG: 2 TABLET ORAL at 08:14

## 2021-03-06 RX ADMIN — MISOPROSTOL SCH MCG: 100 TABLET ORAL at 20:53

## 2021-03-06 RX ADMIN — Medication SCH MG: at 07:29

## 2021-03-06 RX ADMIN — Medication SCH MG: at 08:09

## 2021-03-06 RX ADMIN — Medication SCH EACH: at 22:25

## 2021-03-06 RX ADMIN — Medication PRN ML: at 16:56

## 2021-03-06 RX ADMIN — Medication SCH MG: at 14:25

## 2021-03-06 RX ADMIN — MISOPROSTOL SCH MCG: 100 TABLET ORAL at 12:02

## 2021-03-06 RX ADMIN — METOCLOPRAMIDE HYDROCHLORIDE SCH MG: 5 SOLUTION ORAL at 20:54

## 2021-03-06 RX ADMIN — MISOPROSTOL SCH MCG: 100 TABLET ORAL at 16:43

## 2021-03-06 RX ADMIN — INSULIN HUMAN PRN UNIT: 100 INJECTION, SOLUTION PARENTERAL at 22:49

## 2021-03-06 RX ADMIN — DOCUSATE SODIUM SCH MG: 50 LIQUID ORAL at 08:06

## 2021-03-06 RX ADMIN — ACETYLCYSTEINE SCH MG: 100 INHALANT RESPIRATORY (INHALATION) at 14:26

## 2021-03-06 RX ADMIN — Medication SCH MG: at 01:06

## 2021-03-06 RX ADMIN — MISOPROSTOL SCH MCG: 100 TABLET ORAL at 08:09

## 2021-03-06 RX ADMIN — Medication SCH EACH: at 16:43

## 2021-03-06 RX ADMIN — ZINC OXIDE SCH GM: 200 OINTMENT TOPICAL at 08:13

## 2021-03-06 RX ADMIN — INSULIN HUMAN PRN UNIT: 100 INJECTION, SOLUTION PARENTERAL at 06:23

## 2021-03-06 RX ADMIN — ASPIRIN 81 MG SCH MG: 81 TABLET ORAL at 08:06

## 2021-03-06 RX ADMIN — ACETAMINOPHEN PRN MG: 160 SOLUTION ORAL at 23:23

## 2021-03-06 RX ADMIN — HEPARIN SODIUM SCH UNITS: 5000 INJECTION INTRAVENOUS; SUBCUTANEOUS at 08:13

## 2021-03-06 RX ADMIN — INSULIN GLARGINE SCH UNIT: 100 INJECTION, SOLUTION SUBCUTANEOUS at 22:50

## 2021-03-06 RX ADMIN — DONEPEZIL HYDROCHLORIDE SCH MG: 5 TABLET ORAL at 22:31

## 2021-03-06 RX ADMIN — DULOXETINE HYDROCHLORIDE SCH MG: 30 CAPSULE, DELAYED RELEASE ORAL at 08:06

## 2021-03-06 RX ADMIN — METOCLOPRAMIDE HYDROCHLORIDE SCH MG: 5 SOLUTION ORAL at 12:02

## 2021-03-06 RX ADMIN — FAMOTIDINE SCH MG: 20 TABLET, FILM COATED ORAL at 08:06

## 2021-03-06 RX ADMIN — Medication SCH EACH: at 06:33

## 2021-03-06 RX ADMIN — METOCLOPRAMIDE HYDROCHLORIDE SCH MG: 5 SOLUTION ORAL at 05:30

## 2021-03-06 RX ADMIN — HYDROMORPHONE HYDROCHLORIDE SCH MG: 2 TABLET ORAL at 20:54

## 2021-03-06 RX ADMIN — ACETYLCYSTEINE SCH MG: 100 INHALANT RESPIRATORY (INHALATION) at 07:29

## 2021-03-06 RX ADMIN — INSULIN HUMAN PRN UNIT: 100 INJECTION, SOLUTION PARENTERAL at 12:02

## 2021-03-06 RX ADMIN — Medication SCH OZ: at 08:13

## 2021-03-06 RX ADMIN — HYDROMORPHONE HYDROCHLORIDE SCH MG: 2 TABLET ORAL at 08:07

## 2021-03-06 RX ADMIN — LEVOTHYROXINE SODIUM SCH MCG: 100 TABLET ORAL at 08:06

## 2021-03-06 RX ADMIN — MUPIROCIN SCH GM: 20 OINTMENT TOPICAL at 21:16

## 2021-03-06 RX ADMIN — INSULIN HUMAN PRN UNIT: 100 INJECTION, SOLUTION PARENTERAL at 16:55

## 2021-03-06 RX ADMIN — Medication SCH EACH: at 11:56

## 2021-03-06 RX ADMIN — Medication SCH MG: at 20:10

## 2021-03-06 RX ADMIN — HEPARIN SODIUM SCH UNITS: 5000 INJECTION INTRAVENOUS; SUBCUTANEOUS at 20:55

## 2021-03-06 RX ADMIN — MUPIROCIN SCH GM: 20 OINTMENT TOPICAL at 08:12

## 2021-03-06 RX ADMIN — DOCUSATE SODIUM SCH MG: 50 LIQUID ORAL at 16:43

## 2021-03-06 RX ADMIN — ATORVASTATIN CALCIUM SCH MG: 40 TABLET, FILM COATED ORAL at 22:31

## 2021-03-06 RX ADMIN — AMLODIPINE BESYLATE SCH MG: 10 TABLET ORAL at 08:07

## 2021-03-06 RX ADMIN — Medication SCH TAB: at 08:07

## 2021-03-06 NOTE — NUR
RN OPENING NOTE



PT IS CURRENTLY RESTING IN BED. NONVERBAL AND OBTUNDED. NOT ABLE TO COMMUNICATE NEEDS TO 
NURSES. CURRENTLY ON TRACT. SETTINGS: , PEEP 5, SHILEY #8, FI O2 40%. NO RESPIRATORY 
DISTRESS PRESENT. SINUS TACHYCARDIA RECORDED VIA EXTERNAL MONITOR. GENERALIZED PITTING EDEMA 
PRESENT. F/C PRESENT. TLC PRESENT ON R FEMORAL. HD CATH PRESENT ON R INTERNAL JUGULAR. 
SAFETY MEASURES IN PLACE. BED LOWERED. CALL LIGHT WITHIN REACH. SIDE RAILS UP. WILL CONTINUE 
TO MONITOR.

## 2021-03-06 NOTE — NUR
PATIENT RESTING COMFORTABLY IN BED, NO APPARENT DISTRESS. MEDICATIONS ADMINISTERED PER 
ORDERS, WELL TOLERATED. PT RECEIVED A BED BATH, SHE HAD A BM THIS AM. DRESSING CHANGES DONE 
TO SACRAL AND THIGH WOUNDS. WILL CONTINUE MONITORING CLOSELY.

## 2021-03-06 NOTE — NUR
RN CLOSING NOTE



PT IS AWAKE IN BED RESTING. NONVERBAL DUE TO PRESENCE OF TRACH. HOWEVER, ABLE TO NOD/SHAKE 
HEAD TO INDICATE UNDERSTANDING OF COMMUNICATION. TRACH PRESENT. NO RESPIRATORY DISTRESS 
PRESENT, O2 SAT >95% THROUGHOUT SHIFT. CURRENTLY SINUS TACHY ON EXTERNAL MONITOR. 
GENERALIZED EDEMA PRESENT. INCONTINENT AND F/C PRESENT. DRAINING WITH CLOUDY PALE YELLOW 
FLUID. BEDBOUND. G TUBE PRESENT. TRIPLE LUMEN CATHETER PRESENT IN R FEMORAL. HD CATH PRESENT 
ON R CW. SAFETY MEASURES GIVEN. SIDE RAILS RAISED. BED LOWERED.  CALL LIGHT WITHIN REACH. 
ROUTINE MEDS GIVEN. REPORT TO BE GIVEN TO NIGHT NURSE FOR DASHAWN.

## 2021-03-06 NOTE — NUR
RN NOTES

PT. HAS TEMPERATURE  100.O, GOT AN ORDER FROM DR CAVAZOS TYLENOL 650MG UDC Q 6 HRS PRN, 
ORDER NOTED AND CARRIED OUT

## 2021-03-06 NOTE — NUR
RN NOTES

RECEIVED PT. NON-VERBAL, OBTUNDED, ST ON TELE MONITOR HR-143,VENT DEPENDENT, NOTICED 2 
VALVES ARE NOT WORKING ON PT'S TRIPLE LUMEN ON HER RIGHT FEMORAL , F/C IN PLACE, NOT IN 
DISTRESS, siderailupx2, continue to monitor

## 2021-03-07 VITALS — SYSTOLIC BLOOD PRESSURE: 114 MMHG | DIASTOLIC BLOOD PRESSURE: 74 MMHG

## 2021-03-07 VITALS — DIASTOLIC BLOOD PRESSURE: 58 MMHG | SYSTOLIC BLOOD PRESSURE: 121 MMHG

## 2021-03-07 VITALS — DIASTOLIC BLOOD PRESSURE: 62 MMHG | SYSTOLIC BLOOD PRESSURE: 131 MMHG

## 2021-03-07 VITALS — SYSTOLIC BLOOD PRESSURE: 107 MMHG | DIASTOLIC BLOOD PRESSURE: 50 MMHG

## 2021-03-07 VITALS — DIASTOLIC BLOOD PRESSURE: 91 MMHG | SYSTOLIC BLOOD PRESSURE: 121 MMHG

## 2021-03-07 VITALS — SYSTOLIC BLOOD PRESSURE: 105 MMHG | DIASTOLIC BLOOD PRESSURE: 51 MMHG

## 2021-03-07 VITALS — DIASTOLIC BLOOD PRESSURE: 47 MMHG | SYSTOLIC BLOOD PRESSURE: 107 MMHG

## 2021-03-07 VITALS — SYSTOLIC BLOOD PRESSURE: 180 MMHG | DIASTOLIC BLOOD PRESSURE: 79 MMHG

## 2021-03-07 VITALS — SYSTOLIC BLOOD PRESSURE: 113 MMHG | DIASTOLIC BLOOD PRESSURE: 71 MMHG

## 2021-03-07 VITALS — DIASTOLIC BLOOD PRESSURE: 51 MMHG | SYSTOLIC BLOOD PRESSURE: 105 MMHG

## 2021-03-07 VITALS — DIASTOLIC BLOOD PRESSURE: 35 MMHG | SYSTOLIC BLOOD PRESSURE: 99 MMHG

## 2021-03-07 VITALS — DIASTOLIC BLOOD PRESSURE: 74 MMHG | SYSTOLIC BLOOD PRESSURE: 146 MMHG

## 2021-03-07 VITALS — SYSTOLIC BLOOD PRESSURE: 93 MMHG | DIASTOLIC BLOOD PRESSURE: 48 MMHG

## 2021-03-07 LAB
BASOPHILS # BLD AUTO: 0.1 /CMM (ref 0–0.2)
BASOPHILS NFR BLD AUTO: 0.5 % (ref 0–2)
BUN SERPL-MCNC: 67 MG/DL (ref 7–18)
CALCIUM SERPL-MCNC: 8.3 MG/DL (ref 8.5–10.1)
CHLORIDE SERPL-SCNC: 92 MMOL/L (ref 98–107)
CO2 SERPL-SCNC: 25 MMOL/L (ref 21–32)
CREAT SERPL-MCNC: 2.6 MG/DL (ref 0.6–1.3)
EOSINOPHIL NFR BLD AUTO: 1.5 % (ref 0–6)
EOSINOPHIL NFR BLD MANUAL: 2 % (ref 0–4)
FERRITIN SERPL-MCNC: 2221 NG/ML (ref 8–388)
GLUCOSE SERPL-MCNC: 151 MG/DL (ref 74–106)
HCT VFR BLD AUTO: 20 % (ref 33–45)
HEMOCCULT STL QL: POSITIVE
HGB BLD-MCNC: 6.4 G/DL (ref 11.5–14.8)
IRON SERPL-MCNC: 57 UG/DL (ref 50–175)
LYMPHOCYTES NFR BLD AUTO: 10.6 % (ref 20–44)
LYMPHOCYTES NFR BLD AUTO: 2.2 /CMM (ref 0.8–4.8)
LYMPHOCYTES NFR BLD MANUAL: 9 % (ref 16–48)
MAGNESIUM SERPL-MCNC: 1.8 MG/DL (ref 1.8–2.4)
MCHC RBC AUTO-ENTMCNC: 32 G/DL (ref 31–36)
MCV RBC AUTO: 102 FL (ref 82–100)
MONOCYTES NFR BLD AUTO: 15.6 % (ref 2–12)
MONOCYTES NFR BLD AUTO: 3.3 /CMM (ref 0.1–1.3)
MONOCYTES NFR BLD MANUAL: 25 % (ref 0–11)
NEUTROPHILS # BLD AUTO: 15.1 /CMM (ref 1.8–8.9)
NEUTROPHILS NFR BLD AUTO: 71.8 % (ref 43–81)
NEUTS SEG NFR BLD MANUAL: 64 % (ref 42–76)
PHOSPHATE SERPL-MCNC: 5 MG/DL (ref 2.5–4.9)
PLATELET # BLD AUTO: 446 /CMM (ref 150–450)
POTASSIUM SERPL-SCNC: 3.6 MMOL/L (ref 3.5–5.1)
RBC # BLD AUTO: 1.95 MIL/UL (ref 4–5.2)
SODIUM SERPL-SCNC: 128 MMOL/L (ref 136–145)
TIBC SERPL-MCNC: 68 UG/DL (ref 250–450)
WBC NRBC COR # BLD AUTO: 21 K/UL (ref 4.3–11)

## 2021-03-07 PROCEDURE — 30233N1 TRANSFUSION OF NONAUTOLOGOUS RED BLOOD CELLS INTO PERIPHERAL VEIN, PERCUTANEOUS APPROACH: ICD-10-PCS | Performed by: INTERNAL MEDICINE

## 2021-03-07 RX ADMIN — FOLIC ACID SCH MG: 1 TABLET ORAL at 08:14

## 2021-03-07 RX ADMIN — MISOPROSTOL SCH MCG: 100 TABLET ORAL at 08:38

## 2021-03-07 RX ADMIN — METOCLOPRAMIDE HYDROCHLORIDE SCH MG: 5 SOLUTION ORAL at 12:07

## 2021-03-07 RX ADMIN — ATORVASTATIN CALCIUM SCH MG: 40 TABLET, FILM COATED ORAL at 21:11

## 2021-03-07 RX ADMIN — Medication SCH MG: at 08:38

## 2021-03-07 RX ADMIN — LEVOTHYROXINE SODIUM SCH MCG: 100 TABLET ORAL at 08:14

## 2021-03-07 RX ADMIN — INSULIN HUMAN PRN UNIT: 100 INJECTION, SOLUTION PARENTERAL at 06:12

## 2021-03-07 RX ADMIN — MISOPROSTOL SCH MCG: 100 TABLET ORAL at 21:13

## 2021-03-07 RX ADMIN — ACETYLCYSTEINE SCH MG: 100 INHALANT RESPIRATORY (INHALATION) at 15:03

## 2021-03-07 RX ADMIN — DOCUSATE SODIUM SCH MG: 50 LIQUID ORAL at 17:39

## 2021-03-07 RX ADMIN — ASPIRIN 81 MG SCH MG: 81 TABLET ORAL at 08:14

## 2021-03-07 RX ADMIN — Medication SCH EACH: at 17:38

## 2021-03-07 RX ADMIN — DOCUSATE SODIUM SCH MG: 50 LIQUID ORAL at 08:14

## 2021-03-07 RX ADMIN — Medication SCH EACH: at 21:20

## 2021-03-07 RX ADMIN — ZINC OXIDE SCH GM: 200 OINTMENT TOPICAL at 08:16

## 2021-03-07 RX ADMIN — AMLODIPINE BESYLATE SCH MG: 10 TABLET ORAL at 08:15

## 2021-03-07 RX ADMIN — ACETYLCYSTEINE SCH MG: 100 INHALANT RESPIRATORY (INHALATION) at 01:44

## 2021-03-07 RX ADMIN — Medication SCH MG: at 20:42

## 2021-03-07 RX ADMIN — ACETYLCYSTEINE SCH MG: 100 INHALANT RESPIRATORY (INHALATION) at 09:06

## 2021-03-07 RX ADMIN — METOCLOPRAMIDE HYDROCHLORIDE SCH MG: 5 SOLUTION ORAL at 21:11

## 2021-03-07 RX ADMIN — Medication PRN ML: at 19:45

## 2021-03-07 RX ADMIN — METOCLOPRAMIDE HYDROCHLORIDE SCH MG: 5 SOLUTION ORAL at 04:57

## 2021-03-07 RX ADMIN — Medication PRN MLS/HR: at 18:32

## 2021-03-07 RX ADMIN — DULOXETINE HYDROCHLORIDE SCH MG: 30 CAPSULE, DELAYED RELEASE ORAL at 08:14

## 2021-03-07 RX ADMIN — FAMOTIDINE SCH MG: 20 TABLET, FILM COATED ORAL at 08:14

## 2021-03-07 RX ADMIN — Medication SCH OZ: at 08:17

## 2021-03-07 RX ADMIN — HYDROMORPHONE HYDROCHLORIDE SCH MG: 2 TABLET ORAL at 08:15

## 2021-03-07 RX ADMIN — Medication SCH MG: at 15:03

## 2021-03-07 RX ADMIN — Medication SCH MG: at 09:06

## 2021-03-07 RX ADMIN — MISOPROSTOL SCH MCG: 100 TABLET ORAL at 12:07

## 2021-03-07 RX ADMIN — MUPIROCIN SCH GM: 20 OINTMENT TOPICAL at 21:20

## 2021-03-07 RX ADMIN — INSULIN GLARGINE SCH UNIT: 100 INJECTION, SOLUTION SUBCUTANEOUS at 21:29

## 2021-03-07 RX ADMIN — Medication SCH TAB: at 08:14

## 2021-03-07 RX ADMIN — MISOPROSTOL SCH MCG: 100 TABLET ORAL at 17:39

## 2021-03-07 RX ADMIN — MUPIROCIN SCH GM: 20 OINTMENT TOPICAL at 08:16

## 2021-03-07 RX ADMIN — Medication SCH EACH: at 11:41

## 2021-03-07 RX ADMIN — HYDROMORPHONE HYDROCHLORIDE SCH MG: 2 TABLET ORAL at 21:11

## 2021-03-07 RX ADMIN — Medication SCH EACH: at 06:38

## 2021-03-07 RX ADMIN — DONEPEZIL HYDROCHLORIDE SCH MG: 5 TABLET ORAL at 21:11

## 2021-03-07 RX ADMIN — HEPARIN SODIUM SCH UNITS: 5000 INJECTION INTRAVENOUS; SUBCUTANEOUS at 09:00

## 2021-03-07 RX ADMIN — Medication SCH MG: at 01:44

## 2021-03-07 RX ADMIN — INSULIN HUMAN PRN UNIT: 100 INJECTION, SOLUTION PARENTERAL at 21:28

## 2021-03-07 RX ADMIN — ZINC OXIDE SCH GM: 200 OINTMENT TOPICAL at 21:19

## 2021-03-07 RX ADMIN — INSULIN HUMAN PRN UNIT: 100 INJECTION, SOLUTION PARENTERAL at 17:39

## 2021-03-07 NOTE — NUR
RN NOTES

LAB CALLED AND TO INFORMED THAT PT. H&H 6.4/20, PAGED DR. CAVAZOS, WAITING FOR HIM TO 
CALL BACK

## 2021-03-07 NOTE — NUR
RN TRANSFUSION NOTE



STARTED ADMINISTRATION OF 1 UNIT PRBC AS PER ORDER OF DR. PETERSON. V/S: BP /47, OK OF 
129, TEMP OF 97.6F, RR OF 17, O2 SAT . WILL CONTINUE TO MONITOR.

## 2021-03-07 NOTE — NUR
RN CLOSING NOTE





PT IS CURRENTLY RESTING IN BED. NONVERBAL AND OBTUNDED. NOT ABLE TO COMMUNICATE NEEDS TO 
NURSES. CURRENTLY ON TRACT. SETTINGS: , PEEP 5, SHILEY #8, FI O2 40%. NO RESPIRATORY 
DISTRESS PRESENT. SINUS TACHYCARDIA RECORDED VIA EXTERNAL MONITOR. GENERALIZED PITTING EDEMA 
PRESENT. F/C PRESENT. TLC PRESENT ON R FEMORAL. HD CATH PRESENT ON R INTERNAL JUGULAR. 
SAFETY MEASURES IN PLACE. BED LOWERED. CALL LIGHT WITHIN REACH. ROUTINE MEDS GIVEN. REPORT 
GIVEN TO NIGHT NURSE FOR DASHAWN.

## 2021-03-07 NOTE — NUR
RT NOTE

Pt rec'd trached on [facility] vent on SIMV mode on noted settings as charted. pt shows no signs 
of resp distress or sob. Trach is patent and secured. Pt sx'd for thick mod amt of pale 
yellow secretions. Alarms are set and audible. Ambu bag bedside. Vent plugged into red 
outlet. Will continue to monitor closely.

-------------------------------------------------------------------------------

Addendum: 03/07/21 at 0152 by LEAH CAMPUZANO RT

-------------------------------------------------------------------------------

Amended: Links added.

## 2021-03-07 NOTE — NUR
TELE RN OPENING NOTE 



RECEIVED PATIENT IN BED. OBTUNDED, NONVERBAL. ON MECHANICAL VENT: ANA #8 SIMV  FIO2 
40% PEEP 5. RESPIRATIONS ARE EVEN AND UNLABORED. NO S/S SOB NOTE. NO S/S PAIN NOTED. 
EXTERNAL TELE MONITOR READS SINUS TACHYCARDIA . IN NO APPARENT DISTRESS. IV ACCESS IN 
RIGHT FEMORAL TLC AND RGHT CHEST WALL PERMA CATH. HD RN FINISHED - 2 LITERS REMOVED. GTUBE 
PRESENT, NO RESIDUAL, FLUSHED WITH NO RESISTANCE. NEPRO RUNNING @40ML/HR. ASHBY CATHETER IS 
PRESENT, DRAINING TO GRAVITY. BED IS LOW AND LOCKED, HOB ELEVATED IN SEMI FOWLERS, SIDE 
RAILS UP X3. CALL LIGHT WITHIN REACH. WILL CONTINUE TO MONITOR.

## 2021-03-07 NOTE — NUR
RN TRANSFUSION NOTE



V/S: BP /74, TEMP OF 98.3, AR , O2 OF 99%. NO RASHES OR SIGNS OF REACTION. WILL 
CONTINUE TO MONITOR.

## 2021-03-08 VITALS — DIASTOLIC BLOOD PRESSURE: 62 MMHG | SYSTOLIC BLOOD PRESSURE: 103 MMHG

## 2021-03-08 VITALS — SYSTOLIC BLOOD PRESSURE: 137 MMHG | DIASTOLIC BLOOD PRESSURE: 108 MMHG

## 2021-03-08 VITALS — DIASTOLIC BLOOD PRESSURE: 53 MMHG | SYSTOLIC BLOOD PRESSURE: 111 MMHG

## 2021-03-08 VITALS — SYSTOLIC BLOOD PRESSURE: 97 MMHG | DIASTOLIC BLOOD PRESSURE: 58 MMHG

## 2021-03-08 VITALS — DIASTOLIC BLOOD PRESSURE: 65 MMHG | SYSTOLIC BLOOD PRESSURE: 150 MMHG

## 2021-03-08 VITALS — DIASTOLIC BLOOD PRESSURE: 78 MMHG | SYSTOLIC BLOOD PRESSURE: 102 MMHG

## 2021-03-08 VITALS — SYSTOLIC BLOOD PRESSURE: 104 MMHG | DIASTOLIC BLOOD PRESSURE: 52 MMHG

## 2021-03-08 LAB
BASOPHILS # BLD AUTO: 0.1 /CMM (ref 0–0.2)
BASOPHILS NFR BLD AUTO: 0.3 % (ref 0–2)
BUN SERPL-MCNC: 55 MG/DL (ref 7–18)
CALCIUM SERPL-MCNC: 9 MG/DL (ref 8.5–10.1)
CHLORIDE SERPL-SCNC: 93 MMOL/L (ref 98–107)
CO2 SERPL-SCNC: 27 MMOL/L (ref 21–32)
CREAT SERPL-MCNC: 2.2 MG/DL (ref 0.6–1.3)
EOSINOPHIL NFR BLD AUTO: 1 % (ref 0–6)
GLUCOSE SERPL-MCNC: 142 MG/DL (ref 74–106)
HCT VFR BLD AUTO: 25 % (ref 33–45)
HGB BLD-MCNC: 8.3 G/DL (ref 11.5–14.8)
LYMPHOCYTES NFR BLD AUTO: 12 % (ref 20–44)
LYMPHOCYTES NFR BLD AUTO: 3.1 /CMM (ref 0.8–4.8)
LYMPHOCYTES NFR BLD MANUAL: 18 % (ref 16–48)
MAGNESIUM SERPL-MCNC: 1.9 MG/DL (ref 1.8–2.4)
MCHC RBC AUTO-ENTMCNC: 33 G/DL (ref 31–36)
MCV RBC AUTO: 100 FL (ref 82–100)
METAMYELOCYTES NFR BLD MANUAL: 1 % (ref 0–0)
MONOCYTES NFR BLD AUTO: 13.3 % (ref 2–12)
MONOCYTES NFR BLD AUTO: 3.4 /CMM (ref 0.1–1.3)
MONOCYTES NFR BLD MANUAL: 8 % (ref 0–11)
MYELOCYTES NFR BLD MANUAL: 1 % (ref 0–0)
NEUTROPHILS # BLD AUTO: 18.7 /CMM (ref 1.8–8.9)
NEUTROPHILS NFR BLD AUTO: 73.4 % (ref 43–81)
NEUTS BAND NFR BLD MANUAL: 2 % (ref 0–5)
NEUTS SEG NFR BLD MANUAL: 70 % (ref 42–76)
PHOSPHATE SERPL-MCNC: 4.8 MG/DL (ref 2.5–4.9)
PLATELET # BLD AUTO: 508 /CMM (ref 150–450)
POTASSIUM SERPL-SCNC: 3.8 MMOL/L (ref 3.5–5.1)
RBC # BLD AUTO: 2.46 MIL/UL (ref 4–5.2)
SODIUM SERPL-SCNC: 131 MMOL/L (ref 136–145)
WBC NRBC COR # BLD AUTO: 25.5 K/UL (ref 4.3–11)

## 2021-03-08 RX ADMIN — INSULIN GLARGINE SCH UNIT: 100 INJECTION, SOLUTION SUBCUTANEOUS at 20:22

## 2021-03-08 RX ADMIN — METOCLOPRAMIDE HYDROCHLORIDE SCH MG: 5 SOLUTION ORAL at 21:52

## 2021-03-08 RX ADMIN — MISOPROSTOL SCH MCG: 100 TABLET ORAL at 17:11

## 2021-03-08 RX ADMIN — METOCLOPRAMIDE HYDROCHLORIDE SCH MG: 5 SOLUTION ORAL at 04:53

## 2021-03-08 RX ADMIN — Medication SCH MG: at 13:23

## 2021-03-08 RX ADMIN — MUPIROCIN SCH GM: 20 OINTMENT TOPICAL at 08:26

## 2021-03-08 RX ADMIN — HYDROMORPHONE HYDROCHLORIDE SCH MG: 2 TABLET ORAL at 08:25

## 2021-03-08 RX ADMIN — LEVOTHYROXINE SODIUM SCH MCG: 100 TABLET ORAL at 08:25

## 2021-03-08 RX ADMIN — ZINC OXIDE SCH GM: 200 OINTMENT TOPICAL at 21:49

## 2021-03-08 RX ADMIN — ATORVASTATIN CALCIUM SCH MG: 40 TABLET, FILM COATED ORAL at 21:52

## 2021-03-08 RX ADMIN — DONEPEZIL HYDROCHLORIDE SCH MG: 5 TABLET ORAL at 21:52

## 2021-03-08 RX ADMIN — DULOXETINE HYDROCHLORIDE SCH MG: 30 CAPSULE, DELAYED RELEASE ORAL at 08:25

## 2021-03-08 RX ADMIN — FAMOTIDINE SCH MG: 20 TABLET, FILM COATED ORAL at 08:24

## 2021-03-08 RX ADMIN — Medication SCH MG: at 07:24

## 2021-03-08 RX ADMIN — MISOPROSTOL SCH MCG: 100 TABLET ORAL at 22:05

## 2021-03-08 RX ADMIN — Medication SCH EACH: at 22:14

## 2021-03-08 RX ADMIN — Medication SCH EACH: at 17:46

## 2021-03-08 RX ADMIN — ZINC OXIDE SCH GM: 200 OINTMENT TOPICAL at 08:26

## 2021-03-08 RX ADMIN — Medication SCH MG: at 01:30

## 2021-03-08 RX ADMIN — ACETYLCYSTEINE SCH MG: 100 INHALANT RESPIRATORY (INHALATION) at 13:24

## 2021-03-08 RX ADMIN — Medication SCH TAB: at 08:25

## 2021-03-08 RX ADMIN — HYDROMORPHONE HYDROCHLORIDE PRN MG: 2 TABLET ORAL at 04:53

## 2021-03-08 RX ADMIN — AMLODIPINE BESYLATE SCH MG: 10 TABLET ORAL at 08:25

## 2021-03-08 RX ADMIN — MISOPROSTOL SCH MCG: 100 TABLET ORAL at 13:22

## 2021-03-08 RX ADMIN — METOCLOPRAMIDE HYDROCHLORIDE SCH MG: 5 SOLUTION ORAL at 13:22

## 2021-03-08 RX ADMIN — Medication SCH EACH: at 13:22

## 2021-03-08 RX ADMIN — MISOPROSTOL SCH MCG: 100 TABLET ORAL at 08:24

## 2021-03-08 RX ADMIN — ACETYLCYSTEINE SCH MG: 100 INHALANT RESPIRATORY (INHALATION) at 01:30

## 2021-03-08 RX ADMIN — INSULIN HUMAN PRN UNIT: 100 INJECTION, SOLUTION PARENTERAL at 06:44

## 2021-03-08 RX ADMIN — Medication SCH MG: at 20:11

## 2021-03-08 RX ADMIN — ASPIRIN 81 MG SCH MG: 81 TABLET ORAL at 08:25

## 2021-03-08 RX ADMIN — FOLIC ACID SCH MG: 1 TABLET ORAL at 08:24

## 2021-03-08 RX ADMIN — INSULIN HUMAN PRN UNIT: 100 INJECTION, SOLUTION PARENTERAL at 13:23

## 2021-03-08 RX ADMIN — Medication SCH MG: at 08:24

## 2021-03-08 RX ADMIN — DOCUSATE SODIUM SCH MG: 50 LIQUID ORAL at 08:25

## 2021-03-08 RX ADMIN — MUPIROCIN SCH GM: 20 OINTMENT TOPICAL at 21:49

## 2021-03-08 RX ADMIN — DOCUSATE SODIUM SCH MG: 50 LIQUID ORAL at 17:11

## 2021-03-08 RX ADMIN — ACETYLCYSTEINE SCH MG: 100 INHALANT RESPIRATORY (INHALATION) at 07:24

## 2021-03-08 RX ADMIN — Medication SCH EACH: at 06:43

## 2021-03-08 RX ADMIN — Medication SCH OZ: at 08:26

## 2021-03-08 RX ADMIN — HYDROMORPHONE HYDROCHLORIDE SCH MG: 2 TABLET ORAL at 21:53

## 2021-03-08 NOTE — NUR
RT NOTE

Pt rec'd trached on [facility] vent on SIMV mode on noted settings as charted. pt shows no signs 
of resp distress or sob. Trach is patent and secured. Pt sx'd for thick mod amt of pale 
yellow secretions. Alarms are set and audible. Ambu bag bedside. Vent plugged into red 
outlet. Will continue to monitor closely.

-------------------------------------------------------------------------------

Addendum: 03/08/21 at 0135 by LAEH CAMPUZANO RT

-------------------------------------------------------------------------------

Amended: Links added.

## 2021-03-08 NOTE — NUR
SS Note: 

REMA also called the pt.'s son Ranjit [phone] and informed him that today is the last day 
for the bedhold and offered him to pay for bed hold. Ranjit stated he is unable to pay for a 
bed hold at this time.

## 2021-03-08 NOTE — NUR
RN CLOSING NOTE



PT IS IN BED RESTING. A/O X3, NONVERBAL, AND OBTUNDED. SHAKES/NODS HEAD TO NURSE 
INSTRUCTIONS. NO VISIBLE SIGNS OF PAIN/NAUSEA. CURRENTLY ON TRACH. SIMV, SHILEY #8, , 
PEEP 5, FIO2 40%. NO RESPIRATORY DISTRESS PRESENT. SINUS TACHYCARDIA RECORDED ON EXTERNAL 
MONITOR. GENERALIZED PITTING EDEMA PRESENT. MULTIPLE SKIN WOUNDS AND BRUISES PRESENT. F/C 
PRESENT AND DRAINING THE WHITE FLUID. R FEMORAL TLC PRESENT. HD CATH PRESENT ON R CW. 
ROUTINE MEDS GIVEN. SAFETY MEASURES IN PLACE. SIDE RAILS RAISED. BED LOWERED. CALL LIGHT 
WITHIN REACH. REPORT TO BE GIVEN TO NIGHT NURSE FOR DASHAWN.

## 2021-03-08 NOTE — NUR
TELE RN CLOSING NOTE 



PATIENT RESTING IN BED. NONVERBAL. ON MECHANICAL VENT: NO CHANGES IN SETTINGS  NO RESP 
DISTRESS. TELE MONITOR READS SINUS TACHYCARDIA -130S. NO DISTRESS. IV ACCESS 
MAINTAINED  IN RIGHT FEMORAL TLC AND RGHT CHEST WALL PERMA CATH, REENFORCED DRESSING.  GTUBE 
RUNNING NEPRO RUNNING @40ML/HR. ASHBY CATHETER IS MAINTAINED, DRAINING TO GRAVITY. BED 
REMAINS LOW AND LOCKED, HOB ELEVATED IN SEMI FOWLERS, SIDE RAILS UP X3. WILL ENDORSE TO 
ONCOMING SHIFT.

## 2021-03-09 VITALS — SYSTOLIC BLOOD PRESSURE: 129 MMHG | DIASTOLIC BLOOD PRESSURE: 96 MMHG

## 2021-03-09 VITALS — DIASTOLIC BLOOD PRESSURE: 34 MMHG | SYSTOLIC BLOOD PRESSURE: 113 MMHG

## 2021-03-09 VITALS — DIASTOLIC BLOOD PRESSURE: 43 MMHG | SYSTOLIC BLOOD PRESSURE: 113 MMHG

## 2021-03-09 VITALS — DIASTOLIC BLOOD PRESSURE: 50 MMHG | SYSTOLIC BLOOD PRESSURE: 109 MMHG

## 2021-03-09 VITALS — SYSTOLIC BLOOD PRESSURE: 138 MMHG | DIASTOLIC BLOOD PRESSURE: 78 MMHG

## 2021-03-09 VITALS — DIASTOLIC BLOOD PRESSURE: 78 MMHG | SYSTOLIC BLOOD PRESSURE: 138 MMHG

## 2021-03-09 LAB
ALBUMIN SERPL ELPH-MCNC: 1.6 G/DL (ref 2.9–4.4)
ALBUMIN/GLOB SERPL: 0.4 {RATIO} (ref 0.7–1.7)
ALPHA1 GLOB SERPL ELPH-MCNC: 0.5 G/DL (ref 0–0.4)
ALPHA2 GLOB SERPL ELPH-MCNC: 0.6 G/DL (ref 0.4–1)
B-GLOBULIN SERPL ELPH-MCNC: 0.9 G/DL (ref 0.7–1.3)
BASOPHILS # BLD AUTO: 0.1 /CMM (ref 0–0.2)
BASOPHILS NFR BLD AUTO: 0.4 % (ref 0–2)
BUN SERPL-MCNC: 66 MG/DL (ref 7–18)
CALCIUM SERPL-MCNC: 8.9 MG/DL (ref 8.5–10.1)
CHLORIDE SERPL-SCNC: 93 MMOL/L (ref 98–107)
CO2 SERPL-SCNC: 30 MMOL/L (ref 21–32)
CREAT SERPL-MCNC: 2.5 MG/DL (ref 0.6–1.3)
D DIMER PPP FEU-MCNC: 8.5 MG/L(FEU (ref 0.17–0.5)
EOSINOPHIL NFR BLD AUTO: 2 % (ref 0–6)
EOSINOPHIL NFR BLD MANUAL: 5 % (ref 0–4)
GAMMA GLOB SERPL ELPH-MCNC: 2.2 G/DL (ref 0.4–1.8)
GLOBULIN SER CALC-MCNC: 4.2 G/DL (ref 2.2–3.9)
GLUCOSE SERPL-MCNC: 84 MG/DL (ref 74–106)
HCT VFR BLD AUTO: 24 % (ref 33–45)
HGB BLD-MCNC: 7.9 G/DL (ref 11.5–14.8)
IGA SERPL-MCNC: 723 MG/DL (ref 64–422)
IGM SERPL-MCNC: 44 MG/DL (ref 26–217)
LYMPHOCYTES NFR BLD AUTO: 10 % (ref 20–44)
LYMPHOCYTES NFR BLD AUTO: 2.3 /CMM (ref 0.8–4.8)
LYMPHOCYTES NFR BLD MANUAL: 7 % (ref 16–48)
MAGNESIUM SERPL-MCNC: 1.9 MG/DL (ref 1.8–2.4)
MCHC RBC AUTO-ENTMCNC: 34 G/DL (ref 31–36)
MCV RBC AUTO: 101 FL (ref 82–100)
MONOCYTES NFR BLD AUTO: 12.9 % (ref 2–12)
MONOCYTES NFR BLD AUTO: 2.9 /CMM (ref 0.1–1.3)
MONOCYTES NFR BLD MANUAL: 10 % (ref 0–11)
NEUTROPHILS # BLD AUTO: 17 /CMM (ref 1.8–8.9)
NEUTROPHILS NFR BLD AUTO: 74.7 % (ref 43–81)
NEUTS SEG NFR BLD MANUAL: 78 % (ref 42–76)
PHOSPHATE SERPL-MCNC: 5.7 MG/DL (ref 2.5–4.9)
PLATELET # BLD AUTO: 526 /CMM (ref 150–450)
POTASSIUM SERPL-SCNC: 3.7 MMOL/L (ref 3.5–5.1)
RBC # BLD AUTO: 2.33 MIL/UL (ref 4–5.2)
SODIUM SERPL-SCNC: 130 MMOL/L (ref 136–145)
WBC NRBC COR # BLD AUTO: 22.7 K/UL (ref 4.3–11)

## 2021-03-09 PROCEDURE — 0DJD8ZZ INSPECTION OF LOWER INTESTINAL TRACT, VIA NATURAL OR ARTIFICIAL OPENING ENDOSCOPIC: ICD-10-PCS | Performed by: INTERNAL MEDICINE

## 2021-03-09 PROCEDURE — 0W3P8ZZ CONTROL BLEEDING IN GASTROINTESTINAL TRACT, VIA NATURAL OR ARTIFICIAL OPENING ENDOSCOPIC: ICD-10-PCS | Performed by: INTERNAL MEDICINE

## 2021-03-09 RX ADMIN — Medication SCH MG: at 08:12

## 2021-03-09 RX ADMIN — MUPIROCIN SCH GM: 20 OINTMENT TOPICAL at 21:48

## 2021-03-09 RX ADMIN — DEXTROSE MONOHYDRATE PRN ML: 500 INJECTION PARENTERAL at 11:52

## 2021-03-09 RX ADMIN — ACETYLCYSTEINE SCH MG: 100 INHALANT RESPIRATORY (INHALATION) at 22:34

## 2021-03-09 RX ADMIN — Medication SCH MG: at 20:20

## 2021-03-09 RX ADMIN — SODIUM CHLORIDE SCH MG: 9 INJECTION, SOLUTION INTRAVENOUS at 14:05

## 2021-03-09 RX ADMIN — ZINC OXIDE SCH GM: 200 OINTMENT TOPICAL at 21:49

## 2021-03-09 RX ADMIN — Medication SCH EACH: at 12:08

## 2021-03-09 RX ADMIN — ACETYLCYSTEINE SCH MG: 100 INHALANT RESPIRATORY (INHALATION) at 02:08

## 2021-03-09 RX ADMIN — INSULIN GLARGINE SCH UNIT: 100 INJECTION, SOLUTION SUBCUTANEOUS at 21:42

## 2021-03-09 RX ADMIN — INSULIN HUMAN PRN UNIT: 100 INJECTION, SOLUTION PARENTERAL at 17:44

## 2021-03-09 RX ADMIN — AMLODIPINE BESYLATE SCH MG: 10 TABLET ORAL at 08:13

## 2021-03-09 RX ADMIN — MISOPROSTOL SCH MCG: 100 TABLET ORAL at 14:05

## 2021-03-09 RX ADMIN — ACETYLCYSTEINE SCH MG: 100 INHALANT RESPIRATORY (INHALATION) at 13:11

## 2021-03-09 RX ADMIN — ASPIRIN 81 MG SCH MG: 81 TABLET ORAL at 08:11

## 2021-03-09 RX ADMIN — MISOPROSTOL SCH MCG: 100 TABLET ORAL at 21:39

## 2021-03-09 RX ADMIN — MUPIROCIN SCH GM: 20 OINTMENT TOPICAL at 08:12

## 2021-03-09 RX ADMIN — MISOPROSTOL SCH MCG: 100 TABLET ORAL at 08:11

## 2021-03-09 RX ADMIN — DOCUSATE SODIUM SCH MG: 50 LIQUID ORAL at 17:00

## 2021-03-09 RX ADMIN — FOLIC ACID SCH MG: 1 TABLET ORAL at 08:12

## 2021-03-09 RX ADMIN — LEVOTHYROXINE SODIUM SCH MCG: 100 TABLET ORAL at 07:30

## 2021-03-09 RX ADMIN — Medication PRN ML: at 13:48

## 2021-03-09 RX ADMIN — DOCUSATE SODIUM SCH MG: 50 LIQUID ORAL at 08:11

## 2021-03-09 RX ADMIN — Medication SCH EACH: at 07:32

## 2021-03-09 RX ADMIN — DOCUSATE SODIUM SCH MG: 50 LIQUID ORAL at 18:05

## 2021-03-09 RX ADMIN — Medication SCH MG: at 08:15

## 2021-03-09 RX ADMIN — MISOPROSTOL SCH MCG: 100 TABLET ORAL at 17:44

## 2021-03-09 RX ADMIN — INSULIN HUMAN PRN UNIT: 100 INJECTION, SOLUTION PARENTERAL at 12:09

## 2021-03-09 RX ADMIN — INSULIN HUMAN PRN UNIT: 100 INJECTION, SOLUTION PARENTERAL at 21:13

## 2021-03-09 RX ADMIN — ACETYLCYSTEINE SCH MG: 100 INHALANT RESPIRATORY (INHALATION) at 08:15

## 2021-03-09 RX ADMIN — Medication PRN ML: at 17:50

## 2021-03-09 RX ADMIN — Medication SCH TAB: at 08:13

## 2021-03-09 RX ADMIN — ZINC OXIDE SCH GM: 200 OINTMENT TOPICAL at 08:12

## 2021-03-09 RX ADMIN — DONEPEZIL HYDROCHLORIDE SCH MG: 5 TABLET ORAL at 21:40

## 2021-03-09 RX ADMIN — METOCLOPRAMIDE HYDROCHLORIDE SCH MG: 5 SOLUTION ORAL at 14:05

## 2021-03-09 RX ADMIN — HYDROMORPHONE HYDROCHLORIDE SCH MG: 2 TABLET ORAL at 08:12

## 2021-03-09 RX ADMIN — Medication SCH MG: at 02:08

## 2021-03-09 RX ADMIN — Medication SCH OZ: at 08:12

## 2021-03-09 RX ADMIN — HYDROMORPHONE HYDROCHLORIDE SCH MG: 2 TABLET ORAL at 21:39

## 2021-03-09 RX ADMIN — Medication SCH EACH: at 17:43

## 2021-03-09 RX ADMIN — DULOXETINE HYDROCHLORIDE SCH MG: 30 CAPSULE, DELAYED RELEASE ORAL at 08:11

## 2021-03-09 RX ADMIN — ATORVASTATIN CALCIUM SCH MG: 40 TABLET, FILM COATED ORAL at 21:40

## 2021-03-09 RX ADMIN — METOCLOPRAMIDE HYDROCHLORIDE SCH MG: 5 SOLUTION ORAL at 21:40

## 2021-03-09 RX ADMIN — Medication SCH EACH: at 21:12

## 2021-03-09 RX ADMIN — METOCLOPRAMIDE HYDROCHLORIDE SCH MG: 5 SOLUTION ORAL at 04:57

## 2021-03-09 RX ADMIN — Medication SCH MG: at 13:11

## 2021-03-09 RX ADMIN — DEXTROSE MONOHYDRATE PRN MLS/HR: 50 INJECTION, SOLUTION INTRAVENOUS at 11:59

## 2021-03-09 RX ADMIN — FAMOTIDINE SCH MG: 20 TABLET, FILM COATED ORAL at 08:13

## 2021-03-09 NOTE — NUR
PATIENT'S BLOOD SUGAR 69. HELD LANTUS 36 UNITS. CONTINUES ON NEPRO 40CC/HR. NO S/S OF ANY 
HYPOGLYCEMIA. WILL CONTINUE TO MONITOR.

## 2021-03-09 NOTE — NUR
TELE RN OPENING NOTES

Patient is alert and responsive to verbal and physical stimuli. On trach vent setting 
tolerating well at 98% 02 saturation. Triple lumen femoral iv access patent and flushed 
well. Patient resumed nepro g tube feeding at 40 cc /hour. gtube in place and patent. 
Patient noted with 1100 cc or HD fluid removal and 50 cc of urine in holm bag. Endorsed to 
next nurse to follow up.

-------------------------------------------------------------------------------

Addendum: 03/09/21 at 1933 by BELKIS VOGEL RN

-------------------------------------------------------------------------------

RN CLOSING NOTES

## 2021-03-09 NOTE — NUR
TELE RN OPENING NOTES

Patient is alert and responsive to verbal and physical stimuli. On trach vent setting 
tolerating well at 100% 02 saturation. Triple lumen femoral iv access patent and flushed 
well. Patient is NPO and feeding held due to pre op preparation for colonoscopy and egd. 
Night nurse  completed prep for procedure. Schultz cath intact and hanging to gravity.Will 
continue to monitor. call light withi n reach.

## 2021-03-09 NOTE — NUR
RN NOTES



PATIENT IS ALERT AND RESPONSIVE TO VERBAL AND TACTILE STIMULI. ON TRACH VENT SETTINGS, 
TOLERATING SETTINGS WELL, O2 SAT WNL. WITH ASHBY CATH DRAINING YELLOW URINE TO GRAVITY. 
G-TUBE PATENT AND INTACT, RUNNING NEPRO 40CC/HR. WITH RIGHT CHEST HD CATH, NO S/S OF ANY 
INFECTION. RIGHT FEMORAL TLC PATENT AND INTACT. BED LOCKED AND IN LOWEST POSITION. CALL 
LIGHT WITHIN REACH. SAFETY MEASURES IN PLACE. WILL CONTINUE TO MONITOR.

## 2021-03-09 NOTE — NUR
Patient noted with bs of 58 mg/dl, Dextrose syringe given per md order and Iv dextrose given 
pre procedure of colonoscopy and EGD. MD made aware. Blood sugar rechecked and noted to be 
87 mg/dl. Patient did not show any s/s of hypoglycemia.

## 2021-03-09 NOTE — NUR
Patient accompanied to Radiology for CT of abdomen without contrast. No s/s of respiratory 
distress. RT by side. Patient elham procedure and transport well.

## 2021-03-09 NOTE — NUR
BOWEL PREP COMPLETED. PT GIVEN GOLYTELY AS TOLERATED AND RECIEVED 3.5 LITERS TOTAL. PT 
HAVING CLEAR YELLOW STOOL.

## 2021-03-09 NOTE — NUR
Patient s/p colonoscopy and EGD, elham well. No s/s of bleeding noted. Vitals WNL.  Will 
conitnue to monitor.

## 2021-03-10 VITALS — SYSTOLIC BLOOD PRESSURE: 91 MMHG | DIASTOLIC BLOOD PRESSURE: 76 MMHG

## 2021-03-10 VITALS — SYSTOLIC BLOOD PRESSURE: 103 MMHG | DIASTOLIC BLOOD PRESSURE: 84 MMHG

## 2021-03-10 VITALS — SYSTOLIC BLOOD PRESSURE: 119 MMHG | DIASTOLIC BLOOD PRESSURE: 69 MMHG

## 2021-03-10 VITALS — DIASTOLIC BLOOD PRESSURE: 39 MMHG | SYSTOLIC BLOOD PRESSURE: 99 MMHG

## 2021-03-10 VITALS — SYSTOLIC BLOOD PRESSURE: 113 MMHG | DIASTOLIC BLOOD PRESSURE: 50 MMHG

## 2021-03-10 VITALS — SYSTOLIC BLOOD PRESSURE: 100 MMHG | DIASTOLIC BLOOD PRESSURE: 71 MMHG

## 2021-03-10 VITALS — DIASTOLIC BLOOD PRESSURE: 52 MMHG | SYSTOLIC BLOOD PRESSURE: 103 MMHG

## 2021-03-10 VITALS — SYSTOLIC BLOOD PRESSURE: 129 MMHG | DIASTOLIC BLOOD PRESSURE: 93 MMHG

## 2021-03-10 LAB
BASOPHILS # BLD AUTO: 0 /CMM (ref 0–0.2)
BASOPHILS NFR BLD AUTO: 0.3 % (ref 0–2)
BUN SERPL-MCNC: 51 MG/DL (ref 7–18)
CALCIUM SERPL-MCNC: 8.1 MG/DL (ref 8.5–10.1)
CHLORIDE SERPL-SCNC: 95 MMOL/L (ref 98–107)
CO2 SERPL-SCNC: 33 MMOL/L (ref 21–32)
CREAT SERPL-MCNC: 2.1 MG/DL (ref 0.6–1.3)
EOSINOPHIL NFR BLD AUTO: 2.8 % (ref 0–6)
EOSINOPHIL NFR BLD MANUAL: 1 % (ref 0–4)
GLUCOSE SERPL-MCNC: 118 MG/DL (ref 74–106)
HCT VFR BLD AUTO: 21 % (ref 33–45)
HGB BLD-MCNC: 6.8 G/DL (ref 11.5–14.8)
LYMPHOCYTES NFR BLD AUTO: 1.1 /CMM (ref 0.8–4.8)
LYMPHOCYTES NFR BLD AUTO: 6.6 % (ref 20–44)
LYMPHOCYTES NFR BLD MANUAL: 5 % (ref 16–48)
MAGNESIUM SERPL-MCNC: 1.8 MG/DL (ref 1.8–2.4)
MCHC RBC AUTO-ENTMCNC: 33 G/DL (ref 31–36)
MCV RBC AUTO: 103 FL (ref 82–100)
MONOCYTES NFR BLD AUTO: 12.2 % (ref 2–12)
MONOCYTES NFR BLD AUTO: 2.1 /CMM (ref 0.1–1.3)
MONOCYTES NFR BLD MANUAL: 14 % (ref 0–11)
MYELOCYTES NFR BLD MANUAL: 4 % (ref 0–0)
NEUTROPHILS # BLD AUTO: 13.3 /CMM (ref 1.8–8.9)
NEUTROPHILS NFR BLD AUTO: 78.1 % (ref 43–81)
NEUTS SEG NFR BLD MANUAL: 76 % (ref 42–76)
PHOSPHATE SERPL-MCNC: 4.9 MG/DL (ref 2.5–4.9)
PLATELET # BLD AUTO: 493 /CMM (ref 150–450)
POTASSIUM SERPL-SCNC: 3.5 MMOL/L (ref 3.5–5.1)
RBC # BLD AUTO: 2.02 MIL/UL (ref 4–5.2)
SODIUM SERPL-SCNC: 131 MMOL/L (ref 136–145)
WBC NRBC COR # BLD AUTO: 17.1 K/UL (ref 4.3–11)

## 2021-03-10 RX ADMIN — DOCUSATE SODIUM SCH MG: 50 LIQUID ORAL at 17:01

## 2021-03-10 RX ADMIN — SODIUM CHLORIDE SCH MG: 9 INJECTION, SOLUTION INTRAVENOUS at 14:18

## 2021-03-10 RX ADMIN — SODIUM CHLORIDE SCH MG: 9 INJECTION, SOLUTION INTRAVENOUS at 20:26

## 2021-03-10 RX ADMIN — Medication SCH MG: at 20:16

## 2021-03-10 RX ADMIN — ZINC OXIDE SCH GM: 200 OINTMENT TOPICAL at 20:39

## 2021-03-10 RX ADMIN — MISOPROSTOL SCH MCG: 100 TABLET ORAL at 17:01

## 2021-03-10 RX ADMIN — INSULIN HUMAN PRN UNIT: 100 INJECTION, SOLUTION PARENTERAL at 21:33

## 2021-03-10 RX ADMIN — FAMOTIDINE SCH MG: 20 TABLET, FILM COATED ORAL at 09:16

## 2021-03-10 RX ADMIN — ACETYLCYSTEINE SCH MG: 100 INHALANT RESPIRATORY (INHALATION) at 07:39

## 2021-03-10 RX ADMIN — METOCLOPRAMIDE HYDROCHLORIDE SCH MG: 5 SOLUTION ORAL at 14:18

## 2021-03-10 RX ADMIN — HYDROMORPHONE HYDROCHLORIDE SCH MG: 2 TABLET ORAL at 20:26

## 2021-03-10 RX ADMIN — ZINC OXIDE SCH GM: 200 OINTMENT TOPICAL at 09:31

## 2021-03-10 RX ADMIN — Medication SCH MG: at 07:39

## 2021-03-10 RX ADMIN — MISOPROSTOL SCH MCG: 100 TABLET ORAL at 14:18

## 2021-03-10 RX ADMIN — LEVOTHYROXINE SODIUM SCH MCG: 100 TABLET ORAL at 09:17

## 2021-03-10 RX ADMIN — FOLIC ACID SCH MG: 1 TABLET ORAL at 09:16

## 2021-03-10 RX ADMIN — DONEPEZIL HYDROCHLORIDE SCH MG: 5 TABLET ORAL at 21:28

## 2021-03-10 RX ADMIN — SODIUM CHLORIDE SCH MG: 9 INJECTION, SOLUTION INTRAVENOUS at 00:37

## 2021-03-10 RX ADMIN — MISOPROSTOL SCH MCG: 100 TABLET ORAL at 09:15

## 2021-03-10 RX ADMIN — ASPIRIN 81 MG SCH MG: 81 TABLET ORAL at 09:16

## 2021-03-10 RX ADMIN — ACETYLCYSTEINE SCH MG: 100 INHALANT RESPIRATORY (INHALATION) at 23:37

## 2021-03-10 RX ADMIN — ACETYLCYSTEINE SCH MG: 100 INHALANT RESPIRATORY (INHALATION) at 15:11

## 2021-03-10 RX ADMIN — Medication SCH TAB: at 09:16

## 2021-03-10 RX ADMIN — INSULIN HUMAN PRN UNIT: 100 INJECTION, SOLUTION PARENTERAL at 17:25

## 2021-03-10 RX ADMIN — Medication SCH MG: at 02:09

## 2021-03-10 RX ADMIN — METOCLOPRAMIDE HYDROCHLORIDE SCH MG: 5 SOLUTION ORAL at 20:26

## 2021-03-10 RX ADMIN — Medication SCH EACH: at 06:50

## 2021-03-10 RX ADMIN — Medication SCH MG: at 09:15

## 2021-03-10 RX ADMIN — ATORVASTATIN CALCIUM SCH MG: 40 TABLET, FILM COATED ORAL at 21:28

## 2021-03-10 RX ADMIN — DOCUSATE SODIUM SCH MG: 50 LIQUID ORAL at 09:16

## 2021-03-10 RX ADMIN — DULOXETINE HYDROCHLORIDE SCH MG: 30 CAPSULE, DELAYED RELEASE ORAL at 09:00

## 2021-03-10 RX ADMIN — Medication SCH EACH: at 11:29

## 2021-03-10 RX ADMIN — MISOPROSTOL SCH MCG: 100 TABLET ORAL at 20:37

## 2021-03-10 RX ADMIN — Medication SCH MG: at 13:31

## 2021-03-10 RX ADMIN — MUPIROCIN SCH GM: 20 OINTMENT TOPICAL at 09:18

## 2021-03-10 RX ADMIN — INSULIN HUMAN PRN UNIT: 100 INJECTION, SOLUTION PARENTERAL at 06:50

## 2021-03-10 RX ADMIN — Medication SCH OZ: at 09:18

## 2021-03-10 RX ADMIN — AMLODIPINE BESYLATE SCH MG: 10 TABLET ORAL at 09:00

## 2021-03-10 RX ADMIN — METOCLOPRAMIDE HYDROCHLORIDE SCH MG: 5 SOLUTION ORAL at 04:52

## 2021-03-10 RX ADMIN — Medication SCH EACH: at 21:30

## 2021-03-10 RX ADMIN — INSULIN GLARGINE SCH UNIT: 100 INJECTION, SOLUTION SUBCUTANEOUS at 21:37

## 2021-03-10 RX ADMIN — Medication SCH EACH: at 17:17

## 2021-03-10 RX ADMIN — MUPIROCIN SCH GM: 20 OINTMENT TOPICAL at 20:39

## 2021-03-10 RX ADMIN — HYDROMORPHONE HYDROCHLORIDE SCH MG: 2 TABLET ORAL at 09:16

## 2021-03-10 NOTE — NUR
Tele RN opening notes

Received Pt from morning nurse. Pt is resting in bed comfortably. Pt is alert and able to 
open eyes. Pt is on trach.vent with O2 sat is 98%. No SOB. No S/S of distress noted. Tele 
monitor showed S tachy HR at 127. R chest HD cath is clean, intact and dry. R femoral TLC is 
clean,and intact. G-tube is intact, patent and running nephro 40 ml/hr. Schultz cath is intact 
and draining yellow urine. Safety precautions is maintained. Bed at low position, brakes 
locked, side rails upX3, hob elevated and call light is within reach. Will continue to 
monitor.

## 2021-03-10 NOTE — NUR
TELE RN NOTES



RECEIVED CALL FROM LAB WITH A CRITICAL LAB VALUE FOR HGB OF 6.8

MD CONTACTED AND MADE AWARE. ORDER FOR 1 UNIT PRBC TRANSFUSION RECEIVED.

## 2021-03-10 NOTE — NUR
TELE RN OPENING NOTES



RECEIVED PATIENT IN BED, AWAKE AND RESPONSIVE TO VERBAL AND TACTILE STIMULI. ON TRACH VENT 
SETTINGS, TOLERATING SETTINGS WELL, O2 SAT WNL. CURRENT TELE MONITOR READING SINUS TACHY 
125. ASHBY CATH PRESENT DRAINING YELLOW URINE TO GRAVITY. G-TUBE PATENT AND INTACT, RUNNING 
NEPRO 20CC/HR. RIGHT CHEST HD CATH, NO S/S OF ANY INFECTION. RIGHT FEMORAL TLC PATENT AND 
INTACT. SAFETY PRECAUTIONS IN PLACE; BED IN LOW POSITION AND LOCKED, RAILS UP X2, CALL LIGHT 
WITHIN REACH.  WILL CONTINUE TO MONITOR PATIENT.

## 2021-03-10 NOTE — NUR
TELE RN CLOSING NOTES



PATIENT REMAINS IN BED, AWAKE AND RESPONSIVE TO VERBAL AND TACTILE STIMULI. ON TRACH VENT 
SETTINGS, TOLERATING SETTINGS WELL, O2 SAT WNL. CURRENT TELE MONITOR READING SINUS TACHY 
135. ASHBY CATH PRESENT DRAINING YELLOW URINE TO GRAVITY WITH 30 MLS PRESENT. G-TUBE PATENT 
AND INTACT, RUNNING NEPRO 20CC/HR. RIGHT CHEST HD CATH IN PLACE; HAS BEEN BLEEDING A BIT 
THROUGHOUT THE DAY; DRESSING CHANGED TWICE DURING THE DAY [place] HD NURSE; MD AWARE; NO S/S OF 
ANY INFECTION. RIGHT FEMORAL TLC PATENT AND INTACT. ALL NEEDS ATTENDED THROUGHOUT THE DAY. 
SAFETY PRECAUTIONS IN PLACE; BED IN LOW POSITION AND LOCKED, RAILS UP X2, CALL LIGHT WITHIN 
REACH.  WILL ENDORSE TO NIGHT SHIFT NURSE.

## 2021-03-10 NOTE — NUR
Tele RN notes

Held dilaudid tab 2 mg/1 tab because of decrease BP. BP 99/36. Will continue to monitor.

## 2021-03-10 NOTE — NUR
TELE RN NOTES



RECEIVED CONSENT FROM SON JANETH FARLEY OVER THE PHONE FOR BONE MARROW BIOPSY AND 
ASPIRATION; WITNESSED BY CHARGE NURSE KEVIN AS 2ND RN.

## 2021-03-10 NOTE — NUR
CHECKED G-TUBE RESIDUAL, NOTED WITH 200CC. HELD G-TUBE FEEDING PER PROTOCOL. WILL RECHECK IN 
1 HOUR.

## 2021-03-10 NOTE — NUR
TELE RN NOTES



PATIENT HAS 1 UNIT PRBC TRANSFUSED; TOLERATED WELL, VS WNL.



WILL CONTINUE TO MONITOR PATIENT.

## 2021-03-10 NOTE — NUR
RN NOTE



PATIENT IS ALERT AND RESPONSIVE TO VERBAL AND TACTILE STIMULI. ON TRACH VENT SETTINGS, 
TOLERATING SETTINGS WELL, O2 SAT WNL. WITH ASHBY CATH DRAINING YELLOW URINE TO GRAVITY WITH 
20 CC OUTPUT. G-TUBE PATENT AND INTACT, RUNNING NEPRO 20CC/HR. WITH RIGHT CHEST HD CATH, NO 
S/S OF ANY INFECTION. RIGHT FEMORAL TLC PATENT AND INTACT. BED LOCKED AND IN LOWEST 
POSITION. CALL LIGHT WITHIN REACH. SAFETY MEASURES IN PLACE. WILL ENDORSE TO ONCOMING SHIFT.

-------------------------------------------------------------------------------

Addendum: 03/10/21 at 0701 by KYUNG DEAL RN

-------------------------------------------------------------------------------

BLOOD SUGAR AT THIS TIME 120. NO INSULIN ADMINISTERED PER MD ORDER.

## 2021-03-11 VITALS — SYSTOLIC BLOOD PRESSURE: 107 MMHG | DIASTOLIC BLOOD PRESSURE: 42 MMHG

## 2021-03-11 VITALS — DIASTOLIC BLOOD PRESSURE: 63 MMHG | SYSTOLIC BLOOD PRESSURE: 76 MMHG

## 2021-03-11 VITALS — DIASTOLIC BLOOD PRESSURE: 41 MMHG | SYSTOLIC BLOOD PRESSURE: 109 MMHG

## 2021-03-11 VITALS — SYSTOLIC BLOOD PRESSURE: 101 MMHG | DIASTOLIC BLOOD PRESSURE: 61 MMHG

## 2021-03-11 LAB
AMMONIA PLAS-SCNC: 14 UMOL/L (ref 11–32)
BASOPHILS # BLD AUTO: 0.1 /CMM (ref 0–0.2)
BASOPHILS NFR BLD AUTO: 0.5 % (ref 0–2)
BUN SERPL-MCNC: 57 MG/DL (ref 7–18)
CALCIUM SERPL-MCNC: 8.6 MG/DL (ref 8.5–10.1)
CHLORIDE SERPL-SCNC: 94 MMOL/L (ref 98–107)
CO2 SERPL-SCNC: 29 MMOL/L (ref 21–32)
CREAT SERPL-MCNC: 2.4 MG/DL (ref 0.6–1.3)
EOSINOPHIL NFR BLD AUTO: 0.8 % (ref 0–6)
GLUCOSE SERPL-MCNC: 117 MG/DL (ref 74–106)
HCT VFR BLD AUTO: 24 % (ref 33–45)
HGB BLD-MCNC: 8 G/DL (ref 11.5–14.8)
LYMPHOCYTES NFR BLD AUTO: 1 /CMM (ref 0.8–4.8)
LYMPHOCYTES NFR BLD AUTO: 5.7 % (ref 20–44)
LYMPHOCYTES NFR BLD MANUAL: 7 % (ref 16–48)
MAGNESIUM SERPL-MCNC: 1.8 MG/DL (ref 1.8–2.4)
MCHC RBC AUTO-ENTMCNC: 34 G/DL (ref 31–36)
MCV RBC AUTO: 96 FL (ref 82–100)
MONOCYTES NFR BLD AUTO: 13.2 % (ref 2–12)
MONOCYTES NFR BLD AUTO: 2.3 /CMM (ref 0.1–1.3)
MONOCYTES NFR BLD MANUAL: 11 % (ref 0–11)
NEUTROPHILS # BLD AUTO: 13.9 /CMM (ref 1.8–8.9)
NEUTROPHILS NFR BLD AUTO: 79.8 % (ref 43–81)
NEUTS BAND NFR BLD MANUAL: 1 % (ref 0–5)
NEUTS SEG NFR BLD MANUAL: 81 % (ref 42–76)
PHOSPHATE SERPL-MCNC: 5.3 MG/DL (ref 2.5–4.9)
PLATELET # BLD AUTO: 512 /CMM (ref 150–450)
POTASSIUM SERPL-SCNC: 3.8 MMOL/L (ref 3.5–5.1)
RBC # BLD AUTO: 2.45 MIL/UL (ref 4–5.2)
SODIUM SERPL-SCNC: 132 MMOL/L (ref 136–145)
WBC NRBC COR # BLD AUTO: 17.4 K/UL (ref 4.3–11)

## 2021-03-11 PROCEDURE — 0JB70ZZ EXCISION OF BACK SUBCUTANEOUS TISSUE AND FASCIA, OPEN APPROACH: ICD-10-PCS

## 2021-03-11 PROCEDURE — 05HB33Z INSERTION OF INFUSION DEVICE INTO RIGHT BASILIC VEIN, PERCUTANEOUS APPROACH: ICD-10-PCS | Performed by: NURSE PRACTITIONER

## 2021-03-11 PROCEDURE — 0JBM0ZZ EXCISION OF LEFT UPPER LEG SUBCUTANEOUS TISSUE AND FASCIA, OPEN APPROACH: ICD-10-PCS

## 2021-03-11 PROCEDURE — 0JBL0ZZ EXCISION OF RIGHT UPPER LEG SUBCUTANEOUS TISSUE AND FASCIA, OPEN APPROACH: ICD-10-PCS

## 2021-03-11 RX ADMIN — METOCLOPRAMIDE HYDROCHLORIDE SCH MG: 5 SOLUTION ORAL at 23:07

## 2021-03-11 RX ADMIN — Medication SCH MG: at 07:22

## 2021-03-11 RX ADMIN — Medication SCH MG: at 01:20

## 2021-03-11 RX ADMIN — DEXTROSE MONOHYDRATE PRN ML: 500 INJECTION PARENTERAL at 17:09

## 2021-03-11 RX ADMIN — AMLODIPINE BESYLATE SCH MG: 10 TABLET ORAL at 08:59

## 2021-03-11 RX ADMIN — ZINC OXIDE SCH GM: 200 OINTMENT TOPICAL at 08:58

## 2021-03-11 RX ADMIN — INSULIN HUMAN PRN UNIT: 100 INJECTION, SOLUTION PARENTERAL at 06:38

## 2021-03-11 RX ADMIN — FOLIC ACID SCH MG: 1 TABLET ORAL at 08:56

## 2021-03-11 RX ADMIN — SODIUM CHLORIDE SCH MG: 9 INJECTION, SOLUTION INTRAVENOUS at 08:56

## 2021-03-11 RX ADMIN — Medication SCH OZ: at 09:01

## 2021-03-11 RX ADMIN — METOCLOPRAMIDE HYDROCHLORIDE SCH MG: 5 SOLUTION ORAL at 14:11

## 2021-03-11 RX ADMIN — METOCLOPRAMIDE HYDROCHLORIDE SCH MG: 5 SOLUTION ORAL at 04:07

## 2021-03-11 RX ADMIN — MUPIROCIN SCH GM: 20 OINTMENT TOPICAL at 08:58

## 2021-03-11 RX ADMIN — Medication SCH EACH: at 11:47

## 2021-03-11 RX ADMIN — LEVOTHYROXINE SODIUM SCH MCG: 100 TABLET ORAL at 08:56

## 2021-03-11 RX ADMIN — ATORVASTATIN CALCIUM SCH MG: 40 TABLET, FILM COATED ORAL at 23:07

## 2021-03-11 RX ADMIN — FAMOTIDINE SCH MG: 20 TABLET, FILM COATED ORAL at 08:56

## 2021-03-11 RX ADMIN — HYDROMORPHONE HYDROCHLORIDE SCH MG: 2 TABLET ORAL at 08:55

## 2021-03-11 RX ADMIN — Medication SCH MG: at 09:04

## 2021-03-11 RX ADMIN — MISOPROSTOL SCH MCG: 100 TABLET ORAL at 16:41

## 2021-03-11 RX ADMIN — Medication SCH EACH: at 06:37

## 2021-03-11 RX ADMIN — INSULIN GLARGINE SCH UNIT: 100 INJECTION, SOLUTION SUBCUTANEOUS at 22:00

## 2021-03-11 RX ADMIN — DEXTROSE MONOHYDRATE PRN ML: 500 INJECTION PARENTERAL at 23:32

## 2021-03-11 RX ADMIN — Medication SCH MG: at 20:23

## 2021-03-11 RX ADMIN — Medication SCH MG: at 13:42

## 2021-03-11 RX ADMIN — Medication SCH TAB: at 08:56

## 2021-03-11 RX ADMIN — ACETYLCYSTEINE SCH MG: 100 INHALANT RESPIRATORY (INHALATION) at 07:22

## 2021-03-11 RX ADMIN — Medication SCH EACH: at 17:08

## 2021-03-11 RX ADMIN — Medication PRN OZ: at 08:59

## 2021-03-11 RX ADMIN — MISOPROSTOL SCH MCG: 100 TABLET ORAL at 23:06

## 2021-03-11 RX ADMIN — SODIUM CHLORIDE SCH MG: 9 INJECTION, SOLUTION INTRAVENOUS at 23:08

## 2021-03-11 RX ADMIN — DOCUSATE SODIUM SCH MG: 50 LIQUID ORAL at 16:41

## 2021-03-11 RX ADMIN — HYDROMORPHONE HYDROCHLORIDE SCH MG: 2 TABLET ORAL at 23:07

## 2021-03-11 RX ADMIN — DULOXETINE HYDROCHLORIDE SCH MG: 30 CAPSULE, DELAYED RELEASE ORAL at 08:56

## 2021-03-11 RX ADMIN — MUPIROCIN SCH GM: 20 OINTMENT TOPICAL at 23:09

## 2021-03-11 RX ADMIN — MISOPROSTOL SCH MCG: 100 TABLET ORAL at 16:39

## 2021-03-11 RX ADMIN — DONEPEZIL HYDROCHLORIDE SCH MG: 5 TABLET ORAL at 23:07

## 2021-03-11 RX ADMIN — MISOPROSTOL SCH MCG: 100 TABLET ORAL at 09:04

## 2021-03-11 RX ADMIN — ZINC OXIDE SCH GM: 200 OINTMENT TOPICAL at 23:09

## 2021-03-11 RX ADMIN — ACETYLCYSTEINE SCH MG: 100 INHALANT RESPIRATORY (INHALATION) at 23:42

## 2021-03-11 RX ADMIN — DOCUSATE SODIUM SCH MG: 50 LIQUID ORAL at 08:55

## 2021-03-11 RX ADMIN — ACETYLCYSTEINE SCH MG: 100 INHALANT RESPIRATORY (INHALATION) at 15:44

## 2021-03-11 RX ADMIN — Medication PRN ML: at 03:39

## 2021-03-11 RX ADMIN — Medication SCH EACH: at 23:08

## 2021-03-11 NOTE — NUR
TELE RN OPENING NOTE



PATIENT IS IN BED RESTING COMFORTABLY. PATIENT IS ON VENT AND TRACH. TOLERATING WELL, NO SOB 
NOTED. PATIENT IS ON TELE MONITOR READING SINUS TACHY 112. PATIENT IS ON G-TUBE, TOLERATING 
WELL. PATIENT HAS MULTIPLE WOUNDS, NEEDS TURNING AND REPOSITIONING EVERY 2HRS. SAFETY 
PRECAUTIONS ARE ON. BED IN THE LOWEST POSITION, SIDE RAILS ARE UP, CALL LIGHT WITHIN REACH. 
WILL CONTINUE TO MONITOR CLOSELY THROUGHOUT THE SHIFT.

## 2021-03-11 NOTE — NUR
Tele RN closing notes

Pt is resting in bed comfortably. Pt is alert and able to open eyes. Pt is on trach.vent 
with O2 sat is 98%No SOB. No S/S of distress noted. VS is stable. Afebrile. Routine meds 
were given as ordered. Tele monitor showed S tachy HR at 105. R chest HD cath is clean, 
intact and dry. R femoral TLC is clean, dry and intact. G-tube is intact, patent and running 
nephro 40 ml/hr. Schultz cath is intact and draining yellow urine 130 ml. Wound care provided 
as ordered. Kept Pt clean, dry and comfortable. All needs met and attended. Safety 
precautions is maintained. Bed at low position, brakes locked, side rails upX3, hob elevated 
and call light is within reach. Will endorse to morning for DASHAWN.

## 2021-03-11 NOTE — NUR
TELE/RN NOTE



PATIENT ONLY COMPLETED 1.5HRS OF HD D/T BP DROPPING. LOWEST BP WAS 50/30. BP RECHECK 103/51. 
NO OUTPUT FROM HD PER HD NURSE.

## 2021-03-11 NOTE — NUR
TELE RN NOTE



PATIENTS BLOOD SUGAR WAS 44, ADMINISTERED DEXTROSE 50%, RECHECKED PATIENTS BLOOD SUGAR WENT 
UP .

## 2021-03-11 NOTE — NUR
TELE/RN NOTE



PATIENT BLOOD GLUCOSE LEVEL IS 28 WITH DIAPHORESIS NOTED. GIVEN 50% DEXTROSE INJECTION. WILL 
RECHECK BLOOD SUGAR IN 30 MINUTES.

## 2021-03-11 NOTE — NUR
TELE/RN NOTE



RIGHT FEMORAL IV MEETING RESISTANCE. NEW ORDER FOR MIDLINE PLACEMENT. MIDLINE PLACED BY JAH ORONA TO RIGHT UPPER ARM #18G. CATH FLOW INITIATED TO RIGHT FEMORAL IV. WILL CONTINUE TO 
MONITOR.

## 2021-03-11 NOTE — NUR
TELE RN CLOSING NOTE



PATIENT IS IN BED RESTING COMFORTABLY. PATIENT IS ON VENT AND TRACH. TOLERATING WELL, NO SOB 
NOTED. PATIENT IS ON TELE MONITOR READING SINUS TACHY 102. PATIENT IS ON G-TUBE, TOLERATING 
WELL. PATIENT HAS ASHBY CATHETER. PATIENT HAS MULTIPLE WOUNDS, NEEDS TURNING AND 
REPOSITIONING EVERY 2HRS. PATIENT IS CURRENTLY BEING DIALYZED. SAFETY PRECAUTIONS ARE ON. 
BED IN THE LOWEST POSITION, SIDE RAILS ARE UP, CALL LIGHT WITHIN REACH. ENDORSE PATIENT TO 
NIGHT SHIFT NURSE FOR DASHAWN.

## 2021-03-11 NOTE — NUR
TELE/RN OPENING NOTE



RECEIVED PATIENT RESTING IN BED. NON-VERBAL AT BASELINE. OPENS EYES SPONTANEOUSLY. CONTINUES 
ON MECHANICAL VENT WITH PATIENT TOLERATING SETTINGS WELL. NO SIGNS OR SYMPTOMS OF 
RESPIRATORY DISTRESS NOTED. CONTINUES ON TF WITH PATIENT TOLERATING WELL. RIGHT FEMORAL HD 
CATH INTACT AND PATENT. PATIENT CURRENTLY RECEIVING HD DURING CHANGE OF SHIFT. NO SIGNS OR 
SYMPTOMS OF PAIN NOTED. CONTINUE WITH ASHBY CATHETER. CALL LIGHT WITHIN REACH. ASPIRATION, 
FALL AND SAFETY PRECAUTIONS MAINTAINED. WILL CONTINUE TO MONITOR.

## 2021-03-12 VITALS — SYSTOLIC BLOOD PRESSURE: 113 MMHG | DIASTOLIC BLOOD PRESSURE: 74 MMHG

## 2021-03-12 VITALS — SYSTOLIC BLOOD PRESSURE: 93 MMHG | DIASTOLIC BLOOD PRESSURE: 71 MMHG

## 2021-03-12 VITALS — DIASTOLIC BLOOD PRESSURE: 58 MMHG | SYSTOLIC BLOOD PRESSURE: 112 MMHG

## 2021-03-12 VITALS — DIASTOLIC BLOOD PRESSURE: 74 MMHG | SYSTOLIC BLOOD PRESSURE: 113 MMHG

## 2021-03-12 VITALS — SYSTOLIC BLOOD PRESSURE: 99 MMHG | DIASTOLIC BLOOD PRESSURE: 77 MMHG

## 2021-03-12 VITALS — SYSTOLIC BLOOD PRESSURE: 135 MMHG | DIASTOLIC BLOOD PRESSURE: 71 MMHG

## 2021-03-12 LAB
BASOPHILS # BLD AUTO: 0 /CMM (ref 0–0.2)
BASOPHILS NFR BLD AUTO: 0.1 % (ref 0–2)
BILIRUB UR QL STRIP: NEGATIVE
BUN SERPL-MCNC: 48 MG/DL (ref 7–18)
CALCIUM SERPL-MCNC: 8.6 MG/DL (ref 8.5–10.1)
CHLORIDE SERPL-SCNC: 97 MMOL/L (ref 98–107)
CO2 SERPL-SCNC: 31 MMOL/L (ref 21–32)
COLOR UR: YELLOW
CREAT SERPL-MCNC: 2.2 MG/DL (ref 0.6–1.3)
EOSINOPHIL NFR BLD AUTO: 0.7 % (ref 0–6)
GLUCOSE SERPL-MCNC: 114 MG/DL (ref 74–106)
GLUCOSE UR STRIP-MCNC: NEGATIVE MG/DL
HCT VFR BLD AUTO: 25 % (ref 33–45)
HGB BLD-MCNC: 8.2 G/DL (ref 11.5–14.8)
LEUKOCYTE ESTERASE UR QL STRIP: (no result)
LYMPHOCYTES NFR BLD AUTO: 0.8 /CMM (ref 0.8–4.8)
LYMPHOCYTES NFR BLD AUTO: 4.1 % (ref 20–44)
LYMPHOCYTES NFR BLD MANUAL: 5 % (ref 16–48)
MCHC RBC AUTO-ENTMCNC: 33 G/DL (ref 31–36)
MCV RBC AUTO: 100 FL (ref 82–100)
MONOCYTES NFR BLD AUTO: 1.7 /CMM (ref 0.1–1.3)
MONOCYTES NFR BLD AUTO: 9 % (ref 2–12)
MONOCYTES NFR BLD MANUAL: 7 % (ref 0–11)
NEUTROPHILS # BLD AUTO: 15.9 /CMM (ref 1.8–8.9)
NEUTROPHILS NFR BLD AUTO: 86.1 % (ref 43–81)
NEUTS SEG NFR BLD MANUAL: 88 % (ref 42–76)
NITRITE UR QL STRIP: NEGATIVE
PH UR STRIP: 6 [PH] (ref 5–8)
PLATELET # BLD AUTO: 503 /CMM (ref 150–450)
POTASSIUM SERPL-SCNC: 3.9 MMOL/L (ref 3.5–5.1)
PROT UR QL STRIP: (no result) MG/DL
RBC # BLD AUTO: 2.46 MIL/UL (ref 4–5.2)
RBC #/AREA URNS HPF: (no result) /HPF (ref 0–2)
SODIUM SERPL-SCNC: 135 MMOL/L (ref 136–145)
UROBILINOGEN UR STRIP-MCNC: 0.2 EU/DL
WBC #/AREA URNS HPF: (no result) /HPF
WBC #/AREA URNS HPF: (no result) /HPF (ref 0–3)
WBC NRBC COR # BLD AUTO: 18.5 K/UL (ref 4.3–11)

## 2021-03-12 RX ADMIN — Medication SCH EACH: at 12:42

## 2021-03-12 RX ADMIN — METOCLOPRAMIDE HYDROCHLORIDE SCH MG: 5 SOLUTION ORAL at 12:48

## 2021-03-12 RX ADMIN — Medication SCH EACH: at 05:40

## 2021-03-12 RX ADMIN — Medication SCH TAB: at 09:07

## 2021-03-12 RX ADMIN — HYDROMORPHONE HYDROCHLORIDE SCH MG: 2 TABLET ORAL at 09:07

## 2021-03-12 RX ADMIN — FOLIC ACID SCH MG: 1 TABLET ORAL at 09:06

## 2021-03-12 RX ADMIN — LEVOTHYROXINE SODIUM SCH MCG: 100 TABLET ORAL at 09:14

## 2021-03-12 RX ADMIN — METOCLOPRAMIDE HYDROCHLORIDE SCH MG: 5 SOLUTION ORAL at 21:07

## 2021-03-12 RX ADMIN — Medication SCH MG: at 01:38

## 2021-03-12 RX ADMIN — Medication SCH EACH: at 17:00

## 2021-03-12 RX ADMIN — Medication SCH MG: at 07:43

## 2021-03-12 RX ADMIN — Medication SCH MG: at 14:20

## 2021-03-12 RX ADMIN — INSULIN GLARGINE SCH UNIT: 100 INJECTION, SOLUTION SUBCUTANEOUS at 21:38

## 2021-03-12 RX ADMIN — SODIUM CHLORIDE SCH MG: 9 INJECTION, SOLUTION INTRAVENOUS at 09:07

## 2021-03-12 RX ADMIN — MISOPROSTOL SCH MCG: 100 TABLET ORAL at 21:07

## 2021-03-12 RX ADMIN — ZINC OXIDE SCH GM: 200 OINTMENT TOPICAL at 09:11

## 2021-03-12 RX ADMIN — DONEPEZIL HYDROCHLORIDE SCH MG: 5 TABLET ORAL at 21:07

## 2021-03-12 RX ADMIN — METOCLOPRAMIDE HYDROCHLORIDE SCH MG: 5 SOLUTION ORAL at 05:40

## 2021-03-12 RX ADMIN — ACETYLCYSTEINE SCH MG: 100 INHALANT RESPIRATORY (INHALATION) at 07:44

## 2021-03-12 RX ADMIN — MISOPROSTOL SCH MCG: 100 TABLET ORAL at 12:53

## 2021-03-12 RX ADMIN — INSULIN HUMAN PRN UNIT: 100 INJECTION, SOLUTION PARENTERAL at 17:05

## 2021-03-12 RX ADMIN — Medication SCH MG: at 09:08

## 2021-03-12 RX ADMIN — INSULIN HUMAN PRN UNIT: 100 INJECTION, SOLUTION PARENTERAL at 12:50

## 2021-03-12 RX ADMIN — Medication SCH MG: at 20:10

## 2021-03-12 RX ADMIN — DOCUSATE SODIUM SCH MG: 50 LIQUID ORAL at 17:04

## 2021-03-12 RX ADMIN — MUPIROCIN SCH GM: 20 OINTMENT TOPICAL at 21:46

## 2021-03-12 RX ADMIN — ATORVASTATIN CALCIUM SCH MG: 40 TABLET, FILM COATED ORAL at 21:07

## 2021-03-12 RX ADMIN — DULOXETINE HYDROCHLORIDE SCH MG: 30 CAPSULE, DELAYED RELEASE ORAL at 09:07

## 2021-03-12 RX ADMIN — AMLODIPINE BESYLATE SCH MG: 10 TABLET ORAL at 09:00

## 2021-03-12 RX ADMIN — MISOPROSTOL SCH MCG: 100 TABLET ORAL at 17:04

## 2021-03-12 RX ADMIN — ZINC OXIDE SCH GM: 200 OINTMENT TOPICAL at 21:46

## 2021-03-12 RX ADMIN — SODIUM CHLORIDE SCH MG: 9 INJECTION, SOLUTION INTRAVENOUS at 21:07

## 2021-03-12 RX ADMIN — ACETYLCYSTEINE SCH MG: 100 INHALANT RESPIRATORY (INHALATION) at 14:20

## 2021-03-12 RX ADMIN — Medication SCH OZ: at 09:11

## 2021-03-12 RX ADMIN — HYDROMORPHONE HYDROCHLORIDE SCH MG: 2 TABLET ORAL at 21:07

## 2021-03-12 RX ADMIN — MISOPROSTOL SCH MCG: 100 TABLET ORAL at 09:08

## 2021-03-12 RX ADMIN — MUPIROCIN SCH GM: 20 OINTMENT TOPICAL at 09:11

## 2021-03-12 RX ADMIN — ACETYLCYSTEINE SCH MG: 100 INHALANT RESPIRATORY (INHALATION) at 23:45

## 2021-03-12 RX ADMIN — DOCUSATE SODIUM SCH MG: 50 LIQUID ORAL at 09:06

## 2021-03-12 RX ADMIN — Medication SCH EACH: at 21:08

## 2021-03-12 NOTE — NUR
TELE/RN NOTE



BLOOD SUGAR RECHECK IS 90. NO SIGNS OR SYMPTOMS OF HYPOGLYCEMIA. WILL CONTINUE TO MONITOR.

## 2021-03-12 NOTE — NUR
TELE RN CLOSING NOTE



PATIENT IS IN BED RESTING COMFORTABLY. PATIENT IS ON VENT AND TRACH. TOLERATING WELL, NO SOB 
NOTED. PATIENT IS ON TELE MONITOR READING SINUS TACHY 105. PATIENT HAS MULTIPLE WOUNDS, 
NEEDS TURNING AND REPOSITIONING EVERY 2HRS. PATIENT HAS G-TUBE FEEDING. PATIENT HAS ASHBY 
CATHETER. SAFETY PRECAUTIONS ARE ON. BED IN THE LOWEST POSITION, SIDE RAILS ARE UP, CALL 
LIGHT WITHIN REACH. ENDORSE PATIENT TO NIGHT SHIFT NURSE FOR DASHAWN.

## 2021-03-12 NOTE — NUR
TELE RN OPENING NOTE



PATIENT IS IN BED RESTING COMFORTABLY. PATIENT IS ON VENT AND TRACH. TOLERATING WELL, NO SOB 
NOTED. PATIENT IS ON TELE MONITOR READING SINUS TACHY 103. PATIENT HAS MULTIPLE WOUNDS, 
NEEDS TURNING AND REPOSITIONING EVERY 2HRS. PATIENT HAS G-TUBE FEEDING, CURRENTLY ON HOLD, 
DUE TO HIGH RESIDUAL. PATIENT HAS ASHBY CATHETER. SAFETY PRECAUTIONS ARE ON. BED IN THE 
LOWEST POSITION, SIDE RAILS ARE UP, CALL LIGHT WITHIN REACH. WILL CONTINUE TO MONITOR 
CLOSELY THROUGHOUT THE SHIFT.

## 2021-03-12 NOTE — NUR
RT NOTE



PT TRANSFERRED  WITH NO COMPLICATIONS. CONT. PULSE OX CONNECTED. VENT PLUGGED TO RED 
OUTLET. NO DISTRESS NOTED AT THIS TIME.

## 2021-03-12 NOTE — NUR
TELE/RN CLOSING NOTE



PATIENT CURRENTLY RESTING IN BED. NON-VERBAL AT BASELINE. CONTINUES ON MECHANICAL VENT WITH 
PATIENT TOLERATING SETTINGS WELL. BLOOD GLUCOSE THIS AM WAS 89. CONTINUES ON NEPHRO WITH 
PATIENT TOLERATING FEED FAIR. NATHAN MIDLINE INTACT AND PATENT. RIGHT HD CATH INTACT. ASHBY 
CATHETER PATENT DRAINING CLOUDY YELLOW URINE THIS SHIFT. DRESSINGS CHANGED THIS SHIFT. CALL 
LIGHT WITHIN REACH. ASPIRATION, FALL AND SAFETY PRECAUTIONS MAINTAINED. WILL ENDORSE PLAN OF 
CARE TO ONCOMING SHIFT.

## 2021-03-12 NOTE — NUR
MS/RN OPENING NOTE



RECEIVED PATIENT RESTING IN BED. NON-VERBAL AT BASELINE. SPONTANEOUSLY OPENS EYES. CONTINUES 
ON MECHANICAL VENT WITH PATIENT TOLERATING SETTINGS WELL. CONTINUES ON TELE MONITOR WITH 
SINUS TACHY -105. NO SIGNS OR SYMPTOMS OF RESPIRATORY DISTRESS NOTED. CONTINUES ON 
NEPHRO WITH PATIENT TOLERATING FEED WELL. NATHAN MIDLINE INTACT AND PATENT. ASHBY CATHETER 
INTACT AND PATENT. CALL LIGHT WITHIN REACH. ASPIRATION, FALL AND SAFETY PRECAUTIONS 
MAINTAINED. WILL CONTINUE TO MONITOR.

## 2021-03-13 VITALS — SYSTOLIC BLOOD PRESSURE: 144 MMHG | DIASTOLIC BLOOD PRESSURE: 44 MMHG

## 2021-03-13 VITALS — DIASTOLIC BLOOD PRESSURE: 75 MMHG | SYSTOLIC BLOOD PRESSURE: 89 MMHG

## 2021-03-13 VITALS — SYSTOLIC BLOOD PRESSURE: 107 MMHG | DIASTOLIC BLOOD PRESSURE: 47 MMHG

## 2021-03-13 VITALS — SYSTOLIC BLOOD PRESSURE: 153 MMHG | DIASTOLIC BLOOD PRESSURE: 65 MMHG

## 2021-03-13 VITALS — SYSTOLIC BLOOD PRESSURE: 147 MMHG | DIASTOLIC BLOOD PRESSURE: 47 MMHG

## 2021-03-13 VITALS — DIASTOLIC BLOOD PRESSURE: 55 MMHG | SYSTOLIC BLOOD PRESSURE: 97 MMHG

## 2021-03-13 VITALS — DIASTOLIC BLOOD PRESSURE: 38 MMHG | SYSTOLIC BLOOD PRESSURE: 68 MMHG

## 2021-03-13 VITALS — DIASTOLIC BLOOD PRESSURE: 82 MMHG | SYSTOLIC BLOOD PRESSURE: 109 MMHG

## 2021-03-13 VITALS — SYSTOLIC BLOOD PRESSURE: 149 MMHG | DIASTOLIC BLOOD PRESSURE: 44 MMHG

## 2021-03-13 VITALS — SYSTOLIC BLOOD PRESSURE: 119 MMHG | DIASTOLIC BLOOD PRESSURE: 69 MMHG

## 2021-03-13 LAB
BASE EXCESS BLDA CALC-SCNC: 2 MMOL/L
BASOPHILS # BLD AUTO: 0 /CMM (ref 0–0.2)
BASOPHILS # BLD AUTO: 0 /CMM (ref 0–0.2)
BASOPHILS NFR BLD AUTO: 0.1 % (ref 0–2)
BASOPHILS NFR BLD AUTO: 0.2 % (ref 0–2)
BUN SERPL-MCNC: 21 MG/DL (ref 7–18)
BUN SERPL-MCNC: 44 MG/DL (ref 7–18)
CALCIUM SERPL-MCNC: 7.8 MG/DL (ref 8.5–10.1)
CALCIUM SERPL-MCNC: 8.6 MG/DL (ref 8.5–10.1)
CHLORIDE SERPL-SCNC: 101 MMOL/L (ref 98–107)
CHLORIDE SERPL-SCNC: 98 MMOL/L (ref 98–107)
CO2 SERPL-SCNC: 30 MMOL/L (ref 21–32)
CO2 SERPL-SCNC: 32 MMOL/L (ref 21–32)
CREAT SERPL-MCNC: 1 MG/DL (ref 0.6–1.3)
CREAT SERPL-MCNC: 2.1 MG/DL (ref 0.6–1.3)
DO-HGB MFR BLDA: 534.6 MMHG
EOSINOPHIL NFR BLD AUTO: 0.8 % (ref 0–6)
EOSINOPHIL NFR BLD AUTO: 1.2 % (ref 0–6)
EOSINOPHIL NFR BLD MANUAL: 3 % (ref 0–4)
GLUCOSE SERPL-MCNC: 106 MG/DL (ref 74–106)
GLUCOSE SERPL-MCNC: 115 MG/DL (ref 74–106)
HCT VFR BLD AUTO: 23 % (ref 33–45)
HCT VFR BLD AUTO: 24 % (ref 33–45)
HGB BLD-MCNC: 7.6 G/DL (ref 11.5–14.8)
HGB BLD-MCNC: 8 G/DL (ref 11.5–14.8)
INHALED O2 CONCENTRATION: 100 %
LYMPHOCYTES NFR BLD AUTO: 0.4 /CMM (ref 0.8–4.8)
LYMPHOCYTES NFR BLD AUTO: 0.8 /CMM (ref 0.8–4.8)
LYMPHOCYTES NFR BLD AUTO: 2.5 % (ref 20–44)
LYMPHOCYTES NFR BLD AUTO: 4.5 % (ref 20–44)
LYMPHOCYTES NFR BLD MANUAL: 12 % (ref 16–48)
MCHC RBC AUTO-ENTMCNC: 33 G/DL (ref 31–36)
MCHC RBC AUTO-ENTMCNC: 33 G/DL (ref 31–36)
MCV RBC AUTO: 100 FL (ref 82–100)
MCV RBC AUTO: 100 FL (ref 82–100)
MONOCYTES NFR BLD AUTO: 1.6 /CMM (ref 0.1–1.3)
MONOCYTES NFR BLD AUTO: 11.7 % (ref 2–12)
MONOCYTES NFR BLD AUTO: 2 /CMM (ref 0.1–1.3)
MONOCYTES NFR BLD AUTO: 8.9 % (ref 2–12)
MONOCYTES NFR BLD MANUAL: 8 % (ref 0–11)
NEUTROPHILS # BLD AUTO: 14.3 /CMM (ref 1.8–8.9)
NEUTROPHILS # BLD AUTO: 15.4 /CMM (ref 1.8–8.9)
NEUTROPHILS NFR BLD AUTO: 84.8 % (ref 43–81)
NEUTROPHILS NFR BLD AUTO: 85.3 % (ref 43–81)
NEUTS SEG NFR BLD MANUAL: 77 % (ref 42–76)
PCO2 TEMP ADJ BLDA: 78.1 MMHG (ref 35–45)
PH TEMP ADJ BLDA: 7.21 [PH] (ref 7.35–7.45)
PLATELET # BLD AUTO: 522 /CMM (ref 150–450)
PLATELET # BLD AUTO: 545 /CMM (ref 150–450)
PO2 TEMP ADJ BLDA: 100.3 MMHG (ref 75–100)
POTASSIUM SERPL-SCNC: 2.8 MMOL/L (ref 3.5–5.1)
POTASSIUM SERPL-SCNC: 3.6 MMOL/L (ref 3.5–5.1)
RBC # BLD AUTO: 2.31 MIL/UL (ref 4–5.2)
RBC # BLD AUTO: 2.42 MIL/UL (ref 4–5.2)
SAO2 % BLDA: 96.7 % (ref 92–98.5)
SODIUM SERPL-SCNC: 135 MMOL/L (ref 136–145)
SODIUM SERPL-SCNC: 136 MMOL/L (ref 136–145)
VENTILATION MODE VENT: (no result)
WBC NRBC COR # BLD AUTO: 16.9 K/UL (ref 4.3–11)
WBC NRBC COR # BLD AUTO: 18.1 K/UL (ref 4.3–11)

## 2021-03-13 RX ADMIN — ZINC OXIDE SCH GM: 200 OINTMENT TOPICAL at 21:31

## 2021-03-13 RX ADMIN — LEVOTHYROXINE SODIUM SCH MCG: 100 TABLET ORAL at 08:34

## 2021-03-13 RX ADMIN — METOCLOPRAMIDE HYDROCHLORIDE SCH MG: 5 SOLUTION ORAL at 21:28

## 2021-03-13 RX ADMIN — Medication SCH EACH: at 06:21

## 2021-03-13 RX ADMIN — ACETYLCYSTEINE SCH MG: 100 INHALANT RESPIRATORY (INHALATION) at 08:47

## 2021-03-13 RX ADMIN — Medication PRN ML: at 04:46

## 2021-03-13 RX ADMIN — ZINC OXIDE SCH GM: 200 OINTMENT TOPICAL at 09:47

## 2021-03-13 RX ADMIN — DULOXETINE HYDROCHLORIDE SCH MG: 30 CAPSULE, DELAYED RELEASE ORAL at 08:34

## 2021-03-13 RX ADMIN — Medication SCH MG: at 08:47

## 2021-03-13 RX ADMIN — ACETYLCYSTEINE SCH MG: 100 INHALANT RESPIRATORY (INHALATION) at 15:05

## 2021-03-13 RX ADMIN — AMLODIPINE BESYLATE SCH MG: 10 TABLET ORAL at 08:37

## 2021-03-13 RX ADMIN — HYDROMORPHONE HYDROCHLORIDE SCH MG: 2 TABLET ORAL at 21:30

## 2021-03-13 RX ADMIN — SODIUM CHLORIDE SCH MG: 9 INJECTION, SOLUTION INTRAVENOUS at 21:30

## 2021-03-13 RX ADMIN — Medication SCH TAB: at 08:33

## 2021-03-13 RX ADMIN — MISOPROSTOL SCH MCG: 100 TABLET ORAL at 12:58

## 2021-03-13 RX ADMIN — HYDROMORPHONE HYDROCHLORIDE SCH MG: 2 TABLET ORAL at 08:35

## 2021-03-13 RX ADMIN — Medication SCH MG: at 01:06

## 2021-03-13 RX ADMIN — DOCUSATE SODIUM SCH MG: 50 LIQUID ORAL at 08:34

## 2021-03-13 RX ADMIN — INSULIN HUMAN PRN UNIT: 100 INJECTION, SOLUTION PARENTERAL at 06:33

## 2021-03-13 RX ADMIN — Medication SCH EACH: at 12:28

## 2021-03-13 RX ADMIN — INSULIN GLARGINE SCH UNIT: 100 INJECTION, SOLUTION SUBCUTANEOUS at 22:00

## 2021-03-13 RX ADMIN — MISOPROSTOL SCH MCG: 100 TABLET ORAL at 16:59

## 2021-03-13 RX ADMIN — Medication SCH EACH: at 22:23

## 2021-03-13 RX ADMIN — ACETYLCYSTEINE SCH MG: 100 INHALANT RESPIRATORY (INHALATION) at 23:32

## 2021-03-13 RX ADMIN — MUPIROCIN SCH GM: 20 OINTMENT TOPICAL at 21:32

## 2021-03-13 RX ADMIN — FOLIC ACID SCH MG: 1 TABLET ORAL at 08:36

## 2021-03-13 RX ADMIN — MUPIROCIN SCH GM: 20 OINTMENT TOPICAL at 08:39

## 2021-03-13 RX ADMIN — Medication SCH OZ: at 08:38

## 2021-03-13 RX ADMIN — DONEPEZIL HYDROCHLORIDE SCH MG: 5 TABLET ORAL at 21:28

## 2021-03-13 RX ADMIN — SODIUM CHLORIDE SCH MG: 9 INJECTION, SOLUTION INTRAVENOUS at 08:33

## 2021-03-13 RX ADMIN — Medication SCH EACH: at 16:59

## 2021-03-13 RX ADMIN — Medication SCH MG: at 19:50

## 2021-03-13 RX ADMIN — DOCUSATE SODIUM SCH MG: 50 LIQUID ORAL at 16:59

## 2021-03-13 RX ADMIN — MISOPROSTOL SCH MCG: 100 TABLET ORAL at 08:48

## 2021-03-13 RX ADMIN — ATORVASTATIN CALCIUM SCH MG: 40 TABLET, FILM COATED ORAL at 21:28

## 2021-03-13 RX ADMIN — MISOPROSTOL SCH MCG: 100 TABLET ORAL at 21:28

## 2021-03-13 RX ADMIN — Medication SCH MG: at 15:05

## 2021-03-13 RX ADMIN — METOCLOPRAMIDE HYDROCHLORIDE SCH MG: 5 SOLUTION ORAL at 12:58

## 2021-03-13 RX ADMIN — METOCLOPRAMIDE HYDROCHLORIDE SCH MG: 5 SOLUTION ORAL at 04:12

## 2021-03-13 NOTE — NUR
TELE RN NOTE



RT LET ME KNOW THAT HE NOTICED PATIENT WAS OBTUNDED WITH OXYGEN ATURATION OF 88%. PATIENT 
WAS HYPOTENSIVE. DIALYSIS WAS STOPPED, FEEDING STOPPED. VITALS RECHECKED AND STABLE. CHARGE 
NURSE MADE AWARE. RT PLACED PATIENT % FIO2 TEMPORARILY. WILL CONTINUE TO MONITOR.

## 2021-03-13 NOTE — NUR
PT ARRIVED IN ICU ROOM 254 AT 1747 VIA BED FROM ROOM 306-1.  PT PUT ON MONITOR AND SETTLED 
IN ROOM.  REPORT RECEIVED FROM 3W RN.

## 2021-03-13 NOTE — NUR
TELE RN NOTE



LAB REPORTED PATIENT'S POTASSIUM LEVEL @ 2.9 - PATIENT HAD LABS DONE IN THE MIDDLE OF 
DIALYSIS -WHICH CAUSED POTASSIUM TO NOT BE ACCURATE. EDILBERTO HIDALGO ORDERED POTASSIUM TO BE GIVEN 
IV. LABS WERE REDRAWN AND POTASSIUM IS 3.6. NO POTASSIUM NEEDED. POTASSIUM ORDER WAS 
STOPPED.

## 2021-03-13 NOTE — NUR
TELE/RN CLOSING NOTE



PATIENT CURRENTLY RESTING IN BED. NON-VERBAL AT BASELINE. CONTINUES ON MECHANICAL VENT WITH 
PATIENT TOLERATING SETTINGS WELL. IV ACCESS TO RIGHT UPPER ARM INTACT AND PATENT. ASHBY 
CATHETER PATENT DRAINING CLOUDY, YELLOW URINE. GT PATENT WITH NEPRO RUNNING. PATIENT 
TOLERATING FEED FAIRLY WITH 40CC RESIDUAL AT THIS TIME. UNABLE TO GET DAILY WEIGHT THIS AM. 
WILL ENDORSE DAILY WEIGHT TO ONCOMING SHIFT. CALL LIGHT WITHIN REACH. ASPIRATION, FALL AND 
SAFETY PRECAUTIONS MAINTAINED. WILL ENDORSE PLAN OF CARE TO ONCOMING SHIFT.

## 2021-03-13 NOTE — NUR
ABG result discussed with dr. Robertson, new order received to change vent setting and transfer 
pt to icu. Rt at bedside adjusting vent setting according to order, nursing sup. called for 
icu bed.

## 2021-03-13 NOTE — NUR
patient were found obtunded with saturation 88 to 89 % with ongoing dialysis treatment. 
patient were placed on 100% at this moment for support and dialysis stopped. Patient RN and 
Charge RN were awared as well. Patient will continue to be monitor.

## 2021-03-13 NOTE — NUR
TELE RN OPENING NOTE



RECEIVED PT RESTING IN BED. NON-VERBAL. OPENS EYES, ABLE TO RESPOND TO LIGHT STIMULUS. PT  
ON MECHANICAL VENT WITH PEEP 5, FIO2 40%, , AC 4. PT TOLERATING VENT SETTINGS WELL. PT 
ON TELE CARDIAC MONITOR READING SR IN THE 90s. NO S/S OF ANYC ACUTE DISTRESS NOTED. PT NOTED 
WITH A RCW HD CATH. RIGHT FEMORAL TRIPLE LUMEN AND NATHAN MIDLINE BOTH INTACT, PATENT AND 
FLUSHING WELL. G-TUBE FEEDING  WITH NO RESIDUAL NOTED.PT NOTED WITH ASHBY CATHETER DRAINING 
TO GRAVITY CLEAR YELLOW URINE OUTPUT. ASPIRATION, FALL AND SAFETY PRECAUTIONS IN PLACE AND 
MAINTAINED AT ALL TIME. BED IN LOWEST LOCKED POSITION, CALL LIGHT AND TABLE WITHIN REACH, 
HOB ELEVATED, SIDE RAILS UP. WILL CONTINUE TO MONITOR

## 2021-03-13 NOTE — NUR
RN NOTE



RECEIVED PT IN BED, NONVERBAL OPENS EYES TO TOUCH, PT IS ON VENT SATING 97%, ON CARDIAC 
MONITOR SHOWING SR WITH HR IN 90s. PT HAS G TUBE CHECKED FOR PLACEMENT. SAFETY MEASURES IN 
PLACE.

.

## 2021-03-13 NOTE — NUR
PATIENT TRANSPORTED BY BED TO ICU @ THIS TIME. ACLS PROTOCOL IN PLACE. ACCOMPANIED BY RN AND 
RT. REPORT GIVEN AT BEDSIDE TO DALE, CHARGE NURSE FOR CONTINUITY OF CARE.

## 2021-03-14 VITALS — SYSTOLIC BLOOD PRESSURE: 67 MMHG | DIASTOLIC BLOOD PRESSURE: 54 MMHG

## 2021-03-14 VITALS — DIASTOLIC BLOOD PRESSURE: 95 MMHG | SYSTOLIC BLOOD PRESSURE: 115 MMHG

## 2021-03-14 VITALS — DIASTOLIC BLOOD PRESSURE: 89 MMHG | SYSTOLIC BLOOD PRESSURE: 109 MMHG

## 2021-03-14 VITALS — SYSTOLIC BLOOD PRESSURE: 101 MMHG | DIASTOLIC BLOOD PRESSURE: 62 MMHG

## 2021-03-14 VITALS — SYSTOLIC BLOOD PRESSURE: 109 MMHG | DIASTOLIC BLOOD PRESSURE: 68 MMHG

## 2021-03-14 VITALS — DIASTOLIC BLOOD PRESSURE: 70 MMHG | SYSTOLIC BLOOD PRESSURE: 133 MMHG

## 2021-03-14 VITALS — DIASTOLIC BLOOD PRESSURE: 90 MMHG | SYSTOLIC BLOOD PRESSURE: 116 MMHG

## 2021-03-14 VITALS — SYSTOLIC BLOOD PRESSURE: 102 MMHG | DIASTOLIC BLOOD PRESSURE: 87 MMHG

## 2021-03-14 VITALS — DIASTOLIC BLOOD PRESSURE: 80 MMHG | SYSTOLIC BLOOD PRESSURE: 108 MMHG

## 2021-03-14 VITALS — SYSTOLIC BLOOD PRESSURE: 107 MMHG | DIASTOLIC BLOOD PRESSURE: 56 MMHG

## 2021-03-14 VITALS — DIASTOLIC BLOOD PRESSURE: 85 MMHG | SYSTOLIC BLOOD PRESSURE: 132 MMHG

## 2021-03-14 VITALS — SYSTOLIC BLOOD PRESSURE: 92 MMHG | DIASTOLIC BLOOD PRESSURE: 57 MMHG

## 2021-03-14 VITALS — DIASTOLIC BLOOD PRESSURE: 55 MMHG | SYSTOLIC BLOOD PRESSURE: 124 MMHG

## 2021-03-14 VITALS — SYSTOLIC BLOOD PRESSURE: 119 MMHG | DIASTOLIC BLOOD PRESSURE: 69 MMHG

## 2021-03-14 VITALS — DIASTOLIC BLOOD PRESSURE: 41 MMHG | SYSTOLIC BLOOD PRESSURE: 99 MMHG

## 2021-03-14 VITALS — DIASTOLIC BLOOD PRESSURE: 48 MMHG | SYSTOLIC BLOOD PRESSURE: 117 MMHG

## 2021-03-14 VITALS — DIASTOLIC BLOOD PRESSURE: 40 MMHG | SYSTOLIC BLOOD PRESSURE: 49 MMHG

## 2021-03-14 VITALS — DIASTOLIC BLOOD PRESSURE: 98 MMHG | SYSTOLIC BLOOD PRESSURE: 191 MMHG

## 2021-03-14 VITALS — DIASTOLIC BLOOD PRESSURE: 83 MMHG | SYSTOLIC BLOOD PRESSURE: 113 MMHG

## 2021-03-14 VITALS — DIASTOLIC BLOOD PRESSURE: 45 MMHG | SYSTOLIC BLOOD PRESSURE: 88 MMHG

## 2021-03-14 VITALS — DIASTOLIC BLOOD PRESSURE: 109 MMHG | SYSTOLIC BLOOD PRESSURE: 134 MMHG

## 2021-03-14 VITALS — SYSTOLIC BLOOD PRESSURE: 135 MMHG | DIASTOLIC BLOOD PRESSURE: 48 MMHG

## 2021-03-14 VITALS — SYSTOLIC BLOOD PRESSURE: 159 MMHG | DIASTOLIC BLOOD PRESSURE: 38 MMHG

## 2021-03-14 VITALS — SYSTOLIC BLOOD PRESSURE: 78 MMHG | DIASTOLIC BLOOD PRESSURE: 43 MMHG

## 2021-03-14 VITALS — SYSTOLIC BLOOD PRESSURE: 113 MMHG | DIASTOLIC BLOOD PRESSURE: 60 MMHG

## 2021-03-14 VITALS — SYSTOLIC BLOOD PRESSURE: 93 MMHG | DIASTOLIC BLOOD PRESSURE: 59 MMHG

## 2021-03-14 VITALS — DIASTOLIC BLOOD PRESSURE: 98 MMHG | SYSTOLIC BLOOD PRESSURE: 144 MMHG

## 2021-03-14 VITALS — SYSTOLIC BLOOD PRESSURE: 97 MMHG | DIASTOLIC BLOOD PRESSURE: 80 MMHG

## 2021-03-14 VITALS — DIASTOLIC BLOOD PRESSURE: 55 MMHG | SYSTOLIC BLOOD PRESSURE: 87 MMHG

## 2021-03-14 VITALS — DIASTOLIC BLOOD PRESSURE: 97 MMHG | SYSTOLIC BLOOD PRESSURE: 112 MMHG

## 2021-03-14 VITALS — SYSTOLIC BLOOD PRESSURE: 118 MMHG | DIASTOLIC BLOOD PRESSURE: 65 MMHG

## 2021-03-14 VITALS — SYSTOLIC BLOOD PRESSURE: 114 MMHG | DIASTOLIC BLOOD PRESSURE: 80 MMHG

## 2021-03-14 VITALS — SYSTOLIC BLOOD PRESSURE: 123 MMHG | DIASTOLIC BLOOD PRESSURE: 81 MMHG

## 2021-03-14 VITALS — SYSTOLIC BLOOD PRESSURE: 134 MMHG | DIASTOLIC BLOOD PRESSURE: 94 MMHG

## 2021-03-14 VITALS — DIASTOLIC BLOOD PRESSURE: 63 MMHG | SYSTOLIC BLOOD PRESSURE: 122 MMHG

## 2021-03-14 VITALS — DIASTOLIC BLOOD PRESSURE: 90 MMHG | SYSTOLIC BLOOD PRESSURE: 133 MMHG

## 2021-03-14 VITALS — DIASTOLIC BLOOD PRESSURE: 94 MMHG | SYSTOLIC BLOOD PRESSURE: 108 MMHG

## 2021-03-14 VITALS — SYSTOLIC BLOOD PRESSURE: 126 MMHG | DIASTOLIC BLOOD PRESSURE: 76 MMHG

## 2021-03-14 VITALS — DIASTOLIC BLOOD PRESSURE: 95 MMHG | SYSTOLIC BLOOD PRESSURE: 155 MMHG

## 2021-03-14 VITALS — DIASTOLIC BLOOD PRESSURE: 55 MMHG | SYSTOLIC BLOOD PRESSURE: 115 MMHG

## 2021-03-14 VITALS — DIASTOLIC BLOOD PRESSURE: 83 MMHG | SYSTOLIC BLOOD PRESSURE: 195 MMHG

## 2021-03-14 VITALS — SYSTOLIC BLOOD PRESSURE: 111 MMHG | DIASTOLIC BLOOD PRESSURE: 82 MMHG

## 2021-03-14 VITALS — DIASTOLIC BLOOD PRESSURE: 105 MMHG | SYSTOLIC BLOOD PRESSURE: 130 MMHG

## 2021-03-14 VITALS — SYSTOLIC BLOOD PRESSURE: 139 MMHG | DIASTOLIC BLOOD PRESSURE: 45 MMHG

## 2021-03-14 VITALS — SYSTOLIC BLOOD PRESSURE: 164 MMHG | DIASTOLIC BLOOD PRESSURE: 96 MMHG

## 2021-03-14 VITALS — DIASTOLIC BLOOD PRESSURE: 83 MMHG | SYSTOLIC BLOOD PRESSURE: 120 MMHG

## 2021-03-14 LAB
BASE EXCESS BLDA CALC-SCNC: 0.5 MMOL/L
BASOPHILS # BLD AUTO: 0.1 /CMM (ref 0–0.2)
BASOPHILS NFR BLD AUTO: 0.3 % (ref 0–2)
BILIRUB UR QL STRIP: NEGATIVE
BUN SERPL-MCNC: 48 MG/DL (ref 7–18)
CALCIUM SERPL-MCNC: 8.6 MG/DL (ref 8.5–10.1)
CHLORIDE SERPL-SCNC: 99 MMOL/L (ref 98–107)
CO2 SERPL-SCNC: 29 MMOL/L (ref 21–32)
COLOR UR: YELLOW
CREAT SERPL-MCNC: 2.2 MG/DL (ref 0.6–1.3)
DEPRECATED SQUAMOUS URNS QL MICRO: (no result) /HPF
DO-HGB MFR BLDA: 274 MMHG
EOSINOPHIL NFR BLD AUTO: 2.4 % (ref 0–6)
GLUCOSE SERPL-MCNC: 87 MG/DL (ref 74–106)
GLUCOSE UR STRIP-MCNC: NEGATIVE MG/DL
HCT VFR BLD AUTO: 23 % (ref 33–45)
HGB BLD-MCNC: 7.3 G/DL (ref 11.5–14.8)
INHALED O2 CONCENTRATION: 60 %
LEUKOCYTE ESTERASE UR QL STRIP: (no result)
LYMPHOCYTES NFR BLD AUTO: 1 /CMM (ref 0.8–4.8)
LYMPHOCYTES NFR BLD AUTO: 5.7 % (ref 20–44)
MCHC RBC AUTO-ENTMCNC: 33 G/DL (ref 31–36)
MCV RBC AUTO: 103 FL (ref 82–100)
MONOCYTES NFR BLD AUTO: 1.9 /CMM (ref 0.1–1.3)
MONOCYTES NFR BLD AUTO: 10.3 % (ref 2–12)
NEUTROPHILS # BLD AUTO: 14.6 /CMM (ref 1.8–8.9)
NEUTROPHILS NFR BLD AUTO: 81.3 % (ref 43–81)
NITRITE UR QL STRIP: NEGATIVE
PCO2 TEMP ADJ BLDA: 65.2 MMHG (ref 35–45)
PH TEMP ADJ BLDA: 7.25 [PH] (ref 7.35–7.45)
PH UR STRIP: 6.5 [PH] (ref 5–8)
PLATELET # BLD AUTO: 516 /CMM (ref 150–450)
PO2 TEMP ADJ BLDA: 82.1 MMHG (ref 75–100)
POTASSIUM SERPL-SCNC: 3.7 MMOL/L (ref 3.5–5.1)
PROT UR QL STRIP: (no result) MG/DL
RBC # BLD AUTO: 2.19 MIL/UL (ref 4–5.2)
RBC #/AREA URNS HPF: (no result) /HPF (ref 0–2)
SAO2 % BLDA: 94.5 % (ref 92–98.5)
SODIUM SERPL-SCNC: 135 MMOL/L (ref 136–145)
UROBILINOGEN UR STRIP-MCNC: 0.2 EU/DL
VENTILATION MODE VENT: (no result)
WBC #/AREA URNS HPF: (no result) /HPF
WBC #/AREA URNS HPF: (no result) /HPF (ref 0–3)
WBC NRBC COR # BLD AUTO: 18 K/UL (ref 4.3–11)

## 2021-03-14 RX ADMIN — MISOPROSTOL SCH MCG: 100 TABLET ORAL at 08:28

## 2021-03-14 RX ADMIN — ACETYLCYSTEINE SCH MG: 100 INHALANT RESPIRATORY (INHALATION) at 23:45

## 2021-03-14 RX ADMIN — Medication SCH EACH: at 22:07

## 2021-03-14 RX ADMIN — SODIUM CHLORIDE PRN MLS/HR: 9 INJECTION, SOLUTION INTRAVENOUS at 21:41

## 2021-03-14 RX ADMIN — DOCUSATE SODIUM SCH MG: 50 LIQUID ORAL at 16:52

## 2021-03-14 RX ADMIN — Medication SCH OZ: at 08:37

## 2021-03-14 RX ADMIN — INSULIN GLARGINE SCH UNIT: 100 INJECTION, SOLUTION SUBCUTANEOUS at 22:00

## 2021-03-14 RX ADMIN — DONEPEZIL HYDROCHLORIDE SCH MG: 5 TABLET ORAL at 21:39

## 2021-03-14 RX ADMIN — FOLIC ACID SCH MG: 1 TABLET ORAL at 08:27

## 2021-03-14 RX ADMIN — ACETYLCYSTEINE SCH MG: 100 INHALANT RESPIRATORY (INHALATION) at 07:35

## 2021-03-14 RX ADMIN — SODIUM CHLORIDE SCH MG: 9 INJECTION, SOLUTION INTRAVENOUS at 08:31

## 2021-03-14 RX ADMIN — METOCLOPRAMIDE HYDROCHLORIDE SCH MG: 5 SOLUTION ORAL at 04:54

## 2021-03-14 RX ADMIN — LEVOTHYROXINE SODIUM SCH MCG: 100 TABLET ORAL at 08:28

## 2021-03-14 RX ADMIN — DEXTROSE MONOHYDRATE SCH MLS/HR: 50 INJECTION, SOLUTION INTRAVENOUS at 15:37

## 2021-03-14 RX ADMIN — HYDROMORPHONE HYDROCHLORIDE PRN MG: 2 TABLET ORAL at 11:54

## 2021-03-14 RX ADMIN — Medication SCH EACH: at 17:17

## 2021-03-14 RX ADMIN — METOCLOPRAMIDE HYDROCHLORIDE SCH MG: 5 SOLUTION ORAL at 21:39

## 2021-03-14 RX ADMIN — ZINC OXIDE SCH APPLIC: 200 OINTMENT TOPICAL at 21:39

## 2021-03-14 RX ADMIN — MISOPROSTOL SCH MCG: 100 TABLET ORAL at 21:38

## 2021-03-14 RX ADMIN — HYDROMORPHONE HYDROCHLORIDE SCH MG: 2 TABLET ORAL at 08:28

## 2021-03-14 RX ADMIN — MUPIROCIN SCH APPLIC: 20 OINTMENT TOPICAL at 08:31

## 2021-03-14 RX ADMIN — SODIUM CHLORIDE SCH MG: 9 INJECTION, SOLUTION INTRAVENOUS at 21:39

## 2021-03-14 RX ADMIN — Medication PRN ML: at 04:51

## 2021-03-14 RX ADMIN — DEXTROSE MONOHYDRATE PRN ML: 500 INJECTION PARENTERAL at 07:09

## 2021-03-14 RX ADMIN — AMLODIPINE BESYLATE SCH MG: 10 TABLET ORAL at 08:27

## 2021-03-14 RX ADMIN — Medication SCH EACH: at 11:32

## 2021-03-14 RX ADMIN — ATORVASTATIN CALCIUM SCH MG: 40 TABLET, FILM COATED ORAL at 21:39

## 2021-03-14 RX ADMIN — Medication SCH MG: at 20:03

## 2021-03-14 RX ADMIN — MUPIROCIN SCH APPLIC: 20 OINTMENT TOPICAL at 21:55

## 2021-03-14 RX ADMIN — Medication SCH MG: at 07:35

## 2021-03-14 RX ADMIN — DOCUSATE SODIUM SCH MG: 50 LIQUID ORAL at 08:29

## 2021-03-14 RX ADMIN — MISOPROSTOL SCH MCG: 100 TABLET ORAL at 12:01

## 2021-03-14 RX ADMIN — METOCLOPRAMIDE HYDROCHLORIDE SCH MG: 5 SOLUTION ORAL at 12:03

## 2021-03-14 RX ADMIN — ACETYLCYSTEINE SCH MG: 100 INHALANT RESPIRATORY (INHALATION) at 13:43

## 2021-03-14 RX ADMIN — ZINC OXIDE SCH APPLIC: 200 OINTMENT TOPICAL at 08:39

## 2021-03-14 RX ADMIN — Medication SCH MG: at 08:30

## 2021-03-14 RX ADMIN — Medication SCH TAB: at 08:26

## 2021-03-14 RX ADMIN — DULOXETINE HYDROCHLORIDE SCH MG: 30 CAPSULE, DELAYED RELEASE ORAL at 08:28

## 2021-03-14 RX ADMIN — MISOPROSTOL SCH MCG: 100 TABLET ORAL at 17:17

## 2021-03-14 RX ADMIN — Medication SCH MG: at 13:30

## 2021-03-14 RX ADMIN — HYDROMORPHONE HYDROCHLORIDE SCH MG: 2 TABLET ORAL at 21:39

## 2021-03-14 RX ADMIN — SODIUM CHLORIDE SCH MG: 9 INJECTION, SOLUTION INTRAVENOUS at 21:55

## 2021-03-14 RX ADMIN — Medication SCH EACH: at 07:45

## 2021-03-14 RX ADMIN — Medication SCH MG: at 01:45

## 2021-03-14 NOTE — NUR
RN NOTES 

RECEIVED PT ON BED, TRACH / VENT DEPENDENT, ON SIMV MODE, VSS STABLE, ON TELE SR , ASHBY 
INTACT, TF AT 40CC/HR RUNNING , R UPPER ARM MIDLINE SITE CLEAN, DRY AND INTACT,  NO DISTRESS 
NOTED, SR UP X3, CALL LIGHT WITHIN EASY REACH, BED LOCKED AND IN LOWEST POSITION, CONTINUE 
TO MONITOR

## 2021-03-14 NOTE — NUR
RN NOTE



RN NOTE



RECEIVED PT IN BED, NONVERBAL OPENS EYES TO TOUCH, PT IS ON VENT  WITH FIO2 70% SATING 96% 
ON CARDIAC MONITOR SHOWING SR WITH HR IN 100s PT HAS G TUBE CHECKED FOR PLACEMENT. SAFETY 
MEASURES IN PLACE.

.

## 2021-03-14 NOTE — NUR
RN NOTES

BG =59 , REPEAT =64, TANISHA NP NOITFED, NO SIGN AND SYMPTOMS OF HYPOGLYCEMIA NOTED, CONTINUE 
TO MONITOR .

## 2021-03-14 NOTE — NUR
RN NOTES 

NO SIGNIFICANT CHANGES NOTED ON THIS SHIFT, IGNACIO CURRENT VENT SETTING WELL, IV SITES , 
CLEAN DRY AND INTACT, WILL ENDOSE TO NIGHT SHIFT NURSE FOR CONTINUITY OF CARE .

## 2021-03-15 VITALS — SYSTOLIC BLOOD PRESSURE: 109 MMHG | DIASTOLIC BLOOD PRESSURE: 58 MMHG

## 2021-03-15 VITALS — SYSTOLIC BLOOD PRESSURE: 81 MMHG | DIASTOLIC BLOOD PRESSURE: 48 MMHG

## 2021-03-15 VITALS — DIASTOLIC BLOOD PRESSURE: 107 MMHG | SYSTOLIC BLOOD PRESSURE: 143 MMHG

## 2021-03-15 VITALS — SYSTOLIC BLOOD PRESSURE: 120 MMHG | DIASTOLIC BLOOD PRESSURE: 41 MMHG

## 2021-03-15 VITALS — SYSTOLIC BLOOD PRESSURE: 89 MMHG | DIASTOLIC BLOOD PRESSURE: 59 MMHG

## 2021-03-15 VITALS — SYSTOLIC BLOOD PRESSURE: 109 MMHG | DIASTOLIC BLOOD PRESSURE: 44 MMHG

## 2021-03-15 VITALS — DIASTOLIC BLOOD PRESSURE: 56 MMHG | SYSTOLIC BLOOD PRESSURE: 96 MMHG

## 2021-03-15 VITALS — DIASTOLIC BLOOD PRESSURE: 46 MMHG | SYSTOLIC BLOOD PRESSURE: 95 MMHG

## 2021-03-15 VITALS — SYSTOLIC BLOOD PRESSURE: 133 MMHG | DIASTOLIC BLOOD PRESSURE: 27 MMHG

## 2021-03-15 VITALS — SYSTOLIC BLOOD PRESSURE: 99 MMHG | DIASTOLIC BLOOD PRESSURE: 39 MMHG

## 2021-03-15 VITALS — DIASTOLIC BLOOD PRESSURE: 61 MMHG | SYSTOLIC BLOOD PRESSURE: 89 MMHG

## 2021-03-15 VITALS — DIASTOLIC BLOOD PRESSURE: 33 MMHG | SYSTOLIC BLOOD PRESSURE: 110 MMHG

## 2021-03-15 VITALS — DIASTOLIC BLOOD PRESSURE: 43 MMHG | SYSTOLIC BLOOD PRESSURE: 88 MMHG

## 2021-03-15 VITALS — DIASTOLIC BLOOD PRESSURE: 63 MMHG | SYSTOLIC BLOOD PRESSURE: 138 MMHG

## 2021-03-15 VITALS — SYSTOLIC BLOOD PRESSURE: 99 MMHG | DIASTOLIC BLOOD PRESSURE: 74 MMHG

## 2021-03-15 VITALS — DIASTOLIC BLOOD PRESSURE: 26 MMHG | SYSTOLIC BLOOD PRESSURE: 110 MMHG

## 2021-03-15 VITALS — DIASTOLIC BLOOD PRESSURE: 49 MMHG | SYSTOLIC BLOOD PRESSURE: 126 MMHG

## 2021-03-15 VITALS — DIASTOLIC BLOOD PRESSURE: 55 MMHG | SYSTOLIC BLOOD PRESSURE: 106 MMHG

## 2021-03-15 VITALS — SYSTOLIC BLOOD PRESSURE: 134 MMHG | DIASTOLIC BLOOD PRESSURE: 92 MMHG

## 2021-03-15 VITALS — SYSTOLIC BLOOD PRESSURE: 76 MMHG | DIASTOLIC BLOOD PRESSURE: 54 MMHG

## 2021-03-15 VITALS — DIASTOLIC BLOOD PRESSURE: 93 MMHG | SYSTOLIC BLOOD PRESSURE: 106 MMHG

## 2021-03-15 VITALS — SYSTOLIC BLOOD PRESSURE: 130 MMHG | DIASTOLIC BLOOD PRESSURE: 78 MMHG

## 2021-03-15 VITALS — DIASTOLIC BLOOD PRESSURE: 35 MMHG | SYSTOLIC BLOOD PRESSURE: 133 MMHG

## 2021-03-15 VITALS — DIASTOLIC BLOOD PRESSURE: 65 MMHG | SYSTOLIC BLOOD PRESSURE: 88 MMHG

## 2021-03-15 VITALS — DIASTOLIC BLOOD PRESSURE: 24 MMHG | SYSTOLIC BLOOD PRESSURE: 119 MMHG

## 2021-03-15 VITALS — SYSTOLIC BLOOD PRESSURE: 112 MMHG | DIASTOLIC BLOOD PRESSURE: 40 MMHG

## 2021-03-15 VITALS — DIASTOLIC BLOOD PRESSURE: 36 MMHG | SYSTOLIC BLOOD PRESSURE: 120 MMHG

## 2021-03-15 VITALS — DIASTOLIC BLOOD PRESSURE: 82 MMHG | SYSTOLIC BLOOD PRESSURE: 113 MMHG

## 2021-03-15 VITALS — SYSTOLIC BLOOD PRESSURE: 91 MMHG | DIASTOLIC BLOOD PRESSURE: 68 MMHG

## 2021-03-15 VITALS — DIASTOLIC BLOOD PRESSURE: 60 MMHG | SYSTOLIC BLOOD PRESSURE: 81 MMHG

## 2021-03-15 VITALS — DIASTOLIC BLOOD PRESSURE: 52 MMHG | SYSTOLIC BLOOD PRESSURE: 95 MMHG

## 2021-03-15 VITALS — DIASTOLIC BLOOD PRESSURE: 60 MMHG | SYSTOLIC BLOOD PRESSURE: 87 MMHG

## 2021-03-15 VITALS — SYSTOLIC BLOOD PRESSURE: 108 MMHG | DIASTOLIC BLOOD PRESSURE: 35 MMHG

## 2021-03-15 VITALS — DIASTOLIC BLOOD PRESSURE: 51 MMHG | SYSTOLIC BLOOD PRESSURE: 161 MMHG

## 2021-03-15 VITALS — SYSTOLIC BLOOD PRESSURE: 130 MMHG | DIASTOLIC BLOOD PRESSURE: 93 MMHG

## 2021-03-15 VITALS — SYSTOLIC BLOOD PRESSURE: 118 MMHG | DIASTOLIC BLOOD PRESSURE: 42 MMHG

## 2021-03-15 VITALS — SYSTOLIC BLOOD PRESSURE: 88 MMHG | DIASTOLIC BLOOD PRESSURE: 56 MMHG

## 2021-03-15 VITALS — SYSTOLIC BLOOD PRESSURE: 134 MMHG | DIASTOLIC BLOOD PRESSURE: 89 MMHG

## 2021-03-15 VITALS — DIASTOLIC BLOOD PRESSURE: 38 MMHG | SYSTOLIC BLOOD PRESSURE: 94 MMHG

## 2021-03-15 VITALS — DIASTOLIC BLOOD PRESSURE: 43 MMHG | SYSTOLIC BLOOD PRESSURE: 109 MMHG

## 2021-03-15 VITALS — SYSTOLIC BLOOD PRESSURE: 103 MMHG | DIASTOLIC BLOOD PRESSURE: 75 MMHG

## 2021-03-15 VITALS — SYSTOLIC BLOOD PRESSURE: 120 MMHG | DIASTOLIC BLOOD PRESSURE: 42 MMHG

## 2021-03-15 VITALS — DIASTOLIC BLOOD PRESSURE: 78 MMHG | SYSTOLIC BLOOD PRESSURE: 123 MMHG

## 2021-03-15 VITALS — SYSTOLIC BLOOD PRESSURE: 106 MMHG | DIASTOLIC BLOOD PRESSURE: 65 MMHG

## 2021-03-15 VITALS — SYSTOLIC BLOOD PRESSURE: 110 MMHG | DIASTOLIC BLOOD PRESSURE: 77 MMHG

## 2021-03-15 VITALS — SYSTOLIC BLOOD PRESSURE: 125 MMHG | DIASTOLIC BLOOD PRESSURE: 68 MMHG

## 2021-03-15 VITALS — SYSTOLIC BLOOD PRESSURE: 120 MMHG | DIASTOLIC BLOOD PRESSURE: 48 MMHG

## 2021-03-15 VITALS — SYSTOLIC BLOOD PRESSURE: 129 MMHG | DIASTOLIC BLOOD PRESSURE: 49 MMHG

## 2021-03-15 VITALS — SYSTOLIC BLOOD PRESSURE: 102 MMHG | DIASTOLIC BLOOD PRESSURE: 45 MMHG

## 2021-03-15 VITALS — DIASTOLIC BLOOD PRESSURE: 62 MMHG | SYSTOLIC BLOOD PRESSURE: 92 MMHG

## 2021-03-15 VITALS — DIASTOLIC BLOOD PRESSURE: 33 MMHG | SYSTOLIC BLOOD PRESSURE: 51 MMHG

## 2021-03-15 VITALS — SYSTOLIC BLOOD PRESSURE: 92 MMHG | DIASTOLIC BLOOD PRESSURE: 70 MMHG

## 2021-03-15 VITALS — SYSTOLIC BLOOD PRESSURE: 141 MMHG | DIASTOLIC BLOOD PRESSURE: 79 MMHG

## 2021-03-15 VITALS — DIASTOLIC BLOOD PRESSURE: 42 MMHG | SYSTOLIC BLOOD PRESSURE: 115 MMHG

## 2021-03-15 VITALS — SYSTOLIC BLOOD PRESSURE: 95 MMHG | DIASTOLIC BLOOD PRESSURE: 55 MMHG

## 2021-03-15 VITALS — DIASTOLIC BLOOD PRESSURE: 36 MMHG | SYSTOLIC BLOOD PRESSURE: 103 MMHG

## 2021-03-15 VITALS — SYSTOLIC BLOOD PRESSURE: 81 MMHG | DIASTOLIC BLOOD PRESSURE: 62 MMHG

## 2021-03-15 VITALS — SYSTOLIC BLOOD PRESSURE: 142 MMHG | DIASTOLIC BLOOD PRESSURE: 44 MMHG

## 2021-03-15 VITALS — DIASTOLIC BLOOD PRESSURE: 63 MMHG | SYSTOLIC BLOOD PRESSURE: 107 MMHG

## 2021-03-15 VITALS — SYSTOLIC BLOOD PRESSURE: 103 MMHG | DIASTOLIC BLOOD PRESSURE: 79 MMHG

## 2021-03-15 VITALS — DIASTOLIC BLOOD PRESSURE: 51 MMHG | SYSTOLIC BLOOD PRESSURE: 99 MMHG

## 2021-03-15 VITALS — DIASTOLIC BLOOD PRESSURE: 42 MMHG | SYSTOLIC BLOOD PRESSURE: 107 MMHG

## 2021-03-15 VITALS — SYSTOLIC BLOOD PRESSURE: 91 MMHG | DIASTOLIC BLOOD PRESSURE: 60 MMHG

## 2021-03-15 VITALS — DIASTOLIC BLOOD PRESSURE: 37 MMHG | SYSTOLIC BLOOD PRESSURE: 100 MMHG

## 2021-03-15 VITALS — SYSTOLIC BLOOD PRESSURE: 91 MMHG | DIASTOLIC BLOOD PRESSURE: 61 MMHG

## 2021-03-15 VITALS — DIASTOLIC BLOOD PRESSURE: 54 MMHG | SYSTOLIC BLOOD PRESSURE: 101 MMHG

## 2021-03-15 VITALS — DIASTOLIC BLOOD PRESSURE: 21 MMHG | SYSTOLIC BLOOD PRESSURE: 75 MMHG

## 2021-03-15 VITALS — DIASTOLIC BLOOD PRESSURE: 68 MMHG | SYSTOLIC BLOOD PRESSURE: 129 MMHG

## 2021-03-15 VITALS — DIASTOLIC BLOOD PRESSURE: 69 MMHG | SYSTOLIC BLOOD PRESSURE: 119 MMHG

## 2021-03-15 VITALS — SYSTOLIC BLOOD PRESSURE: 103 MMHG | DIASTOLIC BLOOD PRESSURE: 36 MMHG

## 2021-03-15 VITALS — SYSTOLIC BLOOD PRESSURE: 128 MMHG | DIASTOLIC BLOOD PRESSURE: 35 MMHG

## 2021-03-15 VITALS — DIASTOLIC BLOOD PRESSURE: 72 MMHG | SYSTOLIC BLOOD PRESSURE: 90 MMHG

## 2021-03-15 VITALS — DIASTOLIC BLOOD PRESSURE: 48 MMHG | SYSTOLIC BLOOD PRESSURE: 123 MMHG

## 2021-03-15 VITALS — SYSTOLIC BLOOD PRESSURE: 96 MMHG | DIASTOLIC BLOOD PRESSURE: 66 MMHG

## 2021-03-15 VITALS — DIASTOLIC BLOOD PRESSURE: 49 MMHG | SYSTOLIC BLOOD PRESSURE: 98 MMHG

## 2021-03-15 VITALS — DIASTOLIC BLOOD PRESSURE: 59 MMHG | SYSTOLIC BLOOD PRESSURE: 89 MMHG

## 2021-03-15 VITALS — SYSTOLIC BLOOD PRESSURE: 97 MMHG | DIASTOLIC BLOOD PRESSURE: 42 MMHG

## 2021-03-15 VITALS — DIASTOLIC BLOOD PRESSURE: 64 MMHG | SYSTOLIC BLOOD PRESSURE: 104 MMHG

## 2021-03-15 VITALS — DIASTOLIC BLOOD PRESSURE: 76 MMHG | SYSTOLIC BLOOD PRESSURE: 154 MMHG

## 2021-03-15 VITALS — DIASTOLIC BLOOD PRESSURE: 77 MMHG | SYSTOLIC BLOOD PRESSURE: 174 MMHG

## 2021-03-15 VITALS — DIASTOLIC BLOOD PRESSURE: 57 MMHG | SYSTOLIC BLOOD PRESSURE: 107 MMHG

## 2021-03-15 VITALS — DIASTOLIC BLOOD PRESSURE: 42 MMHG | SYSTOLIC BLOOD PRESSURE: 103 MMHG

## 2021-03-15 VITALS — SYSTOLIC BLOOD PRESSURE: 114 MMHG | DIASTOLIC BLOOD PRESSURE: 85 MMHG

## 2021-03-15 VITALS — SYSTOLIC BLOOD PRESSURE: 97 MMHG | DIASTOLIC BLOOD PRESSURE: 73 MMHG

## 2021-03-15 VITALS — SYSTOLIC BLOOD PRESSURE: 73 MMHG | DIASTOLIC BLOOD PRESSURE: 61 MMHG

## 2021-03-15 VITALS — DIASTOLIC BLOOD PRESSURE: 61 MMHG | SYSTOLIC BLOOD PRESSURE: 130 MMHG

## 2021-03-15 VITALS — SYSTOLIC BLOOD PRESSURE: 96 MMHG | DIASTOLIC BLOOD PRESSURE: 61 MMHG

## 2021-03-15 VITALS — SYSTOLIC BLOOD PRESSURE: 114 MMHG | DIASTOLIC BLOOD PRESSURE: 46 MMHG

## 2021-03-15 VITALS — SYSTOLIC BLOOD PRESSURE: 96 MMHG | DIASTOLIC BLOOD PRESSURE: 67 MMHG

## 2021-03-15 VITALS — SYSTOLIC BLOOD PRESSURE: 143 MMHG | DIASTOLIC BLOOD PRESSURE: 35 MMHG

## 2021-03-15 VITALS — DIASTOLIC BLOOD PRESSURE: 59 MMHG | SYSTOLIC BLOOD PRESSURE: 106 MMHG

## 2021-03-15 VITALS — SYSTOLIC BLOOD PRESSURE: 104 MMHG | DIASTOLIC BLOOD PRESSURE: 35 MMHG

## 2021-03-15 VITALS — SYSTOLIC BLOOD PRESSURE: 120 MMHG | DIASTOLIC BLOOD PRESSURE: 71 MMHG

## 2021-03-15 VITALS — SYSTOLIC BLOOD PRESSURE: 121 MMHG | DIASTOLIC BLOOD PRESSURE: 43 MMHG

## 2021-03-15 VITALS — SYSTOLIC BLOOD PRESSURE: 117 MMHG | DIASTOLIC BLOOD PRESSURE: 65 MMHG

## 2021-03-15 LAB
ALBUMIN SERPL BCP-MCNC: 1.2 G/DL (ref 3.4–5)
ALP SERPL-CCNC: 385 U/L (ref 46–116)
ALT SERPL W P-5'-P-CCNC: 21 U/L (ref 12–78)
AST SERPL W P-5'-P-CCNC: 65 U/L (ref 15–37)
BASE EXCESS BLDA CALC-SCNC: 1.1 MMOL/L
BASOPHILS # BLD AUTO: 0.1 /CMM (ref 0–0.2)
BASOPHILS NFR BLD AUTO: 0.3 % (ref 0–2)
BILIRUB SERPL-MCNC: 0.6 MG/DL (ref 0.2–1)
BUN SERPL-MCNC: 61 MG/DL (ref 7–18)
CALCIUM SERPL-MCNC: 8.4 MG/DL (ref 8.5–10.1)
CHLORIDE SERPL-SCNC: 96 MMOL/L (ref 98–107)
CO2 SERPL-SCNC: 27 MMOL/L (ref 21–32)
CREAT SERPL-MCNC: 2.6 MG/DL (ref 0.6–1.3)
DO-HGB MFR BLDA: 290.6 MMHG
EOSINOPHIL NFR BLD AUTO: 1 % (ref 0–6)
EOSINOPHIL NFR BLD MANUAL: 2 % (ref 0–4)
GLUCOSE SERPL-MCNC: 138 MG/DL (ref 74–106)
HCT VFR BLD AUTO: 21 % (ref 33–45)
HGB BLD-MCNC: 6.9 G/DL (ref 11.5–14.8)
INHALED O2 CONCENTRATION: 70 %
LYMPHOCYTES NFR BLD AUTO: 2.2 /CMM (ref 0.8–4.8)
LYMPHOCYTES NFR BLD AUTO: 8.2 % (ref 20–44)
LYMPHOCYTES NFR BLD MANUAL: 10 % (ref 16–48)
MAGNESIUM SERPL-MCNC: 1.9 MG/DL (ref 1.8–2.4)
MCHC RBC AUTO-ENTMCNC: 33 G/DL (ref 31–36)
MCV RBC AUTO: 102 FL (ref 82–100)
MONOCYTES NFR BLD AUTO: 10.5 % (ref 2–12)
MONOCYTES NFR BLD AUTO: 2.8 /CMM (ref 0.1–1.3)
MONOCYTES NFR BLD MANUAL: 12 % (ref 0–11)
NEUTROPHILS # BLD AUTO: 20.9 /CMM (ref 1.8–8.9)
NEUTROPHILS NFR BLD AUTO: 80 % (ref 43–81)
NEUTS SEG NFR BLD MANUAL: 76 % (ref 42–76)
NT-PROBNP SERPL-MCNC: (no result) PG/ML (ref 0–125)
PCO2 TEMP ADJ BLDA: 38.2 MMHG (ref 35–45)
PH TEMP ADJ BLDA: 7.44 [PH] (ref 7.35–7.45)
PHOSPHATE SERPL-MCNC: 4 MG/DL (ref 2.5–4.9)
PLATELET # BLD AUTO: 543 /CMM (ref 150–450)
PO2 TEMP ADJ BLDA: 167.4 MMHG (ref 75–100)
POTASSIUM SERPL-SCNC: 3.6 MMOL/L (ref 3.5–5.1)
PROT SERPL-MCNC: 7 G/DL (ref 6.4–8.2)
RBC # BLD AUTO: 2.05 MIL/UL (ref 4–5.2)
SAO2 % BLDA: 99.3 % (ref 92–98.5)
SODIUM SERPL-SCNC: 133 MMOL/L (ref 136–145)
VENTILATION MODE VENT: (no result)
WBC NRBC COR # BLD AUTO: 26.1 K/UL (ref 4.3–11)

## 2021-03-15 PROCEDURE — 0W9B3ZZ DRAINAGE OF LEFT PLEURAL CAVITY, PERCUTANEOUS APPROACH: ICD-10-PCS

## 2021-03-15 RX ADMIN — FOLIC ACID SCH MG: 1 TABLET ORAL at 08:19

## 2021-03-15 RX ADMIN — MISOPROSTOL SCH MCG: 100 TABLET ORAL at 08:19

## 2021-03-15 RX ADMIN — ATORVASTATIN CALCIUM SCH MG: 40 TABLET, FILM COATED ORAL at 21:31

## 2021-03-15 RX ADMIN — Medication SCH MG: at 08:21

## 2021-03-15 RX ADMIN — METOCLOPRAMIDE HYDROCHLORIDE SCH MG: 5 SOLUTION ORAL at 21:31

## 2021-03-15 RX ADMIN — Medication PRN OZ: at 08:28

## 2021-03-15 RX ADMIN — INSULIN GLARGINE SCH UNIT: 100 INJECTION, SOLUTION SUBCUTANEOUS at 22:46

## 2021-03-15 RX ADMIN — DOCUSATE SODIUM SCH MG: 50 LIQUID ORAL at 17:03

## 2021-03-15 RX ADMIN — AMLODIPINE BESYLATE SCH MG: 10 TABLET ORAL at 08:21

## 2021-03-15 RX ADMIN — MISOPROSTOL SCH MCG: 100 TABLET ORAL at 17:03

## 2021-03-15 RX ADMIN — HYDROMORPHONE HYDROCHLORIDE SCH MG: 2 TABLET ORAL at 21:31

## 2021-03-15 RX ADMIN — METOCLOPRAMIDE HYDROCHLORIDE SCH MG: 5 SOLUTION ORAL at 12:26

## 2021-03-15 RX ADMIN — ZINC OXIDE SCH APPLIC: 200 OINTMENT TOPICAL at 21:32

## 2021-03-15 RX ADMIN — Medication SCH MG: at 08:51

## 2021-03-15 RX ADMIN — ACETYLCYSTEINE SCH MG: 100 INHALANT RESPIRATORY (INHALATION) at 23:12

## 2021-03-15 RX ADMIN — Medication SCH EACH: at 08:18

## 2021-03-15 RX ADMIN — Medication PRN MLS/HR: at 15:11

## 2021-03-15 RX ADMIN — Medication SCH MG: at 13:38

## 2021-03-15 RX ADMIN — Medication SCH MG: at 19:35

## 2021-03-15 RX ADMIN — MISOPROSTOL SCH MCG: 100 TABLET ORAL at 12:26

## 2021-03-15 RX ADMIN — Medication SCH EACH: at 22:45

## 2021-03-15 RX ADMIN — SODIUM CHLORIDE PRN MLS/HR: 9 INJECTION, SOLUTION INTRAVENOUS at 12:28

## 2021-03-15 RX ADMIN — HYDROMORPHONE HYDROCHLORIDE SCH MG: 2 TABLET ORAL at 08:22

## 2021-03-15 RX ADMIN — Medication PRN ML: at 04:36

## 2021-03-15 RX ADMIN — METOCLOPRAMIDE HYDROCHLORIDE SCH MG: 5 SOLUTION ORAL at 04:36

## 2021-03-15 RX ADMIN — Medication SCH EACH: at 17:03

## 2021-03-15 RX ADMIN — DULOXETINE HYDROCHLORIDE SCH MG: 30 CAPSULE, DELAYED RELEASE ORAL at 08:19

## 2021-03-15 RX ADMIN — DONEPEZIL HYDROCHLORIDE SCH MG: 5 TABLET ORAL at 21:31

## 2021-03-15 RX ADMIN — LEVOTHYROXINE SODIUM SCH MCG: 100 TABLET ORAL at 08:18

## 2021-03-15 RX ADMIN — Medication SCH OZ: at 08:27

## 2021-03-15 RX ADMIN — ACETYLCYSTEINE SCH MG: 100 INHALANT RESPIRATORY (INHALATION) at 13:38

## 2021-03-15 RX ADMIN — ZINC OXIDE SCH APPLIC: 200 OINTMENT TOPICAL at 08:27

## 2021-03-15 RX ADMIN — SODIUM CHLORIDE SCH MG: 9 INJECTION, SOLUTION INTRAVENOUS at 21:31

## 2021-03-15 RX ADMIN — DOCUSATE SODIUM SCH MG: 50 LIQUID ORAL at 08:19

## 2021-03-15 RX ADMIN — Medication SCH EACH: at 12:26

## 2021-03-15 RX ADMIN — MUPIROCIN SCH APPLIC: 20 OINTMENT TOPICAL at 21:32

## 2021-03-15 RX ADMIN — INSULIN HUMAN PRN UNIT: 100 INJECTION, SOLUTION PARENTERAL at 22:47

## 2021-03-15 RX ADMIN — Medication SCH MG: at 23:12

## 2021-03-15 RX ADMIN — SODIUM CHLORIDE SCH MG: 9 INJECTION, SOLUTION INTRAVENOUS at 08:21

## 2021-03-15 RX ADMIN — MUPIROCIN SCH APPLIC: 20 OINTMENT TOPICAL at 08:26

## 2021-03-15 RX ADMIN — Medication SCH MG: at 01:50

## 2021-03-15 RX ADMIN — Medication SCH TAB: at 08:19

## 2021-03-15 RX ADMIN — MISOPROSTOL SCH MCG: 100 TABLET ORAL at 21:31

## 2021-03-15 RX ADMIN — DEXTROSE MONOHYDRATE SCH MLS/HR: 50 INJECTION, SOLUTION INTRAVENOUS at 16:03

## 2021-03-15 RX ADMIN — ACETYLCYSTEINE SCH MG: 100 INHALANT RESPIRATORY (INHALATION) at 08:51

## 2021-03-15 NOTE — NUR
RN NOTES



RECEIVED PT AWAKE AND ALERT BUT UNABLE TO FOLLOW INSTRUCTIONS. SR 90S ON TELE MONITOR. PEG 
TUBE WITH NEPRO AT 40ML/HR ONGOING. ASHBY IN PLACE. NATHAN ML, R SIDED PERMACATH, AND RIGHT 
FEMORAL CATHETER. MATHEW AT 1.5. MULTIPLE WOUNDS NOTED WITH DRESSINGS INTACT. SAFETY MEASURES 
IN PLACE. WILL CONTINUE TO MONITOR.

## 2021-03-15 NOTE — NUR
ICU RN

RCD T W/DX SEPSIS; PT IS ALERT DOES NOT FOLLOW COMMANDS NODS NO WHEN CARE IS BEING PROVIDED. 
NSR ON MONITOR. MATHEW @ 2 MCG/KG/MIN TO MAINTAIN SBP >90. SHILEY 8 W/STITCHES NOTED IN PLACE. 
VENT SETTINGS AC 20 450 40% +5. PT S/P THORACENTESIS. ASHBY W/MIN AMOUT OF CLOUDY URINE 
W/SEDIMENT NOTED. G TUBE W/ NEPRO 2 40 ML/HR NO RESIDUAL AT THIS TIME W/ORDER TO HOLD FOR 
RESIDUAL >150. MULTIPLE SKIN ISSUES NOTED WITH DRESSINGS NOTED IN PLACE. NATHAN MIDLINE AND R 
FEM TLC; NO BLOOD RETURN NOTED AT THIS TIME. HOB ELEVATED.

## 2021-03-15 NOTE — NUR
LEFT SIDE THORACENTESIS DONE AT BEDSIDE. REMOVED 1,100 ML FLUID AND SENT TO LAB FOR CYTOLOGY 
AND OTHER TESTS.

## 2021-03-15 NOTE — NUR
RT NOTE



PT'S FIO2 WAS DECREASED TO 50% AND AND A PEEP OF 5. RT WILL CONTINUE TO MONITOR PT.

-------------------------------------------------------------------------------

Addendum: 03/15/21 at 1023 by SANTINO BENTON RT

-------------------------------------------------------------------------------

Amended: Links added.

## 2021-03-15 NOTE — NUR
PT RECEIVED ON VENT, SETTINGS AC 20, 450, 70%. PT SPO2 98%. TRACH TUBE IN PLACE AND SECURED. 
AMBU-BAG AT BEDSIDE AND VENT PLUGGED INTO RED OUTLET. PT IS ALERT. RT WILL CONTINUE TO 
MONITOR PT.

-------------------------------------------------------------------------------

Addendum: 03/15/21 at 1210 by SANTINO BENTON RT

-------------------------------------------------------------------------------

Amended: Links added.

## 2021-03-16 VITALS — DIASTOLIC BLOOD PRESSURE: 33 MMHG | SYSTOLIC BLOOD PRESSURE: 59 MMHG

## 2021-03-16 VITALS — DIASTOLIC BLOOD PRESSURE: 43 MMHG | SYSTOLIC BLOOD PRESSURE: 138 MMHG

## 2021-03-16 VITALS — DIASTOLIC BLOOD PRESSURE: 53 MMHG | SYSTOLIC BLOOD PRESSURE: 111 MMHG

## 2021-03-16 VITALS — DIASTOLIC BLOOD PRESSURE: 27 MMHG | SYSTOLIC BLOOD PRESSURE: 131 MMHG

## 2021-03-16 VITALS — SYSTOLIC BLOOD PRESSURE: 122 MMHG | DIASTOLIC BLOOD PRESSURE: 41 MMHG

## 2021-03-16 VITALS — SYSTOLIC BLOOD PRESSURE: 120 MMHG | DIASTOLIC BLOOD PRESSURE: 44 MMHG

## 2021-03-16 VITALS — SYSTOLIC BLOOD PRESSURE: 133 MMHG | DIASTOLIC BLOOD PRESSURE: 43 MMHG

## 2021-03-16 VITALS — DIASTOLIC BLOOD PRESSURE: 91 MMHG | SYSTOLIC BLOOD PRESSURE: 131 MMHG

## 2021-03-16 VITALS — DIASTOLIC BLOOD PRESSURE: 45 MMHG | SYSTOLIC BLOOD PRESSURE: 128 MMHG

## 2021-03-16 VITALS — SYSTOLIC BLOOD PRESSURE: 117 MMHG | DIASTOLIC BLOOD PRESSURE: 38 MMHG

## 2021-03-16 VITALS — SYSTOLIC BLOOD PRESSURE: 132 MMHG | DIASTOLIC BLOOD PRESSURE: 58 MMHG

## 2021-03-16 VITALS — SYSTOLIC BLOOD PRESSURE: 121 MMHG | DIASTOLIC BLOOD PRESSURE: 30 MMHG

## 2021-03-16 VITALS — SYSTOLIC BLOOD PRESSURE: 122 MMHG | DIASTOLIC BLOOD PRESSURE: 38 MMHG

## 2021-03-16 VITALS — DIASTOLIC BLOOD PRESSURE: 106 MMHG | SYSTOLIC BLOOD PRESSURE: 137 MMHG

## 2021-03-16 VITALS — DIASTOLIC BLOOD PRESSURE: 36 MMHG | SYSTOLIC BLOOD PRESSURE: 120 MMHG

## 2021-03-16 VITALS — SYSTOLIC BLOOD PRESSURE: 85 MMHG | DIASTOLIC BLOOD PRESSURE: 41 MMHG

## 2021-03-16 VITALS — DIASTOLIC BLOOD PRESSURE: 37 MMHG | SYSTOLIC BLOOD PRESSURE: 133 MMHG

## 2021-03-16 VITALS — DIASTOLIC BLOOD PRESSURE: 46 MMHG | SYSTOLIC BLOOD PRESSURE: 108 MMHG

## 2021-03-16 VITALS — SYSTOLIC BLOOD PRESSURE: 125 MMHG | DIASTOLIC BLOOD PRESSURE: 58 MMHG

## 2021-03-16 VITALS — SYSTOLIC BLOOD PRESSURE: 126 MMHG | DIASTOLIC BLOOD PRESSURE: 96 MMHG

## 2021-03-16 VITALS — DIASTOLIC BLOOD PRESSURE: 47 MMHG | SYSTOLIC BLOOD PRESSURE: 137 MMHG

## 2021-03-16 VITALS — DIASTOLIC BLOOD PRESSURE: 40 MMHG | SYSTOLIC BLOOD PRESSURE: 132 MMHG

## 2021-03-16 VITALS — DIASTOLIC BLOOD PRESSURE: 39 MMHG | SYSTOLIC BLOOD PRESSURE: 118 MMHG

## 2021-03-16 VITALS — DIASTOLIC BLOOD PRESSURE: 48 MMHG | SYSTOLIC BLOOD PRESSURE: 128 MMHG

## 2021-03-16 VITALS — SYSTOLIC BLOOD PRESSURE: 132 MMHG | DIASTOLIC BLOOD PRESSURE: 25 MMHG

## 2021-03-16 VITALS — SYSTOLIC BLOOD PRESSURE: 111 MMHG | DIASTOLIC BLOOD PRESSURE: 66 MMHG

## 2021-03-16 VITALS — SYSTOLIC BLOOD PRESSURE: 121 MMHG | DIASTOLIC BLOOD PRESSURE: 55 MMHG

## 2021-03-16 VITALS — DIASTOLIC BLOOD PRESSURE: 49 MMHG | SYSTOLIC BLOOD PRESSURE: 100 MMHG

## 2021-03-16 VITALS — DIASTOLIC BLOOD PRESSURE: 40 MMHG | SYSTOLIC BLOOD PRESSURE: 121 MMHG

## 2021-03-16 VITALS — DIASTOLIC BLOOD PRESSURE: 49 MMHG | SYSTOLIC BLOOD PRESSURE: 117 MMHG

## 2021-03-16 VITALS — SYSTOLIC BLOOD PRESSURE: 88 MMHG | DIASTOLIC BLOOD PRESSURE: 55 MMHG

## 2021-03-16 VITALS — SYSTOLIC BLOOD PRESSURE: 127 MMHG | DIASTOLIC BLOOD PRESSURE: 39 MMHG

## 2021-03-16 VITALS — SYSTOLIC BLOOD PRESSURE: 122 MMHG | DIASTOLIC BLOOD PRESSURE: 40 MMHG

## 2021-03-16 VITALS — SYSTOLIC BLOOD PRESSURE: 134 MMHG | DIASTOLIC BLOOD PRESSURE: 33 MMHG

## 2021-03-16 VITALS — SYSTOLIC BLOOD PRESSURE: 130 MMHG | DIASTOLIC BLOOD PRESSURE: 47 MMHG

## 2021-03-16 VITALS — SYSTOLIC BLOOD PRESSURE: 120 MMHG | DIASTOLIC BLOOD PRESSURE: 37 MMHG

## 2021-03-16 VITALS — DIASTOLIC BLOOD PRESSURE: 48 MMHG | SYSTOLIC BLOOD PRESSURE: 123 MMHG

## 2021-03-16 VITALS — DIASTOLIC BLOOD PRESSURE: 52 MMHG | SYSTOLIC BLOOD PRESSURE: 143 MMHG

## 2021-03-16 VITALS — SYSTOLIC BLOOD PRESSURE: 115 MMHG | DIASTOLIC BLOOD PRESSURE: 74 MMHG

## 2021-03-16 VITALS — DIASTOLIC BLOOD PRESSURE: 39 MMHG | SYSTOLIC BLOOD PRESSURE: 99 MMHG

## 2021-03-16 VITALS — DIASTOLIC BLOOD PRESSURE: 56 MMHG | SYSTOLIC BLOOD PRESSURE: 137 MMHG

## 2021-03-16 VITALS — SYSTOLIC BLOOD PRESSURE: 138 MMHG | DIASTOLIC BLOOD PRESSURE: 54 MMHG

## 2021-03-16 VITALS — SYSTOLIC BLOOD PRESSURE: 108 MMHG | DIASTOLIC BLOOD PRESSURE: 84 MMHG

## 2021-03-16 VITALS — DIASTOLIC BLOOD PRESSURE: 38 MMHG | SYSTOLIC BLOOD PRESSURE: 136 MMHG

## 2021-03-16 VITALS — DIASTOLIC BLOOD PRESSURE: 37 MMHG | SYSTOLIC BLOOD PRESSURE: 148 MMHG

## 2021-03-16 VITALS — SYSTOLIC BLOOD PRESSURE: 91 MMHG | DIASTOLIC BLOOD PRESSURE: 66 MMHG

## 2021-03-16 VITALS — DIASTOLIC BLOOD PRESSURE: 62 MMHG | SYSTOLIC BLOOD PRESSURE: 124 MMHG

## 2021-03-16 VITALS — SYSTOLIC BLOOD PRESSURE: 121 MMHG | DIASTOLIC BLOOD PRESSURE: 44 MMHG

## 2021-03-16 LAB
BASE EXCESS BLDA CALC-SCNC: 1.1 MMOL/L
BASOPHILS # BLD AUTO: 0.1 /CMM (ref 0–0.2)
BASOPHILS NFR BLD AUTO: 0.4 % (ref 0–2)
BUN SERPL-MCNC: 50 MG/DL (ref 7–18)
CALCIUM SERPL-MCNC: 8 MG/DL (ref 8.5–10.1)
CHLORIDE SERPL-SCNC: 96 MMOL/L (ref 98–107)
CO2 SERPL-SCNC: 34 MMOL/L (ref 21–32)
CREAT SERPL-MCNC: 2.1 MG/DL (ref 0.6–1.3)
DO-HGB MFR BLDA: 141.4 MMHG
EOSINOPHIL NFR BLD AUTO: 1.4 % (ref 0–6)
GLUCOSE SERPL-MCNC: 114 MG/DL (ref 74–106)
HCT VFR BLD AUTO: 24 % (ref 33–45)
HGB BLD-MCNC: 7.9 G/DL (ref 11.5–14.8)
INHALED O2 CONCENTRATION: 40 %
LYMPHOCYTES NFR BLD AUTO: 1.3 /CMM (ref 0.8–4.8)
LYMPHOCYTES NFR BLD AUTO: 5.9 % (ref 20–44)
MAGNESIUM SERPL-MCNC: 1.9 MG/DL (ref 1.8–2.4)
MCHC RBC AUTO-ENTMCNC: 33 G/DL (ref 31–36)
MCV RBC AUTO: 100 FL (ref 82–100)
MONOCYTES NFR BLD AUTO: 10.8 % (ref 2–12)
MONOCYTES NFR BLD AUTO: 2.4 /CMM (ref 0.1–1.3)
NEUTROPHILS # BLD AUTO: 18.2 /CMM (ref 1.8–8.9)
NEUTROPHILS NFR BLD AUTO: 81.5 % (ref 43–81)
PCO2 TEMP ADJ BLDA: 33.7 MMHG (ref 35–45)
PH TEMP ADJ BLDA: 7.48 [PH] (ref 7.35–7.45)
PHOSPHATE SERPL-MCNC: 3 MG/DL (ref 2.5–4.9)
PLATELET # BLD AUTO: 428 /CMM (ref 150–450)
PO2 TEMP ADJ BLDA: 105 MMHG (ref 75–100)
POTASSIUM SERPL-SCNC: 3.3 MMOL/L (ref 3.5–5.1)
RBC # BLD AUTO: 2.36 MIL/UL (ref 4–5.2)
SAO2 % BLDA: 97.7 % (ref 92–98.5)
SODIUM SERPL-SCNC: 133 MMOL/L (ref 136–145)
VENTILATION MODE VENT: (no result)
WBC NRBC COR # BLD AUTO: 22.3 K/UL (ref 4.3–11)

## 2021-03-16 RX ADMIN — FOLIC ACID SCH MG: 1 TABLET ORAL at 08:23

## 2021-03-16 RX ADMIN — DULOXETINE HYDROCHLORIDE SCH MG: 30 CAPSULE, DELAYED RELEASE ORAL at 08:23

## 2021-03-16 RX ADMIN — MUPIROCIN SCH APPLIC: 20 OINTMENT TOPICAL at 08:24

## 2021-03-16 RX ADMIN — SODIUM CHLORIDE SCH MG: 9 INJECTION, SOLUTION INTRAVENOUS at 21:03

## 2021-03-16 RX ADMIN — METOCLOPRAMIDE HYDROCHLORIDE SCH MG: 5 SOLUTION ORAL at 04:47

## 2021-03-16 RX ADMIN — Medication SCH MG: at 19:34

## 2021-03-16 RX ADMIN — MISOPROSTOL SCH MCG: 100 TABLET ORAL at 12:48

## 2021-03-16 RX ADMIN — DOCUSATE SODIUM SCH MG: 50 LIQUID ORAL at 17:00

## 2021-03-16 RX ADMIN — Medication SCH MG: at 23:00

## 2021-03-16 RX ADMIN — LEVOTHYROXINE SODIUM SCH MCG: 100 TABLET ORAL at 08:22

## 2021-03-16 RX ADMIN — DONEPEZIL HYDROCHLORIDE SCH MG: 5 TABLET ORAL at 21:11

## 2021-03-16 RX ADMIN — SODIUM CHLORIDE SCH MG: 9 INJECTION, SOLUTION INTRAVENOUS at 08:23

## 2021-03-16 RX ADMIN — MISOPROSTOL SCH MCG: 100 TABLET ORAL at 17:48

## 2021-03-16 RX ADMIN — MISOPROSTOL SCH MCG: 100 TABLET ORAL at 08:22

## 2021-03-16 RX ADMIN — ZINC OXIDE SCH APPLIC: 200 OINTMENT TOPICAL at 08:27

## 2021-03-16 RX ADMIN — HYDROMORPHONE HYDROCHLORIDE SCH MG: 2 TABLET ORAL at 09:00

## 2021-03-16 RX ADMIN — DOCUSATE SODIUM SCH MG: 50 LIQUID ORAL at 08:28

## 2021-03-16 RX ADMIN — DEXTROSE MONOHYDRATE SCH MLS/HR: 50 INJECTION, SOLUTION INTRAVENOUS at 17:48

## 2021-03-16 RX ADMIN — Medication SCH OZ: at 08:26

## 2021-03-16 RX ADMIN — Medication SCH EACH: at 08:22

## 2021-03-16 RX ADMIN — Medication SCH MG: at 08:23

## 2021-03-16 RX ADMIN — Medication SCH MG: at 13:09

## 2021-03-16 RX ADMIN — ACETYLCYSTEINE SCH MG: 100 INHALANT RESPIRATORY (INHALATION) at 13:09

## 2021-03-16 RX ADMIN — Medication SCH MG: at 07:35

## 2021-03-16 RX ADMIN — HYDROMORPHONE HYDROCHLORIDE SCH MG: 2 TABLET ORAL at 21:04

## 2021-03-16 RX ADMIN — Medication PRN ML: at 03:00

## 2021-03-16 RX ADMIN — Medication SCH EACH: at 21:11

## 2021-03-16 RX ADMIN — MISOPROSTOL SCH MCG: 100 TABLET ORAL at 21:03

## 2021-03-16 RX ADMIN — ZINC OXIDE SCH APPLIC: 200 OINTMENT TOPICAL at 21:04

## 2021-03-16 RX ADMIN — Medication SCH TAB: at 08:23

## 2021-03-16 RX ADMIN — ACETYLCYSTEINE SCH MG: 100 INHALANT RESPIRATORY (INHALATION) at 23:00

## 2021-03-16 RX ADMIN — Medication SCH GM: at 08:26

## 2021-03-16 RX ADMIN — INSULIN HUMAN PRN UNIT: 100 INJECTION, SOLUTION PARENTERAL at 21:12

## 2021-03-16 RX ADMIN — MUPIROCIN SCH APPLIC: 20 OINTMENT TOPICAL at 21:04

## 2021-03-16 RX ADMIN — Medication SCH GM: at 17:51

## 2021-03-16 RX ADMIN — AMLODIPINE BESYLATE SCH MG: 10 TABLET ORAL at 08:24

## 2021-03-16 RX ADMIN — METOCLOPRAMIDE HYDROCHLORIDE SCH MG: 5 SOLUTION ORAL at 12:48

## 2021-03-16 RX ADMIN — ACETYLCYSTEINE SCH MG: 100 INHALANT RESPIRATORY (INHALATION) at 07:35

## 2021-03-16 RX ADMIN — Medication SCH EACH: at 17:48

## 2021-03-16 RX ADMIN — INSULIN GLARGINE SCH UNIT: 100 INJECTION, SOLUTION SUBCUTANEOUS at 21:11

## 2021-03-16 RX ADMIN — Medication SCH GM: at 08:25

## 2021-03-16 RX ADMIN — Medication SCH EACH: at 12:14

## 2021-03-16 RX ADMIN — ATORVASTATIN CALCIUM SCH MG: 40 TABLET, FILM COATED ORAL at 21:11

## 2021-03-16 RX ADMIN — METOCLOPRAMIDE HYDROCHLORIDE SCH MG: 5 SOLUTION ORAL at 21:03

## 2021-03-16 NOTE — NUR
ICU/RN

DUE MEDS ARE GIVEN .PM CARE PROVIDED.WOUND DRESSING DONE AS ORDERED.[id number].CONTINUE TO 
MONITOR.

## 2021-03-16 NOTE — NUR
ICU/RN

PT HAS TRACH ON THE VENT AC MODE,FIO2-40%.SAT O2-98%.V/S STABLE  OFF PRESSORS.AFEBRILE.NO 
PAIN REPORTED AT THIS TIME.G-TUBE  INFUSING WITH NEPRO ,NO RESIDUAL NOTED.PT HAS F/C 
DRAINING WITH MINIMAL AMOUNT OF URINE. ANURIC ON HD.GENERELISED EDEMA PRESENT.MULTIPLY 
WOUNDS NOTED ALL OVER THE BODY COVERED WITH DRESSING.SUCTION PROVIDED.

## 2021-03-16 NOTE — NUR
RECEIVED PT ON BED AWAKE OPEN EYES BUT NOT FOLLOWING ANY COMMANDS ON TRACH/VENT SETTING PER 
MD FIO2 40% SPO2 96% BEDSIDE MONITOR READS SINUS RHTYHM 80'S HAVE NATHAN/MID LINE PATENT AND 
FLUSHED AND RIGHT FEM TLC PATENT AND FLUSHED. RIGHT SUBCLAVIAN HD CATHETER DRESSING CLEAN 
DRY AND INTACT  HAVE ASHBY CATHETER IN PLACED WITH MINIMAL OUTPUT BED ON LOWEST POSITION AND 
LOCKED SIDE RAILS UP X 2 WILL CONT TO MONITOR THE PT

## 2021-03-17 VITALS — SYSTOLIC BLOOD PRESSURE: 132 MMHG | DIASTOLIC BLOOD PRESSURE: 35 MMHG

## 2021-03-17 VITALS — SYSTOLIC BLOOD PRESSURE: 129 MMHG | DIASTOLIC BLOOD PRESSURE: 34 MMHG

## 2021-03-17 VITALS — DIASTOLIC BLOOD PRESSURE: 34 MMHG | SYSTOLIC BLOOD PRESSURE: 114 MMHG

## 2021-03-17 VITALS — SYSTOLIC BLOOD PRESSURE: 137 MMHG | DIASTOLIC BLOOD PRESSURE: 34 MMHG

## 2021-03-17 VITALS — DIASTOLIC BLOOD PRESSURE: 31 MMHG | SYSTOLIC BLOOD PRESSURE: 123 MMHG

## 2021-03-17 VITALS — DIASTOLIC BLOOD PRESSURE: 42 MMHG | SYSTOLIC BLOOD PRESSURE: 117 MMHG

## 2021-03-17 VITALS — DIASTOLIC BLOOD PRESSURE: 42 MMHG | SYSTOLIC BLOOD PRESSURE: 109 MMHG

## 2021-03-17 VITALS — SYSTOLIC BLOOD PRESSURE: 108 MMHG | DIASTOLIC BLOOD PRESSURE: 43 MMHG

## 2021-03-17 VITALS — SYSTOLIC BLOOD PRESSURE: 140 MMHG | DIASTOLIC BLOOD PRESSURE: 30 MMHG

## 2021-03-17 VITALS — DIASTOLIC BLOOD PRESSURE: 42 MMHG | SYSTOLIC BLOOD PRESSURE: 125 MMHG

## 2021-03-17 VITALS — DIASTOLIC BLOOD PRESSURE: 46 MMHG | SYSTOLIC BLOOD PRESSURE: 125 MMHG

## 2021-03-17 VITALS — SYSTOLIC BLOOD PRESSURE: 129 MMHG | DIASTOLIC BLOOD PRESSURE: 54 MMHG

## 2021-03-17 VITALS — DIASTOLIC BLOOD PRESSURE: 32 MMHG | SYSTOLIC BLOOD PRESSURE: 103 MMHG

## 2021-03-17 VITALS — SYSTOLIC BLOOD PRESSURE: 135 MMHG | DIASTOLIC BLOOD PRESSURE: 41 MMHG

## 2021-03-17 VITALS — DIASTOLIC BLOOD PRESSURE: 50 MMHG | SYSTOLIC BLOOD PRESSURE: 99 MMHG

## 2021-03-17 VITALS — SYSTOLIC BLOOD PRESSURE: 99 MMHG | DIASTOLIC BLOOD PRESSURE: 81 MMHG

## 2021-03-17 VITALS — DIASTOLIC BLOOD PRESSURE: 35 MMHG | SYSTOLIC BLOOD PRESSURE: 118 MMHG

## 2021-03-17 VITALS — SYSTOLIC BLOOD PRESSURE: 113 MMHG | DIASTOLIC BLOOD PRESSURE: 81 MMHG

## 2021-03-17 VITALS — DIASTOLIC BLOOD PRESSURE: 25 MMHG | SYSTOLIC BLOOD PRESSURE: 122 MMHG

## 2021-03-17 VITALS — SYSTOLIC BLOOD PRESSURE: 160 MMHG | DIASTOLIC BLOOD PRESSURE: 36 MMHG

## 2021-03-17 VITALS — SYSTOLIC BLOOD PRESSURE: 115 MMHG | DIASTOLIC BLOOD PRESSURE: 71 MMHG

## 2021-03-17 VITALS — SYSTOLIC BLOOD PRESSURE: 125 MMHG | DIASTOLIC BLOOD PRESSURE: 37 MMHG

## 2021-03-17 VITALS — DIASTOLIC BLOOD PRESSURE: 35 MMHG | SYSTOLIC BLOOD PRESSURE: 134 MMHG

## 2021-03-17 VITALS — SYSTOLIC BLOOD PRESSURE: 100 MMHG | DIASTOLIC BLOOD PRESSURE: 58 MMHG

## 2021-03-17 VITALS — SYSTOLIC BLOOD PRESSURE: 90 MMHG | DIASTOLIC BLOOD PRESSURE: 41 MMHG

## 2021-03-17 VITALS — DIASTOLIC BLOOD PRESSURE: 30 MMHG | SYSTOLIC BLOOD PRESSURE: 89 MMHG

## 2021-03-17 VITALS — DIASTOLIC BLOOD PRESSURE: 37 MMHG | SYSTOLIC BLOOD PRESSURE: 108 MMHG

## 2021-03-17 VITALS — DIASTOLIC BLOOD PRESSURE: 35 MMHG | SYSTOLIC BLOOD PRESSURE: 121 MMHG

## 2021-03-17 VITALS — SYSTOLIC BLOOD PRESSURE: 113 MMHG | DIASTOLIC BLOOD PRESSURE: 31 MMHG

## 2021-03-17 VITALS — SYSTOLIC BLOOD PRESSURE: 122 MMHG | DIASTOLIC BLOOD PRESSURE: 38 MMHG

## 2021-03-17 VITALS — SYSTOLIC BLOOD PRESSURE: 78 MMHG | DIASTOLIC BLOOD PRESSURE: 27 MMHG

## 2021-03-17 VITALS — SYSTOLIC BLOOD PRESSURE: 124 MMHG | DIASTOLIC BLOOD PRESSURE: 53 MMHG

## 2021-03-17 VITALS — DIASTOLIC BLOOD PRESSURE: 28 MMHG | SYSTOLIC BLOOD PRESSURE: 98 MMHG

## 2021-03-17 VITALS — DIASTOLIC BLOOD PRESSURE: 28 MMHG | SYSTOLIC BLOOD PRESSURE: 113 MMHG

## 2021-03-17 VITALS — SYSTOLIC BLOOD PRESSURE: 103 MMHG | DIASTOLIC BLOOD PRESSURE: 42 MMHG

## 2021-03-17 VITALS — DIASTOLIC BLOOD PRESSURE: 41 MMHG | SYSTOLIC BLOOD PRESSURE: 129 MMHG

## 2021-03-17 VITALS — SYSTOLIC BLOOD PRESSURE: 90 MMHG | DIASTOLIC BLOOD PRESSURE: 28 MMHG

## 2021-03-17 VITALS — SYSTOLIC BLOOD PRESSURE: 104 MMHG | DIASTOLIC BLOOD PRESSURE: 41 MMHG

## 2021-03-17 VITALS — DIASTOLIC BLOOD PRESSURE: 37 MMHG | SYSTOLIC BLOOD PRESSURE: 99 MMHG

## 2021-03-17 VITALS — SYSTOLIC BLOOD PRESSURE: 113 MMHG | DIASTOLIC BLOOD PRESSURE: 34 MMHG

## 2021-03-17 VITALS — DIASTOLIC BLOOD PRESSURE: 33 MMHG | SYSTOLIC BLOOD PRESSURE: 108 MMHG

## 2021-03-17 VITALS — DIASTOLIC BLOOD PRESSURE: 36 MMHG | SYSTOLIC BLOOD PRESSURE: 116 MMHG

## 2021-03-17 VITALS — SYSTOLIC BLOOD PRESSURE: 113 MMHG | DIASTOLIC BLOOD PRESSURE: 50 MMHG

## 2021-03-17 VITALS — DIASTOLIC BLOOD PRESSURE: 41 MMHG | SYSTOLIC BLOOD PRESSURE: 144 MMHG

## 2021-03-17 VITALS — DIASTOLIC BLOOD PRESSURE: 38 MMHG | SYSTOLIC BLOOD PRESSURE: 105 MMHG

## 2021-03-17 VITALS — DIASTOLIC BLOOD PRESSURE: 46 MMHG | SYSTOLIC BLOOD PRESSURE: 100 MMHG

## 2021-03-17 VITALS — SYSTOLIC BLOOD PRESSURE: 104 MMHG | DIASTOLIC BLOOD PRESSURE: 35 MMHG

## 2021-03-17 VITALS — DIASTOLIC BLOOD PRESSURE: 89 MMHG | SYSTOLIC BLOOD PRESSURE: 112 MMHG

## 2021-03-17 VITALS — SYSTOLIC BLOOD PRESSURE: 112 MMHG | DIASTOLIC BLOOD PRESSURE: 33 MMHG

## 2021-03-17 VITALS — DIASTOLIC BLOOD PRESSURE: 54 MMHG | SYSTOLIC BLOOD PRESSURE: 129 MMHG

## 2021-03-17 VITALS — DIASTOLIC BLOOD PRESSURE: 48 MMHG | SYSTOLIC BLOOD PRESSURE: 122 MMHG

## 2021-03-17 VITALS — SYSTOLIC BLOOD PRESSURE: 114 MMHG | DIASTOLIC BLOOD PRESSURE: 52 MMHG

## 2021-03-17 VITALS — SYSTOLIC BLOOD PRESSURE: 115 MMHG | DIASTOLIC BLOOD PRESSURE: 59 MMHG

## 2021-03-17 VITALS — DIASTOLIC BLOOD PRESSURE: 79 MMHG | SYSTOLIC BLOOD PRESSURE: 116 MMHG

## 2021-03-17 VITALS — DIASTOLIC BLOOD PRESSURE: 42 MMHG | SYSTOLIC BLOOD PRESSURE: 107 MMHG

## 2021-03-17 VITALS — DIASTOLIC BLOOD PRESSURE: 31 MMHG | SYSTOLIC BLOOD PRESSURE: 107 MMHG

## 2021-03-17 VITALS — DIASTOLIC BLOOD PRESSURE: 36 MMHG | SYSTOLIC BLOOD PRESSURE: 119 MMHG

## 2021-03-17 VITALS — SYSTOLIC BLOOD PRESSURE: 124 MMHG | DIASTOLIC BLOOD PRESSURE: 35 MMHG

## 2021-03-17 VITALS — DIASTOLIC BLOOD PRESSURE: 48 MMHG | SYSTOLIC BLOOD PRESSURE: 96 MMHG

## 2021-03-17 VITALS — SYSTOLIC BLOOD PRESSURE: 111 MMHG | DIASTOLIC BLOOD PRESSURE: 36 MMHG

## 2021-03-17 VITALS — SYSTOLIC BLOOD PRESSURE: 124 MMHG | DIASTOLIC BLOOD PRESSURE: 52 MMHG

## 2021-03-17 VITALS — SYSTOLIC BLOOD PRESSURE: 106 MMHG | DIASTOLIC BLOOD PRESSURE: 35 MMHG

## 2021-03-17 VITALS — DIASTOLIC BLOOD PRESSURE: 36 MMHG | SYSTOLIC BLOOD PRESSURE: 107 MMHG

## 2021-03-17 VITALS — SYSTOLIC BLOOD PRESSURE: 76 MMHG | DIASTOLIC BLOOD PRESSURE: 55 MMHG

## 2021-03-17 VITALS — DIASTOLIC BLOOD PRESSURE: 36 MMHG | SYSTOLIC BLOOD PRESSURE: 139 MMHG

## 2021-03-17 VITALS — SYSTOLIC BLOOD PRESSURE: 91 MMHG | DIASTOLIC BLOOD PRESSURE: 27 MMHG

## 2021-03-17 VITALS — SYSTOLIC BLOOD PRESSURE: 103 MMHG | DIASTOLIC BLOOD PRESSURE: 34 MMHG

## 2021-03-17 VITALS — DIASTOLIC BLOOD PRESSURE: 31 MMHG | SYSTOLIC BLOOD PRESSURE: 83 MMHG

## 2021-03-17 VITALS — DIASTOLIC BLOOD PRESSURE: 64 MMHG | SYSTOLIC BLOOD PRESSURE: 133 MMHG

## 2021-03-17 VITALS — SYSTOLIC BLOOD PRESSURE: 139 MMHG | DIASTOLIC BLOOD PRESSURE: 34 MMHG

## 2021-03-17 VITALS — SYSTOLIC BLOOD PRESSURE: 126 MMHG | DIASTOLIC BLOOD PRESSURE: 49 MMHG

## 2021-03-17 VITALS — DIASTOLIC BLOOD PRESSURE: 30 MMHG | SYSTOLIC BLOOD PRESSURE: 121 MMHG

## 2021-03-17 VITALS — DIASTOLIC BLOOD PRESSURE: 45 MMHG | SYSTOLIC BLOOD PRESSURE: 112 MMHG

## 2021-03-17 VITALS — DIASTOLIC BLOOD PRESSURE: 33 MMHG | SYSTOLIC BLOOD PRESSURE: 117 MMHG

## 2021-03-17 VITALS — DIASTOLIC BLOOD PRESSURE: 35 MMHG | SYSTOLIC BLOOD PRESSURE: 110 MMHG

## 2021-03-17 VITALS — DIASTOLIC BLOOD PRESSURE: 50 MMHG | SYSTOLIC BLOOD PRESSURE: 135 MMHG

## 2021-03-17 VITALS — SYSTOLIC BLOOD PRESSURE: 111 MMHG | DIASTOLIC BLOOD PRESSURE: 26 MMHG

## 2021-03-17 VITALS — DIASTOLIC BLOOD PRESSURE: 30 MMHG | SYSTOLIC BLOOD PRESSURE: 107 MMHG

## 2021-03-17 VITALS — DIASTOLIC BLOOD PRESSURE: 66 MMHG | SYSTOLIC BLOOD PRESSURE: 99 MMHG

## 2021-03-17 VITALS — SYSTOLIC BLOOD PRESSURE: 114 MMHG | DIASTOLIC BLOOD PRESSURE: 42 MMHG

## 2021-03-17 VITALS — DIASTOLIC BLOOD PRESSURE: 33 MMHG | SYSTOLIC BLOOD PRESSURE: 94 MMHG

## 2021-03-17 VITALS — DIASTOLIC BLOOD PRESSURE: 31 MMHG | SYSTOLIC BLOOD PRESSURE: 109 MMHG

## 2021-03-17 VITALS — DIASTOLIC BLOOD PRESSURE: 33 MMHG | SYSTOLIC BLOOD PRESSURE: 147 MMHG

## 2021-03-17 LAB
ALBUMIN SERPL BCP-MCNC: 1.2 G/DL (ref 3.4–5)
ALP SERPL-CCNC: 391 U/L (ref 46–116)
ALT SERPL W P-5'-P-CCNC: 39 U/L (ref 12–78)
AST SERPL W P-5'-P-CCNC: 94 U/L (ref 15–37)
BASOPHILS # BLD AUTO: 0 /CMM (ref 0–0.2)
BASOPHILS NFR BLD AUTO: 0.2 % (ref 0–2)
BILIRUB SERPL-MCNC: 0.7 MG/DL (ref 0.2–1)
BUN SERPL-MCNC: 59 MG/DL (ref 7–18)
CALCIUM SERPL-MCNC: 8.5 MG/DL (ref 8.5–10.1)
CHLORIDE SERPL-SCNC: 96 MMOL/L (ref 98–107)
CO2 SERPL-SCNC: 30 MMOL/L (ref 21–32)
CREAT SERPL-MCNC: 2.3 MG/DL (ref 0.6–1.3)
EOSINOPHIL NFR BLD AUTO: 3.2 % (ref 0–6)
GLUCOSE SERPL-MCNC: 95 MG/DL (ref 74–106)
HCT VFR BLD AUTO: 23 % (ref 33–45)
HGB BLD-MCNC: 7.5 G/DL (ref 11.5–14.8)
LYMPHOCYTES NFR BLD AUTO: 1.5 /CMM (ref 0.8–4.8)
LYMPHOCYTES NFR BLD AUTO: 7.2 % (ref 20–44)
MAGNESIUM SERPL-MCNC: 1.8 MG/DL (ref 1.8–2.4)
MCHC RBC AUTO-ENTMCNC: 33 G/DL (ref 31–36)
MCV RBC AUTO: 101 FL (ref 82–100)
MONOCYTES NFR BLD AUTO: 1.9 /CMM (ref 0.1–1.3)
MONOCYTES NFR BLD AUTO: 8.9 % (ref 2–12)
NEUTROPHILS # BLD AUTO: 17 /CMM (ref 1.8–8.9)
NEUTROPHILS NFR BLD AUTO: 80.5 % (ref 43–81)
PHOSPHATE SERPL-MCNC: 3.3 MG/DL (ref 2.5–4.9)
PLATELET # BLD AUTO: 412 /CMM (ref 150–450)
POTASSIUM SERPL-SCNC: 3.3 MMOL/L (ref 3.5–5.1)
PROT SERPL-MCNC: 6.1 G/DL (ref 6.4–8.2)
RBC # BLD AUTO: 2.23 MIL/UL (ref 4–5.2)
SODIUM SERPL-SCNC: 132 MMOL/L (ref 136–145)
WBC NRBC COR # BLD AUTO: 21.1 K/UL (ref 4.3–11)

## 2021-03-17 RX ADMIN — DOCUSATE SODIUM SCH MG: 50 LIQUID ORAL at 17:00

## 2021-03-17 RX ADMIN — INSULIN HUMAN PRN UNIT: 100 INJECTION, SOLUTION PARENTERAL at 18:25

## 2021-03-17 RX ADMIN — Medication SCH EACH: at 06:34

## 2021-03-17 RX ADMIN — Medication SCH GM: at 08:13

## 2021-03-17 RX ADMIN — MUPIROCIN SCH APPLIC: 20 OINTMENT TOPICAL at 08:12

## 2021-03-17 RX ADMIN — HYDROCORTISONE SODIUM SUCCINATE SCH MG: 100 INJECTION, POWDER, FOR SOLUTION INTRAMUSCULAR; INTRAVASCULAR at 17:23

## 2021-03-17 RX ADMIN — MISOPROSTOL SCH MCG: 100 TABLET ORAL at 20:35

## 2021-03-17 RX ADMIN — SODIUM CHLORIDE SCH MG: 9 INJECTION, SOLUTION INTRAVENOUS at 20:36

## 2021-03-17 RX ADMIN — ATORVASTATIN CALCIUM SCH MG: 40 TABLET, FILM COATED ORAL at 21:28

## 2021-03-17 RX ADMIN — HYDROMORPHONE HYDROCHLORIDE SCH MG: 2 TABLET ORAL at 20:35

## 2021-03-17 RX ADMIN — ZINC OXIDE SCH APPLIC: 200 OINTMENT TOPICAL at 20:36

## 2021-03-17 RX ADMIN — METOCLOPRAMIDE HYDROCHLORIDE SCH MG: 5 SOLUTION ORAL at 12:37

## 2021-03-17 RX ADMIN — HYDROCORTISONE SODIUM SUCCINATE SCH MG: 100 INJECTION, POWDER, FOR SOLUTION INTRAMUSCULAR; INTRAVASCULAR at 10:44

## 2021-03-17 RX ADMIN — METOCLOPRAMIDE HYDROCHLORIDE SCH MG: 5 SOLUTION ORAL at 05:10

## 2021-03-17 RX ADMIN — SODIUM CHLORIDE PRN MLS/HR: 9 INJECTION, SOLUTION INTRAVENOUS at 04:06

## 2021-03-17 RX ADMIN — MISOPROSTOL SCH MCG: 100 TABLET ORAL at 17:23

## 2021-03-17 RX ADMIN — MISOPROSTOL SCH MCG: 100 TABLET ORAL at 12:37

## 2021-03-17 RX ADMIN — MISOPROSTOL SCH MCG: 100 TABLET ORAL at 08:11

## 2021-03-17 RX ADMIN — Medication PRN OZ: at 05:12

## 2021-03-17 RX ADMIN — Medication SCH GM: at 17:24

## 2021-03-17 RX ADMIN — ACETAMINOPHEN PRN MG: 160 SOLUTION ORAL at 20:35

## 2021-03-17 RX ADMIN — Medication SCH MG: at 08:11

## 2021-03-17 RX ADMIN — HYDROMORPHONE HYDROCHLORIDE SCH MG: 2 TABLET ORAL at 08:11

## 2021-03-17 RX ADMIN — ACETYLCYSTEINE SCH MG: 100 INHALANT RESPIRATORY (INHALATION) at 23:47

## 2021-03-17 RX ADMIN — DONEPEZIL HYDROCHLORIDE SCH MG: 5 TABLET ORAL at 21:27

## 2021-03-17 RX ADMIN — Medication SCH MG: at 19:56

## 2021-03-17 RX ADMIN — ZINC OXIDE SCH APPLIC: 200 OINTMENT TOPICAL at 10:45

## 2021-03-17 RX ADMIN — METOCLOPRAMIDE HYDROCHLORIDE SCH MG: 5 SOLUTION ORAL at 20:35

## 2021-03-17 RX ADMIN — Medication PRN ML: at 06:17

## 2021-03-17 RX ADMIN — FOLIC ACID SCH MG: 1 TABLET ORAL at 08:11

## 2021-03-17 RX ADMIN — LEVOTHYROXINE SODIUM SCH MCG: 100 TABLET ORAL at 06:34

## 2021-03-17 RX ADMIN — Medication SCH EACH: at 11:26

## 2021-03-17 RX ADMIN — Medication SCH MG: at 14:20

## 2021-03-17 RX ADMIN — INSULIN HUMAN PRN UNIT: 100 INJECTION, SOLUTION PARENTERAL at 12:35

## 2021-03-17 RX ADMIN — SODIUM CHLORIDE SCH MG: 9 INJECTION, SOLUTION INTRAVENOUS at 08:10

## 2021-03-17 RX ADMIN — INSULIN GLARGINE SCH UNIT: 100 INJECTION, SOLUTION SUBCUTANEOUS at 21:30

## 2021-03-17 RX ADMIN — Medication SCH MG: at 07:46

## 2021-03-17 RX ADMIN — MUPIROCIN SCH APPLIC: 20 OINTMENT TOPICAL at 20:36

## 2021-03-17 RX ADMIN — DEXTROSE MONOHYDRATE SCH MLS/HR: 50 INJECTION, SOLUTION INTRAVENOUS at 16:10

## 2021-03-17 RX ADMIN — INSULIN HUMAN PRN UNIT: 100 INJECTION, SOLUTION PARENTERAL at 21:30

## 2021-03-17 RX ADMIN — ACETYLCYSTEINE SCH MG: 100 INHALANT RESPIRATORY (INHALATION) at 14:20

## 2021-03-17 RX ADMIN — AMLODIPINE BESYLATE SCH MG: 10 TABLET ORAL at 09:00

## 2021-03-17 RX ADMIN — ACETYLCYSTEINE SCH MG: 100 INHALANT RESPIRATORY (INHALATION) at 07:46

## 2021-03-17 RX ADMIN — Medication SCH EACH: at 17:23

## 2021-03-17 RX ADMIN — DOCUSATE SODIUM SCH MG: 50 LIQUID ORAL at 08:12

## 2021-03-17 RX ADMIN — Medication SCH OZ: at 08:14

## 2021-03-17 RX ADMIN — Medication SCH EACH: at 21:28

## 2021-03-17 RX ADMIN — DULOXETINE HYDROCHLORIDE SCH MG: 30 CAPSULE, DELAYED RELEASE ORAL at 08:11

## 2021-03-17 RX ADMIN — Medication SCH TAB: at 08:11

## 2021-03-17 NOTE — NUR
RN NOTE

RECEIVED PT AWAKE BUT UNABLE TO FOLLOW COMMANDS. IN SEMI BE'S POSITION. WITH TRACH 
CONNECTED TO VENT WITH FIO2 40% AND PEEP 0. VITAL SIGNS STABLE VIA BEDSIDE MONITOR. ON MATHEW 
DRIP @ 0.2. WITH GT PATENT AND IN PLACE. WITH NEPRO RUNNING AS ORDERED WITHOUT RESIDUAL 
NOTED. ASHBY CATHETER PATENT AND IN PLACE DRAINING MINIMAL URINE VIA GRAVITY. RIGHT CHEST HD 
CATH INTACT. WITH RIGHT FEMORAL PICC LINE AND RIGHT UPPER ARM MIDLINE BOTH INTACT. ORAL CARE 
DONE. PT SUCTIONED. SAFETY MEASURES IN PLACE, ALARMS ON AND AUDIBLE, BED ALARM ON, BED 
LOCKED AND IN LOW POSITION, SIDE RAILS UP X 2, AMBU BAG AT BEDSIDE, WILL MONITOR AND CARRY 
OUT ACTIVE MD ORDERS.

## 2021-03-17 NOTE — NUR
PT ON BED AWAKE YES OPEN STILL NOT FOLLOWING ANY COMMANDS, STILL ON TRACH/VENT SETTING PER 
MD FIO2 40% SPO2 98% ON MATHEW @ 1MCG/KG/MIN INFUSING WELL, TELE MONITOR READS SINUS RHYTHM 
80'S , NO SIGNIFICANT CHANGES ON CONDITION NOTED ALL NEEDS ATTENDED, WOUND TREATMENT WAS 
DONE, BED ON LOWEST POSITION AND LOCKED SIDE RAILS UP, WILL ENDORSE TO AM SHIFT NURSE

## 2021-03-17 NOTE — NUR
ICU/RN

PT IS AWAKE NOT FOLLOWS COMMAND .HAS TRACH ON THE VENT AC MODE.FIO2-40%. SAT 
O2-99%.AFEBRILE.ON NEOSYNEPHRINE DRIP.G-TUBE INFUSING WITH NEPRO.NO RESIDUAL NOTED.ANURIC ON 
HD .F/C IN PLACE WITH MINIMAL CLOUDY URINE OUTPUT.RIGHT CHEST HD CATH.RIGHT FEMORAL PICC 
LINE.GENERELIZED EDEMA PRESENT.MULTIPLY WOUNDS ALL OVER THE BODY COVERED WITH 
DRESSING.SUCTION PROVIDED.REPOSITION FOR COMFORT.LABS REVIEW.CONTINUE MONITORING.

## 2021-03-17 NOTE — NUR
PT BP IS DROPPING DESPITE CHNAGEING BP CUFF AND CHANGING SITE, STARTED MATHEW AS ORDERED RATE 
PER PROTOCOL WILL CONT TO MONITOR THE PT

## 2021-03-17 NOTE — NUR
Intake Paperwork: SW received intake paperwork completed by pt.'s son, Ranjit Weston. SW to 
give paperwork to unit secretary to be sent to Medical Records.

## 2021-03-18 VITALS — SYSTOLIC BLOOD PRESSURE: 125 MMHG | DIASTOLIC BLOOD PRESSURE: 40 MMHG

## 2021-03-18 VITALS — DIASTOLIC BLOOD PRESSURE: 50 MMHG | SYSTOLIC BLOOD PRESSURE: 113 MMHG

## 2021-03-18 VITALS — SYSTOLIC BLOOD PRESSURE: 128 MMHG | DIASTOLIC BLOOD PRESSURE: 38 MMHG

## 2021-03-18 VITALS — SYSTOLIC BLOOD PRESSURE: 124 MMHG | DIASTOLIC BLOOD PRESSURE: 46 MMHG

## 2021-03-18 VITALS — DIASTOLIC BLOOD PRESSURE: 27 MMHG | SYSTOLIC BLOOD PRESSURE: 118 MMHG

## 2021-03-18 VITALS — DIASTOLIC BLOOD PRESSURE: 36 MMHG | SYSTOLIC BLOOD PRESSURE: 105 MMHG

## 2021-03-18 VITALS — DIASTOLIC BLOOD PRESSURE: 40 MMHG | SYSTOLIC BLOOD PRESSURE: 112 MMHG

## 2021-03-18 VITALS — DIASTOLIC BLOOD PRESSURE: 25 MMHG | SYSTOLIC BLOOD PRESSURE: 90 MMHG

## 2021-03-18 VITALS — DIASTOLIC BLOOD PRESSURE: 30 MMHG | SYSTOLIC BLOOD PRESSURE: 102 MMHG

## 2021-03-18 VITALS — DIASTOLIC BLOOD PRESSURE: 41 MMHG | SYSTOLIC BLOOD PRESSURE: 123 MMHG

## 2021-03-18 VITALS — SYSTOLIC BLOOD PRESSURE: 92 MMHG | DIASTOLIC BLOOD PRESSURE: 70 MMHG

## 2021-03-18 VITALS — SYSTOLIC BLOOD PRESSURE: 118 MMHG | DIASTOLIC BLOOD PRESSURE: 44 MMHG

## 2021-03-18 VITALS — SYSTOLIC BLOOD PRESSURE: 116 MMHG | DIASTOLIC BLOOD PRESSURE: 37 MMHG

## 2021-03-18 VITALS — DIASTOLIC BLOOD PRESSURE: 34 MMHG | SYSTOLIC BLOOD PRESSURE: 105 MMHG

## 2021-03-18 VITALS — DIASTOLIC BLOOD PRESSURE: 42 MMHG | SYSTOLIC BLOOD PRESSURE: 129 MMHG

## 2021-03-18 VITALS — SYSTOLIC BLOOD PRESSURE: 121 MMHG | DIASTOLIC BLOOD PRESSURE: 41 MMHG

## 2021-03-18 VITALS — DIASTOLIC BLOOD PRESSURE: 45 MMHG | SYSTOLIC BLOOD PRESSURE: 106 MMHG

## 2021-03-18 VITALS — SYSTOLIC BLOOD PRESSURE: 101 MMHG | DIASTOLIC BLOOD PRESSURE: 41 MMHG

## 2021-03-18 VITALS — SYSTOLIC BLOOD PRESSURE: 121 MMHG | DIASTOLIC BLOOD PRESSURE: 54 MMHG

## 2021-03-18 VITALS — DIASTOLIC BLOOD PRESSURE: 39 MMHG | SYSTOLIC BLOOD PRESSURE: 128 MMHG

## 2021-03-18 VITALS — SYSTOLIC BLOOD PRESSURE: 161 MMHG | DIASTOLIC BLOOD PRESSURE: 60 MMHG

## 2021-03-18 VITALS — SYSTOLIC BLOOD PRESSURE: 121 MMHG | DIASTOLIC BLOOD PRESSURE: 35 MMHG

## 2021-03-18 VITALS — SYSTOLIC BLOOD PRESSURE: 126 MMHG | DIASTOLIC BLOOD PRESSURE: 42 MMHG

## 2021-03-18 VITALS — SYSTOLIC BLOOD PRESSURE: 131 MMHG | DIASTOLIC BLOOD PRESSURE: 35 MMHG

## 2021-03-18 VITALS — DIASTOLIC BLOOD PRESSURE: 39 MMHG | SYSTOLIC BLOOD PRESSURE: 117 MMHG

## 2021-03-18 VITALS — DIASTOLIC BLOOD PRESSURE: 52 MMHG | SYSTOLIC BLOOD PRESSURE: 121 MMHG

## 2021-03-18 VITALS — SYSTOLIC BLOOD PRESSURE: 123 MMHG | DIASTOLIC BLOOD PRESSURE: 94 MMHG

## 2021-03-18 VITALS — SYSTOLIC BLOOD PRESSURE: 117 MMHG | DIASTOLIC BLOOD PRESSURE: 55 MMHG

## 2021-03-18 VITALS — SYSTOLIC BLOOD PRESSURE: 113 MMHG | DIASTOLIC BLOOD PRESSURE: 36 MMHG

## 2021-03-18 VITALS — SYSTOLIC BLOOD PRESSURE: 137 MMHG | DIASTOLIC BLOOD PRESSURE: 40 MMHG

## 2021-03-18 VITALS — SYSTOLIC BLOOD PRESSURE: 104 MMHG | DIASTOLIC BLOOD PRESSURE: 35 MMHG

## 2021-03-18 VITALS — DIASTOLIC BLOOD PRESSURE: 46 MMHG | SYSTOLIC BLOOD PRESSURE: 108 MMHG

## 2021-03-18 VITALS — SYSTOLIC BLOOD PRESSURE: 98 MMHG | DIASTOLIC BLOOD PRESSURE: 50 MMHG

## 2021-03-18 VITALS — SYSTOLIC BLOOD PRESSURE: 115 MMHG | DIASTOLIC BLOOD PRESSURE: 45 MMHG

## 2021-03-18 VITALS — SYSTOLIC BLOOD PRESSURE: 107 MMHG | DIASTOLIC BLOOD PRESSURE: 27 MMHG

## 2021-03-18 VITALS — SYSTOLIC BLOOD PRESSURE: 104 MMHG | DIASTOLIC BLOOD PRESSURE: 39 MMHG

## 2021-03-18 VITALS — DIASTOLIC BLOOD PRESSURE: 44 MMHG | SYSTOLIC BLOOD PRESSURE: 136 MMHG

## 2021-03-18 VITALS — DIASTOLIC BLOOD PRESSURE: 31 MMHG | SYSTOLIC BLOOD PRESSURE: 113 MMHG

## 2021-03-18 VITALS — SYSTOLIC BLOOD PRESSURE: 137 MMHG | DIASTOLIC BLOOD PRESSURE: 99 MMHG

## 2021-03-18 VITALS — DIASTOLIC BLOOD PRESSURE: 40 MMHG | SYSTOLIC BLOOD PRESSURE: 123 MMHG

## 2021-03-18 VITALS — SYSTOLIC BLOOD PRESSURE: 126 MMHG | DIASTOLIC BLOOD PRESSURE: 22 MMHG

## 2021-03-18 VITALS — SYSTOLIC BLOOD PRESSURE: 125 MMHG | DIASTOLIC BLOOD PRESSURE: 21 MMHG

## 2021-03-18 VITALS — SYSTOLIC BLOOD PRESSURE: 123 MMHG | DIASTOLIC BLOOD PRESSURE: 33 MMHG

## 2021-03-18 VITALS — DIASTOLIC BLOOD PRESSURE: 67 MMHG | SYSTOLIC BLOOD PRESSURE: 125 MMHG

## 2021-03-18 VITALS — SYSTOLIC BLOOD PRESSURE: 116 MMHG | DIASTOLIC BLOOD PRESSURE: 45 MMHG

## 2021-03-18 VITALS — DIASTOLIC BLOOD PRESSURE: 44 MMHG | SYSTOLIC BLOOD PRESSURE: 97 MMHG

## 2021-03-18 VITALS — SYSTOLIC BLOOD PRESSURE: 125 MMHG | DIASTOLIC BLOOD PRESSURE: 41 MMHG

## 2021-03-18 VITALS — DIASTOLIC BLOOD PRESSURE: 60 MMHG | SYSTOLIC BLOOD PRESSURE: 126 MMHG

## 2021-03-18 VITALS — SYSTOLIC BLOOD PRESSURE: 113 MMHG | DIASTOLIC BLOOD PRESSURE: 32 MMHG

## 2021-03-18 VITALS — SYSTOLIC BLOOD PRESSURE: 116 MMHG | DIASTOLIC BLOOD PRESSURE: 47 MMHG

## 2021-03-18 VITALS — DIASTOLIC BLOOD PRESSURE: 36 MMHG | SYSTOLIC BLOOD PRESSURE: 98 MMHG

## 2021-03-18 VITALS — DIASTOLIC BLOOD PRESSURE: 36 MMHG | SYSTOLIC BLOOD PRESSURE: 117 MMHG

## 2021-03-18 VITALS — DIASTOLIC BLOOD PRESSURE: 33 MMHG | SYSTOLIC BLOOD PRESSURE: 120 MMHG

## 2021-03-18 VITALS — DIASTOLIC BLOOD PRESSURE: 44 MMHG | SYSTOLIC BLOOD PRESSURE: 123 MMHG

## 2021-03-18 VITALS — SYSTOLIC BLOOD PRESSURE: 121 MMHG | DIASTOLIC BLOOD PRESSURE: 39 MMHG

## 2021-03-18 VITALS — DIASTOLIC BLOOD PRESSURE: 51 MMHG | SYSTOLIC BLOOD PRESSURE: 111 MMHG

## 2021-03-18 VITALS — DIASTOLIC BLOOD PRESSURE: 48 MMHG | SYSTOLIC BLOOD PRESSURE: 124 MMHG

## 2021-03-18 VITALS — SYSTOLIC BLOOD PRESSURE: 89 MMHG | DIASTOLIC BLOOD PRESSURE: 48 MMHG

## 2021-03-18 VITALS — SYSTOLIC BLOOD PRESSURE: 126 MMHG | DIASTOLIC BLOOD PRESSURE: 36 MMHG

## 2021-03-18 VITALS — DIASTOLIC BLOOD PRESSURE: 50 MMHG | SYSTOLIC BLOOD PRESSURE: 112 MMHG

## 2021-03-18 VITALS — SYSTOLIC BLOOD PRESSURE: 137 MMHG | DIASTOLIC BLOOD PRESSURE: 44 MMHG

## 2021-03-18 VITALS — SYSTOLIC BLOOD PRESSURE: 129 MMHG | DIASTOLIC BLOOD PRESSURE: 42 MMHG

## 2021-03-18 VITALS — SYSTOLIC BLOOD PRESSURE: 132 MMHG | DIASTOLIC BLOOD PRESSURE: 62 MMHG

## 2021-03-18 VITALS — SYSTOLIC BLOOD PRESSURE: 116 MMHG | DIASTOLIC BLOOD PRESSURE: 51 MMHG

## 2021-03-18 VITALS — DIASTOLIC BLOOD PRESSURE: 32 MMHG | SYSTOLIC BLOOD PRESSURE: 116 MMHG

## 2021-03-18 VITALS — DIASTOLIC BLOOD PRESSURE: 35 MMHG | SYSTOLIC BLOOD PRESSURE: 107 MMHG

## 2021-03-18 VITALS — SYSTOLIC BLOOD PRESSURE: 106 MMHG | DIASTOLIC BLOOD PRESSURE: 37 MMHG

## 2021-03-18 VITALS — SYSTOLIC BLOOD PRESSURE: 131 MMHG | DIASTOLIC BLOOD PRESSURE: 31 MMHG

## 2021-03-18 VITALS — DIASTOLIC BLOOD PRESSURE: 54 MMHG | SYSTOLIC BLOOD PRESSURE: 104 MMHG

## 2021-03-18 VITALS — SYSTOLIC BLOOD PRESSURE: 108 MMHG | DIASTOLIC BLOOD PRESSURE: 62 MMHG

## 2021-03-18 VITALS — DIASTOLIC BLOOD PRESSURE: 46 MMHG | SYSTOLIC BLOOD PRESSURE: 126 MMHG

## 2021-03-18 VITALS — DIASTOLIC BLOOD PRESSURE: 26 MMHG | SYSTOLIC BLOOD PRESSURE: 114 MMHG

## 2021-03-18 VITALS — SYSTOLIC BLOOD PRESSURE: 91 MMHG | DIASTOLIC BLOOD PRESSURE: 46 MMHG

## 2021-03-18 VITALS — SYSTOLIC BLOOD PRESSURE: 122 MMHG | DIASTOLIC BLOOD PRESSURE: 35 MMHG

## 2021-03-18 VITALS — DIASTOLIC BLOOD PRESSURE: 50 MMHG | SYSTOLIC BLOOD PRESSURE: 109 MMHG

## 2021-03-18 VITALS — DIASTOLIC BLOOD PRESSURE: 17 MMHG | SYSTOLIC BLOOD PRESSURE: 102 MMHG

## 2021-03-18 VITALS — SYSTOLIC BLOOD PRESSURE: 142 MMHG | DIASTOLIC BLOOD PRESSURE: 48 MMHG

## 2021-03-18 VITALS — DIASTOLIC BLOOD PRESSURE: 21 MMHG | SYSTOLIC BLOOD PRESSURE: 103 MMHG

## 2021-03-18 VITALS — DIASTOLIC BLOOD PRESSURE: 41 MMHG | SYSTOLIC BLOOD PRESSURE: 116 MMHG

## 2021-03-18 VITALS — DIASTOLIC BLOOD PRESSURE: 36 MMHG | SYSTOLIC BLOOD PRESSURE: 108 MMHG

## 2021-03-18 VITALS — DIASTOLIC BLOOD PRESSURE: 41 MMHG | SYSTOLIC BLOOD PRESSURE: 117 MMHG

## 2021-03-18 VITALS — SYSTOLIC BLOOD PRESSURE: 91 MMHG | DIASTOLIC BLOOD PRESSURE: 40 MMHG

## 2021-03-18 VITALS — SYSTOLIC BLOOD PRESSURE: 90 MMHG | DIASTOLIC BLOOD PRESSURE: 64 MMHG

## 2021-03-18 VITALS — DIASTOLIC BLOOD PRESSURE: 40 MMHG | SYSTOLIC BLOOD PRESSURE: 119 MMHG

## 2021-03-18 VITALS — SYSTOLIC BLOOD PRESSURE: 121 MMHG | DIASTOLIC BLOOD PRESSURE: 40 MMHG

## 2021-03-18 VITALS — SYSTOLIC BLOOD PRESSURE: 114 MMHG | DIASTOLIC BLOOD PRESSURE: 55 MMHG

## 2021-03-18 VITALS — DIASTOLIC BLOOD PRESSURE: 48 MMHG | SYSTOLIC BLOOD PRESSURE: 120 MMHG

## 2021-03-18 VITALS — DIASTOLIC BLOOD PRESSURE: 22 MMHG | SYSTOLIC BLOOD PRESSURE: 123 MMHG

## 2021-03-18 VITALS — DIASTOLIC BLOOD PRESSURE: 38 MMHG | SYSTOLIC BLOOD PRESSURE: 132 MMHG

## 2021-03-18 VITALS — SYSTOLIC BLOOD PRESSURE: 115 MMHG | DIASTOLIC BLOOD PRESSURE: 42 MMHG

## 2021-03-18 VITALS — SYSTOLIC BLOOD PRESSURE: 129 MMHG | DIASTOLIC BLOOD PRESSURE: 70 MMHG

## 2021-03-18 VITALS — DIASTOLIC BLOOD PRESSURE: 50 MMHG | SYSTOLIC BLOOD PRESSURE: 125 MMHG

## 2021-03-18 LAB
ALBUMIN SERPL BCP-MCNC: 1.2 G/DL (ref 3.4–5)
ALP SERPL-CCNC: 434 U/L (ref 46–116)
ALT SERPL W P-5'-P-CCNC: 43 U/L (ref 12–78)
AST SERPL W P-5'-P-CCNC: 101 U/L (ref 15–37)
BASOPHILS # BLD AUTO: 0.1 /CMM (ref 0–0.2)
BASOPHILS NFR BLD AUTO: 0.3 % (ref 0–2)
BILIRUB SERPL-MCNC: 0.7 MG/DL (ref 0.2–1)
BILIRUB UR QL STRIP: NEGATIVE
BUN SERPL-MCNC: 74 MG/DL (ref 7–18)
CALCIUM SERPL-MCNC: 8.6 MG/DL (ref 8.5–10.1)
CHLORIDE SERPL-SCNC: 94 MMOL/L (ref 98–107)
CO2 SERPL-SCNC: 28 MMOL/L (ref 21–32)
COLOR UR: YELLOW
CREAT SERPL-MCNC: 2.8 MG/DL (ref 0.6–1.3)
EOSINOPHIL NFR BLD AUTO: 0 % (ref 0–6)
GLUCOSE SERPL-MCNC: 205 MG/DL (ref 74–106)
GLUCOSE UR STRIP-MCNC: NEGATIVE MG/DL
HCT VFR BLD AUTO: 23 % (ref 33–45)
HGB BLD-MCNC: 7.5 G/DL (ref 11.5–14.8)
LEUKOCYTE ESTERASE UR QL STRIP: (no result)
LYMPHOCYTES NFR BLD AUTO: 0.7 /CMM (ref 0.8–4.8)
LYMPHOCYTES NFR BLD AUTO: 3.2 % (ref 20–44)
MAGNESIUM SERPL-MCNC: 2 MG/DL (ref 1.8–2.4)
MCHC RBC AUTO-ENTMCNC: 33 G/DL (ref 31–36)
MCV RBC AUTO: 103 FL (ref 82–100)
MONOCYTES NFR BLD AUTO: 0.7 /CMM (ref 0.1–1.3)
MONOCYTES NFR BLD AUTO: 3.5 % (ref 2–12)
NEUTROPHILS # BLD AUTO: 19.1 /CMM (ref 1.8–8.9)
NEUTROPHILS NFR BLD AUTO: 93 % (ref 43–81)
NITRITE UR QL STRIP: NEGATIVE
PH UR STRIP: 7 [PH] (ref 5–8)
PHOSPHATE SERPL-MCNC: 3.6 MG/DL (ref 2.5–4.9)
PLATELET # BLD AUTO: 438 /CMM (ref 150–450)
POTASSIUM SERPL-SCNC: 3.8 MMOL/L (ref 3.5–5.1)
PROT SERPL-MCNC: 6.5 G/DL (ref 6.4–8.2)
PROT UR QL STRIP: 100 MG/DL
RBC # BLD AUTO: 2.23 MIL/UL (ref 4–5.2)
RBC #/AREA URNS HPF: (no result) /HPF (ref 0–2)
SODIUM SERPL-SCNC: 131 MMOL/L (ref 136–145)
UROBILINOGEN UR STRIP-MCNC: 0.2 EU/DL
WBC #/AREA URNS HPF: (no result) /HPF
WBC #/AREA URNS HPF: (no result) /HPF (ref 0–3)
WBC NRBC COR # BLD AUTO: 20.6 K/UL (ref 4.3–11)

## 2021-03-18 RX ADMIN — Medication SCH EACH: at 12:32

## 2021-03-18 RX ADMIN — ZINC OXIDE SCH APPLIC: 200 OINTMENT TOPICAL at 08:43

## 2021-03-18 RX ADMIN — INSULIN HUMAN PRN UNIT: 100 INJECTION, SOLUTION PARENTERAL at 12:35

## 2021-03-18 RX ADMIN — SODIUM CHLORIDE PRN MLS/HR: 9 INJECTION, SOLUTION INTRAVENOUS at 03:45

## 2021-03-18 RX ADMIN — Medication SCH OZ: at 08:29

## 2021-03-18 RX ADMIN — FLUDROCORTISONE ACETATE SCH MG: 0.1 TABLET ORAL at 23:31

## 2021-03-18 RX ADMIN — INSULIN HUMAN PRN UNIT: 100 INJECTION, SOLUTION PARENTERAL at 22:46

## 2021-03-18 RX ADMIN — INSULIN GLARGINE SCH UNIT: 100 INJECTION, SOLUTION SUBCUTANEOUS at 22:46

## 2021-03-18 RX ADMIN — Medication SCH EACH: at 21:44

## 2021-03-18 RX ADMIN — METOCLOPRAMIDE HYDROCHLORIDE SCH MG: 5 SOLUTION ORAL at 21:44

## 2021-03-18 RX ADMIN — DEXTROSE MONOHYDRATE SCH MLS/HR: 50 INJECTION, SOLUTION INTRAVENOUS at 15:01

## 2021-03-18 RX ADMIN — DONEPEZIL HYDROCHLORIDE SCH MG: 5 TABLET ORAL at 21:44

## 2021-03-18 RX ADMIN — HYDROCORTISONE SODIUM SUCCINATE SCH MG: 100 INJECTION, POWDER, FOR SOLUTION INTRAMUSCULAR; INTRAVASCULAR at 00:09

## 2021-03-18 RX ADMIN — Medication SCH MG: at 08:47

## 2021-03-18 RX ADMIN — MISOPROSTOL SCH MCG: 100 TABLET ORAL at 08:26

## 2021-03-18 RX ADMIN — AMLODIPINE BESYLATE SCH MG: 10 TABLET ORAL at 08:27

## 2021-03-18 RX ADMIN — FOLIC ACID SCH MG: 1 TABLET ORAL at 08:26

## 2021-03-18 RX ADMIN — SODIUM CHLORIDE SCH MG: 9 INJECTION, SOLUTION INTRAVENOUS at 08:25

## 2021-03-18 RX ADMIN — Medication SCH GM: at 16:59

## 2021-03-18 RX ADMIN — ATORVASTATIN CALCIUM SCH MG: 40 TABLET, FILM COATED ORAL at 21:44

## 2021-03-18 RX ADMIN — Medication SCH MG: at 01:48

## 2021-03-18 RX ADMIN — Medication SCH GM: at 08:29

## 2021-03-18 RX ADMIN — Medication SCH EACH: at 17:21

## 2021-03-18 RX ADMIN — ACETYLCYSTEINE SCH MG: 100 INHALANT RESPIRATORY (INHALATION) at 23:47

## 2021-03-18 RX ADMIN — HYDROMORPHONE HYDROCHLORIDE SCH MG: 2 TABLET ORAL at 08:25

## 2021-03-18 RX ADMIN — HYDROCORTISONE SODIUM SUCCINATE SCH MG: 100 INJECTION, POWDER, FOR SOLUTION INTRAMUSCULAR; INTRAVASCULAR at 08:24

## 2021-03-18 RX ADMIN — ACETYLCYSTEINE SCH MG: 100 INHALANT RESPIRATORY (INHALATION) at 07:48

## 2021-03-18 RX ADMIN — MUPIROCIN SCH APPLIC: 20 OINTMENT TOPICAL at 21:45

## 2021-03-18 RX ADMIN — ACETYLCYSTEINE SCH MG: 100 INHALANT RESPIRATORY (INHALATION) at 14:31

## 2021-03-18 RX ADMIN — Medication SCH MG: at 19:54

## 2021-03-18 RX ADMIN — LEVOTHYROXINE SODIUM SCH MCG: 125 TABLET ORAL at 08:24

## 2021-03-18 RX ADMIN — SODIUM CHLORIDE SCH MG: 9 INJECTION, SOLUTION INTRAVENOUS at 21:44

## 2021-03-18 RX ADMIN — Medication PRN OZ: at 16:53

## 2021-03-18 RX ADMIN — INSULIN HUMAN PRN UNIT: 100 INJECTION, SOLUTION PARENTERAL at 06:33

## 2021-03-18 RX ADMIN — MUPIROCIN SCH APPLIC: 20 OINTMENT TOPICAL at 08:28

## 2021-03-18 RX ADMIN — MISOPROSTOL SCH MCG: 100 TABLET ORAL at 21:44

## 2021-03-18 RX ADMIN — Medication SCH MG: at 14:31

## 2021-03-18 RX ADMIN — MISOPROSTOL SCH MCG: 100 TABLET ORAL at 12:27

## 2021-03-18 RX ADMIN — DOCUSATE SODIUM SCH MG: 50 LIQUID ORAL at 08:48

## 2021-03-18 RX ADMIN — Medication SCH TAB: at 08:26

## 2021-03-18 RX ADMIN — INSULIN HUMAN PRN UNIT: 100 INJECTION, SOLUTION PARENTERAL at 17:21

## 2021-03-18 RX ADMIN — Medication SCH EACH: at 06:32

## 2021-03-18 RX ADMIN — METOCLOPRAMIDE HYDROCHLORIDE SCH MG: 5 SOLUTION ORAL at 04:04

## 2021-03-18 RX ADMIN — FLUDROCORTISONE ACETATE SCH MG: 0.1 TABLET ORAL at 17:02

## 2021-03-18 RX ADMIN — DULOXETINE HYDROCHLORIDE SCH MG: 30 CAPSULE, DELAYED RELEASE ORAL at 08:24

## 2021-03-18 RX ADMIN — HYDROMORPHONE HYDROCHLORIDE SCH MG: 2 TABLET ORAL at 21:44

## 2021-03-18 RX ADMIN — METOCLOPRAMIDE HYDROCHLORIDE SCH MG: 5 SOLUTION ORAL at 12:27

## 2021-03-18 RX ADMIN — ZINC OXIDE SCH APPLIC: 200 OINTMENT TOPICAL at 21:45

## 2021-03-18 RX ADMIN — FLUDROCORTISONE ACETATE SCH MG: 0.1 TABLET ORAL at 12:27

## 2021-03-18 RX ADMIN — Medication SCH MG: at 07:48

## 2021-03-18 RX ADMIN — HYDROCORTISONE SODIUM SUCCINATE SCH MG: 100 INJECTION, POWDER, FOR SOLUTION INTRAMUSCULAR; INTRAVASCULAR at 16:57

## 2021-03-18 RX ADMIN — HYDROCORTISONE SODIUM SUCCINATE SCH MG: 100 INJECTION, POWDER, FOR SOLUTION INTRAMUSCULAR; INTRAVASCULAR at 23:30

## 2021-03-18 RX ADMIN — Medication PRN ML: at 03:44

## 2021-03-18 RX ADMIN — MISOPROSTOL SCH MCG: 100 TABLET ORAL at 16:57

## 2021-03-18 RX ADMIN — DOCUSATE SODIUM SCH MG: 50 LIQUID ORAL at 16:35

## 2021-03-18 RX ADMIN — Medication SCH APPLIC: at 08:30

## 2021-03-18 NOTE — NUR
RN OPENING NOTES



RECEIVED PT IN BED. ABLE TO OPEN EYES. ON VENT SETTINGS SH 8 AC 20  FIO2 40% PEEP 0. 
TOLERATING SETTINGS WELL. SATURATION AT THIS TIME 98% NO SOB OR RESP DISTRESS NOTED. NSR ON 
MONITOR, HR OF 76 PT IS AFEBRILE. PT HAS GTUBE RUNNING NEPRO AT 40ML/HR NO RESIDUAL NOTED. 
TOLERATING WELL. IV SITES NATHAN MID RIGHT FEM PICC FLUSHED ASEPTICALLY, RIGHT SUBCLAVIAN HD 
CATH INTACT. SAFETY PRECAUTIONS IN PLACE. HOB ELEVATED. SIDE RAILS UP X2. BED LOCKED IN 
LOWEST POSITION. BED ALARM ON. CALL LIGHT WITHIN REACH. WILL ENDORSE TO AM NURSE FOR 
CONTINUATION OF CARE.

## 2021-03-18 NOTE — NUR
RN NOTE

NO SIGNIFICANT CHANGES NOTED ON THIS SHIFT, PT AWAKE AND ALERT WITH HEAD OF BED ELEVATED , 
DOES NOT FOLLOW COMMAND , TOLERATING VENT SETTINGS WELL, BP STABLE, MATHEW DRIP OFF,  TUBE 
FEEDING RUNNING AS ORDERED WITHOUT RESIDUAL NOTED, ASHBY CATHETER PATENT AND IN PLACE 
DRAINING MINIMAL URINE VIA GRAVITY. RIGHT FEMORAL PICC LINE INTACT AND PATENT WITHOUT 
COMPLICATIONS NOTED, RIGHT CHEST WALL HD CATHETER INTACT. SAFETY MEASURES IN PLACE PER 
PROTOCOL, BED LOCKED AND IN LOWEST POSITION, WILL ENDORSE TO NIGHT SHIFT NURSE FOR 
CONTINUITY OF CARE.

## 2021-03-18 NOTE — NUR
RN NOTE

NO ACUTE CHANGES OBSERVED OVERNIGHT, PT AWAKE AND ALERT WITH HEAD OF BED ELEVATED BUT UNABLE 
TO FOLLOW ANY COMMANDS. TOLERATING VENT SETTINGS WELL, VITAL SIGNS STABLE, ON MATHEW DRIP @ 
0.5. GT PATENT AND IN PLACE, WITH TUBE FEEDING RUNNING AS ORDERED WITHOUT RESIDUAL NOTED, 
ASHBY CATHETER PATENT AND IN PLACE DRAINING MINIMAL URINE VIA GRAVITY. RIGHT FEMORAL PICC 
LINE INTACT AND PATENT WITHOUT COMPLICATIONS NOTED, RIGHT CHEST WALL HD CATHETER INTACT. 
ORAL CARE DONE, SAFETY MEASURES IN PLACE PER PROTOCOL, ALL NEEDS MET AND ATTENDED TO

## 2021-03-18 NOTE — NUR
RN NOTE

RECEIVED ALERT FOR CRITICAL LAB ALBUMIN 1.2 UNCHANGED FROM YESTERDAY. PT IS PENDING 
HEMODIALYSIS TODAY. MD ON CALL AWARE.

## 2021-03-18 NOTE — NUR
RN NOTE

COMPLETE BED BATH AND LINEN CHANGE COMPLETED. PT TOLERATED WELL. VITAL SIGNS STABLE VIA 
BEDSIDE MONITOR.

## 2021-03-18 NOTE — NUR
RN NOTES 

RECEIVED PT ON BED AWAKE OPEN EYES, DOES NOT FOLLOW COMMAND ,  TRACH/VENT DEPENDENT, 
TOLERATING VENT WELL, FIO2 40% SPO2 WNL,  BEDSIDE MONITOR READS SINUS RHYTHM 80'S HAVE 
NATHAN/MID LINE PATENT AND FLUSHED AND RIGHT FEM TLC PATENT AND FLUSHED. RIGHT SUBCLAVIAN HD 
CATHETER DRESSING CLEAN DRY AND INTACT  HAVE ASHBY CATHETER IN PLACED WITH MINIMAL OUTPUT, 
BED ON LOWEST POSITION AND LOCKED SIDE RAILS UP X 3, CALL LIGHT WITHIN EASY REACH,  WILL 
CONTINUE TO MONITOR .

## 2021-03-19 VITALS — SYSTOLIC BLOOD PRESSURE: 140 MMHG | DIASTOLIC BLOOD PRESSURE: 59 MMHG

## 2021-03-19 VITALS — DIASTOLIC BLOOD PRESSURE: 52 MMHG | SYSTOLIC BLOOD PRESSURE: 100 MMHG

## 2021-03-19 VITALS — DIASTOLIC BLOOD PRESSURE: 78 MMHG | SYSTOLIC BLOOD PRESSURE: 137 MMHG

## 2021-03-19 VITALS — SYSTOLIC BLOOD PRESSURE: 146 MMHG | DIASTOLIC BLOOD PRESSURE: 64 MMHG

## 2021-03-19 VITALS — SYSTOLIC BLOOD PRESSURE: 136 MMHG | DIASTOLIC BLOOD PRESSURE: 65 MMHG

## 2021-03-19 VITALS — DIASTOLIC BLOOD PRESSURE: 70 MMHG | SYSTOLIC BLOOD PRESSURE: 158 MMHG

## 2021-03-19 VITALS — DIASTOLIC BLOOD PRESSURE: 43 MMHG | SYSTOLIC BLOOD PRESSURE: 109 MMHG

## 2021-03-19 VITALS — DIASTOLIC BLOOD PRESSURE: 67 MMHG | SYSTOLIC BLOOD PRESSURE: 144 MMHG

## 2021-03-19 VITALS — SYSTOLIC BLOOD PRESSURE: 102 MMHG | DIASTOLIC BLOOD PRESSURE: 40 MMHG

## 2021-03-19 VITALS — SYSTOLIC BLOOD PRESSURE: 104 MMHG | DIASTOLIC BLOOD PRESSURE: 17 MMHG

## 2021-03-19 VITALS — SYSTOLIC BLOOD PRESSURE: 99 MMHG | DIASTOLIC BLOOD PRESSURE: 42 MMHG

## 2021-03-19 VITALS — DIASTOLIC BLOOD PRESSURE: 80 MMHG | SYSTOLIC BLOOD PRESSURE: 151 MMHG

## 2021-03-19 VITALS — SYSTOLIC BLOOD PRESSURE: 157 MMHG | DIASTOLIC BLOOD PRESSURE: 65 MMHG

## 2021-03-19 VITALS — SYSTOLIC BLOOD PRESSURE: 101 MMHG | DIASTOLIC BLOOD PRESSURE: 59 MMHG

## 2021-03-19 VITALS — DIASTOLIC BLOOD PRESSURE: 78 MMHG | SYSTOLIC BLOOD PRESSURE: 168 MMHG

## 2021-03-19 VITALS — SYSTOLIC BLOOD PRESSURE: 155 MMHG | DIASTOLIC BLOOD PRESSURE: 71 MMHG

## 2021-03-19 VITALS — DIASTOLIC BLOOD PRESSURE: 70 MMHG | SYSTOLIC BLOOD PRESSURE: 144 MMHG

## 2021-03-19 VITALS — SYSTOLIC BLOOD PRESSURE: 172 MMHG | DIASTOLIC BLOOD PRESSURE: 75 MMHG

## 2021-03-19 VITALS — DIASTOLIC BLOOD PRESSURE: 64 MMHG | SYSTOLIC BLOOD PRESSURE: 147 MMHG

## 2021-03-19 VITALS — SYSTOLIC BLOOD PRESSURE: 93 MMHG | DIASTOLIC BLOOD PRESSURE: 41 MMHG

## 2021-03-19 VITALS — DIASTOLIC BLOOD PRESSURE: 64 MMHG | SYSTOLIC BLOOD PRESSURE: 156 MMHG

## 2021-03-19 VITALS — DIASTOLIC BLOOD PRESSURE: 65 MMHG | SYSTOLIC BLOOD PRESSURE: 161 MMHG

## 2021-03-19 VITALS — DIASTOLIC BLOOD PRESSURE: 79 MMHG | SYSTOLIC BLOOD PRESSURE: 100 MMHG

## 2021-03-19 VITALS — DIASTOLIC BLOOD PRESSURE: 63 MMHG | SYSTOLIC BLOOD PRESSURE: 137 MMHG

## 2021-03-19 VITALS — SYSTOLIC BLOOD PRESSURE: 158 MMHG | DIASTOLIC BLOOD PRESSURE: 68 MMHG

## 2021-03-19 VITALS — SYSTOLIC BLOOD PRESSURE: 135 MMHG | DIASTOLIC BLOOD PRESSURE: 61 MMHG

## 2021-03-19 VITALS — SYSTOLIC BLOOD PRESSURE: 148 MMHG | DIASTOLIC BLOOD PRESSURE: 68 MMHG

## 2021-03-19 VITALS — DIASTOLIC BLOOD PRESSURE: 63 MMHG | SYSTOLIC BLOOD PRESSURE: 151 MMHG

## 2021-03-19 VITALS — DIASTOLIC BLOOD PRESSURE: 64 MMHG | SYSTOLIC BLOOD PRESSURE: 148 MMHG

## 2021-03-19 VITALS — SYSTOLIC BLOOD PRESSURE: 130 MMHG | DIASTOLIC BLOOD PRESSURE: 66 MMHG

## 2021-03-19 VITALS — DIASTOLIC BLOOD PRESSURE: 65 MMHG | SYSTOLIC BLOOD PRESSURE: 156 MMHG

## 2021-03-19 VITALS — SYSTOLIC BLOOD PRESSURE: 118 MMHG | DIASTOLIC BLOOD PRESSURE: 51 MMHG

## 2021-03-19 VITALS — SYSTOLIC BLOOD PRESSURE: 148 MMHG | DIASTOLIC BLOOD PRESSURE: 62 MMHG

## 2021-03-19 VITALS — DIASTOLIC BLOOD PRESSURE: 43 MMHG | SYSTOLIC BLOOD PRESSURE: 102 MMHG

## 2021-03-19 VITALS — SYSTOLIC BLOOD PRESSURE: 140 MMHG | DIASTOLIC BLOOD PRESSURE: 66 MMHG

## 2021-03-19 VITALS — DIASTOLIC BLOOD PRESSURE: 65 MMHG | SYSTOLIC BLOOD PRESSURE: 160 MMHG

## 2021-03-19 VITALS — DIASTOLIC BLOOD PRESSURE: 71 MMHG | SYSTOLIC BLOOD PRESSURE: 141 MMHG

## 2021-03-19 LAB
ALBUMIN SERPL BCP-MCNC: 1.2 G/DL (ref 3.4–5)
ALP SERPL-CCNC: 456 U/L (ref 46–116)
ALT SERPL W P-5'-P-CCNC: 75 U/L (ref 12–78)
AST SERPL W P-5'-P-CCNC: 151 U/L (ref 15–37)
BASOPHILS # BLD AUTO: 0 /CMM (ref 0–0.2)
BASOPHILS NFR BLD AUTO: 0 % (ref 0–2)
BILIRUB SERPL-MCNC: 0.6 MG/DL (ref 0.2–1)
BUN SERPL-MCNC: 62 MG/DL (ref 7–18)
CALCIUM SERPL-MCNC: 8.5 MG/DL (ref 8.5–10.1)
CHLORIDE SERPL-SCNC: 96 MMOL/L (ref 98–107)
CO2 SERPL-SCNC: 28 MMOL/L (ref 21–32)
CREAT SERPL-MCNC: 2.2 MG/DL (ref 0.6–1.3)
EOSINOPHIL NFR BLD AUTO: 0 % (ref 0–6)
GLUCOSE SERPL-MCNC: 177 MG/DL (ref 74–106)
HCT VFR BLD AUTO: 23 % (ref 33–45)
HGB BLD-MCNC: 7.6 G/DL (ref 11.5–14.8)
LYMPHOCYTES NFR BLD AUTO: 0.6 /CMM (ref 0.8–4.8)
LYMPHOCYTES NFR BLD AUTO: 3.3 % (ref 20–44)
MAGNESIUM SERPL-MCNC: 1.9 MG/DL (ref 1.8–2.4)
MCHC RBC AUTO-ENTMCNC: 33 G/DL (ref 31–36)
MCV RBC AUTO: 102 FL (ref 82–100)
MONOCYTES NFR BLD AUTO: 0.9 /CMM (ref 0.1–1.3)
MONOCYTES NFR BLD AUTO: 4.7 % (ref 2–12)
NEUTROPHILS # BLD AUTO: 18 /CMM (ref 1.8–8.9)
NEUTROPHILS NFR BLD AUTO: 92 % (ref 43–81)
PHOSPHATE SERPL-MCNC: 3.9 MG/DL (ref 2.5–4.9)
PLATELET # BLD AUTO: 437 /CMM (ref 150–450)
POTASSIUM SERPL-SCNC: 3.8 MMOL/L (ref 3.5–5.1)
PROT SERPL-MCNC: 6.8 G/DL (ref 6.4–8.2)
RBC # BLD AUTO: 2.25 MIL/UL (ref 4–5.2)
SODIUM SERPL-SCNC: 131 MMOL/L (ref 136–145)
WBC NRBC COR # BLD AUTO: 19.5 K/UL (ref 4.3–11)

## 2021-03-19 RX ADMIN — FLUDROCORTISONE ACETATE SCH MG: 0.1 TABLET ORAL at 17:00

## 2021-03-19 RX ADMIN — METOCLOPRAMIDE HYDROCHLORIDE SCH MG: 5 SOLUTION ORAL at 22:23

## 2021-03-19 RX ADMIN — Medication SCH GM: at 16:18

## 2021-03-19 RX ADMIN — Medication SCH TAB: at 08:11

## 2021-03-19 RX ADMIN — Medication SCH MG: at 08:18

## 2021-03-19 RX ADMIN — LEVOTHYROXINE SODIUM SCH MCG: 125 TABLET ORAL at 08:11

## 2021-03-19 RX ADMIN — Medication SCH MG: at 01:34

## 2021-03-19 RX ADMIN — DOCUSATE SODIUM SCH MG: 50 LIQUID ORAL at 08:11

## 2021-03-19 RX ADMIN — Medication SCH MG: at 07:35

## 2021-03-19 RX ADMIN — FOLIC ACID SCH MG: 1 TABLET ORAL at 08:11

## 2021-03-19 RX ADMIN — SODIUM CHLORIDE SCH MG: 9 INJECTION, SOLUTION INTRAVENOUS at 22:23

## 2021-03-19 RX ADMIN — MISOPROSTOL SCH MCG: 100 TABLET ORAL at 21:00

## 2021-03-19 RX ADMIN — SODIUM CHLORIDE SCH MG: 9 INJECTION, SOLUTION INTRAVENOUS at 08:10

## 2021-03-19 RX ADMIN — Medication SCH APPLIC: at 08:22

## 2021-03-19 RX ADMIN — HYDROMORPHONE HYDROCHLORIDE SCH MG: 2 TABLET ORAL at 22:22

## 2021-03-19 RX ADMIN — INSULIN HUMAN PRN UNIT: 100 INJECTION, SOLUTION PARENTERAL at 08:20

## 2021-03-19 RX ADMIN — MISOPROSTOL SCH MCG: 100 TABLET ORAL at 12:06

## 2021-03-19 RX ADMIN — Medication SCH EACH: at 22:00

## 2021-03-19 RX ADMIN — Medication SCH OZ: at 08:22

## 2021-03-19 RX ADMIN — ACETYLCYSTEINE SCH MG: 100 INHALANT RESPIRATORY (INHALATION) at 23:26

## 2021-03-19 RX ADMIN — ACETYLCYSTEINE SCH MG: 100 INHALANT RESPIRATORY (INHALATION) at 15:00

## 2021-03-19 RX ADMIN — DONEPEZIL HYDROCHLORIDE SCH MG: 5 TABLET ORAL at 22:21

## 2021-03-19 RX ADMIN — DOCUSATE SODIUM SCH MG: 50 LIQUID ORAL at 16:18

## 2021-03-19 RX ADMIN — HYDROMORPHONE HYDROCHLORIDE SCH MG: 2 TABLET ORAL at 08:11

## 2021-03-19 RX ADMIN — INSULIN GLARGINE SCH UNIT: 100 INJECTION, SOLUTION SUBCUTANEOUS at 23:14

## 2021-03-19 RX ADMIN — DULOXETINE HYDROCHLORIDE SCH MG: 30 CAPSULE, DELAYED RELEASE ORAL at 08:11

## 2021-03-19 RX ADMIN — Medication SCH MG: at 15:00

## 2021-03-19 RX ADMIN — INSULIN HUMAN PRN UNIT: 100 INJECTION, SOLUTION PARENTERAL at 23:10

## 2021-03-19 RX ADMIN — Medication SCH GM: at 08:22

## 2021-03-19 RX ADMIN — MISOPROSTOL SCH MCG: 100 TABLET ORAL at 08:11

## 2021-03-19 RX ADMIN — HYDROCORTISONE SODIUM SUCCINATE SCH MG: 100 INJECTION, POWDER, FOR SOLUTION INTRAMUSCULAR; INTRAVASCULAR at 08:10

## 2021-03-19 RX ADMIN — Medication SCH MG: at 19:36

## 2021-03-19 RX ADMIN — FLUDROCORTISONE ACETATE SCH MG: 0.1 TABLET ORAL at 05:51

## 2021-03-19 RX ADMIN — HYDROCORTISONE SODIUM SUCCINATE SCH MG: 100 INJECTION, POWDER, FOR SOLUTION INTRAMUSCULAR; INTRAVASCULAR at 23:29

## 2021-03-19 RX ADMIN — INSULIN HUMAN PRN UNIT: 100 INJECTION, SOLUTION PARENTERAL at 11:46

## 2021-03-19 RX ADMIN — FLUDROCORTISONE ACETATE SCH MG: 0.1 TABLET ORAL at 12:06

## 2021-03-19 RX ADMIN — INSULIN HUMAN PRN UNIT: 100 INJECTION, SOLUTION PARENTERAL at 17:16

## 2021-03-19 RX ADMIN — Medication SCH EACH: at 08:21

## 2021-03-19 RX ADMIN — ZINC OXIDE SCH APPLIC: 200 OINTMENT TOPICAL at 22:58

## 2021-03-19 RX ADMIN — AMLODIPINE BESYLATE SCH MG: 10 TABLET ORAL at 08:45

## 2021-03-19 RX ADMIN — ACETYLCYSTEINE SCH MG: 100 INHALANT RESPIRATORY (INHALATION) at 07:35

## 2021-03-19 RX ADMIN — ZINC OXIDE SCH APPLIC: 200 OINTMENT TOPICAL at 08:24

## 2021-03-19 RX ADMIN — Medication SCH EACH: at 11:46

## 2021-03-19 RX ADMIN — METOCLOPRAMIDE HYDROCHLORIDE SCH MG: 5 SOLUTION ORAL at 12:06

## 2021-03-19 RX ADMIN — MUPIROCIN SCH APPLIC: 20 OINTMENT TOPICAL at 08:21

## 2021-03-19 RX ADMIN — ATORVASTATIN CALCIUM SCH MG: 40 TABLET, FILM COATED ORAL at 22:24

## 2021-03-19 RX ADMIN — FLUDROCORTISONE ACETATE SCH MG: 0.1 TABLET ORAL at 23:29

## 2021-03-19 RX ADMIN — METOCLOPRAMIDE HYDROCHLORIDE SCH MG: 5 SOLUTION ORAL at 05:51

## 2021-03-19 RX ADMIN — Medication PRN ML: at 05:57

## 2021-03-19 RX ADMIN — MISOPROSTOL SCH MCG: 100 TABLET ORAL at 16:38

## 2021-03-19 RX ADMIN — DEXTROSE MONOHYDRATE SCH MLS/HR: 50 INJECTION, SOLUTION INTRAVENOUS at 14:25

## 2021-03-19 RX ADMIN — MUPIROCIN SCH APPLIC: 20 OINTMENT TOPICAL at 22:58

## 2021-03-19 RX ADMIN — HYDROCORTISONE SODIUM SUCCINATE SCH MG: 100 INJECTION, POWDER, FOR SOLUTION INTRAMUSCULAR; INTRAVASCULAR at 16:39

## 2021-03-19 RX ADMIN — Medication SCH EACH: at 17:17

## 2021-03-19 NOTE — NUR
RN NOTES

NO SIGNIFCANT CHANGES NOTED ON THIS SHIFT, TOLERATING CURRENT VENT SETTING WELL, TRACH CARE 
DONE AS NEEDED, NO RESPIRATORY DISTRESS NOTED,  SAFETY PRECAUTIONS IN PLACE. HOB ELEVATED. 
SIDE RAILS UP X3. BED LOCKED IN LOWEST POSITION. BED ALARM ON. CALL LIGHT WITHIN EASY  
REACH. REPORT GIVEN TO RAY ON 3W FOR CONTINUITY OF CARE.

## 2021-03-19 NOTE — NUR
TELE RN OPENING NOTES: RECEIVED PATIENT IN BED, AWAKE, NON-VERBAL. NO S/S OF DISTRESS NOTED. 
CALL LIGHT WITHIN REACH. BED ALARM ON. BED IN LOWEST AND LOCKED POSITION. HOB ELEVATED AT 35 
DEGREES. WITH GT INTACT, FEEDING NEPRO AT 40ML/HOUR. WITH TRACH INTACT CONNECTED TO THE 
VENT, DRESSING IS CLEAN,DRY AND INTACT. WITH ASHBY CATHETER INTACT, DRAINING YELLOW URINE 
OUTPUT.

## 2021-03-19 NOTE — NUR
RN CLOSING NOTES



PT REMAINS IN BED, RESTING. STILL ON SAME VENT SETTINGS, TOLERATING WELL. SATURATION AT THIS 
TIME 98% NO SOB OR RESP DISTRESS NOTED. PT IS AFEBRILE. ALL DUE MEDICATIONS GIVEN. T/R Q2H. 
SAFETY PRECAUTIONS IN PLACE. HOB ELEVATED. SIDE RAILS UP X2. BED LOCKED IN LOWEST POSITION. 
BED ALARM ON. CALL LIGHT WITHIN REACH. WILL ENDORSE TO AM NURSE FOR CONTINUATION OF CARE.

## 2021-03-19 NOTE — NUR
LEIDA RN NOTES



PT TRANSFERRED FROM ICU -1 TO -1 AT 1845 ACCOMPANIED BY DON BECKER AND 
RESPIRATORY THERAPIST. PT AWAKE, OPEN EYES AND RESPONSIVE TO PAIN STIMULI. PT ON TRACH 
CONNECTED TO MECH VENT AT PRESCRIBED PARAMETERS, TOLERATING SETTINGS WELL, SP02 %. PT 
ON  EXTERNAL CARDIAC MONITOR WITH CURRENT READING OF ST AND HR . PT WITH RIGHT 
SUBCLAVIAN HD CATHETER IN PLACE. PT ALSO WITH RIGHT FEMORAL TRIPLE LUMEN PICC LINE AND NATHAN 
MIDLINE  PATENT AND FLUSHES WELL. G-TUBE IN PLACE WITH FEEDING OF NEPRO AT 40ML/HR IN 
PROGRESS AND TOLERATING WELL. ASPIRATION PRECAUTIONS MAINTAINED.  PT WITH ASHBY CATHETER IN 
PLACE DRAINING CLEAR YELLOW URINE VIA GRAVITY. SAFETY MEASURES MAINTAINED; BED PLACED IN 
LOWEST LOCKED POSITION W/ SR UP X3. CALLIGHT W/IN EASY REACH. WILL ENDORSE DASHAWN TO NIGHT 
SHIFT NURSE.

## 2021-03-19 NOTE — NUR
RN NOTES

RECEIVED PT IN BED. ABLE TO OPEN EYES.DOES NOT FOLLOW COMMAND, TRACH /VENT DEPENDENT, 
TOLERATING VENT SETTING WELL,  O2 SAT WNL, NO DISTRESSON TELE SR HR IN 80'S , PT HAS G TUBE 
RUNNING NEPRO AT 40ML/HR NO RESIDUAL NOTED. TOLERATING WELL. IV SITES R UA MID RIGHT FEM 
PICC FLUSHED ASEPTICALLY, RIGHT SUBCLAVIAN HD CATH INTACT. SAFETY PRECAUTIONS IN PLACE. HOB 
ELEVATED. SIDE RAILS UP X3. BED LOCKED AND  IN LOWEST POSITION. BED ALARM ON. CALL LIGHT 
WITHIN EASY  REACH. WILL CONTINUE TO MONITOR.

## 2021-03-19 NOTE — NUR
per charge nurse Rosi med-cytotec is not available. called the ICU, no med. checked in 
patient's room, no med.

## 2021-03-20 VITALS — SYSTOLIC BLOOD PRESSURE: 99 MMHG | DIASTOLIC BLOOD PRESSURE: 75 MMHG

## 2021-03-20 VITALS — SYSTOLIC BLOOD PRESSURE: 99 MMHG | DIASTOLIC BLOOD PRESSURE: 96 MMHG

## 2021-03-20 VITALS — DIASTOLIC BLOOD PRESSURE: 95 MMHG | SYSTOLIC BLOOD PRESSURE: 136 MMHG

## 2021-03-20 VITALS — SYSTOLIC BLOOD PRESSURE: 109 MMHG | DIASTOLIC BLOOD PRESSURE: 48 MMHG

## 2021-03-20 VITALS — SYSTOLIC BLOOD PRESSURE: 97 MMHG | DIASTOLIC BLOOD PRESSURE: 70 MMHG

## 2021-03-20 VITALS — DIASTOLIC BLOOD PRESSURE: 64 MMHG | SYSTOLIC BLOOD PRESSURE: 142 MMHG

## 2021-03-20 LAB
ALBUMIN SERPL BCP-MCNC: 1.2 G/DL (ref 3.4–5)
ALP SERPL-CCNC: 510 U/L (ref 46–116)
ALT SERPL W P-5'-P-CCNC: 198 U/L (ref 12–78)
AST SERPL W P-5'-P-CCNC: 336 U/L (ref 15–37)
BASE EXCESS BLDA CALC-SCNC: 1.2 MMOL/L
BASOPHILS # BLD AUTO: 0 /CMM (ref 0–0.2)
BASOPHILS NFR BLD AUTO: 0.1 % (ref 0–2)
BILIRUB SERPL-MCNC: 0.7 MG/DL (ref 0.2–1)
BUN SERPL-MCNC: 82 MG/DL (ref 7–18)
CALCIUM SERPL-MCNC: 8.6 MG/DL (ref 8.5–10.1)
CHLORIDE SERPL-SCNC: 92 MMOL/L (ref 98–107)
CO2 SERPL-SCNC: 26 MMOL/L (ref 21–32)
CREAT SERPL-MCNC: 2.5 MG/DL (ref 0.6–1.3)
DO-HGB MFR BLDA: 83.8 MMHG
EOSINOPHIL NFR BLD AUTO: 0 % (ref 0–6)
GLUCOSE SERPL-MCNC: 205 MG/DL (ref 74–106)
HCT VFR BLD AUTO: 23 % (ref 33–45)
HGB BLD-MCNC: 7.6 G/DL (ref 11.5–14.8)
INHALED O2 CONCENTRATION: 40 %
INTRINSIC PEEP RESPIRATORY: 0 CM H2O
LYMPHOCYTES NFR BLD AUTO: 0.6 /CMM (ref 0.8–4.8)
LYMPHOCYTES NFR BLD AUTO: 2.5 % (ref 20–44)
MAGNESIUM SERPL-MCNC: 2 MG/DL (ref 1.8–2.4)
MCHC RBC AUTO-ENTMCNC: 33 G/DL (ref 31–36)
MCV RBC AUTO: 103 FL (ref 82–100)
MONOCYTES NFR BLD AUTO: 1.3 /CMM (ref 0.1–1.3)
MONOCYTES NFR BLD AUTO: 5.7 % (ref 2–12)
NEUTROPHILS # BLD AUTO: 20.5 /CMM (ref 1.8–8.9)
NEUTROPHILS NFR BLD AUTO: 91.7 % (ref 43–81)
PCO2 TEMP ADJ BLDA: 40.1 MMHG (ref 35–45)
PEEP SETTING VENT: 400 ML
PH TEMP ADJ BLDA: 7.42 [PH] (ref 7.35–7.45)
PHOSPHATE SERPL-MCNC: 4.1 MG/DL (ref 2.5–4.9)
PLATELET # BLD AUTO: 432 /CMM (ref 150–450)
PO2 TEMP ADJ BLDA: 155.3 MMHG (ref 75–100)
POTASSIUM SERPL-SCNC: 3.8 MMOL/L (ref 3.5–5.1)
PROT SERPL-MCNC: 6.5 G/DL (ref 6.4–8.2)
RBC # BLD AUTO: 2.26 MIL/UL (ref 4–5.2)
SAO2 % BLDA: 99 % (ref 92–98.5)
SET RATE, BG: 20
SODIUM SERPL-SCNC: 127 MMOL/L (ref 136–145)
WBC NRBC COR # BLD AUTO: 22.4 K/UL (ref 4.3–11)

## 2021-03-20 RX ADMIN — ZINC OXIDE SCH APPLIC: 200 OINTMENT TOPICAL at 08:24

## 2021-03-20 RX ADMIN — INSULIN HUMAN PRN UNIT: 100 INJECTION, SOLUTION PARENTERAL at 18:20

## 2021-03-20 RX ADMIN — FLUDROCORTISONE ACETATE SCH MG: 0.1 TABLET ORAL at 12:27

## 2021-03-20 RX ADMIN — Medication SCH OZ: at 08:24

## 2021-03-20 RX ADMIN — HYDROMORPHONE HYDROCHLORIDE SCH MG: 2 TABLET ORAL at 21:46

## 2021-03-20 RX ADMIN — DOCUSATE SODIUM SCH MG: 50 LIQUID ORAL at 08:18

## 2021-03-20 RX ADMIN — Medication SCH EACH: at 22:00

## 2021-03-20 RX ADMIN — INSULIN HUMAN PRN UNIT: 100 INJECTION, SOLUTION PARENTERAL at 06:00

## 2021-03-20 RX ADMIN — FLUDROCORTISONE ACETATE SCH MG: 0.1 TABLET ORAL at 05:04

## 2021-03-20 RX ADMIN — Medication SCH EACH: at 06:03

## 2021-03-20 RX ADMIN — SODIUM CHLORIDE SCH MG: 9 INJECTION, SOLUTION INTRAVENOUS at 08:18

## 2021-03-20 RX ADMIN — Medication SCH MG: at 08:19

## 2021-03-20 RX ADMIN — DOCUSATE SODIUM SCH MG: 50 LIQUID ORAL at 17:00

## 2021-03-20 RX ADMIN — Medication SCH MG: at 01:01

## 2021-03-20 RX ADMIN — ACETYLCYSTEINE SCH MG: 100 INHALANT RESPIRATORY (INHALATION) at 07:45

## 2021-03-20 RX ADMIN — METOCLOPRAMIDE HYDROCHLORIDE SCH MG: 5 SOLUTION ORAL at 12:27

## 2021-03-20 RX ADMIN — LEVOTHYROXINE SODIUM SCH MCG: 125 TABLET ORAL at 08:19

## 2021-03-20 RX ADMIN — ATORVASTATIN CALCIUM SCH MG: 40 TABLET, FILM COATED ORAL at 21:46

## 2021-03-20 RX ADMIN — Medication SCH TAB: at 08:18

## 2021-03-20 RX ADMIN — SODIUM CHLORIDE SCH MG: 9 INJECTION, SOLUTION INTRAVENOUS at 21:46

## 2021-03-20 RX ADMIN — Medication SCH APPLIC: at 08:23

## 2021-03-20 RX ADMIN — MUPIROCIN SCH APPLIC: 20 OINTMENT TOPICAL at 08:26

## 2021-03-20 RX ADMIN — FOLIC ACID SCH MG: 1 TABLET ORAL at 08:19

## 2021-03-20 RX ADMIN — ACETYLCYSTEINE SCH MG: 100 INHALANT RESPIRATORY (INHALATION) at 23:37

## 2021-03-20 RX ADMIN — ACETYLCYSTEINE SCH MG: 100 INHALANT RESPIRATORY (INHALATION) at 15:59

## 2021-03-20 RX ADMIN — HYDROCORTISONE SODIUM SUCCINATE SCH MG: 100 INJECTION, POWDER, FOR SOLUTION INTRAMUSCULAR; INTRAVASCULAR at 16:28

## 2021-03-20 RX ADMIN — INSULIN HUMAN PRN UNIT: 100 INJECTION, SOLUTION PARENTERAL at 11:37

## 2021-03-20 RX ADMIN — DULOXETINE HYDROCHLORIDE SCH MG: 30 CAPSULE, DELAYED RELEASE ORAL at 08:19

## 2021-03-20 RX ADMIN — Medication SCH MG: at 07:45

## 2021-03-20 RX ADMIN — Medication SCH MG: at 19:32

## 2021-03-20 RX ADMIN — MISOPROSTOL SCH MCG: 100 TABLET ORAL at 14:52

## 2021-03-20 RX ADMIN — METOCLOPRAMIDE HYDROCHLORIDE SCH MG: 5 SOLUTION ORAL at 05:03

## 2021-03-20 RX ADMIN — HYDROCORTISONE SODIUM SUCCINATE SCH MG: 100 INJECTION, POWDER, FOR SOLUTION INTRAMUSCULAR; INTRAVASCULAR at 08:18

## 2021-03-20 RX ADMIN — Medication SCH EACH: at 17:55

## 2021-03-20 RX ADMIN — MISOPROSTOL SCH MCG: 100 TABLET ORAL at 17:00

## 2021-03-20 RX ADMIN — AMLODIPINE BESYLATE SCH MG: 10 TABLET ORAL at 08:22

## 2021-03-20 RX ADMIN — Medication PRN ML: at 07:35

## 2021-03-20 RX ADMIN — ZINC OXIDE SCH APPLIC: 200 OINTMENT TOPICAL at 21:59

## 2021-03-20 RX ADMIN — FLUDROCORTISONE ACETATE SCH MG: 0.1 TABLET ORAL at 18:06

## 2021-03-20 RX ADMIN — METOCLOPRAMIDE HYDROCHLORIDE SCH MG: 5 SOLUTION ORAL at 21:46

## 2021-03-20 RX ADMIN — HYDROMORPHONE HYDROCHLORIDE SCH MG: 2 TABLET ORAL at 08:18

## 2021-03-20 RX ADMIN — Medication SCH GM: at 17:56

## 2021-03-20 RX ADMIN — DEXTROSE MONOHYDRATE SCH MLS/HR: 50 INJECTION, SOLUTION INTRAVENOUS at 14:51

## 2021-03-20 RX ADMIN — Medication SCH EACH: at 11:38

## 2021-03-20 RX ADMIN — Medication SCH MG: at 13:58

## 2021-03-20 RX ADMIN — Medication SCH GM: at 08:28

## 2021-03-20 RX ADMIN — MISOPROSTOL SCH MCG: 100 TABLET ORAL at 11:32

## 2021-03-20 RX ADMIN — MUPIROCIN SCH APPLIC: 20 OINTMENT TOPICAL at 21:59

## 2021-03-20 RX ADMIN — DONEPEZIL HYDROCHLORIDE SCH MG: 5 TABLET ORAL at 21:46

## 2021-03-20 RX ADMIN — MISOPROSTOL SCH MCG: 100 TABLET ORAL at 21:48

## 2021-03-20 NOTE — NUR
TELE RN OPENING NOTES: 



 PATIENT IN BED, AWAKE, NON-VERBAL. NO S/S OF DISTRESS NOTED. CALL LIGHT WITHIN REACH. BED 
ALARM ON. BED IN LOWEST AND LOCKED POSITION. HOB ELEVATED AT 35 DEGREES. WITH GT INTACT, 
FEEDING NEPRO AT 40ML/HOUR. WITH TRACH INTACT CONNECTED TO THE VENT, DRESSING IS CLEAN,DRY 
AND INTACT. WITH ASHBY CATHETER INTACT, DRAINING YELLOW URINE OUTPUT.

## 2021-03-20 NOTE — NUR
----- Message from [id number]  Guero CORLEY MD sent at 12/13/2018 11:53 AM CST -----  plz call pt and inform that Vit D came back low , so we need 50,000 of Vit d every week for 12 weeks and then check the level. If not supplemented , plz place the order.    THX wound treatments done as ordered.

## 2021-03-20 NOTE — NUR
TELE RN CLOSING NOTES: 



 PATIENT IN BED, AWAKE, NON-VERBAL. NO S/S OF DISTRESS NOTED. CALL LIGHT WITHIN REACH. BED 
ALARM ON. BED IN LOWEST AND LOCKED POSITION. HOB ELEVATED AT 35 DEGREES. WITH GT INTACT, 
FEEDING NEPRO AT 40ML/HOUR. WITH TRACH INTACT CONNECTED TO THE VENT, DRESSING IS CLEAN,DRY 
AND INTACT. WITH ASHBY CATHETER INTACT, DRAINING YELLOW URINE OUTPUT. DIALYSIS OUTPUT 1500ML 
@ 1750.

## 2021-03-21 VITALS — DIASTOLIC BLOOD PRESSURE: 43 MMHG | SYSTOLIC BLOOD PRESSURE: 116 MMHG

## 2021-03-21 VITALS — SYSTOLIC BLOOD PRESSURE: 140 MMHG | DIASTOLIC BLOOD PRESSURE: 60 MMHG

## 2021-03-21 VITALS — SYSTOLIC BLOOD PRESSURE: 147 MMHG | DIASTOLIC BLOOD PRESSURE: 7 MMHG

## 2021-03-21 VITALS — DIASTOLIC BLOOD PRESSURE: 75 MMHG | SYSTOLIC BLOOD PRESSURE: 157 MMHG

## 2021-03-21 VITALS — SYSTOLIC BLOOD PRESSURE: 135 MMHG | DIASTOLIC BLOOD PRESSURE: 43 MMHG

## 2021-03-21 LAB
ALBUMIN SERPL BCP-MCNC: 1.3 G/DL (ref 3.4–5)
ALP SERPL-CCNC: 461 U/L (ref 46–116)
ALT SERPL W P-5'-P-CCNC: 267 U/L (ref 12–78)
AST SERPL W P-5'-P-CCNC: 317 U/L (ref 15–37)
BASOPHILS # BLD AUTO: 0 /CMM (ref 0–0.2)
BASOPHILS NFR BLD AUTO: 0 % (ref 0–2)
BILIRUB SERPL-MCNC: 0.7 MG/DL (ref 0.2–1)
BUN SERPL-MCNC: 66 MG/DL (ref 7–18)
CALCIUM SERPL-MCNC: 8.3 MG/DL (ref 8.5–10.1)
CHLORIDE SERPL-SCNC: 95 MMOL/L (ref 98–107)
CO2 SERPL-SCNC: 28 MMOL/L (ref 21–32)
CREAT SERPL-MCNC: 2.1 MG/DL (ref 0.6–1.3)
EOSINOPHIL NFR BLD AUTO: 0 % (ref 0–6)
GLUCOSE SERPL-MCNC: 149 MG/DL (ref 74–106)
HCT VFR BLD AUTO: 24 % (ref 33–45)
HGB BLD-MCNC: 7.8 G/DL (ref 11.5–14.8)
LYMPHOCYTES NFR BLD AUTO: 0.3 /CMM (ref 0.8–4.8)
LYMPHOCYTES NFR BLD AUTO: 1.3 % (ref 20–44)
LYMPHOCYTES NFR BLD MANUAL: 2 % (ref 16–48)
MAGNESIUM SERPL-MCNC: 1.9 MG/DL (ref 1.8–2.4)
MCHC RBC AUTO-ENTMCNC: 33 G/DL (ref 31–36)
MCV RBC AUTO: 104 FL (ref 82–100)
METAMYELOCYTES NFR BLD MANUAL: 1 % (ref 0–0)
MONOCYTES NFR BLD AUTO: 1.5 /CMM (ref 0.1–1.3)
MONOCYTES NFR BLD AUTO: 6 % (ref 2–12)
MONOCYTES NFR BLD MANUAL: 5 % (ref 0–11)
NEUTROPHILS # BLD AUTO: 23.1 /CMM (ref 1.8–8.9)
NEUTROPHILS NFR BLD AUTO: 92.7 % (ref 43–81)
NEUTS SEG NFR BLD MANUAL: 92 % (ref 42–76)
PHOSPHATE SERPL-MCNC: 3.8 MG/DL (ref 2.5–4.9)
PLATELET # BLD AUTO: 450 /CMM (ref 150–450)
POTASSIUM SERPL-SCNC: 3.6 MMOL/L (ref 3.5–5.1)
PROT SERPL-MCNC: 6.6 G/DL (ref 6.4–8.2)
RBC # BLD AUTO: 2.29 MIL/UL (ref 4–5.2)
SODIUM SERPL-SCNC: 131 MMOL/L (ref 136–145)
WBC NRBC COR # BLD AUTO: 24.9 K/UL (ref 4.3–11)

## 2021-03-21 RX ADMIN — FLUDROCORTISONE ACETATE SCH MG: 0.1 TABLET ORAL at 00:54

## 2021-03-21 RX ADMIN — METOCLOPRAMIDE HYDROCHLORIDE SCH MG: 5 SOLUTION ORAL at 12:35

## 2021-03-21 RX ADMIN — Medication SCH EACH: at 17:56

## 2021-03-21 RX ADMIN — DOCUSATE SODIUM SCH MG: 50 LIQUID ORAL at 16:44

## 2021-03-21 RX ADMIN — Medication SCH MG: at 08:48

## 2021-03-21 RX ADMIN — Medication SCH EACH: at 22:14

## 2021-03-21 RX ADMIN — Medication SCH MG: at 08:13

## 2021-03-21 RX ADMIN — MUPIROCIN SCH APPLIC: 20 OINTMENT TOPICAL at 22:06

## 2021-03-21 RX ADMIN — FLUDROCORTISONE ACETATE SCH MG: 0.1 TABLET ORAL at 12:35

## 2021-03-21 RX ADMIN — AMLODIPINE BESYLATE SCH MG: 10 TABLET ORAL at 08:43

## 2021-03-21 RX ADMIN — MISOPROSTOL SCH MCG: 100 TABLET ORAL at 16:44

## 2021-03-21 RX ADMIN — DEXTROSE MONOHYDRATE SCH MLS/HR: 50 INJECTION, SOLUTION INTRAVENOUS at 14:39

## 2021-03-21 RX ADMIN — Medication PRN ML: at 18:16

## 2021-03-21 RX ADMIN — HYDROCORTISONE SODIUM SUCCINATE SCH MG: 100 INJECTION, POWDER, FOR SOLUTION INTRAMUSCULAR; INTRAVASCULAR at 00:54

## 2021-03-21 RX ADMIN — MISOPROSTOL SCH MCG: 100 TABLET ORAL at 08:48

## 2021-03-21 RX ADMIN — SODIUM CHLORIDE SCH MG: 9 INJECTION, SOLUTION INTRAVENOUS at 21:51

## 2021-03-21 RX ADMIN — Medication SCH GM: at 09:15

## 2021-03-21 RX ADMIN — Medication SCH GM: at 17:01

## 2021-03-21 RX ADMIN — INSULIN GLARGINE SCH UNIT: 100 INJECTION, SOLUTION SUBCUTANEOUS at 22:17

## 2021-03-21 RX ADMIN — FOLIC ACID SCH MG: 1 TABLET ORAL at 08:44

## 2021-03-21 RX ADMIN — INSULIN HUMAN PRN UNIT: 100 INJECTION, SOLUTION PARENTERAL at 06:35

## 2021-03-21 RX ADMIN — HYDROMORPHONE HYDROCHLORIDE SCH MG: 2 TABLET ORAL at 21:52

## 2021-03-21 RX ADMIN — METOCLOPRAMIDE HYDROCHLORIDE SCH MG: 5 SOLUTION ORAL at 21:51

## 2021-03-21 RX ADMIN — FLUDROCORTISONE ACETATE SCH MG: 0.1 TABLET ORAL at 05:50

## 2021-03-21 RX ADMIN — SODIUM CHLORIDE SCH MG: 9 INJECTION, SOLUTION INTRAVENOUS at 08:44

## 2021-03-21 RX ADMIN — ZINC OXIDE SCH APPLIC: 200 OINTMENT TOPICAL at 22:05

## 2021-03-21 RX ADMIN — DOCUSATE SODIUM SCH MG: 50 LIQUID ORAL at 08:44

## 2021-03-21 RX ADMIN — Medication SCH MG: at 14:04

## 2021-03-21 RX ADMIN — Medication SCH EACH: at 12:20

## 2021-03-21 RX ADMIN — Medication SCH EACH: at 06:20

## 2021-03-21 RX ADMIN — HYDROMORPHONE HYDROCHLORIDE SCH MG: 2 TABLET ORAL at 08:43

## 2021-03-21 RX ADMIN — ACETYLCYSTEINE SCH MG: 100 INHALANT RESPIRATORY (INHALATION) at 08:13

## 2021-03-21 RX ADMIN — LEVOTHYROXINE SODIUM SCH MCG: 125 TABLET ORAL at 08:42

## 2021-03-21 RX ADMIN — Medication SCH OZ: at 09:14

## 2021-03-21 RX ADMIN — Medication SCH MG: at 19:39

## 2021-03-21 RX ADMIN — Medication SCH MG: at 01:50

## 2021-03-21 RX ADMIN — ACETYLCYSTEINE SCH MG: 100 INHALANT RESPIRATORY (INHALATION) at 23:35

## 2021-03-21 RX ADMIN — MUPIROCIN SCH APPLIC: 20 OINTMENT TOPICAL at 09:15

## 2021-03-21 RX ADMIN — DULOXETINE HYDROCHLORIDE SCH MG: 30 CAPSULE, DELAYED RELEASE ORAL at 08:44

## 2021-03-21 RX ADMIN — INSULIN HUMAN PRN UNIT: 100 INJECTION, SOLUTION PARENTERAL at 18:14

## 2021-03-21 RX ADMIN — INSULIN HUMAN PRN UNIT: 100 INJECTION, SOLUTION PARENTERAL at 00:45

## 2021-03-21 RX ADMIN — ZINC OXIDE SCH APPLIC: 200 OINTMENT TOPICAL at 09:16

## 2021-03-21 RX ADMIN — ATORVASTATIN CALCIUM SCH MG: 40 TABLET, FILM COATED ORAL at 21:52

## 2021-03-21 RX ADMIN — HYDROCORTISONE SODIUM SUCCINATE SCH MG: 100 INJECTION, POWDER, FOR SOLUTION INTRAMUSCULAR; INTRAVASCULAR at 08:42

## 2021-03-21 RX ADMIN — HYDROCORTISONE SODIUM SUCCINATE SCH MG: 100 INJECTION, POWDER, FOR SOLUTION INTRAMUSCULAR; INTRAVASCULAR at 16:44

## 2021-03-21 RX ADMIN — INSULIN GLARGINE SCH UNIT: 100 INJECTION, SOLUTION SUBCUTANEOUS at 00:49

## 2021-03-21 RX ADMIN — DONEPEZIL HYDROCHLORIDE SCH MG: 5 TABLET ORAL at 21:51

## 2021-03-21 RX ADMIN — ACETYLCYSTEINE SCH MG: 100 INHALANT RESPIRATORY (INHALATION) at 16:14

## 2021-03-21 RX ADMIN — FLUDROCORTISONE ACETATE SCH MG: 0.1 TABLET ORAL at 17:05

## 2021-03-21 RX ADMIN — METOCLOPRAMIDE HYDROCHLORIDE SCH MG: 5 SOLUTION ORAL at 05:50

## 2021-03-21 RX ADMIN — Medication SCH TAB: at 08:43

## 2021-03-21 RX ADMIN — MISOPROSTOL SCH MCG: 100 TABLET ORAL at 21:55

## 2021-03-21 RX ADMIN — MISOPROSTOL SCH MCG: 100 TABLET ORAL at 13:13

## 2021-03-21 RX ADMIN — Medication SCH APPLIC: at 09:14

## 2021-03-21 NOTE — NUR
Received pt. on AC/VC 20, 400, 40%, +5. Breathing tx. was given, tolerated well no adverse 
reaction noted, Sx 2x + PRN small thin yellowish secretion. Breath sounds diminished 
throughout. Extra trach and ambu bag was on the bedside. Keep monitor the pt.

## 2021-03-21 NOTE — NUR
MS/RN  OPENING NOTE



RECEIVED PATIENT FROM NIGHT SHIFT NURSE.

PATIENT IS ASLEEP IN BED, EASILY WOKEN UP. NON VERBAL OPENS EYES. NO ACUTE DISTRESS NOTED. 
[facility], AC 20, , FIO2 30%, PEEP 0, TOLERATING WELL. O2 SAT 
97%. PATIENT HAS GTUBE NEPRO 40 ML/HR, TOLERATING WELL. SAFETY MEASURES IN PLACE, BED LOCKED 
AND IN LOWEST POSITION, CALL LIGHT WITHIN REACH. WILL CONTINUE TO MONITOR AND ENSURE SAFETY.

## 2021-03-21 NOTE — NUR
GT RESIDUAL FEEDING CHECKED=NONE. RIGHT FEMORAL CENTRAL LINE DRESSING AND ULTRASITE PORTS 
COVER CHANGED. SACRAL DRESSING, UPPER THIGHS, BILATERAL ARMS DRESSINGS, AND MEPILEX ON BOTH 
HEELS CHANGED. OFFLOADED. TURNED AND REPOSITIONED Q2 HOURS. HOB ELEVATED AT ALL TIMES.

## 2021-03-21 NOTE — NUR
RT



PT RECVD ON ORDERED SETTINGS, TRACH PATENT AND SECURED. RESP TX GIVEN AND TOLERATED WELL. 
ALARMS ON AND AUDIBLE. AMBU BAG AND SPARE TRACH AT BEDSIDE. WILL CONTINUE TO MONITOR 
THROUGHOUT SHIFT.

-------------------------------------------------------------------------------

Addendum: 03/21/21 at 2018 by ALONDRA LIZARRAGA RT

-------------------------------------------------------------------------------

NO RESPIRATORY DISTRESS NOTED

## 2021-03-21 NOTE — NUR
MS/RN  CLOSING NOTE



PATIENT IS ASLEEP IN BED, EASILY WOKEN UP. NON VERBAL OPENS EYES. NO ACUTE DISTRESS NOTED. 
PATIENT ON [facility] VENT, SHILEY #8, AC 20, , FIO2 30%, PEEP 0, TOLERATING WELL. O2 SAT 
97%. PATIENT HAS GTUBE NEPRO 40 ML/HR, TOLERATING WELL. SAFETY MEASURES IN PLACE, BED LOCKED 
AND IN LOWEST POSITION, CALL LIGHT WITHIN REACH. ALL NEEDS MET THROUGHOUT THE SHIFT. WILL 
ENDORSE TO NIGHT SHIFT NURSE.

## 2021-03-21 NOTE — NUR
TELE/RN OPENING NOTES 



RECEIVED PATIENT RESTING IN BED. PATIENT IS ALERT AND ORIENTED TO OPEN EYES. PATIENT 
TOLERATING VENT SETTING WELL. PATIENT PLACED IN COMFORTABLE POSITION. IV ACCESS IN PLACE 
FLUSHING WELL. SAFETY MEASURES ARE IN PLACE, BED IS LOCKED AND PLACED IN THE LOW 
POSITION,CALL LIGHT IS WITHIN REACH. WILL CONTINUE WITH PATIENT PLAN OF CARE.

## 2021-03-22 VITALS — SYSTOLIC BLOOD PRESSURE: 140 MMHG | DIASTOLIC BLOOD PRESSURE: 78 MMHG

## 2021-03-22 VITALS — DIASTOLIC BLOOD PRESSURE: 62 MMHG | SYSTOLIC BLOOD PRESSURE: 104 MMHG

## 2021-03-22 VITALS — SYSTOLIC BLOOD PRESSURE: 153 MMHG | DIASTOLIC BLOOD PRESSURE: 82 MMHG

## 2021-03-22 VITALS — DIASTOLIC BLOOD PRESSURE: 64 MMHG | SYSTOLIC BLOOD PRESSURE: 154 MMHG

## 2021-03-22 LAB
BASOPHILS # BLD AUTO: 0 /CMM (ref 0–0.2)
BASOPHILS NFR BLD AUTO: 0 % (ref 0–2)
BUN SERPL-MCNC: 53 MG/DL (ref 7–18)
CALCIUM SERPL-MCNC: 8.1 MG/DL (ref 8.5–10.1)
CHLORIDE SERPL-SCNC: 97 MMOL/L (ref 98–107)
CO2 SERPL-SCNC: 29 MMOL/L (ref 21–32)
CREAT SERPL-MCNC: 1.5 MG/DL (ref 0.6–1.3)
EOSINOPHIL NFR BLD AUTO: 0 % (ref 0–6)
GLUCOSE SERPL-MCNC: 114 MG/DL (ref 74–106)
HCT VFR BLD AUTO: 26 % (ref 33–45)
HGB BLD-MCNC: 8.3 G/DL (ref 11.5–14.8)
LYMPHOCYTES NFR BLD AUTO: 0.4 /CMM (ref 0.8–4.8)
LYMPHOCYTES NFR BLD AUTO: 1.7 % (ref 20–44)
LYMPHOCYTES NFR BLD MANUAL: 1 % (ref 16–48)
MAGNESIUM SERPL-MCNC: 1.9 MG/DL (ref 1.8–2.4)
MCHC RBC AUTO-ENTMCNC: 33 G/DL (ref 31–36)
MCV RBC AUTO: 103 FL (ref 82–100)
MONOCYTES NFR BLD AUTO: 1 /CMM (ref 0.1–1.3)
MONOCYTES NFR BLD AUTO: 3.9 % (ref 2–12)
MONOCYTES NFR BLD MANUAL: 3 % (ref 0–11)
NEUTROPHILS # BLD AUTO: 25 /CMM (ref 1.8–8.9)
NEUTROPHILS NFR BLD AUTO: 94.4 % (ref 43–81)
NEUTS SEG NFR BLD MANUAL: 96 % (ref 42–76)
PHOSPHATE SERPL-MCNC: 3.2 MG/DL (ref 2.5–4.9)
PLATELET # BLD AUTO: 505 /CMM (ref 150–450)
POTASSIUM SERPL-SCNC: 3.6 MMOL/L (ref 3.5–5.1)
RBC # BLD AUTO: 2.48 MIL/UL (ref 4–5.2)
SODIUM SERPL-SCNC: 132 MMOL/L (ref 136–145)
WBC NRBC COR # BLD AUTO: 26.5 K/UL (ref 4.3–11)

## 2021-03-22 RX ADMIN — Medication SCH TAB: at 08:12

## 2021-03-22 RX ADMIN — Medication SCH APPLIC: at 08:17

## 2021-03-22 RX ADMIN — FLUDROCORTISONE ACETATE SCH MG: 0.1 TABLET ORAL at 12:06

## 2021-03-22 RX ADMIN — HYDROMORPHONE HYDROCHLORIDE SCH MG: 2 TABLET ORAL at 08:12

## 2021-03-22 RX ADMIN — SODIUM CHLORIDE SCH MG: 9 INJECTION, SOLUTION INTRAVENOUS at 08:11

## 2021-03-22 RX ADMIN — LEVOTHYROXINE SODIUM SCH MCG: 125 TABLET ORAL at 08:11

## 2021-03-22 RX ADMIN — Medication SCH EACH: at 06:52

## 2021-03-22 RX ADMIN — Medication SCH MG: at 08:14

## 2021-03-22 RX ADMIN — DOCUSATE SODIUM SCH MG: 50 LIQUID ORAL at 08:11

## 2021-03-22 RX ADMIN — Medication SCH MG: at 13:33

## 2021-03-22 RX ADMIN — HYDROCORTISONE SODIUM SUCCINATE SCH MG: 100 INJECTION, POWDER, FOR SOLUTION INTRAMUSCULAR; INTRAVASCULAR at 00:30

## 2021-03-22 RX ADMIN — Medication SCH MG: at 07:59

## 2021-03-22 RX ADMIN — Medication SCH MG: at 01:31

## 2021-03-22 RX ADMIN — ACETYLCYSTEINE SCH MG: 100 INHALANT RESPIRATORY (INHALATION) at 07:59

## 2021-03-22 RX ADMIN — DEXTROSE MONOHYDRATE SCH MLS/HR: 50 INJECTION, SOLUTION INTRAVENOUS at 14:28

## 2021-03-22 RX ADMIN — METOCLOPRAMIDE HYDROCHLORIDE SCH MG: 5 SOLUTION ORAL at 05:08

## 2021-03-22 RX ADMIN — ACETYLCYSTEINE SCH MG: 100 INHALANT RESPIRATORY (INHALATION) at 15:46

## 2021-03-22 RX ADMIN — MISOPROSTOL SCH MCG: 100 TABLET ORAL at 12:06

## 2021-03-22 RX ADMIN — Medication SCH GM: at 08:13

## 2021-03-22 RX ADMIN — AMLODIPINE BESYLATE SCH MG: 10 TABLET ORAL at 08:17

## 2021-03-22 RX ADMIN — ZINC OXIDE SCH APPLIC: 200 OINTMENT TOPICAL at 08:12

## 2021-03-22 RX ADMIN — INSULIN HUMAN PRN UNIT: 100 INJECTION, SOLUTION PARENTERAL at 11:29

## 2021-03-22 RX ADMIN — FLUDROCORTISONE ACETATE SCH MG: 0.1 TABLET ORAL at 00:30

## 2021-03-22 RX ADMIN — Medication SCH OZ: at 08:12

## 2021-03-22 RX ADMIN — FOLIC ACID SCH MG: 1 TABLET ORAL at 08:11

## 2021-03-22 RX ADMIN — MISOPROSTOL SCH MCG: 100 TABLET ORAL at 08:14

## 2021-03-22 RX ADMIN — HYDROCORTISONE SODIUM SUCCINATE SCH MG: 100 INJECTION, POWDER, FOR SOLUTION INTRAMUSCULAR; INTRAVASCULAR at 08:11

## 2021-03-22 RX ADMIN — METOCLOPRAMIDE HYDROCHLORIDE SCH MG: 5 SOLUTION ORAL at 12:05

## 2021-03-22 RX ADMIN — Medication SCH EACH: at 11:28

## 2021-03-22 RX ADMIN — DULOXETINE HYDROCHLORIDE SCH MG: 30 CAPSULE, DELAYED RELEASE ORAL at 08:11

## 2021-03-22 RX ADMIN — MUPIROCIN SCH APPLIC: 20 OINTMENT TOPICAL at 08:13

## 2021-03-22 RX ADMIN — FLUDROCORTISONE ACETATE SCH MG: 0.1 TABLET ORAL at 05:08

## 2021-03-22 NOTE — NUR
TELE/RN Opening NOTES 



PATIENT RESTING IN BED. PATIENT IS ALERT AND ORIENTED TO OPEN EYES. PATIENT TOLERATING VENT 
SETTING WELL. PATIENT TURNED AND REPOSITION Q2H . IV ACCESS IN PLACE FLUSHING WELL, HD 
PERMACATH IN PLACE. G TUBE IN PLACE NO RESIDUAL THIS MORNING. ASHBY CATH INTACT. ALL NEEDS 
HAVE BEEN MET DURING SHIFT. SAFETY MEASURES ARE IN PLACE, BED IS LOCKED AND PLACED IN THE 
LOW POSITION,CALL LIGHT IS WITHIN REACH.

## 2021-03-22 NOTE — NUR
RN NOTES



SPOKE W/ SUB-ACUTE UNIT CHARGE NURSE AND INFORMED ABOUT PATIENT'S TRANSFER TO SUB-ACUTE; 
ROOM 274. RT CURRENTLY AT BEDSIDE PROVIDING BREATHING TX. WILL TRANSFER PATIENT IN 30MINS.

## 2021-03-22 NOTE — NUR
RN NOTES



PATIENT WAS SEEN BY DR. FRAN ANAYA W/ ORDER FOR DISCHARGE TO [facility] SUB-ACUTE UNIT. DISCHARGE 
INSTRUCTIONS AND EDUCATION PROVIDED TO SUB-ACUTE RN CHARGE NURSE, MEDICATIONS RECONCILED, 
AND PATIENT SKIN ISSUES CHECKED. BEDSIDE ENDORSEMENT DONE. RT AT BEDSIDE DURING PATIENT 
TRANSPORT AND RELOCATION TO ROOM 274. RECONNECTED PATIENT TO VENT AND TUBE FEEDING PUMP. 
ENDORSED SCHEDULED BONE MARROW BIOPSY TO CHARGE RN; SUPPLIES PLACED AT BEDSIDE. MS CHARGE 
NURSE AND MD AWARE OF PATIENT TRANSFER.

## 2021-03-22 NOTE — NUR
TELE/RN CLOSING NOTES 



PATIENT RESTING IN BED. PATIENT IS ALERT AND ORIENTED TO OPEN EYES. PATIENT TOLERATING VENT 
SETTING WELL. PATIENT TURNED AND REPOSITION Q2H . IV ACCESS IN PLACE FLUSHING WELL, HD 
PERMACATH IN PLACE. G TUBE IN PLACE NO RESIDUAL THIS MORNING. ASHBY CATH INTACT. ALL NEEDS 
HAVE BEEN MET DURING SHIFT. SAFETY MEASURES ARE IN PLACE, BED IS LOCKED AND PLACED IN THE 
LOW POSITION,CALL LIGHT IS WITHIN REACH. WILL ENDORSE CARE TO DAY SHIFT NURSE.

## 2021-04-20 ENCOUNTER — HOSPITAL ENCOUNTER (INPATIENT)
Dept: HOSPITAL 54 - TELE-TD | Age: 81
LOS: 13 days | Discharge: NURSING, REHAB OR LONG-TERM CARE | DRG: 853 | End: 2021-05-03
Attending: NURSE PRACTITIONER | Admitting: INTERNAL MEDICINE
Payer: MEDICARE

## 2021-04-20 VITALS — BODY MASS INDEX: 25.21 KG/M2 | WEIGHT: 137 LBS | HEIGHT: 62 IN

## 2021-04-20 VITALS — SYSTOLIC BLOOD PRESSURE: 138 MMHG | DIASTOLIC BLOOD PRESSURE: 74 MMHG

## 2021-04-20 VITALS — SYSTOLIC BLOOD PRESSURE: 118 MMHG | DIASTOLIC BLOOD PRESSURE: 54 MMHG

## 2021-04-20 DIAGNOSIS — L89.154: ICD-10-CM

## 2021-04-20 DIAGNOSIS — Z93.1: ICD-10-CM

## 2021-04-20 DIAGNOSIS — N39.0: ICD-10-CM

## 2021-04-20 DIAGNOSIS — J96.22: ICD-10-CM

## 2021-04-20 DIAGNOSIS — K29.40: ICD-10-CM

## 2021-04-20 DIAGNOSIS — M10.9: ICD-10-CM

## 2021-04-20 DIAGNOSIS — Z99.2: ICD-10-CM

## 2021-04-20 DIAGNOSIS — E43: ICD-10-CM

## 2021-04-20 DIAGNOSIS — F02.80: ICD-10-CM

## 2021-04-20 DIAGNOSIS — G93.41: ICD-10-CM

## 2021-04-20 DIAGNOSIS — N17.0: ICD-10-CM

## 2021-04-20 DIAGNOSIS — E11.622: ICD-10-CM

## 2021-04-20 DIAGNOSIS — K57.30: ICD-10-CM

## 2021-04-20 DIAGNOSIS — D63.1: ICD-10-CM

## 2021-04-20 DIAGNOSIS — I21.4: ICD-10-CM

## 2021-04-20 DIAGNOSIS — R13.10: ICD-10-CM

## 2021-04-20 DIAGNOSIS — Z93.0: ICD-10-CM

## 2021-04-20 DIAGNOSIS — E03.9: ICD-10-CM

## 2021-04-20 DIAGNOSIS — G20: ICD-10-CM

## 2021-04-20 DIAGNOSIS — Z99.11: ICD-10-CM

## 2021-04-20 DIAGNOSIS — J90: ICD-10-CM

## 2021-04-20 DIAGNOSIS — E87.6: ICD-10-CM

## 2021-04-20 DIAGNOSIS — J96.21: ICD-10-CM

## 2021-04-20 DIAGNOSIS — F20.9: ICD-10-CM

## 2021-04-20 DIAGNOSIS — E11.40: ICD-10-CM

## 2021-04-20 DIAGNOSIS — I70.0: ICD-10-CM

## 2021-04-20 DIAGNOSIS — Z22.322: ICD-10-CM

## 2021-04-20 DIAGNOSIS — N18.6: ICD-10-CM

## 2021-04-20 DIAGNOSIS — I13.2: ICD-10-CM

## 2021-04-20 DIAGNOSIS — R18.8: ICD-10-CM

## 2021-04-20 DIAGNOSIS — K21.9: ICD-10-CM

## 2021-04-20 DIAGNOSIS — L98.9: ICD-10-CM

## 2021-04-20 DIAGNOSIS — L97.429: ICD-10-CM

## 2021-04-20 DIAGNOSIS — E87.1: ICD-10-CM

## 2021-04-20 DIAGNOSIS — L97.919: ICD-10-CM

## 2021-04-20 DIAGNOSIS — A41.9: Primary | ICD-10-CM

## 2021-04-20 DIAGNOSIS — K44.9: ICD-10-CM

## 2021-04-20 DIAGNOSIS — E11.621: ICD-10-CM

## 2021-04-20 DIAGNOSIS — K74.60: ICD-10-CM

## 2021-04-20 DIAGNOSIS — Z86.16: ICD-10-CM

## 2021-04-20 DIAGNOSIS — E11.22: ICD-10-CM

## 2021-04-20 DIAGNOSIS — I50.9: ICD-10-CM

## 2021-04-20 PROCEDURE — G0378 HOSPITAL OBSERVATION PER HR: HCPCS

## 2021-04-20 PROCEDURE — P9016 RBC LEUKOCYTES REDUCED: HCPCS

## 2021-04-20 PROCEDURE — C9113 INJ PANTOPRAZOLE SODIUM, VIA: HCPCS

## 2021-04-20 PROCEDURE — A4623 TRACHEOSTOMY INNER CANNULA: HCPCS

## 2021-04-20 PROCEDURE — A6253 ABSORPT DRG > 48 SQ IN W/O B: HCPCS

## 2021-04-20 PROCEDURE — 5A1955Z RESPIRATORY VENTILATION, GREATER THAN 96 CONSECUTIVE HOURS: ICD-10-PCS | Performed by: INTERNAL MEDICINE

## 2021-04-20 PROCEDURE — A6403 STERILE GAUZE>16 <= 48 SQ IN: HCPCS

## 2021-04-20 PROCEDURE — A4217 STERILE WATER/SALINE, 500 ML: HCPCS

## 2021-04-20 PROCEDURE — A7526 TRACHEOSTOMY TUBE COLLAR: HCPCS

## 2021-04-20 PROCEDURE — A4216 STERILE WATER/SALINE, 10 ML: HCPCS

## 2021-04-20 PROCEDURE — P9047 ALBUMIN (HUMAN), 25%, 50ML: HCPCS

## 2021-04-20 RX ADMIN — NEOMYCIN AND POLYMYXIN B SULFATES AND BACITRACIN ZINC SCH GM: 400; 3.5; 5 OINTMENT TOPICAL at 21:08

## 2021-04-20 RX ADMIN — SODIUM CHLORIDE SCH MG: 9 INJECTION, SOLUTION INTRAVENOUS at 21:08

## 2021-04-20 RX ADMIN — ACETYLCYSTEINE SCH MG: 100 INHALANT RESPIRATORY (INHALATION) at 23:51

## 2021-04-20 RX ADMIN — MEROPENEM SCH MLS/HR: 500 INJECTION INTRAVENOUS at 21:09

## 2021-04-20 RX ADMIN — Medication SCH MG: at 19:51

## 2021-04-21 VITALS — DIASTOLIC BLOOD PRESSURE: 57 MMHG | SYSTOLIC BLOOD PRESSURE: 107 MMHG

## 2021-04-21 VITALS — SYSTOLIC BLOOD PRESSURE: 133 MMHG | DIASTOLIC BLOOD PRESSURE: 64 MMHG

## 2021-04-21 VITALS — SYSTOLIC BLOOD PRESSURE: 131 MMHG | DIASTOLIC BLOOD PRESSURE: 43 MMHG

## 2021-04-21 VITALS — SYSTOLIC BLOOD PRESSURE: 138 MMHG | DIASTOLIC BLOOD PRESSURE: 63 MMHG

## 2021-04-21 VITALS — DIASTOLIC BLOOD PRESSURE: 33 MMHG | SYSTOLIC BLOOD PRESSURE: 107 MMHG

## 2021-04-21 VITALS — DIASTOLIC BLOOD PRESSURE: 42 MMHG | SYSTOLIC BLOOD PRESSURE: 111 MMHG

## 2021-04-21 LAB
BASOPHILS # BLD AUTO: 0 /CMM (ref 0–0.2)
BASOPHILS NFR BLD AUTO: 0.1 % (ref 0–2)
BUN SERPL-MCNC: 136 MG/DL (ref 7–18)
CALCIUM SERPL-MCNC: 7.5 MG/DL (ref 8.5–10.1)
CHLORIDE SERPL-SCNC: 91 MMOL/L (ref 98–107)
CO2 SERPL-SCNC: 20 MMOL/L (ref 21–32)
CREAT SERPL-MCNC: 3.2 MG/DL (ref 0.6–1.3)
EOSINOPHIL NFR BLD AUTO: 0.6 % (ref 0–6)
GLUCOSE SERPL-MCNC: 150 MG/DL (ref 74–106)
HCT VFR BLD AUTO: 24 % (ref 33–45)
HGB BLD-MCNC: 7.9 G/DL (ref 11.5–14.8)
LYMPHOCYTES NFR BLD AUTO: 1.6 /CMM (ref 0.8–4.8)
LYMPHOCYTES NFR BLD AUTO: 7 % (ref 20–44)
LYMPHOCYTES NFR BLD MANUAL: 2 % (ref 16–48)
MCHC RBC AUTO-ENTMCNC: 33 G/DL (ref 31–36)
MCV RBC AUTO: 99 FL (ref 82–100)
MONOCYTES NFR BLD AUTO: 2.3 /CMM (ref 0.1–1.3)
MONOCYTES NFR BLD AUTO: 9.8 % (ref 2–12)
MONOCYTES NFR BLD MANUAL: 7 % (ref 0–11)
NEUTROPHILS # BLD AUTO: 18.9 /CMM (ref 1.8–8.9)
NEUTROPHILS NFR BLD AUTO: 82.5 % (ref 43–81)
NEUTS SEG NFR BLD MANUAL: 91 % (ref 42–76)
PLATELET # BLD AUTO: 389 /CMM (ref 150–450)
POTASSIUM SERPL-SCNC: 4 MMOL/L (ref 3.5–5.1)
RBC # BLD AUTO: 2.47 MIL/UL (ref 4–5.2)
SODIUM SERPL-SCNC: 124 MMOL/L (ref 136–145)
WBC NRBC COR # BLD AUTO: 23 K/UL (ref 4.3–11)

## 2021-04-21 RX ADMIN — Medication SCH MG: at 14:41

## 2021-04-21 RX ADMIN — Medication SCH MG: at 02:15

## 2021-04-21 RX ADMIN — MEROPENEM SCH MLS/HR: 500 INJECTION INTRAVENOUS at 20:02

## 2021-04-21 RX ADMIN — Medication SCH EACH: at 00:34

## 2021-04-21 RX ADMIN — Medication SCH EACH: at 18:06

## 2021-04-21 RX ADMIN — Medication SCH OZ: at 08:16

## 2021-04-21 RX ADMIN — NEOMYCIN AND POLYMYXIN B SULFATES AND BACITRACIN ZINC SCH APPLIC: 400; 3.5; 5 OINTMENT TOPICAL at 21:15

## 2021-04-21 RX ADMIN — NEOMYCIN AND POLYMYXIN B SULFATES AND BACITRACIN ZINC SCH GM: 400; 3.5; 5 OINTMENT TOPICAL at 08:17

## 2021-04-21 RX ADMIN — SODIUM CHLORIDE SCH MG: 9 INJECTION, SOLUTION INTRAVENOUS at 20:02

## 2021-04-21 RX ADMIN — Medication SCH MG: at 19:56

## 2021-04-21 RX ADMIN — ACETYLCYSTEINE SCH MG: 100 INHALANT RESPIRATORY (INHALATION) at 14:42

## 2021-04-21 RX ADMIN — Medication SCH EACH: at 11:57

## 2021-04-21 RX ADMIN — ACETYLCYSTEINE SCH MG: 100 INHALANT RESPIRATORY (INHALATION) at 23:51

## 2021-04-21 RX ADMIN — Medication SCH MG: at 08:10

## 2021-04-21 RX ADMIN — SODIUM CHLORIDE SCH MG: 9 INJECTION, SOLUTION INTRAVENOUS at 08:15

## 2021-04-21 RX ADMIN — Medication SCH EACH: at 05:36

## 2021-04-21 RX ADMIN — SODIUM HYPOCHLORITE SCH ML: 1.25 SOLUTION TOPICAL at 08:16

## 2021-04-21 RX ADMIN — INSULIN HUMAN PRN UNITS: 100 INJECTION, SOLUTION PARENTERAL at 06:45

## 2021-04-21 RX ADMIN — Medication PRN ML: at 17:37

## 2021-04-21 RX ADMIN — ACETYLCYSTEINE SCH MG: 100 INHALANT RESPIRATORY (INHALATION) at 08:10

## 2021-04-22 VITALS — SYSTOLIC BLOOD PRESSURE: 114 MMHG | DIASTOLIC BLOOD PRESSURE: 54 MMHG

## 2021-04-22 VITALS — DIASTOLIC BLOOD PRESSURE: 58 MMHG | SYSTOLIC BLOOD PRESSURE: 114 MMHG

## 2021-04-22 VITALS — DIASTOLIC BLOOD PRESSURE: 38 MMHG | SYSTOLIC BLOOD PRESSURE: 134 MMHG

## 2021-04-22 VITALS — DIASTOLIC BLOOD PRESSURE: 43 MMHG | SYSTOLIC BLOOD PRESSURE: 98 MMHG

## 2021-04-22 VITALS — DIASTOLIC BLOOD PRESSURE: 86 MMHG | SYSTOLIC BLOOD PRESSURE: 116 MMHG

## 2021-04-22 VITALS — SYSTOLIC BLOOD PRESSURE: 118 MMHG | DIASTOLIC BLOOD PRESSURE: 78 MMHG

## 2021-04-22 LAB
BASOPHILS # BLD AUTO: 0 /CMM (ref 0–0.2)
BASOPHILS NFR BLD AUTO: 0.3 % (ref 0–2)
BUN SERPL-MCNC: 103 MG/DL (ref 7–18)
CALCIUM SERPL-MCNC: 7.4 MG/DL (ref 8.5–10.1)
CHLORIDE SERPL-SCNC: 96 MMOL/L (ref 98–107)
CO2 SERPL-SCNC: 21 MMOL/L (ref 21–32)
CREAT SERPL-MCNC: 2.8 MG/DL (ref 0.6–1.3)
EOSINOPHIL NFR BLD AUTO: 0.7 % (ref 0–6)
GLUCOSE SERPL-MCNC: 114 MG/DL (ref 74–106)
HCT VFR BLD AUTO: 23 % (ref 33–45)
HGB BLD-MCNC: 7.4 G/DL (ref 11.5–14.8)
LYMPHOCYTES NFR BLD AUTO: 0.9 /CMM (ref 0.8–4.8)
LYMPHOCYTES NFR BLD AUTO: 5.6 % (ref 20–44)
LYMPHOCYTES NFR BLD MANUAL: 5 % (ref 16–48)
MCHC RBC AUTO-ENTMCNC: 32 G/DL (ref 31–36)
MCV RBC AUTO: 97 FL (ref 82–100)
MONOCYTES NFR BLD AUTO: 1.6 /CMM (ref 0.1–1.3)
MONOCYTES NFR BLD AUTO: 9.9 % (ref 2–12)
MONOCYTES NFR BLD MANUAL: 6 % (ref 0–11)
MYELOCYTES NFR BLD MANUAL: 1 % (ref 0–0)
NEUTROPHILS # BLD AUTO: 13.4 /CMM (ref 1.8–8.9)
NEUTROPHILS NFR BLD AUTO: 83.5 % (ref 43–81)
NEUTS BAND NFR BLD MANUAL: 1 % (ref 0–5)
NEUTS SEG NFR BLD MANUAL: 87 % (ref 42–76)
PLATELET # BLD AUTO: 401 /CMM (ref 150–450)
POTASSIUM SERPL-SCNC: 3.6 MMOL/L (ref 3.5–5.1)
RBC # BLD AUTO: 2.38 MIL/UL (ref 4–5.2)
SODIUM SERPL-SCNC: 130 MMOL/L (ref 136–145)
WBC NRBC COR # BLD AUTO: 16.1 K/UL (ref 4.3–11)

## 2021-04-22 PROCEDURE — 0JBN0ZZ EXCISION OF RIGHT LOWER LEG SUBCUTANEOUS TISSUE AND FASCIA, OPEN APPROACH: ICD-10-PCS | Performed by: STUDENT IN AN ORGANIZED HEALTH CARE EDUCATION/TRAINING PROGRAM

## 2021-04-22 PROCEDURE — 0JBR0ZZ EXCISION OF LEFT FOOT SUBCUTANEOUS TISSUE AND FASCIA, OPEN APPROACH: ICD-10-PCS | Performed by: STUDENT IN AN ORGANIZED HEALTH CARE EDUCATION/TRAINING PROGRAM

## 2021-04-22 PROCEDURE — 5A1D70Z PERFORMANCE OF URINARY FILTRATION, INTERMITTENT, LESS THAN 6 HOURS PER DAY: ICD-10-PCS | Performed by: INTERNAL MEDICINE

## 2021-04-22 RX ADMIN — Medication SCH MG: at 19:44

## 2021-04-22 RX ADMIN — Medication SCH EACH: at 06:29

## 2021-04-22 RX ADMIN — Medication SCH EACH: at 12:38

## 2021-04-22 RX ADMIN — DEXTROSE MONOHYDRATE SCH MLS/HR: 50 INJECTION, SOLUTION INTRAVENOUS at 09:43

## 2021-04-22 RX ADMIN — INSULIN HUMAN PRN UNITS: 100 INJECTION, SOLUTION PARENTERAL at 18:22

## 2021-04-22 RX ADMIN — SODIUM CHLORIDE SCH MG: 9 INJECTION, SOLUTION INTRAVENOUS at 21:47

## 2021-04-22 RX ADMIN — SODIUM HYPOCHLORITE SCH ML: 1.25 SOLUTION TOPICAL at 09:29

## 2021-04-22 RX ADMIN — Medication SCH EACH: at 18:07

## 2021-04-22 RX ADMIN — Medication SCH EACH: at 00:15

## 2021-04-22 RX ADMIN — Medication SCH MG: at 01:55

## 2021-04-22 RX ADMIN — SODIUM CHLORIDE SCH MG: 9 INJECTION, SOLUTION INTRAVENOUS at 09:29

## 2021-04-22 RX ADMIN — ACETYLCYSTEINE SCH MG: 100 INHALANT RESPIRATORY (INHALATION) at 07:21

## 2021-04-22 RX ADMIN — INSULIN HUMAN PRN UNITS: 100 INJECTION, SOLUTION PARENTERAL at 12:40

## 2021-04-22 RX ADMIN — NEOMYCIN AND POLYMYXIN B SULFATES AND BACITRACIN ZINC SCH APPLIC: 400; 3.5; 5 OINTMENT TOPICAL at 09:29

## 2021-04-22 RX ADMIN — ACETYLCYSTEINE SCH MG: 100 INHALANT RESPIRATORY (INHALATION) at 15:48

## 2021-04-22 RX ADMIN — Medication SCH OZ: at 09:29

## 2021-04-22 RX ADMIN — Medication SCH OZ: at 12:38

## 2021-04-22 RX ADMIN — Medication SCH MG: at 13:34

## 2021-04-22 RX ADMIN — ACETYLCYSTEINE SCH MG: 100 INHALANT RESPIRATORY (INHALATION) at 23:38

## 2021-04-22 RX ADMIN — NEOMYCIN AND POLYMYXIN B SULFATES AND BACITRACIN ZINC SCH APPLIC: 400; 3.5; 5 OINTMENT TOPICAL at 21:00

## 2021-04-22 RX ADMIN — Medication SCH MG: at 07:21

## 2021-04-22 RX ADMIN — Medication PRN ML: at 18:13

## 2021-04-23 VITALS — DIASTOLIC BLOOD PRESSURE: 68 MMHG | SYSTOLIC BLOOD PRESSURE: 139 MMHG

## 2021-04-23 VITALS — SYSTOLIC BLOOD PRESSURE: 153 MMHG | DIASTOLIC BLOOD PRESSURE: 62 MMHG

## 2021-04-23 VITALS — SYSTOLIC BLOOD PRESSURE: 132 MMHG | DIASTOLIC BLOOD PRESSURE: 67 MMHG

## 2021-04-23 VITALS — SYSTOLIC BLOOD PRESSURE: 98 MMHG | DIASTOLIC BLOOD PRESSURE: 55 MMHG

## 2021-04-23 VITALS — SYSTOLIC BLOOD PRESSURE: 126 MMHG | DIASTOLIC BLOOD PRESSURE: 41 MMHG

## 2021-04-23 VITALS — SYSTOLIC BLOOD PRESSURE: 144 MMHG | DIASTOLIC BLOOD PRESSURE: 64 MMHG

## 2021-04-23 LAB
BASOPHILS # BLD AUTO: 0 /CMM (ref 0–0.2)
BASOPHILS NFR BLD AUTO: 0.2 % (ref 0–2)
BUN SERPL-MCNC: 109 MG/DL (ref 7–18)
CALCIUM SERPL-MCNC: 7.5 MG/DL (ref 8.5–10.1)
CHLORIDE SERPL-SCNC: 96 MMOL/L (ref 98–107)
CO2 SERPL-SCNC: 24 MMOL/L (ref 21–32)
CREAT SERPL-MCNC: 3.1 MG/DL (ref 0.6–1.3)
EOSINOPHIL NFR BLD AUTO: 1.2 % (ref 0–6)
EOSINOPHIL NFR BLD MANUAL: 2 % (ref 0–4)
GLUCOSE SERPL-MCNC: 170 MG/DL (ref 74–106)
HCT VFR BLD AUTO: 25 % (ref 33–45)
HGB BLD-MCNC: 8 G/DL (ref 11.5–14.8)
LYMPHOCYTES NFR BLD AUTO: 1.1 /CMM (ref 0.8–4.8)
LYMPHOCYTES NFR BLD AUTO: 8 % (ref 20–44)
LYMPHOCYTES NFR BLD MANUAL: 7 % (ref 16–48)
MAGNESIUM SERPL-MCNC: 2 MG/DL (ref 1.8–2.4)
MCHC RBC AUTO-ENTMCNC: 33 G/DL (ref 31–36)
MCV RBC AUTO: 98 FL (ref 82–100)
MONOCYTES NFR BLD AUTO: 1.6 /CMM (ref 0.1–1.3)
MONOCYTES NFR BLD AUTO: 11 % (ref 2–12)
MONOCYTES NFR BLD MANUAL: 13 % (ref 0–11)
NEUTROPHILS # BLD AUTO: 11.4 /CMM (ref 1.8–8.9)
NEUTROPHILS NFR BLD AUTO: 79.6 % (ref 43–81)
NEUTS BAND NFR BLD MANUAL: 1 % (ref 0–5)
NEUTS SEG NFR BLD MANUAL: 77 % (ref 42–76)
PHOSPHATE SERPL-MCNC: 6.2 MG/DL (ref 2.5–4.9)
PLATELET # BLD AUTO: 443 /CMM (ref 150–450)
POTASSIUM SERPL-SCNC: 3.5 MMOL/L (ref 3.5–5.1)
RBC # BLD AUTO: 2.51 MIL/UL (ref 4–5.2)
SODIUM SERPL-SCNC: 131 MMOL/L (ref 136–145)
WBC NRBC COR # BLD AUTO: 14.4 K/UL (ref 4.3–11)

## 2021-04-23 RX ADMIN — INSULIN HUMAN PRN UNITS: 100 INJECTION, SOLUTION PARENTERAL at 23:53

## 2021-04-23 RX ADMIN — Medication SCH EACH: at 01:06

## 2021-04-23 RX ADMIN — SODIUM HYPOCHLORITE SCH ML: 1.25 SOLUTION TOPICAL at 09:30

## 2021-04-23 RX ADMIN — Medication SCH MG: at 13:09

## 2021-04-23 RX ADMIN — INSULIN HUMAN PRN UNITS: 100 INJECTION, SOLUTION PARENTERAL at 12:06

## 2021-04-23 RX ADMIN — Medication SCH OZ: at 09:31

## 2021-04-23 RX ADMIN — Medication SCH EACH: at 17:54

## 2021-04-23 RX ADMIN — INSULIN HUMAN PRN UNITS: 100 INJECTION, SOLUTION PARENTERAL at 01:06

## 2021-04-23 RX ADMIN — NEOMYCIN AND POLYMYXIN B SULFATES AND BACITRACIN ZINC SCH APPLIC: 400; 3.5; 5 OINTMENT TOPICAL at 21:22

## 2021-04-23 RX ADMIN — ACETYLCYSTEINE SCH MG: 100 INHALANT RESPIRATORY (INHALATION) at 07:48

## 2021-04-23 RX ADMIN — INSULIN HUMAN PRN UNITS: 100 INJECTION, SOLUTION PARENTERAL at 17:56

## 2021-04-23 RX ADMIN — Medication SCH MG: at 19:37

## 2021-04-23 RX ADMIN — DEXTROSE MONOHYDRATE SCH MLS/HR: 50 INJECTION, SOLUTION INTRAVENOUS at 10:02

## 2021-04-23 RX ADMIN — Medication SCH MG: at 01:33

## 2021-04-23 RX ADMIN — INSULIN HUMAN PRN UNITS: 100 INJECTION, SOLUTION PARENTERAL at 06:21

## 2021-04-23 RX ADMIN — Medication SCH EACH: at 06:22

## 2021-04-23 RX ADMIN — Medication SCH EACH: at 23:52

## 2021-04-23 RX ADMIN — Medication SCH EACH: at 12:06

## 2021-04-23 RX ADMIN — NEOMYCIN AND POLYMYXIN B SULFATES AND BACITRACIN ZINC SCH APPLIC: 400; 3.5; 5 OINTMENT TOPICAL at 09:31

## 2021-04-23 RX ADMIN — SODIUM CHLORIDE SCH MG: 9 INJECTION, SOLUTION INTRAVENOUS at 21:20

## 2021-04-23 RX ADMIN — SODIUM CHLORIDE SCH MG: 9 INJECTION, SOLUTION INTRAVENOUS at 10:02

## 2021-04-23 RX ADMIN — ACETYLCYSTEINE SCH MG: 100 INHALANT RESPIRATORY (INHALATION) at 15:17

## 2021-04-23 RX ADMIN — Medication SCH MG: at 07:48

## 2021-04-24 VITALS — DIASTOLIC BLOOD PRESSURE: 76 MMHG | SYSTOLIC BLOOD PRESSURE: 134 MMHG

## 2021-04-24 VITALS — DIASTOLIC BLOOD PRESSURE: 76 MMHG | SYSTOLIC BLOOD PRESSURE: 125 MMHG

## 2021-04-24 VITALS — SYSTOLIC BLOOD PRESSURE: 102 MMHG | DIASTOLIC BLOOD PRESSURE: 40 MMHG

## 2021-04-24 VITALS — DIASTOLIC BLOOD PRESSURE: 66 MMHG | SYSTOLIC BLOOD PRESSURE: 122 MMHG

## 2021-04-24 VITALS — SYSTOLIC BLOOD PRESSURE: 184 MMHG | DIASTOLIC BLOOD PRESSURE: 76 MMHG

## 2021-04-24 VITALS — DIASTOLIC BLOOD PRESSURE: 48 MMHG | SYSTOLIC BLOOD PRESSURE: 122 MMHG

## 2021-04-24 VITALS — SYSTOLIC BLOOD PRESSURE: 169 MMHG | DIASTOLIC BLOOD PRESSURE: 61 MMHG

## 2021-04-24 VITALS — SYSTOLIC BLOOD PRESSURE: 151 MMHG | DIASTOLIC BLOOD PRESSURE: 46 MMHG

## 2021-04-24 LAB
BASOPHILS # BLD AUTO: 0 /CMM (ref 0–0.2)
BASOPHILS NFR BLD AUTO: 0.1 % (ref 0–2)
BUN SERPL-MCNC: 86 MG/DL (ref 7–18)
CALCIUM SERPL-MCNC: 7.9 MG/DL (ref 8.5–10.1)
CHLORIDE SERPL-SCNC: 103 MMOL/L (ref 98–107)
CO2 SERPL-SCNC: 26 MMOL/L (ref 21–32)
CREAT SERPL-MCNC: 2.6 MG/DL (ref 0.6–1.3)
EOSINOPHIL NFR BLD AUTO: 0.5 % (ref 0–6)
GLUCOSE SERPL-MCNC: 122 MG/DL (ref 74–106)
HCT VFR BLD AUTO: 24 % (ref 33–45)
HGB BLD-MCNC: 7.6 G/DL (ref 11.5–14.8)
LYMPHOCYTES NFR BLD AUTO: 1.2 /CMM (ref 0.8–4.8)
LYMPHOCYTES NFR BLD AUTO: 7.5 % (ref 20–44)
LYMPHOCYTES NFR BLD MANUAL: 10 % (ref 16–48)
MCHC RBC AUTO-ENTMCNC: 32 G/DL (ref 31–36)
MCV RBC AUTO: 99 FL (ref 82–100)
MONOCYTES NFR BLD AUTO: 1.9 /CMM (ref 0.1–1.3)
MONOCYTES NFR BLD AUTO: 11.6 % (ref 2–12)
MONOCYTES NFR BLD MANUAL: 4 % (ref 0–11)
MYELOCYTES NFR BLD MANUAL: 1 % (ref 0–0)
NEUTROPHILS # BLD AUTO: 12.8 /CMM (ref 1.8–8.9)
NEUTROPHILS NFR BLD AUTO: 80.3 % (ref 43–81)
NEUTS SEG NFR BLD MANUAL: 85 % (ref 42–76)
PLATELET # BLD AUTO: 440 /CMM (ref 150–450)
POTASSIUM SERPL-SCNC: 3.7 MMOL/L (ref 3.5–5.1)
RBC # BLD AUTO: 2.4 MIL/UL (ref 4–5.2)
SODIUM SERPL-SCNC: 139 MMOL/L (ref 136–145)
WBC NRBC COR # BLD AUTO: 16 K/UL (ref 4.3–11)

## 2021-04-24 RX ADMIN — ACETYLCYSTEINE SCH MG: 100 INHALANT RESPIRATORY (INHALATION) at 13:06

## 2021-04-24 RX ADMIN — Medication SCH MG: at 08:11

## 2021-04-24 RX ADMIN — Medication SCH EACH: at 06:50

## 2021-04-24 RX ADMIN — ACETYLCYSTEINE SCH MG: 100 INHALANT RESPIRATORY (INHALATION) at 08:11

## 2021-04-24 RX ADMIN — ACETYLCYSTEINE SCH MG: 100 INHALANT RESPIRATORY (INHALATION) at 00:26

## 2021-04-24 RX ADMIN — ACETYLCYSTEINE SCH MG: 100 INHALANT RESPIRATORY (INHALATION) at 23:40

## 2021-04-24 RX ADMIN — NEOMYCIN AND POLYMYXIN B SULFATES AND BACITRACIN ZINC SCH APPLIC: 400; 3.5; 5 OINTMENT TOPICAL at 20:41

## 2021-04-24 RX ADMIN — Medication SCH MG: at 13:06

## 2021-04-24 RX ADMIN — NEOMYCIN AND POLYMYXIN B SULFATES AND BACITRACIN ZINC SCH APPLIC: 400; 3.5; 5 OINTMENT TOPICAL at 08:43

## 2021-04-24 RX ADMIN — SODIUM HYPOCHLORITE SCH ML: 1.25 SOLUTION TOPICAL at 08:44

## 2021-04-24 RX ADMIN — INSULIN HUMAN PRN UNITS: 100 INJECTION, SOLUTION PARENTERAL at 06:49

## 2021-04-24 RX ADMIN — Medication SCH OZ: at 08:42

## 2021-04-24 RX ADMIN — SODIUM CHLORIDE SCH MG: 9 INJECTION, SOLUTION INTRAVENOUS at 08:41

## 2021-04-24 RX ADMIN — DEXTROSE MONOHYDRATE SCH MLS/HR: 50 INJECTION, SOLUTION INTRAVENOUS at 08:40

## 2021-04-24 RX ADMIN — INSULIN HUMAN PRN UNITS: 100 INJECTION, SOLUTION PARENTERAL at 12:56

## 2021-04-24 RX ADMIN — Medication PRN ML: at 02:25

## 2021-04-24 RX ADMIN — Medication SCH EACH: at 12:56

## 2021-04-24 RX ADMIN — INSULIN HUMAN PRN UNITS: 100 INJECTION, SOLUTION PARENTERAL at 17:38

## 2021-04-24 RX ADMIN — SODIUM HYPOCHLORITE SCH ML: 1.25 SOLUTION TOPICAL at 08:43

## 2021-04-24 RX ADMIN — Medication SCH EACH: at 17:36

## 2021-04-24 RX ADMIN — Medication SCH MG: at 19:29

## 2021-04-24 RX ADMIN — SODIUM CHLORIDE SCH MG: 9 INJECTION, SOLUTION INTRAVENOUS at 20:38

## 2021-04-24 RX ADMIN — Medication SCH MG: at 00:26

## 2021-04-25 VITALS — DIASTOLIC BLOOD PRESSURE: 74 MMHG | SYSTOLIC BLOOD PRESSURE: 165 MMHG

## 2021-04-25 VITALS — DIASTOLIC BLOOD PRESSURE: 68 MMHG | SYSTOLIC BLOOD PRESSURE: 142 MMHG

## 2021-04-25 VITALS — SYSTOLIC BLOOD PRESSURE: 156 MMHG | DIASTOLIC BLOOD PRESSURE: 78 MMHG

## 2021-04-25 VITALS — SYSTOLIC BLOOD PRESSURE: 102 MMHG | DIASTOLIC BLOOD PRESSURE: 43 MMHG

## 2021-04-25 VITALS — SYSTOLIC BLOOD PRESSURE: 136 MMHG | DIASTOLIC BLOOD PRESSURE: 76 MMHG

## 2021-04-25 VITALS — DIASTOLIC BLOOD PRESSURE: 43 MMHG | SYSTOLIC BLOOD PRESSURE: 102 MMHG

## 2021-04-25 LAB
BASOPHILS # BLD AUTO: 0.1 /CMM (ref 0–0.2)
BASOPHILS NFR BLD AUTO: 0.4 % (ref 0–2)
BUN SERPL-MCNC: 103 MG/DL (ref 7–18)
CALCIUM SERPL-MCNC: 8.4 MG/DL (ref 8.5–10.1)
CHLORIDE SERPL-SCNC: 102 MMOL/L (ref 98–107)
CO2 SERPL-SCNC: 24 MMOL/L (ref 21–32)
CREAT SERPL-MCNC: 2.8 MG/DL (ref 0.6–1.3)
EOSINOPHIL NFR BLD AUTO: 0.8 % (ref 0–6)
GLUCOSE SERPL-MCNC: 139 MG/DL (ref 74–106)
HCT VFR BLD AUTO: 27 % (ref 33–45)
HGB BLD-MCNC: 8.5 G/DL (ref 11.5–14.8)
LYMPHOCYTES NFR BLD AUTO: 1.3 /CMM (ref 0.8–4.8)
LYMPHOCYTES NFR BLD AUTO: 6.5 % (ref 20–44)
MCHC RBC AUTO-ENTMCNC: 32 G/DL (ref 31–36)
MCV RBC AUTO: 99 FL (ref 82–100)
MONOCYTES NFR BLD AUTO: 1.5 /CMM (ref 0.1–1.3)
MONOCYTES NFR BLD AUTO: 7.4 % (ref 2–12)
NEUTROPHILS # BLD AUTO: 17.3 /CMM (ref 1.8–8.9)
NEUTROPHILS NFR BLD AUTO: 84.9 % (ref 43–81)
PLATELET # BLD AUTO: 461 /CMM (ref 150–450)
POTASSIUM SERPL-SCNC: 3.9 MMOL/L (ref 3.5–5.1)
RBC # BLD AUTO: 2.68 MIL/UL (ref 4–5.2)
SODIUM SERPL-SCNC: 139 MMOL/L (ref 136–145)
WBC NRBC COR # BLD AUTO: 20.4 K/UL (ref 4.3–11)

## 2021-04-25 RX ADMIN — Medication SCH EACH: at 11:42

## 2021-04-25 RX ADMIN — Medication SCH MG: at 13:12

## 2021-04-25 RX ADMIN — SODIUM CHLORIDE SCH MG: 9 INJECTION, SOLUTION INTRAVENOUS at 09:33

## 2021-04-25 RX ADMIN — Medication SCH EACH: at 01:26

## 2021-04-25 RX ADMIN — Medication SCH MG: at 19:51

## 2021-04-25 RX ADMIN — SODIUM CHLORIDE SCH MG: 9 INJECTION, SOLUTION INTRAVENOUS at 21:49

## 2021-04-25 RX ADMIN — Medication SCH EACH: at 05:55

## 2021-04-25 RX ADMIN — Medication SCH MG: at 07:40

## 2021-04-25 RX ADMIN — Medication PRN ML: at 03:39

## 2021-04-25 RX ADMIN — Medication SCH OZ: at 09:35

## 2021-04-25 RX ADMIN — Medication SCH MG: at 01:49

## 2021-04-25 RX ADMIN — INSULIN HUMAN PRN UNITS: 100 INJECTION, SOLUTION PARENTERAL at 05:54

## 2021-04-25 RX ADMIN — INSULIN HUMAN PRN UNITS: 100 INJECTION, SOLUTION PARENTERAL at 17:12

## 2021-04-25 RX ADMIN — ACETYLCYSTEINE SCH MG: 100 INHALANT RESPIRATORY (INHALATION) at 13:12

## 2021-04-25 RX ADMIN — SODIUM HYPOCHLORITE SCH ML: 1.25 SOLUTION TOPICAL at 09:34

## 2021-04-25 RX ADMIN — DEXTROSE MONOHYDRATE SCH MLS/HR: 50 INJECTION, SOLUTION INTRAVENOUS at 09:33

## 2021-04-25 RX ADMIN — NEOMYCIN AND POLYMYXIN B SULFATES AND BACITRACIN ZINC SCH APPLIC: 400; 3.5; 5 OINTMENT TOPICAL at 09:35

## 2021-04-25 RX ADMIN — INSULIN HUMAN PRN UNITS: 100 INJECTION, SOLUTION PARENTERAL at 01:29

## 2021-04-25 RX ADMIN — NEOMYCIN AND POLYMYXIN B SULFATES AND BACITRACIN ZINC SCH APPLIC: 400; 3.5; 5 OINTMENT TOPICAL at 21:50

## 2021-04-25 RX ADMIN — ACETYLCYSTEINE SCH MG: 100 INHALANT RESPIRATORY (INHALATION) at 07:40

## 2021-04-25 RX ADMIN — Medication SCH EACH: at 17:43

## 2021-04-25 RX ADMIN — INSULIN HUMAN PRN UNITS: 100 INJECTION, SOLUTION PARENTERAL at 11:41

## 2021-04-26 VITALS — DIASTOLIC BLOOD PRESSURE: 68 MMHG | SYSTOLIC BLOOD PRESSURE: 136 MMHG

## 2021-04-26 VITALS — SYSTOLIC BLOOD PRESSURE: 145 MMHG | DIASTOLIC BLOOD PRESSURE: 59 MMHG

## 2021-04-26 VITALS — SYSTOLIC BLOOD PRESSURE: 148 MMHG | DIASTOLIC BLOOD PRESSURE: 65 MMHG

## 2021-04-26 VITALS — DIASTOLIC BLOOD PRESSURE: 53 MMHG | SYSTOLIC BLOOD PRESSURE: 153 MMHG

## 2021-04-26 VITALS — DIASTOLIC BLOOD PRESSURE: 88 MMHG | SYSTOLIC BLOOD PRESSURE: 149 MMHG

## 2021-04-26 VITALS — SYSTOLIC BLOOD PRESSURE: 151 MMHG | DIASTOLIC BLOOD PRESSURE: 73 MMHG

## 2021-04-26 LAB
BASOPHILS # BLD AUTO: 0.1 /CMM (ref 0–0.2)
BASOPHILS NFR BLD AUTO: 0.4 % (ref 0–2)
BUN SERPL-MCNC: 78 MG/DL (ref 7–18)
CALCIUM SERPL-MCNC: 7.8 MG/DL (ref 8.5–10.1)
CHLORIDE SERPL-SCNC: 101 MMOL/L (ref 98–107)
CO2 SERPL-SCNC: 23 MMOL/L (ref 21–32)
CREAT SERPL-MCNC: 2.6 MG/DL (ref 0.6–1.3)
EOSINOPHIL NFR BLD AUTO: 0.9 % (ref 0–6)
GLUCOSE SERPL-MCNC: 161 MG/DL (ref 74–106)
HCT VFR BLD AUTO: 30 % (ref 33–45)
HGB BLD-MCNC: 8 G/DL (ref 11.5–14.8)
LYMPHOCYTES NFR BLD AUTO: 1.4 /CMM (ref 0.8–4.8)
LYMPHOCYTES NFR BLD AUTO: 8 % (ref 20–44)
MCHC RBC AUTO-ENTMCNC: 27 G/DL (ref 31–36)
MCV RBC AUTO: 113 FL (ref 82–100)
MONOCYTES NFR BLD AUTO: 1.3 /CMM (ref 0.1–1.3)
MONOCYTES NFR BLD AUTO: 7.1 % (ref 2–12)
NEUTROPHILS # BLD AUTO: 14.7 /CMM (ref 1.8–8.9)
NEUTROPHILS NFR BLD AUTO: 83.6 % (ref 43–81)
PLATELET # BLD AUTO: 386 /CMM (ref 150–450)
POTASSIUM SERPL-SCNC: 4.3 MMOL/L (ref 3.5–5.1)
RBC # BLD AUTO: 2.62 MIL/UL (ref 4–5.2)
SODIUM SERPL-SCNC: 134 MMOL/L (ref 136–145)
WBC NRBC COR # BLD AUTO: 17.6 K/UL (ref 4.3–11)

## 2021-04-26 PROCEDURE — 0QB10ZZ EXCISION OF SACRUM, OPEN APPROACH: ICD-10-PCS

## 2021-04-26 RX ADMIN — Medication SCH MG: at 02:13

## 2021-04-26 RX ADMIN — INSULIN HUMAN PRN UNITS: 100 INJECTION, SOLUTION PARENTERAL at 12:18

## 2021-04-26 RX ADMIN — SODIUM CHLORIDE SCH MG: 9 INJECTION, SOLUTION INTRAVENOUS at 21:20

## 2021-04-26 RX ADMIN — SODIUM HYPOCHLORITE SCH ML: 1.25 SOLUTION TOPICAL at 08:14

## 2021-04-26 RX ADMIN — Medication PRN ML: at 23:32

## 2021-04-26 RX ADMIN — NEOMYCIN AND POLYMYXIN B SULFATES AND BACITRACIN ZINC SCH APPLIC: 400; 3.5; 5 OINTMENT TOPICAL at 08:15

## 2021-04-26 RX ADMIN — Medication SCH MG: at 14:34

## 2021-04-26 RX ADMIN — Medication SCH OZ: at 08:15

## 2021-04-26 RX ADMIN — HYDROMORPHONE HYDROCHLORIDE PRN MG: 2 TABLET ORAL at 23:37

## 2021-04-26 RX ADMIN — Medication SCH EACH: at 12:14

## 2021-04-26 RX ADMIN — ACETYLCYSTEINE SCH MG: 100 INHALANT RESPIRATORY (INHALATION) at 07:30

## 2021-04-26 RX ADMIN — SODIUM CHLORIDE SCH MG: 9 INJECTION, SOLUTION INTRAVENOUS at 08:13

## 2021-04-26 RX ADMIN — Medication SCH MG: at 07:30

## 2021-04-26 RX ADMIN — Medication SCH EACH: at 17:01

## 2021-04-26 RX ADMIN — ACETYLCYSTEINE SCH MG: 100 INHALANT RESPIRATORY (INHALATION) at 14:34

## 2021-04-26 RX ADMIN — DEXTROSE MONOHYDRATE SCH MLS/HR: 50 INJECTION, SOLUTION INTRAVENOUS at 08:13

## 2021-04-26 RX ADMIN — ACETYLCYSTEINE SCH MG: 100 INHALANT RESPIRATORY (INHALATION) at 23:37

## 2021-04-26 RX ADMIN — NEOMYCIN AND POLYMYXIN B SULFATES AND BACITRACIN ZINC SCH APPLIC: 400; 3.5; 5 OINTMENT TOPICAL at 21:30

## 2021-04-26 RX ADMIN — Medication SCH EACH: at 23:56

## 2021-04-26 RX ADMIN — INSULIN HUMAN PRN UNITS: 100 INJECTION, SOLUTION PARENTERAL at 16:41

## 2021-04-26 RX ADMIN — INSULIN HUMAN PRN UNITS: 100 INJECTION, SOLUTION PARENTERAL at 05:26

## 2021-04-26 RX ADMIN — Medication SCH EACH: at 00:33

## 2021-04-26 RX ADMIN — Medication SCH EACH: at 05:25

## 2021-04-26 RX ADMIN — Medication SCH MG: at 20:00

## 2021-04-26 RX ADMIN — ACETYLCYSTEINE SCH MG: 100 INHALANT RESPIRATORY (INHALATION) at 00:03

## 2021-04-27 VITALS — DIASTOLIC BLOOD PRESSURE: 77 MMHG | SYSTOLIC BLOOD PRESSURE: 146 MMHG

## 2021-04-27 VITALS — SYSTOLIC BLOOD PRESSURE: 147 MMHG | DIASTOLIC BLOOD PRESSURE: 73 MMHG

## 2021-04-27 VITALS — SYSTOLIC BLOOD PRESSURE: 159 MMHG | DIASTOLIC BLOOD PRESSURE: 64 MMHG

## 2021-04-27 VITALS — SYSTOLIC BLOOD PRESSURE: 146 MMHG | DIASTOLIC BLOOD PRESSURE: 70 MMHG

## 2021-04-27 VITALS — DIASTOLIC BLOOD PRESSURE: 75 MMHG | SYSTOLIC BLOOD PRESSURE: 157 MMHG

## 2021-04-27 VITALS — DIASTOLIC BLOOD PRESSURE: 71 MMHG | SYSTOLIC BLOOD PRESSURE: 145 MMHG

## 2021-04-27 LAB
ALBUMIN SERPL BCP-MCNC: 1.4 G/DL (ref 3.4–5)
ALP SERPL-CCNC: 472 U/L (ref 46–116)
ALT SERPL W P-5'-P-CCNC: 70 U/L (ref 12–78)
AST SERPL W P-5'-P-CCNC: 103 U/L (ref 15–37)
BASOPHILS # BLD AUTO: 0 /CMM (ref 0–0.2)
BASOPHILS NFR BLD AUTO: 0.2 % (ref 0–2)
BILIRUB DIRECT SERPL-MCNC: 0.2 MG/DL (ref 0–0.2)
BILIRUB SERPL-MCNC: 0.5 MG/DL (ref 0.2–1)
BUN SERPL-MCNC: 91 MG/DL (ref 7–18)
CALCIUM SERPL-MCNC: 7.8 MG/DL (ref 8.5–10.1)
CHLORIDE SERPL-SCNC: 100 MMOL/L (ref 98–107)
CO2 SERPL-SCNC: 25 MMOL/L (ref 21–32)
CREAT SERPL-MCNC: 2.9 MG/DL (ref 0.6–1.3)
EOSINOPHIL NFR BLD AUTO: 0.8 % (ref 0–6)
GLUCOSE SERPL-MCNC: 204 MG/DL (ref 74–106)
HCT VFR BLD AUTO: 25 % (ref 33–45)
HGB BLD-MCNC: 7.9 G/DL (ref 11.5–14.8)
LYMPHOCYTES NFR BLD AUTO: 1.4 /CMM (ref 0.8–4.8)
LYMPHOCYTES NFR BLD AUTO: 5.6 % (ref 20–44)
MCHC RBC AUTO-ENTMCNC: 31 G/DL (ref 31–36)
MCV RBC AUTO: 100 FL (ref 82–100)
MONOCYTES NFR BLD AUTO: 1.8 /CMM (ref 0.1–1.3)
MONOCYTES NFR BLD AUTO: 7.6 % (ref 2–12)
NEUTROPHILS # BLD AUTO: 20.9 /CMM (ref 1.8–8.9)
NEUTROPHILS NFR BLD AUTO: 85.8 % (ref 43–81)
PLATELET # BLD AUTO: 469 /CMM (ref 150–450)
POTASSIUM SERPL-SCNC: 3.9 MMOL/L (ref 3.5–5.1)
PROT SERPL-MCNC: 6.5 G/DL (ref 6.4–8.2)
RBC # BLD AUTO: 2.53 MIL/UL (ref 4–5.2)
SODIUM SERPL-SCNC: 136 MMOL/L (ref 136–145)
WBC NRBC COR # BLD AUTO: 24.4 K/UL (ref 4.3–11)

## 2021-04-27 PROCEDURE — 0W993ZZ DRAINAGE OF RIGHT PLEURAL CAVITY, PERCUTANEOUS APPROACH: ICD-10-PCS

## 2021-04-27 RX ADMIN — INSULIN HUMAN PRN UNITS: 100 INJECTION, SOLUTION PARENTERAL at 23:38

## 2021-04-27 RX ADMIN — INSULIN HUMAN PRN UNITS: 100 INJECTION, SOLUTION PARENTERAL at 00:02

## 2021-04-27 RX ADMIN — SODIUM CHLORIDE SCH MG: 9 INJECTION, SOLUTION INTRAVENOUS at 20:31

## 2021-04-27 RX ADMIN — SODIUM HYPOCHLORITE SCH ML: 1.25 SOLUTION TOPICAL at 09:15

## 2021-04-27 RX ADMIN — Medication SCH OZ: at 09:15

## 2021-04-27 RX ADMIN — ACETYLCYSTEINE SCH MG: 100 INHALANT RESPIRATORY (INHALATION) at 22:55

## 2021-04-27 RX ADMIN — Medication SCH EACH: at 11:35

## 2021-04-27 RX ADMIN — HYDROMORPHONE HYDROCHLORIDE PRN MG: 2 TABLET ORAL at 15:43

## 2021-04-27 RX ADMIN — Medication SCH EACH: at 17:33

## 2021-04-27 RX ADMIN — Medication SCH MG: at 20:14

## 2021-04-27 RX ADMIN — INSULIN HUMAN PRN UNITS: 100 INJECTION, SOLUTION PARENTERAL at 13:19

## 2021-04-27 RX ADMIN — Medication SCH MG: at 13:55

## 2021-04-27 RX ADMIN — SODIUM CHLORIDE SCH MG: 9 INJECTION, SOLUTION INTRAVENOUS at 09:14

## 2021-04-27 RX ADMIN — INSULIN HUMAN PRN UNITS: 100 INJECTION, SOLUTION PARENTERAL at 06:15

## 2021-04-27 RX ADMIN — ACETYLCYSTEINE SCH MG: 100 INHALANT RESPIRATORY (INHALATION) at 13:55

## 2021-04-27 RX ADMIN — Medication SCH EACH: at 06:13

## 2021-04-27 RX ADMIN — ACETYLCYSTEINE SCH MG: 100 INHALANT RESPIRATORY (INHALATION) at 07:43

## 2021-04-27 RX ADMIN — Medication SCH MG: at 01:30

## 2021-04-27 RX ADMIN — HYDROMORPHONE HYDROCHLORIDE PRN MG: 2 TABLET ORAL at 21:37

## 2021-04-27 RX ADMIN — INSULIN HUMAN PRN UNITS: 100 INJECTION, SOLUTION PARENTERAL at 17:36

## 2021-04-27 RX ADMIN — Medication SCH MG: at 07:43

## 2021-04-27 RX ADMIN — Medication SCH EACH: at 23:38

## 2021-04-27 RX ADMIN — DEXTROSE MONOHYDRATE SCH MLS/HR: 50 INJECTION, SOLUTION INTRAVENOUS at 09:16

## 2021-04-28 VITALS — SYSTOLIC BLOOD PRESSURE: 126 MMHG | DIASTOLIC BLOOD PRESSURE: 76 MMHG

## 2021-04-28 VITALS — DIASTOLIC BLOOD PRESSURE: 66 MMHG | SYSTOLIC BLOOD PRESSURE: 134 MMHG

## 2021-04-28 VITALS — SYSTOLIC BLOOD PRESSURE: 140 MMHG | DIASTOLIC BLOOD PRESSURE: 75 MMHG

## 2021-04-28 VITALS — SYSTOLIC BLOOD PRESSURE: 122 MMHG | DIASTOLIC BLOOD PRESSURE: 65 MMHG

## 2021-04-28 VITALS — DIASTOLIC BLOOD PRESSURE: 66 MMHG | SYSTOLIC BLOOD PRESSURE: 122 MMHG

## 2021-04-28 VITALS — SYSTOLIC BLOOD PRESSURE: 155 MMHG | DIASTOLIC BLOOD PRESSURE: 98 MMHG

## 2021-04-28 LAB
BASOPHILS # BLD AUTO: 0.1 /CMM (ref 0–0.2)
BASOPHILS NFR BLD AUTO: 0.2 % (ref 0–2)
BUN SERPL-MCNC: 65 MG/DL (ref 7–18)
CALCIUM SERPL-MCNC: 7.3 MG/DL (ref 8.5–10.1)
CHLORIDE SERPL-SCNC: 100 MMOL/L (ref 98–107)
CO2 SERPL-SCNC: 30 MMOL/L (ref 21–32)
CREAT SERPL-MCNC: 2.4 MG/DL (ref 0.6–1.3)
EOSINOPHIL NFR BLD AUTO: 0.8 % (ref 0–6)
GLUCOSE SERPL-MCNC: 120 MG/DL (ref 74–106)
HCT VFR BLD AUTO: 22 % (ref 33–45)
HGB BLD-MCNC: 7 G/DL (ref 11.5–14.8)
LYMPHOCYTES NFR BLD AUTO: 1.6 /CMM (ref 0.8–4.8)
LYMPHOCYTES NFR BLD AUTO: 7.2 % (ref 20–44)
MCHC RBC AUTO-ENTMCNC: 32 G/DL (ref 31–36)
MCV RBC AUTO: 101 FL (ref 82–100)
MONOCYTES NFR BLD AUTO: 2 /CMM (ref 0.1–1.3)
MONOCYTES NFR BLD AUTO: 9 % (ref 2–12)
NEUTROPHILS # BLD AUTO: 17.9 /CMM (ref 1.8–8.9)
NEUTROPHILS NFR BLD AUTO: 82.8 % (ref 43–81)
PLATELET # BLD AUTO: 391 /CMM (ref 150–450)
POTASSIUM SERPL-SCNC: 3.5 MMOL/L (ref 3.5–5.1)
RBC # BLD AUTO: 2.19 MIL/UL (ref 4–5.2)
SODIUM SERPL-SCNC: 138 MMOL/L (ref 136–145)
WBC NRBC COR # BLD AUTO: 21.7 K/UL (ref 4.3–11)

## 2021-04-28 RX ADMIN — ACETYLCYSTEINE SCH MG: 100 INHALANT RESPIRATORY (INHALATION) at 23:57

## 2021-04-28 RX ADMIN — Medication SCH EACH: at 05:24

## 2021-04-28 RX ADMIN — Medication SCH OZ: at 08:54

## 2021-04-28 RX ADMIN — Medication SCH MG: at 20:01

## 2021-04-28 RX ADMIN — ACETYLCYSTEINE SCH MG: 100 INHALANT RESPIRATORY (INHALATION) at 07:57

## 2021-04-28 RX ADMIN — Medication SCH MG: at 02:11

## 2021-04-28 RX ADMIN — DEXTROSE MONOHYDRATE SCH MLS/HR: 50 INJECTION, SOLUTION INTRAVENOUS at 08:54

## 2021-04-28 RX ADMIN — INSULIN HUMAN PRN UNITS: 100 INJECTION, SOLUTION PARENTERAL at 12:11

## 2021-04-28 RX ADMIN — SODIUM CHLORIDE SCH MG: 9 INJECTION, SOLUTION INTRAVENOUS at 20:18

## 2021-04-28 RX ADMIN — SODIUM CHLORIDE SCH MG: 9 INJECTION, SOLUTION INTRAVENOUS at 08:54

## 2021-04-28 RX ADMIN — Medication PRN ML: at 00:00

## 2021-04-28 RX ADMIN — Medication SCH MG: at 07:57

## 2021-04-28 RX ADMIN — Medication SCH EACH: at 12:07

## 2021-04-28 RX ADMIN — Medication SCH EACH: at 18:35

## 2021-04-28 RX ADMIN — INSULIN HUMAN PRN UNITS: 100 INJECTION, SOLUTION PARENTERAL at 05:25

## 2021-04-28 RX ADMIN — Medication SCH MG: at 13:46

## 2021-04-28 RX ADMIN — INSULIN HUMAN PRN UNITS: 100 INJECTION, SOLUTION PARENTERAL at 18:37

## 2021-04-28 RX ADMIN — ACETYLCYSTEINE SCH MG: 100 INHALANT RESPIRATORY (INHALATION) at 15:24

## 2021-04-28 RX ADMIN — SODIUM HYPOCHLORITE SCH ML: 1.25 SOLUTION TOPICAL at 08:54

## 2021-04-29 VITALS — DIASTOLIC BLOOD PRESSURE: 68 MMHG | SYSTOLIC BLOOD PRESSURE: 126 MMHG

## 2021-04-29 VITALS — SYSTOLIC BLOOD PRESSURE: 106 MMHG | DIASTOLIC BLOOD PRESSURE: 66 MMHG

## 2021-04-29 VITALS — DIASTOLIC BLOOD PRESSURE: 78 MMHG | SYSTOLIC BLOOD PRESSURE: 98 MMHG

## 2021-04-29 VITALS — SYSTOLIC BLOOD PRESSURE: 134 MMHG | DIASTOLIC BLOOD PRESSURE: 62 MMHG

## 2021-04-29 VITALS — SYSTOLIC BLOOD PRESSURE: 149 MMHG | DIASTOLIC BLOOD PRESSURE: 75 MMHG

## 2021-04-29 VITALS — SYSTOLIC BLOOD PRESSURE: 136 MMHG | DIASTOLIC BLOOD PRESSURE: 61 MMHG

## 2021-04-29 LAB
BASOPHILS # BLD AUTO: 0.1 /CMM (ref 0–0.2)
BASOPHILS NFR BLD AUTO: 0.3 % (ref 0–2)
BUN SERPL-MCNC: 81 MG/DL (ref 7–18)
CALCIUM SERPL-MCNC: 8.1 MG/DL (ref 8.5–10.1)
CHLORIDE SERPL-SCNC: 99 MMOL/L (ref 98–107)
CO2 SERPL-SCNC: 29 MMOL/L (ref 21–32)
CREAT SERPL-MCNC: 3 MG/DL (ref 0.6–1.3)
EOSINOPHIL NFR BLD AUTO: 1.1 % (ref 0–6)
GLUCOSE SERPL-MCNC: 165 MG/DL (ref 74–106)
HCT VFR BLD AUTO: 23 % (ref 33–45)
HGB BLD-MCNC: 7.3 G/DL (ref 11.5–14.8)
LYMPHOCYTES NFR BLD AUTO: 1.3 /CMM (ref 0.8–4.8)
LYMPHOCYTES NFR BLD AUTO: 5 % (ref 20–44)
MCHC RBC AUTO-ENTMCNC: 31 G/DL (ref 31–36)
MCV RBC AUTO: 103 FL (ref 82–100)
MONOCYTES NFR BLD AUTO: 1.9 /CMM (ref 0.1–1.3)
MONOCYTES NFR BLD AUTO: 7.5 % (ref 2–12)
NEUTROPHILS # BLD AUTO: 21.8 /CMM (ref 1.8–8.9)
NEUTROPHILS NFR BLD AUTO: 86.1 % (ref 43–81)
PLATELET # BLD AUTO: 402 /CMM (ref 150–450)
POTASSIUM SERPL-SCNC: 4.2 MMOL/L (ref 3.5–5.1)
RBC # BLD AUTO: 2.27 MIL/UL (ref 4–5.2)
SODIUM SERPL-SCNC: 135 MMOL/L (ref 136–145)
WBC NRBC COR # BLD AUTO: 25.3 K/UL (ref 4.3–11)

## 2021-04-29 PROCEDURE — 0JBR0ZZ EXCISION OF LEFT FOOT SUBCUTANEOUS TISSUE AND FASCIA, OPEN APPROACH: ICD-10-PCS | Performed by: STUDENT IN AN ORGANIZED HEALTH CARE EDUCATION/TRAINING PROGRAM

## 2021-04-29 RX ADMIN — Medication SCH MG: at 12:49

## 2021-04-29 RX ADMIN — Medication SCH MG: at 07:14

## 2021-04-29 RX ADMIN — INSULIN HUMAN PRN UNITS: 100 INJECTION, SOLUTION PARENTERAL at 17:35

## 2021-04-29 RX ADMIN — INSULIN HUMAN PRN UNITS: 100 INJECTION, SOLUTION PARENTERAL at 05:21

## 2021-04-29 RX ADMIN — SODIUM HYPOCHLORITE SCH ML: 1.25 SOLUTION TOPICAL at 09:15

## 2021-04-29 RX ADMIN — Medication SCH EACH: at 12:00

## 2021-04-29 RX ADMIN — Medication SCH MG: at 01:29

## 2021-04-29 RX ADMIN — INSULIN HUMAN PRN UNITS: 100 INJECTION, SOLUTION PARENTERAL at 13:13

## 2021-04-29 RX ADMIN — Medication SCH EACH: at 05:20

## 2021-04-29 RX ADMIN — DEXTROSE MONOHYDRATE SCH MLS/HR: 50 INJECTION, SOLUTION INTRAVENOUS at 09:14

## 2021-04-29 RX ADMIN — INSULIN HUMAN PRN UNITS: 100 INJECTION, SOLUTION PARENTERAL at 01:04

## 2021-04-29 RX ADMIN — Medication SCH OZ: at 09:15

## 2021-04-29 RX ADMIN — Medication SCH MG: at 19:44

## 2021-04-29 RX ADMIN — Medication SCH OZ: at 09:16

## 2021-04-29 RX ADMIN — ACETYLCYSTEINE SCH MG: 100 INHALANT RESPIRATORY (INHALATION) at 12:49

## 2021-04-29 RX ADMIN — Medication PRN ML: at 21:52

## 2021-04-29 RX ADMIN — Medication SCH EACH: at 01:04

## 2021-04-29 RX ADMIN — Medication PRN ML: at 00:00

## 2021-04-29 RX ADMIN — SODIUM CHLORIDE SCH MG: 9 INJECTION, SOLUTION INTRAVENOUS at 09:14

## 2021-04-29 RX ADMIN — INSULIN HUMAN PRN UNITS: 100 INJECTION, SOLUTION PARENTERAL at 23:25

## 2021-04-29 RX ADMIN — SODIUM CHLORIDE SCH MG: 9 INJECTION, SOLUTION INTRAVENOUS at 21:53

## 2021-04-29 RX ADMIN — ACETYLCYSTEINE SCH MG: 100 INHALANT RESPIRATORY (INHALATION) at 23:14

## 2021-04-29 RX ADMIN — Medication SCH EACH: at 17:12

## 2021-04-29 RX ADMIN — Medication SCH EACH: at 23:28

## 2021-04-29 RX ADMIN — ACETYLCYSTEINE SCH MG: 100 INHALANT RESPIRATORY (INHALATION) at 07:47

## 2021-04-30 VITALS — DIASTOLIC BLOOD PRESSURE: 64 MMHG | SYSTOLIC BLOOD PRESSURE: 127 MMHG

## 2021-04-30 VITALS — DIASTOLIC BLOOD PRESSURE: 68 MMHG | SYSTOLIC BLOOD PRESSURE: 137 MMHG

## 2021-04-30 VITALS — SYSTOLIC BLOOD PRESSURE: 112 MMHG | DIASTOLIC BLOOD PRESSURE: 59 MMHG

## 2021-04-30 VITALS — DIASTOLIC BLOOD PRESSURE: 88 MMHG | SYSTOLIC BLOOD PRESSURE: 113 MMHG

## 2021-04-30 VITALS — SYSTOLIC BLOOD PRESSURE: 119 MMHG | DIASTOLIC BLOOD PRESSURE: 95 MMHG

## 2021-04-30 VITALS — DIASTOLIC BLOOD PRESSURE: 95 MMHG | SYSTOLIC BLOOD PRESSURE: 119 MMHG

## 2021-04-30 VITALS — SYSTOLIC BLOOD PRESSURE: 111 MMHG | DIASTOLIC BLOOD PRESSURE: 60 MMHG

## 2021-04-30 VITALS — SYSTOLIC BLOOD PRESSURE: 118 MMHG | DIASTOLIC BLOOD PRESSURE: 68 MMHG

## 2021-04-30 VITALS — SYSTOLIC BLOOD PRESSURE: 121 MMHG | DIASTOLIC BLOOD PRESSURE: 70 MMHG

## 2021-04-30 VITALS — SYSTOLIC BLOOD PRESSURE: 122 MMHG | DIASTOLIC BLOOD PRESSURE: 74 MMHG

## 2021-04-30 VITALS — DIASTOLIC BLOOD PRESSURE: 66 MMHG | SYSTOLIC BLOOD PRESSURE: 148 MMHG

## 2021-04-30 VITALS — DIASTOLIC BLOOD PRESSURE: 63 MMHG | SYSTOLIC BLOOD PRESSURE: 126 MMHG

## 2021-04-30 LAB
BASOPHILS # BLD AUTO: 0.1 /CMM (ref 0–0.2)
BASOPHILS NFR BLD AUTO: 0.3 % (ref 0–2)
BUN SERPL-MCNC: 65 MG/DL (ref 7–18)
CALCIUM SERPL-MCNC: 7.9 MG/DL (ref 8.5–10.1)
CHLORIDE SERPL-SCNC: 98 MMOL/L (ref 98–107)
CO2 SERPL-SCNC: 30 MMOL/L (ref 21–32)
CREAT SERPL-MCNC: 2.4 MG/DL (ref 0.6–1.3)
EOSINOPHIL NFR BLD AUTO: 1.9 % (ref 0–6)
EOSINOPHIL NFR BLD MANUAL: 1 % (ref 0–4)
GLUCOSE SERPL-MCNC: 153 MG/DL (ref 74–106)
HCT VFR BLD AUTO: 21 % (ref 33–45)
HGB BLD-MCNC: 6.5 G/DL (ref 11.5–14.8)
LYMPHOCYTES NFR BLD AUTO: 1.3 /CMM (ref 0.8–4.8)
LYMPHOCYTES NFR BLD AUTO: 6 % (ref 20–44)
LYMPHOCYTES NFR BLD MANUAL: 9 % (ref 16–48)
MCHC RBC AUTO-ENTMCNC: 32 G/DL (ref 31–36)
MCV RBC AUTO: 102 FL (ref 82–100)
MONOCYTES NFR BLD AUTO: 10.1 % (ref 2–12)
MONOCYTES NFR BLD AUTO: 2.2 /CMM (ref 0.1–1.3)
MONOCYTES NFR BLD MANUAL: 5 % (ref 0–11)
NEUTROPHILS # BLD AUTO: 17.9 /CMM (ref 1.8–8.9)
NEUTROPHILS NFR BLD AUTO: 81.7 % (ref 43–81)
NEUTS BAND NFR BLD MANUAL: 1 % (ref 0–5)
NEUTS SEG NFR BLD MANUAL: 84 % (ref 42–76)
PLATELET # BLD AUTO: 366 /CMM (ref 150–450)
POTASSIUM SERPL-SCNC: 3.1 MMOL/L (ref 3.5–5.1)
RBC # BLD AUTO: 2.02 MIL/UL (ref 4–5.2)
SODIUM SERPL-SCNC: 135 MMOL/L (ref 136–145)
WBC NRBC COR # BLD AUTO: 21.8 K/UL (ref 4.3–11)

## 2021-04-30 PROCEDURE — 30233N1 TRANSFUSION OF NONAUTOLOGOUS RED BLOOD CELLS INTO PERIPHERAL VEIN, PERCUTANEOUS APPROACH: ICD-10-PCS | Performed by: INTERNAL MEDICINE

## 2021-04-30 RX ADMIN — INSULIN HUMAN PRN UNITS: 100 INJECTION, SOLUTION PARENTERAL at 05:40

## 2021-04-30 RX ADMIN — ACETYLCYSTEINE SCH MG: 100 INHALANT RESPIRATORY (INHALATION) at 07:27

## 2021-04-30 RX ADMIN — Medication PRN ML: at 23:32

## 2021-04-30 RX ADMIN — Medication SCH MG: at 01:47

## 2021-04-30 RX ADMIN — SODIUM CHLORIDE SCH MG: 9 INJECTION, SOLUTION INTRAVENOUS at 21:44

## 2021-04-30 RX ADMIN — Medication SCH EACH: at 05:38

## 2021-04-30 RX ADMIN — Medication SCH OZ: at 09:53

## 2021-04-30 RX ADMIN — ACETYLCYSTEINE SCH MG: 100 INHALANT RESPIRATORY (INHALATION) at 14:06

## 2021-04-30 RX ADMIN — INSULIN HUMAN PRN UNITS: 100 INJECTION, SOLUTION PARENTERAL at 18:27

## 2021-04-30 RX ADMIN — SODIUM HYPOCHLORITE SCH ML: 1.25 SOLUTION TOPICAL at 09:53

## 2021-04-30 RX ADMIN — Medication SCH MG: at 14:06

## 2021-04-30 RX ADMIN — Medication SCH EACH: at 05:41

## 2021-04-30 RX ADMIN — Medication SCH MG: at 20:00

## 2021-04-30 RX ADMIN — ACETYLCYSTEINE SCH MG: 100 INHALANT RESPIRATORY (INHALATION) at 23:39

## 2021-04-30 RX ADMIN — SODIUM CHLORIDE SCH MG: 9 INJECTION, SOLUTION INTRAVENOUS at 09:53

## 2021-04-30 RX ADMIN — DEXTROSE MONOHYDRATE SCH MLS/HR: 50 INJECTION, SOLUTION INTRAVENOUS at 09:53

## 2021-04-30 RX ADMIN — Medication SCH EACH: at 18:25

## 2021-04-30 RX ADMIN — Medication SCH MG: at 07:27

## 2021-05-01 VITALS — DIASTOLIC BLOOD PRESSURE: 64 MMHG | SYSTOLIC BLOOD PRESSURE: 124 MMHG

## 2021-05-01 VITALS — DIASTOLIC BLOOD PRESSURE: 70 MMHG | SYSTOLIC BLOOD PRESSURE: 146 MMHG

## 2021-05-01 VITALS — SYSTOLIC BLOOD PRESSURE: 146 MMHG | DIASTOLIC BLOOD PRESSURE: 62 MMHG

## 2021-05-01 VITALS — SYSTOLIC BLOOD PRESSURE: 121 MMHG | DIASTOLIC BLOOD PRESSURE: 69 MMHG

## 2021-05-01 VITALS — SYSTOLIC BLOOD PRESSURE: 125 MMHG | DIASTOLIC BLOOD PRESSURE: 74 MMHG

## 2021-05-01 VITALS — SYSTOLIC BLOOD PRESSURE: 142 MMHG | DIASTOLIC BLOOD PRESSURE: 59 MMHG

## 2021-05-01 LAB
BASOPHILS # BLD AUTO: 0.1 /CMM (ref 0–0.2)
BASOPHILS NFR BLD AUTO: 0.3 % (ref 0–2)
BUN SERPL-MCNC: 86 MG/DL (ref 7–18)
CALCIUM SERPL-MCNC: 7.7 MG/DL (ref 8.5–10.1)
CHLORIDE SERPL-SCNC: 97 MMOL/L (ref 98–107)
CO2 SERPL-SCNC: 28 MMOL/L (ref 21–32)
CREAT SERPL-MCNC: 2.8 MG/DL (ref 0.6–1.3)
EOSINOPHIL NFR BLD AUTO: 1.5 % (ref 0–6)
GLUCOSE SERPL-MCNC: 188 MG/DL (ref 74–106)
HCT VFR BLD AUTO: 27 % (ref 33–45)
HGB BLD-MCNC: 8.7 G/DL (ref 11.5–14.8)
LYMPHOCYTES NFR BLD AUTO: 1.2 /CMM (ref 0.8–4.8)
LYMPHOCYTES NFR BLD AUTO: 5.4 % (ref 20–44)
MCHC RBC AUTO-ENTMCNC: 32 G/DL (ref 31–36)
MCV RBC AUTO: 100 FL (ref 82–100)
MONOCYTES NFR BLD AUTO: 2 /CMM (ref 0.1–1.3)
MONOCYTES NFR BLD AUTO: 9.1 % (ref 2–12)
NEUTROPHILS # BLD AUTO: 18.5 /CMM (ref 1.8–8.9)
NEUTROPHILS NFR BLD AUTO: 83.7 % (ref 43–81)
PLATELET # BLD AUTO: 357 /CMM (ref 150–450)
POTASSIUM SERPL-SCNC: 3.2 MMOL/L (ref 3.5–5.1)
RBC # BLD AUTO: 2.72 MIL/UL (ref 4–5.2)
SODIUM SERPL-SCNC: 135 MMOL/L (ref 136–145)
WBC NRBC COR # BLD AUTO: 22.2 K/UL (ref 4.3–11)

## 2021-05-01 RX ADMIN — INSULIN HUMAN PRN UNITS: 100 INJECTION, SOLUTION PARENTERAL at 17:54

## 2021-05-01 RX ADMIN — ACETYLCYSTEINE SCH MG: 100 INHALANT RESPIRATORY (INHALATION) at 07:44

## 2021-05-01 RX ADMIN — POTASSIUM CHLORIDE SCH MLS/HR: 200 INJECTION, SOLUTION INTRAVENOUS at 12:00

## 2021-05-01 RX ADMIN — Medication SCH EACH: at 05:50

## 2021-05-01 RX ADMIN — POTASSIUM CHLORIDE SCH MLS/HR: 200 INJECTION, SOLUTION INTRAVENOUS at 11:00

## 2021-05-01 RX ADMIN — Medication SCH EACH: at 23:37

## 2021-05-01 RX ADMIN — Medication SCH EACH: at 17:52

## 2021-05-01 RX ADMIN — Medication SCH OZ: at 09:20

## 2021-05-01 RX ADMIN — SODIUM CHLORIDE SCH MG: 9 INJECTION, SOLUTION INTRAVENOUS at 09:21

## 2021-05-01 RX ADMIN — Medication SCH MG: at 14:16

## 2021-05-01 RX ADMIN — SODIUM HYPOCHLORITE SCH ML: 1.25 SOLUTION TOPICAL at 09:19

## 2021-05-01 RX ADMIN — INSULIN HUMAN PRN UNITS: 100 INJECTION, SOLUTION PARENTERAL at 05:49

## 2021-05-01 RX ADMIN — POTASSIUM CHLORIDE SCH MLS/HR: 200 INJECTION, SOLUTION INTRAVENOUS at 10:28

## 2021-05-01 RX ADMIN — INSULIN HUMAN PRN UNITS: 100 INJECTION, SOLUTION PARENTERAL at 00:17

## 2021-05-01 RX ADMIN — Medication SCH MG: at 07:45

## 2021-05-01 RX ADMIN — Medication SCH EACH: at 05:52

## 2021-05-01 RX ADMIN — INSULIN HUMAN PRN UNITS: 100 INJECTION, SOLUTION PARENTERAL at 23:38

## 2021-05-01 RX ADMIN — Medication SCH MG: at 19:48

## 2021-05-01 RX ADMIN — Medication SCH EACH: at 00:18

## 2021-05-01 RX ADMIN — Medication SCH MG: at 01:20

## 2021-05-01 RX ADMIN — POTASSIUM CHLORIDE SCH MLS/HR: 200 INJECTION, SOLUTION INTRAVENOUS at 13:00

## 2021-05-01 RX ADMIN — DEXTROSE MONOHYDRATE SCH MLS/HR: 50 INJECTION, SOLUTION INTRAVENOUS at 09:21

## 2021-05-01 RX ADMIN — ACETYLCYSTEINE SCH MG: 100 INHALANT RESPIRATORY (INHALATION) at 23:20

## 2021-05-01 RX ADMIN — SODIUM CHLORIDE SCH MG: 9 INJECTION, SOLUTION INTRAVENOUS at 20:11

## 2021-05-01 RX ADMIN — ACETYLCYSTEINE SCH MG: 100 INHALANT RESPIRATORY (INHALATION) at 15:59

## 2021-05-02 VITALS — SYSTOLIC BLOOD PRESSURE: 141 MMHG | DIASTOLIC BLOOD PRESSURE: 65 MMHG

## 2021-05-02 VITALS — SYSTOLIC BLOOD PRESSURE: 143 MMHG | DIASTOLIC BLOOD PRESSURE: 72 MMHG

## 2021-05-02 VITALS — DIASTOLIC BLOOD PRESSURE: 64 MMHG | SYSTOLIC BLOOD PRESSURE: 142 MMHG

## 2021-05-02 VITALS — DIASTOLIC BLOOD PRESSURE: 67 MMHG | SYSTOLIC BLOOD PRESSURE: 143 MMHG

## 2021-05-02 VITALS — SYSTOLIC BLOOD PRESSURE: 133 MMHG | DIASTOLIC BLOOD PRESSURE: 70 MMHG

## 2021-05-02 VITALS — DIASTOLIC BLOOD PRESSURE: 61 MMHG | SYSTOLIC BLOOD PRESSURE: 138 MMHG

## 2021-05-02 LAB
BASOPHILS # BLD AUTO: 0.1 /CMM (ref 0–0.2)
BASOPHILS NFR BLD AUTO: 0.5 % (ref 0–2)
BUN SERPL-MCNC: 70 MG/DL (ref 7–18)
CALCIUM SERPL-MCNC: 8 MG/DL (ref 8.5–10.1)
CHLORIDE SERPL-SCNC: 97 MMOL/L (ref 98–107)
CO2 SERPL-SCNC: 27 MMOL/L (ref 21–32)
CREAT SERPL-MCNC: 2.4 MG/DL (ref 0.6–1.3)
EOSINOPHIL NFR BLD AUTO: 1.9 % (ref 0–6)
GLUCOSE SERPL-MCNC: 150 MG/DL (ref 74–106)
HCT VFR BLD AUTO: 25 % (ref 33–45)
HGB BLD-MCNC: 8 G/DL (ref 11.5–14.8)
LYMPHOCYTES NFR BLD AUTO: 1.4 /CMM (ref 0.8–4.8)
LYMPHOCYTES NFR BLD AUTO: 7.8 % (ref 20–44)
MAGNESIUM SERPL-MCNC: 1.9 MG/DL (ref 1.8–2.4)
MCHC RBC AUTO-ENTMCNC: 32 G/DL (ref 31–36)
MCV RBC AUTO: 100 FL (ref 82–100)
MONOCYTES NFR BLD AUTO: 1.9 /CMM (ref 0.1–1.3)
MONOCYTES NFR BLD AUTO: 10.6 % (ref 2–12)
NEUTROPHILS # BLD AUTO: 13.9 /CMM (ref 1.8–8.9)
NEUTROPHILS NFR BLD AUTO: 79.2 % (ref 43–81)
PHOSPHATE SERPL-MCNC: 4.5 MG/DL (ref 2.5–4.9)
PLATELET # BLD AUTO: 348 /CMM (ref 150–450)
POTASSIUM SERPL-SCNC: 3.9 MMOL/L (ref 3.5–5.1)
RBC # BLD AUTO: 2.48 MIL/UL (ref 4–5.2)
SODIUM SERPL-SCNC: 133 MMOL/L (ref 136–145)
WBC NRBC COR # BLD AUTO: 17.5 K/UL (ref 4.3–11)

## 2021-05-02 RX ADMIN — Medication SCH MG: at 23:35

## 2021-05-02 RX ADMIN — ACETYLCYSTEINE SCH MG: 100 INHALANT RESPIRATORY (INHALATION) at 13:37

## 2021-05-02 RX ADMIN — INSULIN HUMAN PRN UNITS: 100 INJECTION, SOLUTION PARENTERAL at 12:31

## 2021-05-02 RX ADMIN — Medication SCH MG: at 13:37

## 2021-05-02 RX ADMIN — Medication SCH EACH: at 05:24

## 2021-05-02 RX ADMIN — Medication PRN ML: at 01:45

## 2021-05-02 RX ADMIN — Medication SCH OZ: at 08:22

## 2021-05-02 RX ADMIN — INSULIN HUMAN PRN UNITS: 100 INJECTION, SOLUTION PARENTERAL at 05:29

## 2021-05-02 RX ADMIN — Medication SCH MG: at 08:06

## 2021-05-02 RX ADMIN — SODIUM HYPOCHLORITE SCH ML: 1.25 SOLUTION TOPICAL at 08:21

## 2021-05-02 RX ADMIN — SODIUM CHLORIDE SCH MG: 9 INJECTION, SOLUTION INTRAVENOUS at 08:18

## 2021-05-02 RX ADMIN — SODIUM CHLORIDE SCH MG: 9 INJECTION, SOLUTION INTRAVENOUS at 21:14

## 2021-05-02 RX ADMIN — Medication SCH MG: at 01:09

## 2021-05-02 RX ADMIN — Medication SCH EACH: at 12:27

## 2021-05-02 RX ADMIN — INSULIN HUMAN PRN UNITS: 100 INJECTION, SOLUTION PARENTERAL at 17:07

## 2021-05-02 RX ADMIN — Medication SCH MG: at 19:40

## 2021-05-02 RX ADMIN — ACETYLCYSTEINE SCH MG: 100 INHALANT RESPIRATORY (INHALATION) at 08:06

## 2021-05-02 RX ADMIN — Medication SCH OZ: at 08:19

## 2021-05-02 RX ADMIN — DEXTROSE MONOHYDRATE SCH MLS/HR: 50 INJECTION, SOLUTION INTRAVENOUS at 08:18

## 2021-05-02 RX ADMIN — Medication SCH EACH: at 17:05

## 2021-05-02 RX ADMIN — ACETYLCYSTEINE SCH MG: 100 INHALANT RESPIRATORY (INHALATION) at 23:35

## 2021-05-03 ENCOUNTER — HOSPITAL ENCOUNTER (INPATIENT)
Dept: HOSPITAL 54 - SA | Age: 81
LOS: 17 days | Discharge: ANOTHER HOSPITAL | DRG: 207 | End: 2021-05-20
Attending: INTERNAL MEDICINE | Admitting: INTERNAL MEDICINE
Payer: MEDICARE

## 2021-05-03 VITALS — DIASTOLIC BLOOD PRESSURE: 71 MMHG | SYSTOLIC BLOOD PRESSURE: 131 MMHG

## 2021-05-03 VITALS — BODY MASS INDEX: 28.52 KG/M2 | HEIGHT: 62 IN | WEIGHT: 155 LBS

## 2021-05-03 VITALS — DIASTOLIC BLOOD PRESSURE: 65 MMHG | SYSTOLIC BLOOD PRESSURE: 109 MMHG

## 2021-05-03 VITALS — SYSTOLIC BLOOD PRESSURE: 148 MMHG | DIASTOLIC BLOOD PRESSURE: 69 MMHG

## 2021-05-03 VITALS — SYSTOLIC BLOOD PRESSURE: 153 MMHG | DIASTOLIC BLOOD PRESSURE: 72 MMHG

## 2021-05-03 VITALS — DIASTOLIC BLOOD PRESSURE: 66 MMHG | SYSTOLIC BLOOD PRESSURE: 147 MMHG

## 2021-05-03 DIAGNOSIS — G93.1: ICD-10-CM

## 2021-05-03 DIAGNOSIS — L97.429: ICD-10-CM

## 2021-05-03 DIAGNOSIS — K74.60: ICD-10-CM

## 2021-05-03 DIAGNOSIS — L30.4: ICD-10-CM

## 2021-05-03 DIAGNOSIS — E11.51: ICD-10-CM

## 2021-05-03 DIAGNOSIS — Z86.16: ICD-10-CM

## 2021-05-03 DIAGNOSIS — E87.1: ICD-10-CM

## 2021-05-03 DIAGNOSIS — K29.40: ICD-10-CM

## 2021-05-03 DIAGNOSIS — Z98.890: ICD-10-CM

## 2021-05-03 DIAGNOSIS — Z16.12: ICD-10-CM

## 2021-05-03 DIAGNOSIS — F02.80: ICD-10-CM

## 2021-05-03 DIAGNOSIS — Z93.1: ICD-10-CM

## 2021-05-03 DIAGNOSIS — K57.30: ICD-10-CM

## 2021-05-03 DIAGNOSIS — I50.9: ICD-10-CM

## 2021-05-03 DIAGNOSIS — I25.10: ICD-10-CM

## 2021-05-03 DIAGNOSIS — E11.40: ICD-10-CM

## 2021-05-03 DIAGNOSIS — M62.562: ICD-10-CM

## 2021-05-03 DIAGNOSIS — I13.2: ICD-10-CM

## 2021-05-03 DIAGNOSIS — R13.10: ICD-10-CM

## 2021-05-03 DIAGNOSIS — Z93.0: ICD-10-CM

## 2021-05-03 DIAGNOSIS — E03.9: ICD-10-CM

## 2021-05-03 DIAGNOSIS — E87.6: ICD-10-CM

## 2021-05-03 DIAGNOSIS — J44.9: ICD-10-CM

## 2021-05-03 DIAGNOSIS — K21.9: ICD-10-CM

## 2021-05-03 DIAGNOSIS — Z99.2: ICD-10-CM

## 2021-05-03 DIAGNOSIS — G92: ICD-10-CM

## 2021-05-03 DIAGNOSIS — E11.621: ICD-10-CM

## 2021-05-03 DIAGNOSIS — I70.0: ICD-10-CM

## 2021-05-03 DIAGNOSIS — L97.519: ICD-10-CM

## 2021-05-03 DIAGNOSIS — J96.22: ICD-10-CM

## 2021-05-03 DIAGNOSIS — M62.561: ICD-10-CM

## 2021-05-03 DIAGNOSIS — K44.9: ICD-10-CM

## 2021-05-03 DIAGNOSIS — Z22.322: ICD-10-CM

## 2021-05-03 DIAGNOSIS — L97.529: ICD-10-CM

## 2021-05-03 DIAGNOSIS — Z99.11: ICD-10-CM

## 2021-05-03 DIAGNOSIS — D63.1: ICD-10-CM

## 2021-05-03 DIAGNOSIS — E43: ICD-10-CM

## 2021-05-03 DIAGNOSIS — Z87.01: ICD-10-CM

## 2021-05-03 DIAGNOSIS — N18.6: ICD-10-CM

## 2021-05-03 DIAGNOSIS — L89.153: ICD-10-CM

## 2021-05-03 DIAGNOSIS — J96.21: Primary | ICD-10-CM

## 2021-05-03 DIAGNOSIS — R18.8: ICD-10-CM

## 2021-05-03 DIAGNOSIS — E11.22: ICD-10-CM

## 2021-05-03 DIAGNOSIS — G20: ICD-10-CM

## 2021-05-03 LAB
BASOPHILS # BLD AUTO: 0.1 /CMM (ref 0–0.2)
BASOPHILS NFR BLD AUTO: 0.3 % (ref 0–2)
BUN SERPL-MCNC: 89 MG/DL (ref 7–18)
CALCIUM SERPL-MCNC: 7.9 MG/DL (ref 8.5–10.1)
CHLORIDE SERPL-SCNC: 95 MMOL/L (ref 98–107)
CO2 SERPL-SCNC: 27 MMOL/L (ref 21–32)
CREAT SERPL-MCNC: 2.9 MG/DL (ref 0.6–1.3)
EOSINOPHIL NFR BLD AUTO: 1.4 % (ref 0–6)
GLUCOSE SERPL-MCNC: 145 MG/DL (ref 74–106)
HCT VFR BLD AUTO: 24 % (ref 33–45)
HGB BLD-MCNC: 7.7 G/DL (ref 11.5–14.8)
LYMPHOCYTES NFR BLD AUTO: 1.3 /CMM (ref 0.8–4.8)
LYMPHOCYTES NFR BLD AUTO: 6.7 % (ref 20–44)
LYMPHOCYTES NFR BLD MANUAL: 7 % (ref 16–48)
MCHC RBC AUTO-ENTMCNC: 32 G/DL (ref 31–36)
MCV RBC AUTO: 100 FL (ref 82–100)
MONOCYTES NFR BLD AUTO: 10.5 % (ref 2–12)
MONOCYTES NFR BLD AUTO: 2.1 /CMM (ref 0.1–1.3)
MONOCYTES NFR BLD MANUAL: 12 % (ref 0–11)
NEUTROPHILS # BLD AUTO: 15.9 /CMM (ref 1.8–8.9)
NEUTROPHILS NFR BLD AUTO: 81.1 % (ref 43–81)
NEUTS SEG NFR BLD MANUAL: 81 % (ref 42–76)
PLATELET # BLD AUTO: 396 /CMM (ref 150–450)
POTASSIUM SERPL-SCNC: 3.9 MMOL/L (ref 3.5–5.1)
RBC # BLD AUTO: 2.36 MIL/UL (ref 4–5.2)
SODIUM SERPL-SCNC: 130 MMOL/L (ref 136–145)
WBC NRBC COR # BLD AUTO: 19.6 K/UL (ref 4.3–11)

## 2021-05-03 PROCEDURE — A7526 TRACHEOSTOMY TUBE COLLAR: HCPCS

## 2021-05-03 PROCEDURE — P9016 RBC LEUKOCYTES REDUCED: HCPCS

## 2021-05-03 PROCEDURE — U0003 INFECTIOUS AGENT DETECTION BY NUCLEIC ACID (DNA OR RNA); SEVERE ACUTE RESPIRATORY SYNDROME CORONAVIRUS 2 (SARS-COV-2) (CORONAVIRUS DISEASE [COVID-19]), AMPLIFIED PROBE TECHNIQUE, MAKING USE OF HIGH THROUGHPUT TECHNOLOGIES AS DESCRIBED BY CMS-2020-01-R: HCPCS

## 2021-05-03 PROCEDURE — A4623 TRACHEOSTOMY INNER CANNULA: HCPCS

## 2021-05-03 PROCEDURE — A6253 ABSORPT DRG > 48 SQ IN W/O B: HCPCS

## 2021-05-03 PROCEDURE — 5A1955Z RESPIRATORY VENTILATION, GREATER THAN 96 CONSECUTIVE HOURS: ICD-10-PCS | Performed by: INTERNAL MEDICINE

## 2021-05-03 RX ADMIN — INSULIN HUMAN PRN UNITS: 100 INJECTION, SOLUTION PARENTERAL at 23:54

## 2021-05-03 RX ADMIN — Medication SCH MG: at 07:58

## 2021-05-03 RX ADMIN — Medication SCH EACH: at 12:33

## 2021-05-03 RX ADMIN — Medication SCH MG: at 20:03

## 2021-05-03 RX ADMIN — SODIUM HYPOCHLORITE SCH ML: 1.25 SOLUTION TOPICAL at 09:40

## 2021-05-03 RX ADMIN — ACETYLCYSTEINE SCH MG: 100 INHALANT RESPIRATORY (INHALATION) at 23:47

## 2021-05-03 RX ADMIN — SODIUM HYPOCHLORITE SCH ML: 1.25 SOLUTION TOPICAL at 20:20

## 2021-05-03 RX ADMIN — Medication SCH EACH: at 05:34

## 2021-05-03 RX ADMIN — Medication PRN ML: at 05:34

## 2021-05-03 RX ADMIN — HYDROGEN PEROXIDE SCH ML: 2.65 LIQUID TOPICAL at 20:03

## 2021-05-03 RX ADMIN — PANTOPRAZOLE SODIUM SCH MG: 40 GRANULE, DELAYED RELEASE ORAL at 21:53

## 2021-05-03 RX ADMIN — ZINC OXIDE SCH GM: 200 OINTMENT TOPICAL at 20:20

## 2021-05-03 RX ADMIN — Medication SCH EACH: at 00:21

## 2021-05-03 RX ADMIN — INSULIN HUMAN PRN UNITS: 100 INJECTION, SOLUTION PARENTERAL at 00:23

## 2021-05-03 RX ADMIN — HYDROMORPHONE HYDROCHLORIDE PRN MG: 2 TABLET ORAL at 13:02

## 2021-05-03 RX ADMIN — POVIDONE-IODINE SCH GM: 100 OINTMENT TOPICAL at 20:20

## 2021-05-03 RX ADMIN — DEXTROSE MONOHYDRATE SCH MLS/HR: 50 INJECTION, SOLUTION INTRAVENOUS at 09:39

## 2021-05-03 RX ADMIN — Medication SCH OZ: at 09:41

## 2021-05-03 RX ADMIN — Medication SCH EACH: at 23:52

## 2021-05-03 RX ADMIN — ATORVASTATIN CALCIUM SCH MG: 40 TABLET, FILM COATED ORAL at 21:53

## 2021-05-03 RX ADMIN — INSULIN HUMAN PRN UNITS: 100 INJECTION, SOLUTION PARENTERAL at 06:08

## 2021-05-03 RX ADMIN — ACETYLCYSTEINE SCH MG: 100 INHALANT RESPIRATORY (INHALATION) at 07:58

## 2021-05-03 RX ADMIN — SODIUM CHLORIDE SCH MG: 9 INJECTION, SOLUTION INTRAVENOUS at 09:39

## 2021-05-03 RX ADMIN — ACETYLCYSTEINE SCH MG: 100 INHALANT RESPIRATORY (INHALATION) at 14:27

## 2021-05-03 RX ADMIN — Medication SCH MG: at 14:27

## 2021-05-03 NOTE — NUR
Family Invite to IDT:



SW emailed the pt.'s daughter, Ranjit Weston inviting them to participate in 05/07/21 12:30 
pm IDT meeting. REMA will follow up accordingly.

## 2021-05-03 NOTE — NUR
Admitted 80-year-old female from [facility] with diagnoses of respiratory failure, GI bleed, 
sepsis, ESRD on HD, ventilator dependent, tracheostomy, anemia, GERD, DM, Hx Covid. Pt awake 
and responsive upon admission. Pt able to nod and shake her head. Started GT feeding Nepro 
at 55 mL/hr. On mechanical vent with setting of AC 20  FiO2 30%. She has a trach tube 
Shiley # 8. Body assessment done. Pt with multiple wounds. Made pt clean and comfortable in 
bed. HOB elevated to prevent aspiration. Verified orders with Dr Maki. Insulin Glargine 
and Meropenem clarified with JAH Mccarthy. Received orders to DC Insulin Glargine and 
Meropenem. Pt denied any pain or discomfort. Call light within easy reach. Notified pt's son 
that she is now in Subacute Room 274.

## 2021-05-03 NOTE — NUR
RN NOTES

Received pt in bed awake, alert with no respiratory distress. No s/s of bleeding bleeding to 
HD cath site. Received new orders from Dr. Maki, noted and carried out.

## 2021-05-03 NOTE — NUR
HD APT. & TRANSPORT 05/04/2021:

 



REMA called  [facility] [[address], [place] [address]; [phone]] and spoke 
to Daysi to reinstate HD. Per Daysi,they have reinstated pt.s HD for 2:30pm- 6 pm Tuesdays, 
Thursdays, & Saturdays. Must arrive by 2:15 pm. Noted.





REMA called [facility] at [phone] and spoke to Wanda to set up Specialty Care 
transportation FOR 03/23/21 from [facility] to  [facility] [[address], [place] 
[address]; [phone]] for Chair time of 2:30pm- 6 pm with RT.   1:30 pm Confirmation # 
[id number]. REMA notified charge nurse, Rhea.

## 2021-05-04 VITALS — SYSTOLIC BLOOD PRESSURE: 159 MMHG | DIASTOLIC BLOOD PRESSURE: 98 MMHG

## 2021-05-04 RX ADMIN — ZINC OXIDE SCH GM: 200 OINTMENT TOPICAL at 09:00

## 2021-05-04 RX ADMIN — PANTOPRAZOLE SODIUM SCH MG: 40 GRANULE, DELAYED RELEASE ORAL at 21:19

## 2021-05-04 RX ADMIN — Medication SCH EACH: at 05:56

## 2021-05-04 RX ADMIN — Medication SCH TAB: at 09:02

## 2021-05-04 RX ADMIN — Medication SCH EACH: at 18:31

## 2021-05-04 RX ADMIN — DULOXETINE HYDROCHLORIDE SCH MG: 30 CAPSULE, DELAYED RELEASE ORAL at 09:02

## 2021-05-04 RX ADMIN — ATORVASTATIN CALCIUM SCH MG: 40 TABLET, FILM COATED ORAL at 21:20

## 2021-05-04 RX ADMIN — PANTOPRAZOLE SODIUM SCH MG: 40 GRANULE, DELAYED RELEASE ORAL at 09:02

## 2021-05-04 RX ADMIN — SODIUM HYPOCHLORITE SCH ML: 1.25 SOLUTION TOPICAL at 09:00

## 2021-05-04 RX ADMIN — DOCUSATE SODIUM SCH MG: 50 LIQUID ORAL at 09:02

## 2021-05-04 RX ADMIN — SODIUM HYPOCHLORITE SCH ML: 1.25 SOLUTION TOPICAL at 21:19

## 2021-05-04 RX ADMIN — Medication SCH EACH: at 23:43

## 2021-05-04 RX ADMIN — Medication SCH MG: at 08:03

## 2021-05-04 RX ADMIN — INSULIN HUMAN PRN UNITS: 100 INJECTION, SOLUTION PARENTERAL at 05:57

## 2021-05-04 RX ADMIN — HYDROGEN PEROXIDE SCH ML: 2.65 LIQUID TOPICAL at 20:06

## 2021-05-04 RX ADMIN — DONEPEZIL HYDROCHLORIDE SCH MG: 5 TABLET ORAL at 21:20

## 2021-05-04 RX ADMIN — Medication SCH MG: at 13:47

## 2021-05-04 RX ADMIN — FOLIC ACID SCH MG: 1 TABLET ORAL at 09:02

## 2021-05-04 RX ADMIN — ACETYLCYSTEINE SCH MG: 100 INHALANT RESPIRATORY (INHALATION) at 08:03

## 2021-05-04 RX ADMIN — Medication SCH MG: at 20:06

## 2021-05-04 RX ADMIN — ACETYLCYSTEINE SCH MG: 100 INHALANT RESPIRATORY (INHALATION) at 23:46

## 2021-05-04 RX ADMIN — INSULIN HUMAN PRN UNITS: 100 INJECTION, SOLUTION PARENTERAL at 18:33

## 2021-05-04 RX ADMIN — AMLODIPINE BESYLATE SCH MG: 10 TABLET ORAL at 09:00

## 2021-05-04 RX ADMIN — INSULIN HUMAN PRN UNITS: 100 INJECTION, SOLUTION PARENTERAL at 23:44

## 2021-05-04 RX ADMIN — Medication SCH EACH: at 12:00

## 2021-05-04 RX ADMIN — INSULIN HUMAN PRN UNITS: 100 INJECTION, SOLUTION PARENTERAL at 13:23

## 2021-05-04 RX ADMIN — POVIDONE-IODINE SCH GM: 100 OINTMENT TOPICAL at 09:00

## 2021-05-04 RX ADMIN — LEVOTHYROXINE SODIUM SCH MCG: 75 TABLET ORAL at 05:56

## 2021-05-04 RX ADMIN — ZINC OXIDE SCH GM: 200 OINTMENT TOPICAL at 21:20

## 2021-05-04 RX ADMIN — NEOMYCIN AND POLYMYXIN B SULFATES AND BACITRACIN ZINC SCH GM: 400; 3.5; 5 OINTMENT TOPICAL at 09:00

## 2021-05-04 RX ADMIN — DEXTROSE MONOHYDRATE SCH MLS/HR: 50 INJECTION, SOLUTION INTRAVENOUS at 09:16

## 2021-05-04 RX ADMIN — DOCUSATE SODIUM SCH MG: 50 LIQUID ORAL at 17:00

## 2021-05-04 RX ADMIN — Medication SCH MG: at 02:07

## 2021-05-04 RX ADMIN — HYDROGEN PEROXIDE SCH ML: 2.65 LIQUID TOPICAL at 09:00

## 2021-05-04 RX ADMIN — Medication PRN ML: at 14:14

## 2021-05-04 RX ADMIN — NEOMYCIN AND POLYMYXIN B SULFATES AND BACITRACIN ZINC SCH GM: 400; 3.5; 5 OINTMENT TOPICAL at 21:20

## 2021-05-04 RX ADMIN — ACETYLCYSTEINE SCH MG: 100 INHALANT RESPIRATORY (INHALATION) at 13:47

## 2021-05-04 RX ADMIN — POVIDONE-IODINE SCH GM: 100 OINTMENT TOPICAL at 21:19

## 2021-05-04 RX ADMIN — Medication SCH MG: at 05:55

## 2021-05-04 NOTE — NUR
Asked Dr Robertson if pt may receive her second dose of Covid vaccine tomorrow. Dr Robertson said 
yes. Informed [facility].

-------------------------------------------------------------------------------

Addendum: 05/11/21 at 1658 by BYRON COREA RN

-------------------------------------------------------------------------------

Education provided to family regarding risks, benefits, and possible side effects of the 
Covid vaccine.

## 2021-05-05 VITALS — DIASTOLIC BLOOD PRESSURE: 91 MMHG | SYSTOLIC BLOOD PRESSURE: 120 MMHG

## 2021-05-05 VITALS — SYSTOLIC BLOOD PRESSURE: 112 MMHG | DIASTOLIC BLOOD PRESSURE: 84 MMHG

## 2021-05-05 RX ADMIN — Medication SCH EACH: at 06:04

## 2021-05-05 RX ADMIN — Medication SCH EACH: at 12:14

## 2021-05-05 RX ADMIN — POVIDONE-IODINE SCH GM: 100 OINTMENT TOPICAL at 20:36

## 2021-05-05 RX ADMIN — DONEPEZIL HYDROCHLORIDE SCH MG: 5 TABLET ORAL at 21:47

## 2021-05-05 RX ADMIN — DOCUSATE SODIUM SCH MG: 50 LIQUID ORAL at 17:54

## 2021-05-05 RX ADMIN — AMLODIPINE BESYLATE SCH MG: 10 TABLET ORAL at 09:34

## 2021-05-05 RX ADMIN — INSULIN HUMAN PRN UNITS: 100 INJECTION, SOLUTION PARENTERAL at 17:55

## 2021-05-05 RX ADMIN — PANTOPRAZOLE SODIUM SCH MG: 40 GRANULE, DELAYED RELEASE ORAL at 20:40

## 2021-05-05 RX ADMIN — Medication SCH MG: at 19:39

## 2021-05-05 RX ADMIN — Medication SCH MG: at 01:51

## 2021-05-05 RX ADMIN — ATORVASTATIN CALCIUM SCH MG: 40 TABLET, FILM COATED ORAL at 21:47

## 2021-05-05 RX ADMIN — DOCUSATE SODIUM SCH MG: 50 LIQUID ORAL at 09:35

## 2021-05-05 RX ADMIN — LEVOTHYROXINE SODIUM SCH MCG: 75 TABLET ORAL at 06:04

## 2021-05-05 RX ADMIN — POVIDONE-IODINE SCH GM: 100 OINTMENT TOPICAL at 20:37

## 2021-05-05 RX ADMIN — HYDROGEN PEROXIDE SCH ML: 2.65 LIQUID TOPICAL at 19:39

## 2021-05-05 RX ADMIN — Medication SCH EACH: at 17:54

## 2021-05-05 RX ADMIN — ZINC OXIDE SCH GM: 200 OINTMENT TOPICAL at 20:37

## 2021-05-05 RX ADMIN — INSULIN HUMAN PRN UNITS: 100 INJECTION, SOLUTION PARENTERAL at 06:05

## 2021-05-05 RX ADMIN — SODIUM HYPOCHLORITE SCH ML: 1.25 SOLUTION TOPICAL at 20:36

## 2021-05-05 RX ADMIN — DEXTROSE MONOHYDRATE SCH MLS/HR: 50 INJECTION, SOLUTION INTRAVENOUS at 09:34

## 2021-05-05 RX ADMIN — ZINC OXIDE SCH GM: 200 OINTMENT TOPICAL at 09:38

## 2021-05-05 RX ADMIN — HYDROGEN PEROXIDE SCH ML: 2.65 LIQUID TOPICAL at 09:14

## 2021-05-05 RX ADMIN — Medication SCH EACH: at 23:39

## 2021-05-05 RX ADMIN — Medication SCH MG: at 14:26

## 2021-05-05 RX ADMIN — SODIUM HYPOCHLORITE SCH ML: 1.25 SOLUTION TOPICAL at 09:37

## 2021-05-05 RX ADMIN — FOLIC ACID SCH MG: 1 TABLET ORAL at 09:34

## 2021-05-05 RX ADMIN — PANTOPRAZOLE SODIUM SCH MG: 40 GRANULE, DELAYED RELEASE ORAL at 09:34

## 2021-05-05 RX ADMIN — Medication SCH MG: at 06:04

## 2021-05-05 RX ADMIN — INSULIN HUMAN PRN UNITS: 100 INJECTION, SOLUTION PARENTERAL at 23:40

## 2021-05-05 RX ADMIN — ACETYLCYSTEINE SCH MG: 100 INHALANT RESPIRATORY (INHALATION) at 08:21

## 2021-05-05 RX ADMIN — INSULIN HUMAN PRN UNITS: 100 INJECTION, SOLUTION PARENTERAL at 12:16

## 2021-05-05 RX ADMIN — NEOMYCIN AND POLYMYXIN B SULFATES AND BACITRACIN ZINC SCH GM: 400; 3.5; 5 OINTMENT TOPICAL at 20:37

## 2021-05-05 RX ADMIN — ACETYLCYSTEINE SCH MG: 100 INHALANT RESPIRATORY (INHALATION) at 23:17

## 2021-05-05 RX ADMIN — POVIDONE-IODINE SCH GM: 100 OINTMENT TOPICAL at 09:37

## 2021-05-05 RX ADMIN — Medication SCH MG: at 08:21

## 2021-05-05 RX ADMIN — Medication SCH TAB: at 09:34

## 2021-05-05 RX ADMIN — NEOMYCIN AND POLYMYXIN B SULFATES AND BACITRACIN ZINC SCH GM: 400; 3.5; 5 OINTMENT TOPICAL at 09:38

## 2021-05-05 RX ADMIN — ACETYLCYSTEINE SCH MG: 100 INHALANT RESPIRATORY (INHALATION) at 14:27

## 2021-05-05 RX ADMIN — POVIDONE-IODINE SCH GM: 100 OINTMENT TOPICAL at 09:38

## 2021-05-05 RX ADMIN — DULOXETINE HYDROCHLORIDE SCH MG: 30 CAPSULE, DELAYED RELEASE ORAL at 09:34

## 2021-05-05 NOTE — NUR
Intake Paperwork: 



REMA called & spoke to the pt.'s son, Ranjit Weston [phone] to complete biopsychosocial 
& gather collateral information. Ranjit started he is not the conservator or power of 
 but is the next of kin and will be the responsible party. REMA emailed Ranjit 
[[email or web address]]: the patient's Bill of rights, Conservatorship & Advanced 
Healthcare Directive informational packets, COVID -19 vaccine information, most recent 
visitation guidelines & link to COVID-19 testing centers. Ranjit stated he is not interested 
in filing for Conservatorship at this time and will follow up at a later time if he feels it 
is needed.  



REMA mailed the intake paperwork: (Patient Right's Acknowledgement, Documentation of Preferred 
Intensity of Care, Conditions of Admission, CDPH Agreement, and Voluntary Prior Express 
Consent form, Important Message from Medicare) to Ranjit at [[address] [facility], [address]]. Ranjit is agreeable to complete paperwork & mail back with attached 
pre-paid envelope. Ranjit expressed that the pt.'s code status should be full Code: Maximum 
Treatment & CPR. Noted. SW will be available as needed.

## 2021-05-05 NOTE — NUR
Seen and examined by Dr. Maki no new order given at this time.  Patient received 2nd 
dose of Covid-19 vaccine with no immediate reaction noted.  Patient continue on ATB 
Ceftazidime for sepsis with no adverse reaction noted.  Resident with multiple 
discoloration, edema and open skin blister in the arms and lower extremities.   Local 
treatment done in the wounds.  Patient awake, alert and respond appropriately to simple 
questions.  Will monitor.

## 2021-05-05 NOTE — NUR
RN NOTE



NO ADVERSE REACTION NOTED. NO FEVER. NO RESPIRATORY DISTRESS. WILL CONTINUE TO MONITOR AND 
ENDORSE MONITORING OF ADVERSE REACTIONS TO NIGHT NURSE

## 2021-05-06 VITALS — DIASTOLIC BLOOD PRESSURE: 119 MMHG | SYSTOLIC BLOOD PRESSURE: 142 MMHG

## 2021-05-06 VITALS — DIASTOLIC BLOOD PRESSURE: 56 MMHG | SYSTOLIC BLOOD PRESSURE: 116 MMHG

## 2021-05-06 VITALS — DIASTOLIC BLOOD PRESSURE: 55 MMHG | SYSTOLIC BLOOD PRESSURE: 109 MMHG

## 2021-05-06 LAB
BASOPHILS # BLD AUTO: 0 /CMM (ref 0–0.2)
BASOPHILS NFR BLD AUTO: 0.2 % (ref 0–2)
EOSINOPHIL NFR BLD AUTO: 1 % (ref 0–6)
EOSINOPHIL NFR BLD MANUAL: 2 % (ref 0–4)
HCT VFR BLD AUTO: 22 % (ref 33–45)
HGB BLD-MCNC: 7.2 G/DL (ref 11.5–14.8)
LYMPHOCYTES NFR BLD AUTO: 1.1 /CMM (ref 0.8–4.8)
LYMPHOCYTES NFR BLD AUTO: 5.3 % (ref 20–44)
LYMPHOCYTES NFR BLD MANUAL: 6 % (ref 16–48)
MCHC RBC AUTO-ENTMCNC: 32 G/DL (ref 31–36)
MCV RBC AUTO: 104 FL (ref 82–100)
METAMYELOCYTES NFR BLD MANUAL: 1 % (ref 0–0)
MONOCYTES NFR BLD AUTO: 1.6 /CMM (ref 0.1–1.3)
MONOCYTES NFR BLD AUTO: 7.8 % (ref 2–12)
MONOCYTES NFR BLD MANUAL: 7 % (ref 0–11)
MYELOCYTES NFR BLD MANUAL: 1 % (ref 0–0)
NEUTROPHILS # BLD AUTO: 18 /CMM (ref 1.8–8.9)
NEUTROPHILS NFR BLD AUTO: 85.7 % (ref 43–81)
NEUTS SEG NFR BLD MANUAL: 83 % (ref 42–76)
PLATELET # BLD AUTO: 462 /CMM (ref 150–450)
RBC # BLD AUTO: 2.16 MIL/UL (ref 4–5.2)
WBC NRBC COR # BLD AUTO: 20.9 K/UL (ref 4.3–11)

## 2021-05-06 RX ADMIN — ZINC OXIDE SCH GM: 200 OINTMENT TOPICAL at 09:20

## 2021-05-06 RX ADMIN — HYDROGEN PEROXIDE SCH ML: 2.65 LIQUID TOPICAL at 09:00

## 2021-05-06 RX ADMIN — DOCUSATE SODIUM SCH MG: 50 LIQUID ORAL at 18:00

## 2021-05-06 RX ADMIN — Medication SCH TAB: at 09:17

## 2021-05-06 RX ADMIN — Medication SCH MG: at 01:29

## 2021-05-06 RX ADMIN — POVIDONE-IODINE SCH GM: 100 OINTMENT TOPICAL at 21:17

## 2021-05-06 RX ADMIN — ACETYLCYSTEINE SCH MG: 100 INHALANT RESPIRATORY (INHALATION) at 08:18

## 2021-05-06 RX ADMIN — ZINC OXIDE SCH GM: 200 OINTMENT TOPICAL at 21:18

## 2021-05-06 RX ADMIN — POVIDONE-IODINE SCH GM: 100 OINTMENT TOPICAL at 09:19

## 2021-05-06 RX ADMIN — ATORVASTATIN CALCIUM SCH MG: 40 TABLET, FILM COATED ORAL at 21:18

## 2021-05-06 RX ADMIN — Medication SCH EACH: at 12:22

## 2021-05-06 RX ADMIN — Medication SCH EACH: at 18:18

## 2021-05-06 RX ADMIN — ACETAMINOPHEN PRN MG: 160 SOLUTION ORAL at 19:33

## 2021-05-06 RX ADMIN — DONEPEZIL HYDROCHLORIDE SCH MG: 5 TABLET ORAL at 21:18

## 2021-05-06 RX ADMIN — DOCUSATE SODIUM SCH MG: 50 LIQUID ORAL at 09:17

## 2021-05-06 RX ADMIN — Medication SCH EACH: at 06:05

## 2021-05-06 RX ADMIN — NEOMYCIN AND POLYMYXIN B SULFATES AND BACITRACIN ZINC SCH GM: 400; 3.5; 5 OINTMENT TOPICAL at 09:19

## 2021-05-06 RX ADMIN — INSULIN HUMAN PRN UNITS: 100 INJECTION, SOLUTION PARENTERAL at 18:19

## 2021-05-06 RX ADMIN — FOLIC ACID SCH MG: 1 TABLET ORAL at 09:17

## 2021-05-06 RX ADMIN — ZINC OXIDE SCH GM: 200 OINTMENT TOPICAL at 09:19

## 2021-05-06 RX ADMIN — AMLODIPINE BESYLATE SCH MG: 10 TABLET ORAL at 09:00

## 2021-05-06 RX ADMIN — ACETYLCYSTEINE SCH MG: 100 INHALANT RESPIRATORY (INHALATION) at 23:23

## 2021-05-06 RX ADMIN — DULOXETINE HYDROCHLORIDE SCH MG: 30 CAPSULE, DELAYED RELEASE ORAL at 09:17

## 2021-05-06 RX ADMIN — Medication SCH MG: at 06:06

## 2021-05-06 RX ADMIN — INSULIN HUMAN PRN UNITS: 100 INJECTION, SOLUTION PARENTERAL at 12:25

## 2021-05-06 RX ADMIN — Medication SCH MG: at 06:12

## 2021-05-06 RX ADMIN — POVIDONE-IODINE SCH GM: 100 OINTMENT TOPICAL at 09:18

## 2021-05-06 RX ADMIN — Medication SCH MG: at 13:30

## 2021-05-06 RX ADMIN — DEXTROSE MONOHYDRATE SCH MLS/HR: 50 INJECTION, SOLUTION INTRAVENOUS at 09:00

## 2021-05-06 RX ADMIN — SODIUM HYPOCHLORITE SCH ML: 1.25 SOLUTION TOPICAL at 09:18

## 2021-05-06 RX ADMIN — HYDROGEN PEROXIDE SCH ML: 2.65 LIQUID TOPICAL at 19:34

## 2021-05-06 RX ADMIN — INSULIN HUMAN PRN UNITS: 100 INJECTION, SOLUTION PARENTERAL at 06:07

## 2021-05-06 RX ADMIN — PANTOPRAZOLE SODIUM SCH MG: 40 GRANULE, DELAYED RELEASE ORAL at 20:42

## 2021-05-06 RX ADMIN — LEVOTHYROXINE SODIUM SCH MCG: 75 TABLET ORAL at 06:06

## 2021-05-06 RX ADMIN — Medication SCH MG: at 08:18

## 2021-05-06 RX ADMIN — LEVOTHYROXINE SODIUM SCH MCG: 75 TABLET ORAL at 06:12

## 2021-05-06 RX ADMIN — NEOMYCIN AND POLYMYXIN B SULFATES AND BACITRACIN ZINC SCH GM: 400; 3.5; 5 OINTMENT TOPICAL at 21:18

## 2021-05-06 RX ADMIN — PANTOPRAZOLE SODIUM SCH MG: 40 GRANULE, DELAYED RELEASE ORAL at 09:18

## 2021-05-06 RX ADMIN — ACETYLCYSTEINE SCH MG: 100 INHALANT RESPIRATORY (INHALATION) at 15:30

## 2021-05-06 RX ADMIN — Medication SCH MG: at 19:34

## 2021-05-06 RX ADMIN — SODIUM HYPOCHLORITE SCH ML: 1.25 SOLUTION TOPICAL at 21:17

## 2021-05-06 RX ADMIN — AMLODIPINE BESYLATE SCH MG: 10 TABLET ORAL at 20:41

## 2021-05-06 RX ADMIN — POVIDONE-IODINE SCH GM: 100 OINTMENT TOPICAL at 21:18

## 2021-05-06 NOTE — NUR
Monitored for  vaccine  injection, with no adverse reactions noted. afebrile and no distress 
presented. continue to monitor.

## 2021-05-06 NOTE — NUR
Pt with no adverse reactions to vaccine given. Pt in no acute distress noted. Will continue 
to monitor.

## 2021-05-06 NOTE — NUR
Dr Maki ordered to give Norvasc 10 mg GT daily at 2100 instead of at 0900 so that pt 
will not receive it prior to hemodialysis. Patient scheduled back for 1 month recheck.

## 2021-05-07 VITALS — DIASTOLIC BLOOD PRESSURE: 57 MMHG | SYSTOLIC BLOOD PRESSURE: 125 MMHG

## 2021-05-07 VITALS — SYSTOLIC BLOOD PRESSURE: 115 MMHG | DIASTOLIC BLOOD PRESSURE: 50 MMHG

## 2021-05-07 VITALS — DIASTOLIC BLOOD PRESSURE: 50 MMHG | SYSTOLIC BLOOD PRESSURE: 103 MMHG

## 2021-05-07 VITALS — SYSTOLIC BLOOD PRESSURE: 130 MMHG | DIASTOLIC BLOOD PRESSURE: 61 MMHG

## 2021-05-07 LAB
BASOPHILS # BLD AUTO: 0.1 /CMM (ref 0–0.2)
BASOPHILS NFR BLD AUTO: 0.2 % (ref 0–2)
EOSINOPHIL NFR BLD AUTO: 0.6 % (ref 0–6)
HCT VFR BLD AUTO: 23 % (ref 33–45)
HGB BLD-MCNC: 7.1 G/DL (ref 11.5–14.8)
LYMPHOCYTES NFR BLD AUTO: 0.7 /CMM (ref 0.8–4.8)
LYMPHOCYTES NFR BLD AUTO: 3.4 % (ref 20–44)
LYMPHOCYTES NFR BLD MANUAL: 3 % (ref 16–48)
MCHC RBC AUTO-ENTMCNC: 31 G/DL (ref 31–36)
MCV RBC AUTO: 106 FL (ref 82–100)
MONOCYTES NFR BLD AUTO: 2 /CMM (ref 0.1–1.3)
MONOCYTES NFR BLD AUTO: 9.4 % (ref 2–12)
MONOCYTES NFR BLD MANUAL: 7 % (ref 0–11)
MYELOCYTES NFR BLD MANUAL: 1 % (ref 0–0)
NEUTROPHILS # BLD AUTO: 18.7 /CMM (ref 1.8–8.9)
NEUTROPHILS NFR BLD AUTO: 86.4 % (ref 43–81)
NEUTS BAND NFR BLD MANUAL: 2 % (ref 0–5)
NEUTS SEG NFR BLD MANUAL: 87 % (ref 42–76)
PLATELET # BLD AUTO: 507 /CMM (ref 150–450)
RBC # BLD AUTO: 2.14 MIL/UL (ref 4–5.2)
WBC NRBC COR # BLD AUTO: 21.7 K/UL (ref 4.3–11)

## 2021-05-07 RX ADMIN — PANTOPRAZOLE SODIUM SCH MG: 40 GRANULE, DELAYED RELEASE ORAL at 21:07

## 2021-05-07 RX ADMIN — POVIDONE-IODINE SCH GM: 100 OINTMENT TOPICAL at 21:08

## 2021-05-07 RX ADMIN — Medication SCH MG: at 01:30

## 2021-05-07 RX ADMIN — ACETAMINOPHEN PRN MG: 160 SOLUTION ORAL at 03:59

## 2021-05-07 RX ADMIN — Medication SCH MG: at 05:10

## 2021-05-07 RX ADMIN — ACETYLCYSTEINE SCH MG: 100 INHALANT RESPIRATORY (INHALATION) at 15:13

## 2021-05-07 RX ADMIN — SODIUM HYPOCHLORITE SCH ML: 1.25 SOLUTION TOPICAL at 21:08

## 2021-05-07 RX ADMIN — Medication SCH MG: at 12:45

## 2021-05-07 RX ADMIN — DOCUSATE SODIUM SCH MG: 50 LIQUID ORAL at 17:21

## 2021-05-07 RX ADMIN — ACETYLCYSTEINE SCH MG: 100 INHALANT RESPIRATORY (INHALATION) at 23:30

## 2021-05-07 RX ADMIN — HYDROGEN PEROXIDE SCH ML: 2.65 LIQUID TOPICAL at 08:02

## 2021-05-07 RX ADMIN — AMLODIPINE BESYLATE SCH MG: 10 TABLET ORAL at 21:07

## 2021-05-07 RX ADMIN — Medication SCH MG: at 19:54

## 2021-05-07 RX ADMIN — ATORVASTATIN CALCIUM SCH MG: 40 TABLET, FILM COATED ORAL at 21:25

## 2021-05-07 RX ADMIN — FOLIC ACID SCH MG: 1 TABLET ORAL at 09:05

## 2021-05-07 RX ADMIN — Medication PRN ML: at 00:58

## 2021-05-07 RX ADMIN — ZINC OXIDE SCH GM: 200 OINTMENT TOPICAL at 21:08

## 2021-05-07 RX ADMIN — HYDROMORPHONE HYDROCHLORIDE PRN MG: 2 TABLET ORAL at 09:13

## 2021-05-07 RX ADMIN — DULOXETINE HYDROCHLORIDE SCH MG: 30 CAPSULE, DELAYED RELEASE ORAL at 09:04

## 2021-05-07 RX ADMIN — HYDROGEN PEROXIDE SCH ML: 2.65 LIQUID TOPICAL at 20:36

## 2021-05-07 RX ADMIN — Medication SCH MG: at 07:49

## 2021-05-07 RX ADMIN — ZINC OXIDE SCH GM: 200 OINTMENT TOPICAL at 21:07

## 2021-05-07 RX ADMIN — POVIDONE-IODINE SCH GM: 100 OINTMENT TOPICAL at 09:00

## 2021-05-07 RX ADMIN — INSULIN HUMAN PRN UNITS: 100 INJECTION, SOLUTION PARENTERAL at 13:13

## 2021-05-07 RX ADMIN — POVIDONE-IODINE SCH GM: 100 OINTMENT TOPICAL at 10:00

## 2021-05-07 RX ADMIN — Medication SCH EACH: at 13:00

## 2021-05-07 RX ADMIN — DONEPEZIL HYDROCHLORIDE SCH MG: 5 TABLET ORAL at 21:07

## 2021-05-07 RX ADMIN — INSULIN HUMAN PRN UNITS: 100 INJECTION, SOLUTION PARENTERAL at 18:18

## 2021-05-07 RX ADMIN — SODIUM HYPOCHLORITE SCH ML: 1.25 SOLUTION TOPICAL at 10:00

## 2021-05-07 RX ADMIN — DEXTROSE MONOHYDRATE SCH MLS/HR: 50 INJECTION, SOLUTION INTRAVENOUS at 09:05

## 2021-05-07 RX ADMIN — Medication SCH EACH: at 05:37

## 2021-05-07 RX ADMIN — INSULIN HUMAN PRN UNITS: 100 INJECTION, SOLUTION PARENTERAL at 00:44

## 2021-05-07 RX ADMIN — Medication PRN ML: at 22:18

## 2021-05-07 RX ADMIN — Medication SCH TAB: at 09:05

## 2021-05-07 RX ADMIN — Medication SCH EACH: at 00:39

## 2021-05-07 RX ADMIN — INSULIN HUMAN PRN UNITS: 100 INJECTION, SOLUTION PARENTERAL at 05:38

## 2021-05-07 RX ADMIN — Medication SCH EACH: at 18:17

## 2021-05-07 RX ADMIN — NEOMYCIN AND POLYMYXIN B SULFATES AND BACITRACIN ZINC SCH GM: 400; 3.5; 5 OINTMENT TOPICAL at 10:00

## 2021-05-07 RX ADMIN — PANTOPRAZOLE SODIUM SCH MG: 40 GRANULE, DELAYED RELEASE ORAL at 09:05

## 2021-05-07 RX ADMIN — ACETYLCYSTEINE SCH MG: 100 INHALANT RESPIRATORY (INHALATION) at 07:49

## 2021-05-07 RX ADMIN — DOCUSATE SODIUM SCH MG: 50 LIQUID ORAL at 08:59

## 2021-05-07 RX ADMIN — ZINC OXIDE SCH GM: 200 OINTMENT TOPICAL at 10:00

## 2021-05-07 RX ADMIN — NEOMYCIN AND POLYMYXIN B SULFATES AND BACITRACIN ZINC SCH GM: 400; 3.5; 5 OINTMENT TOPICAL at 21:08

## 2021-05-07 RX ADMIN — LEVOTHYROXINE SODIUM SCH MCG: 75 TABLET ORAL at 05:10

## 2021-05-07 NOTE — NUR
Dr. Maki gave an order to do CBC today, patient noted to have small to moderate amount 
of bleeding when turn to the side during wound treatment. Order carried out.

## 2021-05-07 NOTE — NUR
Left a message to Dr. Maki regarding CBC result with Hbg. 7.1. Updated resident's son 
Ranjit of patient's condition during IDT including current and new order, wound condition and 
treatment, reviewed code status, remain full code.  When asked the type of reaction the 
patient had from (allergy) codeine and oxycodone, he said she developed rashes.  Ranjit 
verbalized that he will visit his mother tomorrow in person.

## 2021-05-07 NOTE — NUR
Seen and examined by JAH Ross, with new order given to start patient with Melatonin 
6mg. tonight then 3mg thereafter due to difficulty sleeping. Order carried out.

## 2021-05-07 NOTE — NUR
Seen and examined by JAH Watson, NP for Dr. Madrid with new order for labs, urine and 
sputum culture.  Orders carried out.

## 2021-05-07 NOTE — NUR
Patient received Covid Vaccine 19 2 days ago ,no fever or adverse reactions noted. Patient 
with increased HR last night night 110 -118 ,denies any pain.No respiratory distress. Will 
continue to monitor and will endorse.

## 2021-05-07 NOTE — NUR
Left a message to Dr. Maki noted resident with fresh blood coming out from rectum, Hbg 
yesterday 7.2, awaiting for new order from MD.

## 2021-05-07 NOTE — NUR
HD APT. & TRANSPORT 05/08/2021: 





SW called [facility] at [phone] and spoke to Morris to set up Specialty Care 
Transportation for 05/08/2021 from [facility] to [facility] [[facility] [facility] [address], [place] [address]; [phone]] for Chair time of 2:30pm- 6 pm.   from [facility] is 1:25 pm 
Confirmation #[id number].

## 2021-05-07 NOTE — NUR
INTERDISCIPLINARY PLAN OF CARE CONFERENCE took place today. The patients Son, Ranjit Weston [phone] participated via phone conference. Dr. Robertson and Interdisciplinary 
team discussed the plan of care in detail. Current orders as well as treatments and 
medications were reviewed. IDT addressed familys questions.  See other disciplines IDT 
notes for further details.

## 2021-05-07 NOTE — NUR
Facility Update: 



REMA notified pt.s family via email regarding condition in the facility: "SW Facility Update: 
No [facility] Sub-Acute residents or employees tested positive for COVID-19 this 
week. As recommended by [facility] Department of Public Health guidelines, Sub-Acute 
residents & healthcare personnel will continue receiving routine testing. [facility] continues to follow infection control protocols and screen our residents 
and staff daily for symptoms".

## 2021-05-08 VITALS — DIASTOLIC BLOOD PRESSURE: 49 MMHG | SYSTOLIC BLOOD PRESSURE: 108 MMHG

## 2021-05-08 VITALS — DIASTOLIC BLOOD PRESSURE: 96 MMHG | SYSTOLIC BLOOD PRESSURE: 132 MMHG

## 2021-05-08 VITALS — DIASTOLIC BLOOD PRESSURE: 73 MMHG | SYSTOLIC BLOOD PRESSURE: 109 MMHG

## 2021-05-08 LAB
BASOPHILS # BLD AUTO: 0 /CMM (ref 0–0.2)
BASOPHILS NFR BLD AUTO: 0.2 % (ref 0–2)
EOSINOPHIL NFR BLD AUTO: 1.4 % (ref 0–6)
HCT VFR BLD AUTO: 22 % (ref 33–45)
HGB BLD-MCNC: 7 G/DL (ref 11.5–14.8)
LYMPHOCYTES NFR BLD AUTO: 1.6 /CMM (ref 0.8–4.8)
LYMPHOCYTES NFR BLD AUTO: 7.6 % (ref 20–44)
MCHC RBC AUTO-ENTMCNC: 32 G/DL (ref 31–36)
MCV RBC AUTO: 106 FL (ref 82–100)
MONOCYTES NFR BLD AUTO: 2 /CMM (ref 0.1–1.3)
MONOCYTES NFR BLD AUTO: 9.3 % (ref 2–12)
NEUTROPHILS # BLD AUTO: 17.2 /CMM (ref 1.8–8.9)
NEUTROPHILS NFR BLD AUTO: 81.5 % (ref 43–81)
PLATELET # BLD AUTO: 487 /CMM (ref 150–450)
RBC # BLD AUTO: 2.06 MIL/UL (ref 4–5.2)
WBC NRBC COR # BLD AUTO: 21.1 K/UL (ref 4.3–11)

## 2021-05-08 RX ADMIN — ZINC OXIDE SCH GM: 200 OINTMENT TOPICAL at 21:18

## 2021-05-08 RX ADMIN — PANTOPRAZOLE SODIUM SCH MG: 40 GRANULE, DELAYED RELEASE ORAL at 21:17

## 2021-05-08 RX ADMIN — SODIUM HYPOCHLORITE SCH ML: 1.25 SOLUTION TOPICAL at 09:54

## 2021-05-08 RX ADMIN — HYDROGEN PEROXIDE SCH ML: 2.65 LIQUID TOPICAL at 08:00

## 2021-05-08 RX ADMIN — LEVOTHYROXINE SODIUM SCH MCG: 75 TABLET ORAL at 05:36

## 2021-05-08 RX ADMIN — ZINC OXIDE SCH GM: 200 OINTMENT TOPICAL at 09:56

## 2021-05-08 RX ADMIN — INSULIN HUMAN PRN UNITS: 100 INJECTION, SOLUTION PARENTERAL at 19:04

## 2021-05-08 RX ADMIN — DONEPEZIL HYDROCHLORIDE SCH MG: 5 TABLET ORAL at 21:19

## 2021-05-08 RX ADMIN — Medication SCH EACH: at 05:36

## 2021-05-08 RX ADMIN — NEOMYCIN AND POLYMYXIN B SULFATES AND BACITRACIN ZINC SCH GM: 400; 3.5; 5 OINTMENT TOPICAL at 21:18

## 2021-05-08 RX ADMIN — HYDROGEN PEROXIDE SCH ML: 2.65 LIQUID TOPICAL at 19:26

## 2021-05-08 RX ADMIN — INSULIN HUMAN PRN UNITS: 100 INJECTION, SOLUTION PARENTERAL at 12:48

## 2021-05-08 RX ADMIN — Medication SCH TAB: at 09:54

## 2021-05-08 RX ADMIN — DEXTROSE MONOHYDRATE SCH MLS/HR: 50 INJECTION, SOLUTION INTRAVENOUS at 09:15

## 2021-05-08 RX ADMIN — ACETYLCYSTEINE SCH MG: 100 INHALANT RESPIRATORY (INHALATION) at 23:15

## 2021-05-08 RX ADMIN — POVIDONE-IODINE SCH GM: 100 OINTMENT TOPICAL at 09:55

## 2021-05-08 RX ADMIN — ACETYLCYSTEINE SCH MG: 100 INHALANT RESPIRATORY (INHALATION) at 15:25

## 2021-05-08 RX ADMIN — Medication SCH EACH: at 18:55

## 2021-05-08 RX ADMIN — Medication SCH MG: at 07:59

## 2021-05-08 RX ADMIN — POVIDONE-IODINE SCH GM: 100 OINTMENT TOPICAL at 21:24

## 2021-05-08 RX ADMIN — POVIDONE-IODINE SCH GM: 100 OINTMENT TOPICAL at 21:17

## 2021-05-08 RX ADMIN — DULOXETINE HYDROCHLORIDE SCH MG: 30 CAPSULE, DELAYED RELEASE ORAL at 09:54

## 2021-05-08 RX ADMIN — HYDROMORPHONE HYDROCHLORIDE PRN MG: 2 TABLET ORAL at 00:08

## 2021-05-08 RX ADMIN — PANTOPRAZOLE SODIUM SCH MG: 40 GRANULE, DELAYED RELEASE ORAL at 09:54

## 2021-05-08 RX ADMIN — Medication SCH MG: at 19:26

## 2021-05-08 RX ADMIN — POVIDONE-IODINE SCH GM: 100 OINTMENT TOPICAL at 09:54

## 2021-05-08 RX ADMIN — ATORVASTATIN CALCIUM SCH MG: 40 TABLET, FILM COATED ORAL at 21:19

## 2021-05-08 RX ADMIN — SODIUM HYPOCHLORITE SCH ML: 1.25 SOLUTION TOPICAL at 21:17

## 2021-05-08 RX ADMIN — Medication SCH EACH: at 12:46

## 2021-05-08 RX ADMIN — ZINC OXIDE SCH GM: 200 OINTMENT TOPICAL at 09:55

## 2021-05-08 RX ADMIN — Medication SCH EA: at 22:34

## 2021-05-08 RX ADMIN — AMLODIPINE BESYLATE SCH MG: 10 TABLET ORAL at 21:17

## 2021-05-08 RX ADMIN — Medication SCH EACH: at 00:19

## 2021-05-08 RX ADMIN — POVIDONE-IODINE SCH GM: 100 OINTMENT TOPICAL at 21:46

## 2021-05-08 RX ADMIN — DOCUSATE SODIUM SCH MG: 50 LIQUID ORAL at 09:54

## 2021-05-08 RX ADMIN — ACETYLCYSTEINE SCH MG: 100 INHALANT RESPIRATORY (INHALATION) at 07:59

## 2021-05-08 RX ADMIN — DOCUSATE SODIUM SCH MG: 50 LIQUID ORAL at 18:00

## 2021-05-08 RX ADMIN — FOLIC ACID SCH MG: 1 TABLET ORAL at 09:54

## 2021-05-08 RX ADMIN — NEOMYCIN AND POLYMYXIN B SULFATES AND BACITRACIN ZINC SCH GM: 400; 3.5; 5 OINTMENT TOPICAL at 09:55

## 2021-05-08 RX ADMIN — INSULIN HUMAN PRN UNITS: 100 INJECTION, SOLUTION PARENTERAL at 00:22

## 2021-05-08 RX ADMIN — Medication SCH MG: at 01:56

## 2021-05-08 RX ADMIN — DEXTROSE MONOHYDRATE PRN MG: 50 INJECTION, SOLUTION INTRAVENOUS at 13:20

## 2021-05-08 RX ADMIN — INSULIN HUMAN PRN UNITS: 100 INJECTION, SOLUTION PARENTERAL at 05:39

## 2021-05-08 RX ADMIN — Medication SCH MG: at 12:48

## 2021-05-08 RX ADMIN — Medication SCH MG: at 05:36

## 2021-05-08 RX ADMIN — ZINC OXIDE SCH GM: 200 OINTMENT TOPICAL at 21:47

## 2021-05-08 NOTE — NUR
Patient received Covid Vaccine-19 3 days ago ,no fever or adverse reactions noted. No 
respiratory distress. Endorsed to Charge nurse and will endorse to next shift nurse.

## 2021-05-08 NOTE — NUR
Resident with episode of vomiting x 1 yellow color in moderate amount.  Dr. Robertson notified 
with order to give Zofran 4 mg IVP PRN.  Resident's son, Ranjti visited in person, Covid-19 
screening done no s/s of the disease, with negative Covid-19 test within the last 7 days.  
Educated family of hand hygiene and isolation protocol.  Informed son of vomiting episode 
and new order.  Patient observed to be smiling when greeted by her son.

## 2021-05-08 NOTE — NUR
No active bleeding noted from the rectum. Patient no urine output unable to collect urine 
for culture. Will endorse.

## 2021-05-08 NOTE — NUR
RN SUBACUTE NOTE - PAIN



PATIENT SHOWS FACIAL GRIMACE. ADMINISTERED DILAUDID AS ORDERED. WILL CONTINUE TO ASSESS FOR 
PAIN AFTER 1 HOUR OF ADMINISTRATION.

## 2021-05-08 NOTE — NUR
Resident was picked up late by ambulance today for dialysis.  Informed dialysis center of 
the delay, spoke with DON Mariscal.  Resident stable no s/s of distress and discomfort.  Wound 
treatment done prior to leaving for dialysis.  Report given to ambulance staff.

## 2021-05-08 NOTE — NUR
No return call from Dr. Maki, notified Dr. Robertson of Hgb 7.0 with order to repeat CBC in 
AM. Order carried out.

## 2021-05-09 VITALS — DIASTOLIC BLOOD PRESSURE: 80 MMHG | SYSTOLIC BLOOD PRESSURE: 120 MMHG

## 2021-05-09 VITALS — SYSTOLIC BLOOD PRESSURE: 145 MMHG | DIASTOLIC BLOOD PRESSURE: 70 MMHG

## 2021-05-09 VITALS — SYSTOLIC BLOOD PRESSURE: 146 MMHG | DIASTOLIC BLOOD PRESSURE: 72 MMHG

## 2021-05-09 VITALS — DIASTOLIC BLOOD PRESSURE: 86 MMHG | SYSTOLIC BLOOD PRESSURE: 109 MMHG

## 2021-05-09 VITALS — DIASTOLIC BLOOD PRESSURE: 59 MMHG | SYSTOLIC BLOOD PRESSURE: 113 MMHG

## 2021-05-09 VITALS — SYSTOLIC BLOOD PRESSURE: 147 MMHG | DIASTOLIC BLOOD PRESSURE: 74 MMHG

## 2021-05-09 VITALS — DIASTOLIC BLOOD PRESSURE: 60 MMHG | SYSTOLIC BLOOD PRESSURE: 126 MMHG

## 2021-05-09 VITALS — SYSTOLIC BLOOD PRESSURE: 106 MMHG | DIASTOLIC BLOOD PRESSURE: 56 MMHG

## 2021-05-09 LAB
BASOPHILS # BLD AUTO: 0.1 /CMM (ref 0–0.2)
BASOPHILS NFR BLD AUTO: 0.4 % (ref 0–2)
EOSINOPHIL NFR BLD AUTO: 0.2 % (ref 0–6)
HCT VFR BLD AUTO: 22 % (ref 33–45)
HGB BLD-MCNC: 6.8 G/DL (ref 11.5–14.8)
LYMPHOCYTES NFR BLD AUTO: 10 % (ref 20–44)
LYMPHOCYTES NFR BLD AUTO: 2.1 /CMM (ref 0.8–4.8)
LYMPHOCYTES NFR BLD MANUAL: 10 % (ref 16–48)
MCHC RBC AUTO-ENTMCNC: 32 G/DL (ref 31–36)
MCV RBC AUTO: 107 FL (ref 82–100)
MONOCYTES NFR BLD AUTO: 1.6 /CMM (ref 0.1–1.3)
MONOCYTES NFR BLD AUTO: 7.9 % (ref 2–12)
MONOCYTES NFR BLD MANUAL: 8 % (ref 0–11)
MYELOCYTES NFR BLD MANUAL: 2 % (ref 0–0)
NEUTROPHILS # BLD AUTO: 16.9 /CMM (ref 1.8–8.9)
NEUTROPHILS NFR BLD AUTO: 81.5 % (ref 43–81)
NEUTS BAND NFR BLD MANUAL: 2 % (ref 0–5)
NEUTS SEG NFR BLD MANUAL: 78 % (ref 42–76)
PLATELET # BLD AUTO: 488 /CMM (ref 150–450)
RBC # BLD AUTO: 2.01 MIL/UL (ref 4–5.2)
WBC NRBC COR # BLD AUTO: 22.8 K/UL (ref 4.3–11)

## 2021-05-09 PROCEDURE — 30233N1 TRANSFUSION OF NONAUTOLOGOUS RED BLOOD CELLS INTO PERIPHERAL VEIN, PERCUTANEOUS APPROACH: ICD-10-PCS | Performed by: INTERNAL MEDICINE

## 2021-05-09 PROCEDURE — 5A1D70Z PERFORMANCE OF URINARY FILTRATION, INTERMITTENT, LESS THAN 6 HOURS PER DAY: ICD-10-PCS

## 2021-05-09 RX ADMIN — POVIDONE-IODINE SCH APPLIC: 100 OINTMENT TOPICAL at 09:00

## 2021-05-09 RX ADMIN — INSULIN HUMAN PRN UNITS: 100 INJECTION, SOLUTION PARENTERAL at 18:20

## 2021-05-09 RX ADMIN — Medication SCH MG: at 19:53

## 2021-05-09 RX ADMIN — DOCUSATE SODIUM SCH MG: 50 LIQUID ORAL at 08:39

## 2021-05-09 RX ADMIN — Medication SCH EACH: at 00:05

## 2021-05-09 RX ADMIN — ACETYLCYSTEINE SCH MG: 100 INHALANT RESPIRATORY (INHALATION) at 14:37

## 2021-05-09 RX ADMIN — LEVOTHYROXINE SODIUM SCH MCG: 75 TABLET ORAL at 05:50

## 2021-05-09 RX ADMIN — Medication SCH MG: at 05:50

## 2021-05-09 RX ADMIN — AMLODIPINE BESYLATE SCH MG: 10 TABLET ORAL at 20:16

## 2021-05-09 RX ADMIN — ATORVASTATIN CALCIUM SCH MG: 40 TABLET, FILM COATED ORAL at 21:07

## 2021-05-09 RX ADMIN — Medication SCH EACH: at 12:13

## 2021-05-09 RX ADMIN — FOLIC ACID SCH MG: 1 TABLET ORAL at 08:39

## 2021-05-09 RX ADMIN — DONEPEZIL HYDROCHLORIDE SCH MG: 5 TABLET ORAL at 21:07

## 2021-05-09 RX ADMIN — PANTOPRAZOLE SODIUM SCH MG: 40 GRANULE, DELAYED RELEASE ORAL at 08:39

## 2021-05-09 RX ADMIN — ZINC OXIDE SCH APPLIC: 200 OINTMENT TOPICAL at 20:18

## 2021-05-09 RX ADMIN — Medication SCH MG: at 07:54

## 2021-05-09 RX ADMIN — Medication SCH MG: at 02:11

## 2021-05-09 RX ADMIN — Medication SCH EACH: at 18:06

## 2021-05-09 RX ADMIN — POVIDONE-IODINE SCH APPLIC: 100 OINTMENT TOPICAL at 20:17

## 2021-05-09 RX ADMIN — ACETYLCYSTEINE SCH MG: 100 INHALANT RESPIRATORY (INHALATION) at 07:54

## 2021-05-09 RX ADMIN — Medication SCH MG: at 13:30

## 2021-05-09 RX ADMIN — Medication SCH EA: at 21:07

## 2021-05-09 RX ADMIN — SODIUM HYPOCHLORITE SCH ML: 1.25 SOLUTION TOPICAL at 09:00

## 2021-05-09 RX ADMIN — NEOMYCIN AND POLYMYXIN B SULFATES AND BACITRACIN ZINC SCH APPLIC: 400; 3.5; 5 OINTMENT TOPICAL at 20:17

## 2021-05-09 RX ADMIN — ZINC OXIDE SCH APPLIC: 200 OINTMENT TOPICAL at 09:00

## 2021-05-09 RX ADMIN — Medication SCH EACH: at 23:48

## 2021-05-09 RX ADMIN — Medication SCH TAB: at 08:40

## 2021-05-09 RX ADMIN — INSULIN HUMAN PRN UNITS: 100 INJECTION, SOLUTION PARENTERAL at 12:15

## 2021-05-09 RX ADMIN — NEOMYCIN AND POLYMYXIN B SULFATES AND BACITRACIN ZINC SCH APPLIC: 400; 3.5; 5 OINTMENT TOPICAL at 09:00

## 2021-05-09 RX ADMIN — SODIUM HYPOCHLORITE SCH ML: 1.25 SOLUTION TOPICAL at 20:17

## 2021-05-09 RX ADMIN — INSULIN HUMAN PRN UNITS: 100 INJECTION, SOLUTION PARENTERAL at 00:13

## 2021-05-09 RX ADMIN — ZINC OXIDE SCH APPLIC: 200 OINTMENT TOPICAL at 20:17

## 2021-05-09 RX ADMIN — HYDROGEN PEROXIDE SCH ML: 2.65 LIQUID TOPICAL at 08:42

## 2021-05-09 RX ADMIN — Medication SCH EACH: at 05:50

## 2021-05-09 RX ADMIN — HYDROGEN PEROXIDE SCH ML: 2.65 LIQUID TOPICAL at 19:53

## 2021-05-09 RX ADMIN — PANTOPRAZOLE SODIUM SCH MG: 40 GRANULE, DELAYED RELEASE ORAL at 20:16

## 2021-05-09 RX ADMIN — DULOXETINE HYDROCHLORIDE SCH MG: 30 CAPSULE, DELAYED RELEASE ORAL at 08:39

## 2021-05-09 RX ADMIN — DOCUSATE SODIUM SCH MG: 50 LIQUID ORAL at 17:00

## 2021-05-09 RX ADMIN — INSULIN HUMAN PRN UNITS: 100 INJECTION, SOLUTION PARENTERAL at 23:50

## 2021-05-09 RX ADMIN — ACETYLCYSTEINE SCH MG: 100 INHALANT RESPIRATORY (INHALATION) at 23:40

## 2021-05-09 RX ADMIN — Medication PRN ML: at 04:00

## 2021-05-09 RX ADMIN — INSULIN HUMAN PRN UNITS: 100 INJECTION, SOLUTION PARENTERAL at 05:57

## 2021-05-09 NOTE — NUR
Called and spoke to patients' son (Ranjit Weston) made him aware of Dr. Mueller' order to 
transfuse 1 unit PRBC today, he gave telephone consent, ok to transfuse 1 unit PRBC for his 
mom.

## 2021-05-09 NOTE — NUR
Dr. Robertson came to the unit, notified him of Hgb 6.8, aware patient had dialysis yesterday. 
He ordered to transfuse 1 unit PRBC  today, type and screen, obtain consent. Noted and 
carried out.

## 2021-05-09 NOTE — NUR
Vital signs checked prior to blood transfusion, B/P 109/86, /min, RR 20/min, Temp., 
97.9.

Verified blood with other licensed prior to blood transfusion. Started blood transfusion ( 1 
unit PRBC),

v/s checked after 15 min, no further adverse reactions noted at this time, pt on vent 
tolerating well. No resp. distress, cont. with cont. o2 sat monitoring, pt afebrile. Patient 
not in distress, closely monitored.

## 2021-05-09 NOTE — NUR
Lab called and reported patients' Hgb is 6.8. Patient awake, on vent tolerating well, 02 sat 
monitoring cont. 02 sat right now 98%, HR 92/min. Patient not in distress. Will notify MD 
ASAP for  low Hgb.

## 2021-05-09 NOTE — NUR
Transfused 1 unit PRBC. V/S B/P 145/70, , RR 20, Temp. 97.5, no adverse reactions 
noted, no resp. distress. On vent tolerating well. 02 sat 98%. Mid line patent and intact , 
dressing changed. Patient kept safe and comfortable.  Closely monitored.

## 2021-05-09 NOTE — NUR
JAH Degroot (ID ) came to visit patient, she is aware of patients latest WBC result 20.7, she 
stated will not order antibiotic at this time, pt afebrile. Patient closely monitored.

## 2021-05-10 VITALS — SYSTOLIC BLOOD PRESSURE: 145 MMHG | DIASTOLIC BLOOD PRESSURE: 75 MMHG

## 2021-05-10 VITALS — DIASTOLIC BLOOD PRESSURE: 65 MMHG | SYSTOLIC BLOOD PRESSURE: 156 MMHG

## 2021-05-10 LAB
BASOPHILS # BLD AUTO: 0 /CMM (ref 0–0.2)
BASOPHILS NFR BLD AUTO: 0.2 % (ref 0–2)
EOSINOPHIL NFR BLD AUTO: 0.6 % (ref 0–6)
HCT VFR BLD AUTO: 31 % (ref 33–45)
HGB BLD-MCNC: 9.8 G/DL (ref 11.5–14.8)
LYMPHOCYTES NFR BLD AUTO: 1.3 /CMM (ref 0.8–4.8)
LYMPHOCYTES NFR BLD AUTO: 6.1 % (ref 20–44)
LYMPHOCYTES NFR BLD MANUAL: 6 % (ref 16–48)
MCHC RBC AUTO-ENTMCNC: 32 G/DL (ref 31–36)
MCV RBC AUTO: 102 FL (ref 82–100)
METAMYELOCYTES NFR BLD MANUAL: 1 % (ref 0–0)
MONOCYTES NFR BLD AUTO: 1.7 /CMM (ref 0.1–1.3)
MONOCYTES NFR BLD AUTO: 7.8 % (ref 2–12)
MONOCYTES NFR BLD MANUAL: 4 % (ref 0–11)
MYELOCYTES NFR BLD MANUAL: 1 % (ref 0–0)
NEUTROPHILS # BLD AUTO: 18.9 /CMM (ref 1.8–8.9)
NEUTROPHILS NFR BLD AUTO: 85.3 % (ref 43–81)
NEUTS BAND NFR BLD MANUAL: 6 % (ref 0–5)
NEUTS SEG NFR BLD MANUAL: 82 % (ref 42–76)
PLATELET # BLD AUTO: 510 /CMM (ref 150–450)
RBC # BLD AUTO: 2.98 MIL/UL (ref 4–5.2)
WBC NRBC COR # BLD AUTO: 22.1 K/UL (ref 4.3–11)

## 2021-05-10 RX ADMIN — Medication SCH MG: at 13:30

## 2021-05-10 RX ADMIN — NEOMYCIN AND POLYMYXIN B SULFATES AND BACITRACIN ZINC SCH APPLIC: 400; 3.5; 5 OINTMENT TOPICAL at 20:16

## 2021-05-10 RX ADMIN — Medication SCH EACH: at 05:26

## 2021-05-10 RX ADMIN — INSULIN HUMAN PRN UNITS: 100 INJECTION, SOLUTION PARENTERAL at 12:44

## 2021-05-10 RX ADMIN — INSULIN HUMAN PRN UNITS: 100 INJECTION, SOLUTION PARENTERAL at 23:25

## 2021-05-10 RX ADMIN — DULOXETINE HYDROCHLORIDE SCH MG: 30 CAPSULE, DELAYED RELEASE ORAL at 09:36

## 2021-05-10 RX ADMIN — DOCUSATE SODIUM SCH MG: 50 LIQUID ORAL at 09:36

## 2021-05-10 RX ADMIN — ATORVASTATIN CALCIUM SCH MG: 40 TABLET, FILM COATED ORAL at 21:11

## 2021-05-10 RX ADMIN — Medication SCH MG: at 01:41

## 2021-05-10 RX ADMIN — AMLODIPINE BESYLATE SCH MG: 10 TABLET ORAL at 20:05

## 2021-05-10 RX ADMIN — ZINC OXIDE SCH APPLIC: 200 OINTMENT TOPICAL at 10:30

## 2021-05-10 RX ADMIN — Medication SCH MG: at 20:16

## 2021-05-10 RX ADMIN — ACETYLCYSTEINE SCH MG: 100 INHALANT RESPIRATORY (INHALATION) at 08:12

## 2021-05-10 RX ADMIN — ZINC OXIDE SCH APPLIC: 200 OINTMENT TOPICAL at 12:30

## 2021-05-10 RX ADMIN — LEVOTHYROXINE SODIUM SCH MCG: 75 TABLET ORAL at 05:10

## 2021-05-10 RX ADMIN — POVIDONE-IODINE SCH APPLIC: 100 OINTMENT TOPICAL at 10:30

## 2021-05-10 RX ADMIN — SODIUM HYPOCHLORITE SCH ML: 1.25 SOLUTION TOPICAL at 10:30

## 2021-05-10 RX ADMIN — DOCUSATE SODIUM SCH MG: 50 LIQUID ORAL at 17:37

## 2021-05-10 RX ADMIN — Medication SCH TAB: at 09:36

## 2021-05-10 RX ADMIN — Medication SCH MG: at 08:12

## 2021-05-10 RX ADMIN — SODIUM HYPOCHLORITE SCH ML: 1.25 SOLUTION TOPICAL at 20:15

## 2021-05-10 RX ADMIN — ACETYLCYSTEINE SCH MG: 100 INHALANT RESPIRATORY (INHALATION) at 15:55

## 2021-05-10 RX ADMIN — ZINC OXIDE SCH APPLIC: 200 OINTMENT TOPICAL at 20:16

## 2021-05-10 RX ADMIN — PANTOPRAZOLE SODIUM SCH MG: 40 GRANULE, DELAYED RELEASE ORAL at 09:36

## 2021-05-10 RX ADMIN — Medication SCH EACH: at 18:08

## 2021-05-10 RX ADMIN — Medication SCH EA: at 21:11

## 2021-05-10 RX ADMIN — CEFEPIME HYDROCHLORIDE SCH MLS/HR: 1 INJECTION, POWDER, FOR SOLUTION INTRAMUSCULAR; INTRAVENOUS at 16:33

## 2021-05-10 RX ADMIN — NEOMYCIN AND POLYMYXIN B SULFATES AND BACITRACIN ZINC SCH APPLIC: 400; 3.5; 5 OINTMENT TOPICAL at 10:30

## 2021-05-10 RX ADMIN — HYDROMORPHONE HYDROCHLORIDE PRN MG: 2 TABLET ORAL at 09:43

## 2021-05-10 RX ADMIN — HYDROGEN PEROXIDE SCH ML: 2.65 LIQUID TOPICAL at 20:15

## 2021-05-10 RX ADMIN — PANTOPRAZOLE SODIUM SCH MG: 40 GRANULE, DELAYED RELEASE ORAL at 20:06

## 2021-05-10 RX ADMIN — DONEPEZIL HYDROCHLORIDE SCH MG: 5 TABLET ORAL at 21:11

## 2021-05-10 RX ADMIN — POVIDONE-IODINE SCH APPLIC: 100 OINTMENT TOPICAL at 20:15

## 2021-05-10 RX ADMIN — FOLIC ACID SCH MG: 1 TABLET ORAL at 09:36

## 2021-05-10 RX ADMIN — INSULIN HUMAN PRN UNITS: 100 INJECTION, SOLUTION PARENTERAL at 05:26

## 2021-05-10 RX ADMIN — Medication SCH MG: at 05:10

## 2021-05-10 RX ADMIN — HYDROGEN PEROXIDE SCH ML: 2.65 LIQUID TOPICAL at 08:13

## 2021-05-10 RX ADMIN — ACETYLCYSTEINE SCH MG: 100 INHALANT RESPIRATORY (INHALATION) at 23:31

## 2021-05-10 RX ADMIN — INSULIN HUMAN PRN UNITS: 100 INJECTION, SOLUTION PARENTERAL at 18:09

## 2021-05-10 RX ADMIN — Medication SCH EACH: at 23:20

## 2021-05-10 RX ADMIN — Medication SCH EACH: at 12:43

## 2021-05-10 NOTE — NUR
Seen by Dr Maki. Informed him that urine has not yet been collected for culture since 
pt is anuric. Pt has a Schultz catheter. Received order to DC urine culture and Schultz 
catheter.

## 2021-05-10 NOTE — NUR
NP Mikayla Watson aware of lab results. She ordered to give Cefepime for pneumonia and 
leukocytosis. Asked her for the duration of Cefepime and she said to give it for 7 days. 
Notified pt's son Ranjit.

## 2021-05-11 VITALS — SYSTOLIC BLOOD PRESSURE: 97 MMHG | DIASTOLIC BLOOD PRESSURE: 60 MMHG

## 2021-05-11 VITALS — DIASTOLIC BLOOD PRESSURE: 73 MMHG | SYSTOLIC BLOOD PRESSURE: 140 MMHG

## 2021-05-11 VITALS — DIASTOLIC BLOOD PRESSURE: 77 MMHG | SYSTOLIC BLOOD PRESSURE: 154 MMHG

## 2021-05-11 VITALS — SYSTOLIC BLOOD PRESSURE: 108 MMHG | DIASTOLIC BLOOD PRESSURE: 69 MMHG

## 2021-05-11 RX ADMIN — DULOXETINE HYDROCHLORIDE SCH MG: 30 CAPSULE, DELAYED RELEASE ORAL at 09:34

## 2021-05-11 RX ADMIN — DOCUSATE SODIUM SCH MG: 50 LIQUID ORAL at 19:15

## 2021-05-11 RX ADMIN — AMLODIPINE BESYLATE SCH MG: 10 TABLET ORAL at 20:27

## 2021-05-11 RX ADMIN — POVIDONE-IODINE SCH APPLIC: 100 OINTMENT TOPICAL at 10:30

## 2021-05-11 RX ADMIN — INSULIN HUMAN PRN UNITS: 100 INJECTION, SOLUTION PARENTERAL at 19:19

## 2021-05-11 RX ADMIN — ACETYLCYSTEINE SCH MG: 100 INHALANT RESPIRATORY (INHALATION) at 15:30

## 2021-05-11 RX ADMIN — HYDROGEN PEROXIDE SCH ML: 2.65 LIQUID TOPICAL at 08:08

## 2021-05-11 RX ADMIN — Medication SCH EA: at 21:15

## 2021-05-11 RX ADMIN — Medication SCH EACH: at 12:45

## 2021-05-11 RX ADMIN — NEOMYCIN AND POLYMYXIN B SULFATES AND BACITRACIN ZINC SCH APPLIC: 400; 3.5; 5 OINTMENT TOPICAL at 20:28

## 2021-05-11 RX ADMIN — NEOMYCIN AND POLYMYXIN B SULFATES AND BACITRACIN ZINC SCH APPLIC: 400; 3.5; 5 OINTMENT TOPICAL at 10:30

## 2021-05-11 RX ADMIN — Medication SCH MG: at 12:42

## 2021-05-11 RX ADMIN — Medication SCH TAB: at 09:34

## 2021-05-11 RX ADMIN — Medication SCH MG: at 07:49

## 2021-05-11 RX ADMIN — CEFEPIME HYDROCHLORIDE SCH MLS/HR: 1 INJECTION, POWDER, FOR SOLUTION INTRAMUSCULAR; INTRAVENOUS at 19:00

## 2021-05-11 RX ADMIN — SODIUM HYPOCHLORITE SCH ML: 1.25 SOLUTION TOPICAL at 20:28

## 2021-05-11 RX ADMIN — Medication SCH EACH: at 19:15

## 2021-05-11 RX ADMIN — ZINC OXIDE SCH APPLIC: 200 OINTMENT TOPICAL at 20:29

## 2021-05-11 RX ADMIN — HYDROGEN PEROXIDE SCH ML: 2.65 LIQUID TOPICAL at 21:47

## 2021-05-11 RX ADMIN — DOCUSATE SODIUM SCH MG: 50 LIQUID ORAL at 09:34

## 2021-05-11 RX ADMIN — ACETYLCYSTEINE SCH MG: 100 INHALANT RESPIRATORY (INHALATION) at 07:49

## 2021-05-11 RX ADMIN — ZINC OXIDE SCH APPLIC: 200 OINTMENT TOPICAL at 10:30

## 2021-05-11 RX ADMIN — SODIUM HYPOCHLORITE SCH ML: 1.25 SOLUTION TOPICAL at 10:30

## 2021-05-11 RX ADMIN — ATORVASTATIN CALCIUM SCH MG: 40 TABLET, FILM COATED ORAL at 21:15

## 2021-05-11 RX ADMIN — LEVOTHYROXINE SODIUM SCH MCG: 75 TABLET ORAL at 05:08

## 2021-05-11 RX ADMIN — Medication SCH MG: at 01:50

## 2021-05-11 RX ADMIN — ACETYLCYSTEINE SCH MG: 100 INHALANT RESPIRATORY (INHALATION) at 23:53

## 2021-05-11 RX ADMIN — HYDROMORPHONE HYDROCHLORIDE PRN MG: 2 TABLET ORAL at 09:39

## 2021-05-11 RX ADMIN — Medication SCH MG: at 05:08

## 2021-05-11 RX ADMIN — INSULIN HUMAN PRN UNITS: 100 INJECTION, SOLUTION PARENTERAL at 12:46

## 2021-05-11 RX ADMIN — POVIDONE-IODINE SCH APPLIC: 100 OINTMENT TOPICAL at 20:28

## 2021-05-11 RX ADMIN — INSULIN HUMAN PRN UNITS: 100 INJECTION, SOLUTION PARENTERAL at 05:36

## 2021-05-11 RX ADMIN — PANTOPRAZOLE SODIUM SCH MG: 40 GRANULE, DELAYED RELEASE ORAL at 09:34

## 2021-05-11 RX ADMIN — DONEPEZIL HYDROCHLORIDE SCH MG: 5 TABLET ORAL at 21:15

## 2021-05-11 RX ADMIN — FOLIC ACID SCH MG: 1 TABLET ORAL at 09:34

## 2021-05-11 RX ADMIN — Medication SCH EACH: at 05:26

## 2021-05-11 RX ADMIN — Medication SCH MG: at 19:48

## 2021-05-11 RX ADMIN — PANTOPRAZOLE SODIUM SCH MG: 40 GRANULE, DELAYED RELEASE ORAL at 20:34

## 2021-05-11 NOTE — NUR
Called  [facility] and spoke with Meron. Asked her if pt is receiving Epogen at  [facility]. She said pt receives Mircera 225 mcg every 2 weeks. Notified Dr Maki and JAH Ross.

## 2021-05-11 NOTE — NUR
Called Dr Ward's office and spoke with Meron. According to [facility], Dr Lara 
ordered a vascular surgery consult with Dr Ward for a fistula. Notified Dr Maki and 
he is in agreement. Left message for Dr Ward.

## 2021-05-11 NOTE — NUR
Pt was at  [facility] at 1600, administered Cefepime upon return to [facility] at 1900.

## 2021-05-11 NOTE — NUR
Resident was picked up by ambulance for scheduled dialysis at  [facility]. resident awake, no 
signs of distress, resp. even and unlabored. Trach secured and midline. On mechanical vent. 
VS stable.

## 2021-05-12 VITALS — SYSTOLIC BLOOD PRESSURE: 136 MMHG | DIASTOLIC BLOOD PRESSURE: 69 MMHG

## 2021-05-12 VITALS — SYSTOLIC BLOOD PRESSURE: 100 MMHG | DIASTOLIC BLOOD PRESSURE: 68 MMHG

## 2021-05-12 LAB
BASOPHILS # BLD AUTO: 0.1 /CMM (ref 0–0.2)
BASOPHILS NFR BLD AUTO: 0.3 % (ref 0–2)
EOSINOPHIL NFR BLD AUTO: 0.1 % (ref 0–6)
EOSINOPHIL NFR BLD MANUAL: 2 % (ref 0–4)
HCT VFR BLD AUTO: 31 % (ref 33–45)
HGB BLD-MCNC: 9.7 G/DL (ref 11.5–14.8)
LYMPHOCYTES NFR BLD AUTO: 1 /CMM (ref 0.8–4.8)
LYMPHOCYTES NFR BLD AUTO: 5.2 % (ref 20–44)
LYMPHOCYTES NFR BLD MANUAL: 4 % (ref 16–48)
MCHC RBC AUTO-ENTMCNC: 32 G/DL (ref 31–36)
MCV RBC AUTO: 104 FL (ref 82–100)
MONOCYTES NFR BLD AUTO: 1.8 /CMM (ref 0.1–1.3)
MONOCYTES NFR BLD AUTO: 9.3 % (ref 2–12)
MONOCYTES NFR BLD MANUAL: 9 % (ref 0–11)
NEUTROPHILS # BLD AUTO: 16.7 /CMM (ref 1.8–8.9)
NEUTROPHILS NFR BLD AUTO: 85.1 % (ref 43–81)
NEUTS SEG NFR BLD MANUAL: 85 % (ref 42–76)
PLATELET # BLD AUTO: 456 /CMM (ref 150–450)
RBC # BLD AUTO: 2.93 MIL/UL (ref 4–5.2)
WBC NRBC COR # BLD AUTO: 19.6 K/UL (ref 4.3–11)

## 2021-05-12 RX ADMIN — HYDROGEN PEROXIDE SCH ML: 2.65 LIQUID TOPICAL at 10:28

## 2021-05-12 RX ADMIN — SODIUM HYPOCHLORITE SCH ML: 1.25 SOLUTION TOPICAL at 21:36

## 2021-05-12 RX ADMIN — Medication SCH EACH: at 11:40

## 2021-05-12 RX ADMIN — ZINC OXIDE SCH APPLIC: 200 OINTMENT TOPICAL at 21:36

## 2021-05-12 RX ADMIN — Medication SCH MG: at 19:39

## 2021-05-12 RX ADMIN — NEOMYCIN AND POLYMYXIN B SULFATES AND BACITRACIN ZINC SCH APPLIC: 400; 3.5; 5 OINTMENT TOPICAL at 21:36

## 2021-05-12 RX ADMIN — FOLIC ACID SCH MG: 1 TABLET ORAL at 09:20

## 2021-05-12 RX ADMIN — INSULIN HUMAN PRN UNITS: 100 INJECTION, SOLUTION PARENTERAL at 23:26

## 2021-05-12 RX ADMIN — ATORVASTATIN CALCIUM SCH MG: 40 TABLET, FILM COATED ORAL at 21:36

## 2021-05-12 RX ADMIN — Medication SCH EA: at 21:36

## 2021-05-12 RX ADMIN — PANTOPRAZOLE SODIUM SCH MG: 40 GRANULE, DELAYED RELEASE ORAL at 09:20

## 2021-05-12 RX ADMIN — Medication SCH MG: at 01:26

## 2021-05-12 RX ADMIN — ZINC OXIDE SCH APPLIC: 200 OINTMENT TOPICAL at 09:21

## 2021-05-12 RX ADMIN — INSULIN HUMAN PRN UNITS: 100 INJECTION, SOLUTION PARENTERAL at 06:05

## 2021-05-12 RX ADMIN — POVIDONE-IODINE SCH APPLIC: 100 OINTMENT TOPICAL at 21:36

## 2021-05-12 RX ADMIN — ACETYLCYSTEINE SCH MG: 100 INHALANT RESPIRATORY (INHALATION) at 07:57

## 2021-05-12 RX ADMIN — DONEPEZIL HYDROCHLORIDE SCH MG: 5 TABLET ORAL at 21:36

## 2021-05-12 RX ADMIN — Medication PRN ML: at 06:30

## 2021-05-12 RX ADMIN — DOCUSATE SODIUM SCH MG: 50 LIQUID ORAL at 17:18

## 2021-05-12 RX ADMIN — DOCUSATE SODIUM SCH MG: 50 LIQUID ORAL at 09:20

## 2021-05-12 RX ADMIN — HYDROGEN PEROXIDE SCH ML: 2.65 LIQUID TOPICAL at 19:39

## 2021-05-12 RX ADMIN — Medication SCH MG: at 14:20

## 2021-05-12 RX ADMIN — Medication SCH MG: at 07:57

## 2021-05-12 RX ADMIN — CEFEPIME HYDROCHLORIDE SCH MLS/HR: 1 INJECTION, POWDER, FOR SOLUTION INTRAMUSCULAR; INTRAVENOUS at 16:00

## 2021-05-12 RX ADMIN — INSULIN HUMAN PRN UNITS: 100 INJECTION, SOLUTION PARENTERAL at 00:31

## 2021-05-12 RX ADMIN — Medication SCH EACH: at 23:25

## 2021-05-12 RX ADMIN — Medication SCH MG: at 05:48

## 2021-05-12 RX ADMIN — Medication SCH EACH: at 17:18

## 2021-05-12 RX ADMIN — ZINC OXIDE SCH APPLIC: 200 OINTMENT TOPICAL at 09:20

## 2021-05-12 RX ADMIN — NEOMYCIN AND POLYMYXIN B SULFATES AND BACITRACIN ZINC SCH APPLIC: 400; 3.5; 5 OINTMENT TOPICAL at 09:20

## 2021-05-12 RX ADMIN — ACETYLCYSTEINE SCH MG: 100 INHALANT RESPIRATORY (INHALATION) at 23:28

## 2021-05-12 RX ADMIN — DULOXETINE HYDROCHLORIDE SCH MG: 30 CAPSULE, DELAYED RELEASE ORAL at 09:20

## 2021-05-12 RX ADMIN — Medication SCH EACH: at 00:26

## 2021-05-12 RX ADMIN — AMLODIPINE BESYLATE SCH MG: 10 TABLET ORAL at 21:35

## 2021-05-12 RX ADMIN — SODIUM HYPOCHLORITE SCH ML: 1.25 SOLUTION TOPICAL at 09:20

## 2021-05-12 RX ADMIN — Medication SCH EACH: at 05:48

## 2021-05-12 RX ADMIN — POVIDONE-IODINE SCH APPLIC: 100 OINTMENT TOPICAL at 09:20

## 2021-05-12 RX ADMIN — INSULIN HUMAN PRN UNITS: 100 INJECTION, SOLUTION PARENTERAL at 11:56

## 2021-05-12 RX ADMIN — LEVOTHYROXINE SODIUM SCH MCG: 75 TABLET ORAL at 05:48

## 2021-05-12 RX ADMIN — PANTOPRAZOLE SODIUM SCH MG: 40 GRANULE, DELAYED RELEASE ORAL at 21:35

## 2021-05-12 RX ADMIN — Medication SCH TAB: at 09:20

## 2021-05-12 RX ADMIN — ACETYLCYSTEINE SCH MG: 100 INHALANT RESPIRATORY (INHALATION) at 14:54

## 2021-05-12 NOTE — NUR
Left a message to Dr. Ward to follow-up vascular consultation for fistula placement.  
According to MD, he is out of town for a week and will plan surgery in 2 weeks. Endorsed.

## 2021-05-13 VITALS — SYSTOLIC BLOOD PRESSURE: 123 MMHG | DIASTOLIC BLOOD PRESSURE: 58 MMHG

## 2021-05-13 VITALS — SYSTOLIC BLOOD PRESSURE: 140 MMHG | DIASTOLIC BLOOD PRESSURE: 57 MMHG

## 2021-05-13 VITALS — SYSTOLIC BLOOD PRESSURE: 128 MMHG | DIASTOLIC BLOOD PRESSURE: 95 MMHG

## 2021-05-13 RX ADMIN — ACETYLCYSTEINE SCH MG: 100 INHALANT RESPIRATORY (INHALATION) at 15:30

## 2021-05-13 RX ADMIN — POVIDONE-IODINE SCH APPLIC: 100 OINTMENT TOPICAL at 20:34

## 2021-05-13 RX ADMIN — PANTOPRAZOLE SODIUM SCH MG: 40 GRANULE, DELAYED RELEASE ORAL at 08:40

## 2021-05-13 RX ADMIN — Medication SCH EA: at 21:22

## 2021-05-13 RX ADMIN — INSULIN HUMAN PRN UNITS: 100 INJECTION, SOLUTION PARENTERAL at 06:03

## 2021-05-13 RX ADMIN — LEVOTHYROXINE SODIUM SCH MCG: 75 TABLET ORAL at 06:02

## 2021-05-13 RX ADMIN — DOCUSATE SODIUM SCH MG: 50 LIQUID ORAL at 18:40

## 2021-05-13 RX ADMIN — Medication SCH TAB: at 08:40

## 2021-05-13 RX ADMIN — NEOMYCIN AND POLYMYXIN B SULFATES AND BACITRACIN ZINC SCH APPLIC: 400; 3.5; 5 OINTMENT TOPICAL at 08:40

## 2021-05-13 RX ADMIN — ACETYLCYSTEINE SCH MG: 100 INHALANT RESPIRATORY (INHALATION) at 08:22

## 2021-05-13 RX ADMIN — CEFEPIME HYDROCHLORIDE SCH MLS/HR: 1 INJECTION, POWDER, FOR SOLUTION INTRAMUSCULAR; INTRAVENOUS at 18:40

## 2021-05-13 RX ADMIN — Medication SCH EACH: at 18:40

## 2021-05-13 RX ADMIN — ZINC OXIDE SCH APPLIC: 200 OINTMENT TOPICAL at 08:41

## 2021-05-13 RX ADMIN — Medication SCH MG: at 06:02

## 2021-05-13 RX ADMIN — ATORVASTATIN CALCIUM SCH MG: 40 TABLET, FILM COATED ORAL at 21:22

## 2021-05-13 RX ADMIN — Medication SCH MG: at 19:50

## 2021-05-13 RX ADMIN — DOCUSATE SODIUM SCH MG: 50 LIQUID ORAL at 08:40

## 2021-05-13 RX ADMIN — NEOMYCIN AND POLYMYXIN B SULFATES AND BACITRACIN ZINC SCH APPLIC: 400; 3.5; 5 OINTMENT TOPICAL at 20:35

## 2021-05-13 RX ADMIN — Medication SCH MG: at 12:46

## 2021-05-13 RX ADMIN — HYDROGEN PEROXIDE SCH ML: 2.65 LIQUID TOPICAL at 08:22

## 2021-05-13 RX ADMIN — FOLIC ACID SCH MG: 1 TABLET ORAL at 08:40

## 2021-05-13 RX ADMIN — DONEPEZIL HYDROCHLORIDE SCH MG: 5 TABLET ORAL at 21:22

## 2021-05-13 RX ADMIN — Medication SCH EACH: at 12:05

## 2021-05-13 RX ADMIN — ACETYLCYSTEINE SCH MG: 100 INHALANT RESPIRATORY (INHALATION) at 23:12

## 2021-05-13 RX ADMIN — HYDROGEN PEROXIDE SCH ML: 2.65 LIQUID TOPICAL at 19:50

## 2021-05-13 RX ADMIN — DULOXETINE HYDROCHLORIDE SCH MG: 30 CAPSULE, DELAYED RELEASE ORAL at 08:40

## 2021-05-13 RX ADMIN — ZINC OXIDE SCH APPLIC: 200 OINTMENT TOPICAL at 20:35

## 2021-05-13 RX ADMIN — SODIUM HYPOCHLORITE SCH ML: 1.25 SOLUTION TOPICAL at 20:34

## 2021-05-13 RX ADMIN — POVIDONE-IODINE SCH APPLIC: 100 OINTMENT TOPICAL at 08:40

## 2021-05-13 RX ADMIN — Medication SCH MG: at 08:22

## 2021-05-13 RX ADMIN — SODIUM HYPOCHLORITE SCH ML: 1.25 SOLUTION TOPICAL at 08:40

## 2021-05-13 RX ADMIN — Medication SCH MG: at 01:39

## 2021-05-13 RX ADMIN — AMLODIPINE BESYLATE SCH MG: 10 TABLET ORAL at 20:34

## 2021-05-13 RX ADMIN — Medication SCH EACH: at 06:02

## 2021-05-13 RX ADMIN — PANTOPRAZOLE SODIUM SCH MG: 40 GRANULE, DELAYED RELEASE ORAL at 20:34

## 2021-05-13 RX ADMIN — INSULIN HUMAN PRN UNITS: 100 INJECTION, SOLUTION PARENTERAL at 12:07

## 2021-05-13 RX ADMIN — INSULIN HUMAN PRN UNITS: 100 INJECTION, SOLUTION PARENTERAL at 18:49

## 2021-05-13 NOTE — NUR
HD APT. & TRANSPORT 05/13/2021: 





SW called [facility] at [phone] and spoke to Kimberly to set up Specialty Care 
Transportation for 05/08/2021 from [facility] to [facility] [[address] [facility] [address], [place] [address]; [phone]] for Chair time of 2:30pm- 6 pm.   from [facility] is 1:25 pm 
Confirmation #[id number].

## 2021-05-13 NOTE — NUR
Education provided to pt's son regarding pneumococcal vaccine. Explained risks, benefits, 
and possible side effects. Vaccine information statement to be e-mailed by  to 
pt's son.

## 2021-05-13 NOTE — NUR
Asked Dr Robertson if pt should be given the Flu and Pneumococcal vaccines. He said pt does not 
need to receive the Flu vaccine since it is almost summer and there are not many cases of 
Flu this year. Clarified if pt needs Pneumococcal vaccine 13 or 23, Dr Robertson said to give 
Pneumococcal vaccine 23.

-------------------------------------------------------------------------------

Addendum: 05/13/21 at 1551 by BYRON COREA RN

-------------------------------------------------------------------------------

Per Dr Robertson, Pneumococcal vaccine can be given 2 weeks after the completion of the Covid-19 
vaccine.

## 2021-05-14 VITALS — DIASTOLIC BLOOD PRESSURE: 58 MMHG | SYSTOLIC BLOOD PRESSURE: 122 MMHG

## 2021-05-14 VITALS — DIASTOLIC BLOOD PRESSURE: 69 MMHG | SYSTOLIC BLOOD PRESSURE: 136 MMHG

## 2021-05-14 VITALS — SYSTOLIC BLOOD PRESSURE: 130 MMHG | DIASTOLIC BLOOD PRESSURE: 82 MMHG

## 2021-05-14 RX ADMIN — SODIUM HYPOCHLORITE SCH ML: 1.25 SOLUTION TOPICAL at 21:47

## 2021-05-14 RX ADMIN — ZINC OXIDE SCH APPLIC: 200 OINTMENT TOPICAL at 21:48

## 2021-05-14 RX ADMIN — FOLIC ACID SCH MG: 1 TABLET ORAL at 09:54

## 2021-05-14 RX ADMIN — INSULIN HUMAN PRN UNITS: 100 INJECTION, SOLUTION PARENTERAL at 12:54

## 2021-05-14 RX ADMIN — INSULIN HUMAN PRN UNITS: 100 INJECTION, SOLUTION PARENTERAL at 05:31

## 2021-05-14 RX ADMIN — Medication SCH MG: at 19:38

## 2021-05-14 RX ADMIN — Medication PRN ML: at 00:03

## 2021-05-14 RX ADMIN — NEOMYCIN AND POLYMYXIN B SULFATES AND BACITRACIN ZINC SCH APPLIC: 400; 3.5; 5 OINTMENT TOPICAL at 21:48

## 2021-05-14 RX ADMIN — ATORVASTATIN CALCIUM SCH MG: 40 TABLET, FILM COATED ORAL at 21:50

## 2021-05-14 RX ADMIN — POVIDONE-IODINE SCH APPLIC: 100 OINTMENT TOPICAL at 21:48

## 2021-05-14 RX ADMIN — SODIUM HYPOCHLORITE SCH ML: 1.25 SOLUTION TOPICAL at 11:00

## 2021-05-14 RX ADMIN — HYDROMORPHONE HYDROCHLORIDE PRN MG: 2 TABLET ORAL at 10:02

## 2021-05-14 RX ADMIN — Medication SCH MG: at 13:39

## 2021-05-14 RX ADMIN — DULOXETINE HYDROCHLORIDE SCH MG: 30 CAPSULE, DELAYED RELEASE ORAL at 09:54

## 2021-05-14 RX ADMIN — Medication SCH EACH: at 00:09

## 2021-05-14 RX ADMIN — HYDROMORPHONE HYDROCHLORIDE PRN MG: 2 TABLET ORAL at 21:51

## 2021-05-14 RX ADMIN — PANTOPRAZOLE SODIUM SCH MG: 40 GRANULE, DELAYED RELEASE ORAL at 21:47

## 2021-05-14 RX ADMIN — INSULIN HUMAN PRN UNITS: 100 INJECTION, SOLUTION PARENTERAL at 17:24

## 2021-05-14 RX ADMIN — LEVOTHYROXINE SODIUM SCH MCG: 75 TABLET ORAL at 05:30

## 2021-05-14 RX ADMIN — ACETYLCYSTEINE SCH MG: 100 INHALANT RESPIRATORY (INHALATION) at 07:23

## 2021-05-14 RX ADMIN — Medication SCH MG: at 07:23

## 2021-05-14 RX ADMIN — NEOMYCIN AND POLYMYXIN B SULFATES AND BACITRACIN ZINC SCH APPLIC: 400; 3.5; 5 OINTMENT TOPICAL at 11:00

## 2021-05-14 RX ADMIN — Medication SCH EACH: at 17:23

## 2021-05-14 RX ADMIN — DONEPEZIL HYDROCHLORIDE SCH MG: 5 TABLET ORAL at 21:50

## 2021-05-14 RX ADMIN — POVIDONE-IODINE SCH APPLIC: 100 OINTMENT TOPICAL at 21:47

## 2021-05-14 RX ADMIN — INSULIN HUMAN PRN UNITS: 100 INJECTION, SOLUTION PARENTERAL at 00:10

## 2021-05-14 RX ADMIN — DOCUSATE SODIUM SCH MG: 50 LIQUID ORAL at 09:54

## 2021-05-14 RX ADMIN — DOCUSATE SODIUM SCH MG: 50 LIQUID ORAL at 17:09

## 2021-05-14 RX ADMIN — ZINC OXIDE SCH APPLIC: 200 OINTMENT TOPICAL at 21:49

## 2021-05-14 RX ADMIN — CEFEPIME HYDROCHLORIDE SCH MLS/HR: 1 INJECTION, POWDER, FOR SOLUTION INTRAMUSCULAR; INTRAVENOUS at 16:00

## 2021-05-14 RX ADMIN — Medication SCH MG: at 01:38

## 2021-05-14 RX ADMIN — POVIDONE-IODINE SCH APPLIC: 100 OINTMENT TOPICAL at 11:00

## 2021-05-14 RX ADMIN — Medication SCH EACH: at 05:30

## 2021-05-14 RX ADMIN — HYDROGEN PEROXIDE SCH ML: 2.65 LIQUID TOPICAL at 08:43

## 2021-05-14 RX ADMIN — ACETYLCYSTEINE SCH MG: 100 INHALANT RESPIRATORY (INHALATION) at 15:29

## 2021-05-14 RX ADMIN — AMLODIPINE BESYLATE SCH MG: 10 TABLET ORAL at 21:47

## 2021-05-14 RX ADMIN — HYDROGEN PEROXIDE SCH ML: 2.65 LIQUID TOPICAL at 21:04

## 2021-05-14 RX ADMIN — PANTOPRAZOLE SODIUM SCH MG: 40 GRANULE, DELAYED RELEASE ORAL at 09:54

## 2021-05-14 RX ADMIN — ZINC OXIDE SCH APPLIC: 200 OINTMENT TOPICAL at 11:00

## 2021-05-14 RX ADMIN — Medication SCH MG: at 05:30

## 2021-05-14 RX ADMIN — Medication SCH TAB: at 09:54

## 2021-05-14 RX ADMIN — Medication SCH EA: at 21:50

## 2021-05-14 RX ADMIN — Medication SCH EACH: at 12:52

## 2021-05-14 NOTE — NUR
HD APT. & TRANSPORT 05/15/2021: 





SW called [facility] at [phone] and spoke to Chely to set up Specialty Care 
Transportation for 05/15/2021 from [facility] to [facility] [[address] [facility] [address], [place] [address]; [phone]] for Chair time of 2:30pm- 6 pm.   from [facility] is 1:25 pm 
Confirmation #[id number].

## 2021-05-15 VITALS — DIASTOLIC BLOOD PRESSURE: 51 MMHG | SYSTOLIC BLOOD PRESSURE: 128 MMHG

## 2021-05-15 VITALS — DIASTOLIC BLOOD PRESSURE: 77 MMHG | SYSTOLIC BLOOD PRESSURE: 134 MMHG

## 2021-05-15 VITALS — SYSTOLIC BLOOD PRESSURE: 160 MMHG | DIASTOLIC BLOOD PRESSURE: 67 MMHG

## 2021-05-15 RX ADMIN — Medication SCH MG: at 20:10

## 2021-05-15 RX ADMIN — Medication SCH MG: at 13:56

## 2021-05-15 RX ADMIN — Medication SCH TAB: at 08:20

## 2021-05-15 RX ADMIN — ACETYLCYSTEINE SCH MG: 100 INHALANT RESPIRATORY (INHALATION) at 15:30

## 2021-05-15 RX ADMIN — Medication SCH MG: at 02:23

## 2021-05-15 RX ADMIN — Medication SCH EACH: at 00:00

## 2021-05-15 RX ADMIN — DOCUSATE SODIUM SCH MG: 50 LIQUID ORAL at 17:00

## 2021-05-15 RX ADMIN — Medication SCH EA: at 21:36

## 2021-05-15 RX ADMIN — CLOTRIMAZOLE SCH GM: 1 CREAM TOPICAL at 11:49

## 2021-05-15 RX ADMIN — INSULIN HUMAN PRN UNITS: 100 INJECTION, SOLUTION PARENTERAL at 12:04

## 2021-05-15 RX ADMIN — ACETYLCYSTEINE SCH MG: 100 INHALANT RESPIRATORY (INHALATION) at 00:07

## 2021-05-15 RX ADMIN — ACETAMINOPHEN PRN MG: 160 SOLUTION ORAL at 21:37

## 2021-05-15 RX ADMIN — SODIUM HYPOCHLORITE SCH ML: 1.25 SOLUTION TOPICAL at 11:49

## 2021-05-15 RX ADMIN — FOLIC ACID SCH MG: 1 TABLET ORAL at 08:20

## 2021-05-15 RX ADMIN — POVIDONE-IODINE SCH APPLIC: 100 OINTMENT TOPICAL at 21:34

## 2021-05-15 RX ADMIN — NEOMYCIN AND POLYMYXIN B SULFATES AND BACITRACIN ZINC SCH APPLIC: 400; 3.5; 5 OINTMENT TOPICAL at 21:34

## 2021-05-15 RX ADMIN — ACETYLCYSTEINE SCH MG: 100 INHALANT RESPIRATORY (INHALATION) at 23:43

## 2021-05-15 RX ADMIN — AMLODIPINE BESYLATE SCH MG: 10 TABLET ORAL at 21:33

## 2021-05-15 RX ADMIN — HYDROGEN PEROXIDE SCH ML: 2.65 LIQUID TOPICAL at 21:13

## 2021-05-15 RX ADMIN — DULOXETINE HYDROCHLORIDE SCH MG: 30 CAPSULE, DELAYED RELEASE ORAL at 08:20

## 2021-05-15 RX ADMIN — Medication SCH EACH: at 11:50

## 2021-05-15 RX ADMIN — ZINC OXIDE SCH APPLIC: 200 OINTMENT TOPICAL at 08:21

## 2021-05-15 RX ADMIN — PANTOPRAZOLE SODIUM SCH MG: 40 GRANULE, DELAYED RELEASE ORAL at 08:20

## 2021-05-15 RX ADMIN — ZINC OXIDE SCH APPLIC: 200 OINTMENT TOPICAL at 21:34

## 2021-05-15 RX ADMIN — POVIDONE-IODINE SCH APPLIC: 100 OINTMENT TOPICAL at 08:20

## 2021-05-15 RX ADMIN — INSULIN HUMAN PRN UNITS: 100 INJECTION, SOLUTION PARENTERAL at 01:21

## 2021-05-15 RX ADMIN — LEVOTHYROXINE SODIUM SCH MCG: 75 TABLET ORAL at 06:21

## 2021-05-15 RX ADMIN — Medication SCH MG: at 06:21

## 2021-05-15 RX ADMIN — POVIDONE-IODINE SCH APPLIC: 100 OINTMENT TOPICAL at 21:33

## 2021-05-15 RX ADMIN — PANTOPRAZOLE SODIUM SCH MG: 40 GRANULE, DELAYED RELEASE ORAL at 21:33

## 2021-05-15 RX ADMIN — CEFEPIME HYDROCHLORIDE SCH MLS/HR: 1 INJECTION, POWDER, FOR SOLUTION INTRAMUSCULAR; INTRAVENOUS at 18:31

## 2021-05-15 RX ADMIN — Medication SCH MG: at 08:13

## 2021-05-15 RX ADMIN — NEOMYCIN AND POLYMYXIN B SULFATES AND BACITRACIN ZINC SCH APPLIC: 400; 3.5; 5 OINTMENT TOPICAL at 08:21

## 2021-05-15 RX ADMIN — ACETYLCYSTEINE SCH MG: 100 INHALANT RESPIRATORY (INHALATION) at 08:13

## 2021-05-15 RX ADMIN — DONEPEZIL HYDROCHLORIDE SCH MG: 5 TABLET ORAL at 21:32

## 2021-05-15 RX ADMIN — CLOTRIMAZOLE SCH GM: 1 CREAM TOPICAL at 21:34

## 2021-05-15 RX ADMIN — SODIUM HYPOCHLORITE SCH ML: 1.25 SOLUTION TOPICAL at 08:20

## 2021-05-15 RX ADMIN — ATORVASTATIN CALCIUM SCH MG: 40 TABLET, FILM COATED ORAL at 21:32

## 2021-05-15 RX ADMIN — Medication SCH EACH: at 18:32

## 2021-05-15 RX ADMIN — DOCUSATE SODIUM SCH MG: 50 LIQUID ORAL at 08:20

## 2021-05-15 RX ADMIN — HYDROGEN PEROXIDE SCH ML: 2.65 LIQUID TOPICAL at 08:13

## 2021-05-15 RX ADMIN — Medication SCH EACH: at 06:21

## 2021-05-15 RX ADMIN — INSULIN HUMAN PRN UNITS: 100 INJECTION, SOLUTION PARENTERAL at 06:32

## 2021-05-15 NOTE — NUR
Treatment order obtained from Dorene WALSH regarding pt's sacral stage 4 wound (pack 
with Dakin's solution daily) and bilateral inguinal folds (erythema intertrigo)- Lotrimin 1% 
cream. Order noted and carried out. Pt's son Ranjit notified.

## 2021-05-16 VITALS — SYSTOLIC BLOOD PRESSURE: 130 MMHG | DIASTOLIC BLOOD PRESSURE: 75 MMHG

## 2021-05-16 VITALS — DIASTOLIC BLOOD PRESSURE: 55 MMHG | SYSTOLIC BLOOD PRESSURE: 145 MMHG

## 2021-05-16 VITALS — SYSTOLIC BLOOD PRESSURE: 147 MMHG | DIASTOLIC BLOOD PRESSURE: 95 MMHG

## 2021-05-16 RX ADMIN — NEOMYCIN AND POLYMYXIN B SULFATES AND BACITRACIN ZINC SCH APPLIC: 400; 3.5; 5 OINTMENT TOPICAL at 08:26

## 2021-05-16 RX ADMIN — ACETYLCYSTEINE SCH MG: 100 INHALANT RESPIRATORY (INHALATION) at 02:12

## 2021-05-16 RX ADMIN — Medication SCH EACH: at 00:05

## 2021-05-16 RX ADMIN — POVIDONE-IODINE SCH APPLIC: 100 OINTMENT TOPICAL at 08:25

## 2021-05-16 RX ADMIN — Medication SCH EACH: at 17:19

## 2021-05-16 RX ADMIN — NEOMYCIN AND POLYMYXIN B SULFATES AND BACITRACIN ZINC SCH APPLIC: 400; 3.5; 5 OINTMENT TOPICAL at 21:09

## 2021-05-16 RX ADMIN — PANTOPRAZOLE SODIUM SCH MG: 40 GRANULE, DELAYED RELEASE ORAL at 21:09

## 2021-05-16 RX ADMIN — POVIDONE-IODINE SCH APPLIC: 100 OINTMENT TOPICAL at 21:09

## 2021-05-16 RX ADMIN — Medication SCH EACH: at 06:36

## 2021-05-16 RX ADMIN — DOCUSATE SODIUM SCH MG: 50 LIQUID ORAL at 08:25

## 2021-05-16 RX ADMIN — Medication SCH MG: at 02:01

## 2021-05-16 RX ADMIN — INSULIN HUMAN PRN UNITS: 100 INJECTION, SOLUTION PARENTERAL at 06:42

## 2021-05-16 RX ADMIN — Medication SCH MG: at 20:33

## 2021-05-16 RX ADMIN — Medication PRN ML: at 17:20

## 2021-05-16 RX ADMIN — ACETYLCYSTEINE SCH MG: 100 INHALANT RESPIRATORY (INHALATION) at 15:35

## 2021-05-16 RX ADMIN — Medication SCH TAB: at 08:25

## 2021-05-16 RX ADMIN — ZINC OXIDE SCH APPLIC: 200 OINTMENT TOPICAL at 08:26

## 2021-05-16 RX ADMIN — DONEPEZIL HYDROCHLORIDE SCH MG: 5 TABLET ORAL at 21:10

## 2021-05-16 RX ADMIN — Medication SCH EACH: at 23:20

## 2021-05-16 RX ADMIN — Medication SCH MG: at 08:08

## 2021-05-16 RX ADMIN — DULOXETINE HYDROCHLORIDE SCH MG: 30 CAPSULE, DELAYED RELEASE ORAL at 08:25

## 2021-05-16 RX ADMIN — ACETYLCYSTEINE SCH MG: 100 INHALANT RESPIRATORY (INHALATION) at 08:08

## 2021-05-16 RX ADMIN — FOLIC ACID SCH MG: 1 TABLET ORAL at 08:25

## 2021-05-16 RX ADMIN — POVIDONE-IODINE SCH APPLIC: 100 OINTMENT TOPICAL at 08:26

## 2021-05-16 RX ADMIN — INSULIN HUMAN PRN UNITS: 100 INJECTION, SOLUTION PARENTERAL at 12:12

## 2021-05-16 RX ADMIN — Medication SCH MG: at 14:14

## 2021-05-16 RX ADMIN — CLOTRIMAZOLE SCH GM: 1 CREAM TOPICAL at 08:26

## 2021-05-16 RX ADMIN — Medication SCH EACH: at 12:11

## 2021-05-16 RX ADMIN — INSULIN HUMAN PRN UNITS: 100 INJECTION, SOLUTION PARENTERAL at 17:30

## 2021-05-16 RX ADMIN — INSULIN HUMAN PRN UNITS: 100 INJECTION, SOLUTION PARENTERAL at 23:21

## 2021-05-16 RX ADMIN — INSULIN HUMAN PRN UNITS: 100 INJECTION, SOLUTION PARENTERAL at 00:09

## 2021-05-16 RX ADMIN — HYDROGEN PEROXIDE SCH ML: 2.65 LIQUID TOPICAL at 20:33

## 2021-05-16 RX ADMIN — ATORVASTATIN CALCIUM SCH MG: 40 TABLET, FILM COATED ORAL at 21:10

## 2021-05-16 RX ADMIN — HYDROGEN PEROXIDE SCH ML: 2.65 LIQUID TOPICAL at 08:55

## 2021-05-16 RX ADMIN — DOCUSATE SODIUM SCH MG: 50 LIQUID ORAL at 17:19

## 2021-05-16 RX ADMIN — HYDROMORPHONE HYDROCHLORIDE PRN MG: 2 TABLET ORAL at 12:41

## 2021-05-16 RX ADMIN — ZINC OXIDE SCH APPLIC: 200 OINTMENT TOPICAL at 21:10

## 2021-05-16 RX ADMIN — CLOTRIMAZOLE SCH APPLIC: 1 CREAM TOPICAL at 21:09

## 2021-05-16 RX ADMIN — CEFEPIME HYDROCHLORIDE SCH MLS/HR: 1 INJECTION, POWDER, FOR SOLUTION INTRAMUSCULAR; INTRAVENOUS at 16:50

## 2021-05-16 RX ADMIN — PANTOPRAZOLE SODIUM SCH MG: 40 GRANULE, DELAYED RELEASE ORAL at 08:25

## 2021-05-16 RX ADMIN — LEVOTHYROXINE SODIUM SCH MCG: 75 TABLET ORAL at 06:44

## 2021-05-16 RX ADMIN — SODIUM HYPOCHLORITE SCH ML: 1.25 SOLUTION TOPICAL at 08:25

## 2021-05-16 RX ADMIN — Medication SCH EA: at 21:10

## 2021-05-16 RX ADMIN — AMLODIPINE BESYLATE SCH MG: 10 TABLET ORAL at 21:09

## 2021-05-16 RX ADMIN — Medication SCH MG: at 06:44

## 2021-05-17 VITALS — DIASTOLIC BLOOD PRESSURE: 71 MMHG | SYSTOLIC BLOOD PRESSURE: 154 MMHG

## 2021-05-17 VITALS — SYSTOLIC BLOOD PRESSURE: 140 MMHG | DIASTOLIC BLOOD PRESSURE: 90 MMHG

## 2021-05-17 VITALS — SYSTOLIC BLOOD PRESSURE: 100 MMHG | DIASTOLIC BLOOD PRESSURE: 46 MMHG

## 2021-05-17 VITALS — DIASTOLIC BLOOD PRESSURE: 61 MMHG | SYSTOLIC BLOOD PRESSURE: 142 MMHG

## 2021-05-17 RX ADMIN — PANTOPRAZOLE SODIUM SCH MG: 40 GRANULE, DELAYED RELEASE ORAL at 09:46

## 2021-05-17 RX ADMIN — ATORVASTATIN CALCIUM SCH MG: 40 TABLET, FILM COATED ORAL at 21:18

## 2021-05-17 RX ADMIN — HYDROGEN PEROXIDE SCH ML: 2.65 LIQUID TOPICAL at 20:44

## 2021-05-17 RX ADMIN — Medication PRN ML: at 18:49

## 2021-05-17 RX ADMIN — POVIDONE-IODINE SCH APPLIC: 100 OINTMENT TOPICAL at 21:17

## 2021-05-17 RX ADMIN — INSULIN HUMAN PRN UNITS: 100 INJECTION, SOLUTION PARENTERAL at 17:32

## 2021-05-17 RX ADMIN — Medication SCH TAB: at 09:46

## 2021-05-17 RX ADMIN — CLOTRIMAZOLE SCH APPLIC: 1 CREAM TOPICAL at 21:17

## 2021-05-17 RX ADMIN — INSULIN HUMAN PRN UNITS: 100 INJECTION, SOLUTION PARENTERAL at 23:27

## 2021-05-17 RX ADMIN — Medication SCH MG: at 02:12

## 2021-05-17 RX ADMIN — HYDROMORPHONE HYDROCHLORIDE PRN MG: 2 TABLET ORAL at 10:31

## 2021-05-17 RX ADMIN — ACETYLCYSTEINE SCH MG: 100 INHALANT RESPIRATORY (INHALATION) at 08:12

## 2021-05-17 RX ADMIN — INSULIN HUMAN PRN UNITS: 100 INJECTION, SOLUTION PARENTERAL at 12:00

## 2021-05-17 RX ADMIN — FOLIC ACID SCH MG: 1 TABLET ORAL at 09:46

## 2021-05-17 RX ADMIN — DONEPEZIL HYDROCHLORIDE SCH MG: 5 TABLET ORAL at 21:18

## 2021-05-17 RX ADMIN — NEOMYCIN AND POLYMYXIN B SULFATES AND BACITRACIN ZINC SCH APPLIC: 400; 3.5; 5 OINTMENT TOPICAL at 21:17

## 2021-05-17 RX ADMIN — Medication SCH EACH: at 11:34

## 2021-05-17 RX ADMIN — Medication SCH MG: at 08:12

## 2021-05-17 RX ADMIN — ACETYLCYSTEINE SCH MG: 100 INHALANT RESPIRATORY (INHALATION) at 15:10

## 2021-05-17 RX ADMIN — Medication SCH EACH: at 17:32

## 2021-05-17 RX ADMIN — DOCUSATE SODIUM SCH MG: 50 LIQUID ORAL at 09:46

## 2021-05-17 RX ADMIN — CLOTRIMAZOLE SCH APPLIC: 1 CREAM TOPICAL at 09:47

## 2021-05-17 RX ADMIN — PANTOPRAZOLE SODIUM SCH MG: 40 GRANULE, DELAYED RELEASE ORAL at 21:17

## 2021-05-17 RX ADMIN — Medication SCH MG: at 05:47

## 2021-05-17 RX ADMIN — ZINC OXIDE SCH APPLIC: 200 OINTMENT TOPICAL at 09:47

## 2021-05-17 RX ADMIN — Medication SCH EA: at 21:18

## 2021-05-17 RX ADMIN — Medication SCH MG: at 20:44

## 2021-05-17 RX ADMIN — Medication SCH EACH: at 05:47

## 2021-05-17 RX ADMIN — NEOMYCIN AND POLYMYXIN B SULFATES AND BACITRACIN ZINC SCH APPLIC: 400; 3.5; 5 OINTMENT TOPICAL at 09:47

## 2021-05-17 RX ADMIN — Medication SCH EACH: at 23:25

## 2021-05-17 RX ADMIN — AMLODIPINE BESYLATE SCH MG: 10 TABLET ORAL at 21:17

## 2021-05-17 RX ADMIN — POVIDONE-IODINE SCH APPLIC: 100 OINTMENT TOPICAL at 09:47

## 2021-05-17 RX ADMIN — LEVOTHYROXINE SODIUM SCH MCG: 75 TABLET ORAL at 05:47

## 2021-05-17 RX ADMIN — INSULIN HUMAN PRN UNITS: 100 INJECTION, SOLUTION PARENTERAL at 05:48

## 2021-05-17 RX ADMIN — ZINC OXIDE SCH APPLIC: 200 OINTMENT TOPICAL at 21:17

## 2021-05-17 RX ADMIN — HYDROGEN PEROXIDE SCH ML: 2.65 LIQUID TOPICAL at 08:18

## 2021-05-17 RX ADMIN — DOCUSATE SODIUM SCH MG: 50 LIQUID ORAL at 16:54

## 2021-05-17 RX ADMIN — Medication SCH MG: at 15:10

## 2021-05-17 RX ADMIN — POVIDONE-IODINE SCH APPLIC: 100 OINTMENT TOPICAL at 09:46

## 2021-05-17 RX ADMIN — DULOXETINE HYDROCHLORIDE SCH MG: 30 CAPSULE, DELAYED RELEASE ORAL at 09:46

## 2021-05-17 RX ADMIN — ACETYLCYSTEINE SCH MG: 100 INHALANT RESPIRATORY (INHALATION) at 23:35

## 2021-05-17 RX ADMIN — SODIUM HYPOCHLORITE SCH ML: 1.25 SOLUTION TOPICAL at 09:46

## 2021-05-17 NOTE — NUR
HD APT. & TRANSPORT 05/18/2021: 





SW called [facility] at [phone] and spoke to Jessica to set up Specialty Care 
Transportation for 05/18/2021 from [facility] to [facility] [[facility] [facility] [address], [place] [address]; [phone]] for Chair time of 2:30pm- 6 pm.   from [facility] is 1:25 pm 
Confirmation #[id number].

## 2021-05-18 ENCOUNTER — HOSPITAL ENCOUNTER (INPATIENT)
Dept: HOSPITAL 54 - ICU | Age: 81
LOS: 1 days | DRG: 871 | End: 2021-05-19
Attending: INTERNAL MEDICINE | Admitting: INTERNAL MEDICINE
Payer: MEDICARE

## 2021-05-18 VITALS — SYSTOLIC BLOOD PRESSURE: 95 MMHG | DIASTOLIC BLOOD PRESSURE: 57 MMHG

## 2021-05-18 VITALS — SYSTOLIC BLOOD PRESSURE: 90 MMHG | DIASTOLIC BLOOD PRESSURE: 70 MMHG

## 2021-05-18 VITALS — SYSTOLIC BLOOD PRESSURE: 79 MMHG | DIASTOLIC BLOOD PRESSURE: 49 MMHG

## 2021-05-18 VITALS — SYSTOLIC BLOOD PRESSURE: 112 MMHG | DIASTOLIC BLOOD PRESSURE: 54 MMHG

## 2021-05-18 VITALS — DIASTOLIC BLOOD PRESSURE: 54 MMHG | SYSTOLIC BLOOD PRESSURE: 78 MMHG

## 2021-05-18 VITALS — SYSTOLIC BLOOD PRESSURE: 92 MMHG | DIASTOLIC BLOOD PRESSURE: 54 MMHG

## 2021-05-18 VITALS — SYSTOLIC BLOOD PRESSURE: 99 MMHG | DIASTOLIC BLOOD PRESSURE: 58 MMHG

## 2021-05-18 VITALS — SYSTOLIC BLOOD PRESSURE: 70 MMHG | DIASTOLIC BLOOD PRESSURE: 44 MMHG

## 2021-05-18 VITALS — DIASTOLIC BLOOD PRESSURE: 41 MMHG | SYSTOLIC BLOOD PRESSURE: 69 MMHG

## 2021-05-18 VITALS — DIASTOLIC BLOOD PRESSURE: 25 MMHG | SYSTOLIC BLOOD PRESSURE: 51 MMHG

## 2021-05-18 VITALS — DIASTOLIC BLOOD PRESSURE: 25 MMHG | SYSTOLIC BLOOD PRESSURE: 63 MMHG

## 2021-05-18 VITALS — SYSTOLIC BLOOD PRESSURE: 108 MMHG | DIASTOLIC BLOOD PRESSURE: 58 MMHG

## 2021-05-18 VITALS — DIASTOLIC BLOOD PRESSURE: 64 MMHG | SYSTOLIC BLOOD PRESSURE: 74 MMHG

## 2021-05-18 VITALS — DIASTOLIC BLOOD PRESSURE: 40 MMHG | SYSTOLIC BLOOD PRESSURE: 84 MMHG

## 2021-05-18 VITALS — DIASTOLIC BLOOD PRESSURE: 44 MMHG | SYSTOLIC BLOOD PRESSURE: 168 MMHG

## 2021-05-18 VITALS — SYSTOLIC BLOOD PRESSURE: 90 MMHG | DIASTOLIC BLOOD PRESSURE: 52 MMHG

## 2021-05-18 VITALS — SYSTOLIC BLOOD PRESSURE: 59 MMHG | DIASTOLIC BLOOD PRESSURE: 23 MMHG

## 2021-05-18 VITALS — DIASTOLIC BLOOD PRESSURE: 57 MMHG | SYSTOLIC BLOOD PRESSURE: 92 MMHG

## 2021-05-18 VITALS — SYSTOLIC BLOOD PRESSURE: 109 MMHG | DIASTOLIC BLOOD PRESSURE: 64 MMHG

## 2021-05-18 VITALS — SYSTOLIC BLOOD PRESSURE: 75 MMHG | DIASTOLIC BLOOD PRESSURE: 47 MMHG

## 2021-05-18 VITALS — SYSTOLIC BLOOD PRESSURE: 35 MMHG | DIASTOLIC BLOOD PRESSURE: 17 MMHG

## 2021-05-18 VITALS — DIASTOLIC BLOOD PRESSURE: 26 MMHG | SYSTOLIC BLOOD PRESSURE: 43 MMHG

## 2021-05-18 VITALS — SYSTOLIC BLOOD PRESSURE: 63 MMHG | DIASTOLIC BLOOD PRESSURE: 46 MMHG

## 2021-05-18 VITALS — SYSTOLIC BLOOD PRESSURE: 62 MMHG | DIASTOLIC BLOOD PRESSURE: 40 MMHG

## 2021-05-18 VITALS — SYSTOLIC BLOOD PRESSURE: 80 MMHG | DIASTOLIC BLOOD PRESSURE: 48 MMHG

## 2021-05-18 VITALS — SYSTOLIC BLOOD PRESSURE: 84 MMHG | DIASTOLIC BLOOD PRESSURE: 51 MMHG

## 2021-05-18 VITALS — DIASTOLIC BLOOD PRESSURE: 57 MMHG | SYSTOLIC BLOOD PRESSURE: 94 MMHG

## 2021-05-18 VITALS — SYSTOLIC BLOOD PRESSURE: 108 MMHG | DIASTOLIC BLOOD PRESSURE: 64 MMHG

## 2021-05-18 VITALS — DIASTOLIC BLOOD PRESSURE: 72 MMHG | SYSTOLIC BLOOD PRESSURE: 103 MMHG

## 2021-05-18 VITALS — DIASTOLIC BLOOD PRESSURE: 64 MMHG | SYSTOLIC BLOOD PRESSURE: 109 MMHG

## 2021-05-18 VITALS — SYSTOLIC BLOOD PRESSURE: 98 MMHG | DIASTOLIC BLOOD PRESSURE: 58 MMHG

## 2021-05-18 DIAGNOSIS — L97.429: ICD-10-CM

## 2021-05-18 DIAGNOSIS — R18.8: ICD-10-CM

## 2021-05-18 DIAGNOSIS — Z99.2: ICD-10-CM

## 2021-05-18 DIAGNOSIS — I50.9: ICD-10-CM

## 2021-05-18 DIAGNOSIS — N18.6: ICD-10-CM

## 2021-05-18 DIAGNOSIS — N39.0: ICD-10-CM

## 2021-05-18 DIAGNOSIS — K21.9: ICD-10-CM

## 2021-05-18 DIAGNOSIS — E11.621: ICD-10-CM

## 2021-05-18 DIAGNOSIS — L97.529: ICD-10-CM

## 2021-05-18 DIAGNOSIS — G92: ICD-10-CM

## 2021-05-18 DIAGNOSIS — Z22.322: ICD-10-CM

## 2021-05-18 DIAGNOSIS — N17.9: ICD-10-CM

## 2021-05-18 DIAGNOSIS — E87.5: ICD-10-CM

## 2021-05-18 DIAGNOSIS — L89.154: ICD-10-CM

## 2021-05-18 DIAGNOSIS — Z66: ICD-10-CM

## 2021-05-18 DIAGNOSIS — L89.314: ICD-10-CM

## 2021-05-18 DIAGNOSIS — J44.9: ICD-10-CM

## 2021-05-18 DIAGNOSIS — E27.40: ICD-10-CM

## 2021-05-18 DIAGNOSIS — K44.9: ICD-10-CM

## 2021-05-18 DIAGNOSIS — Z79.4: ICD-10-CM

## 2021-05-18 DIAGNOSIS — Z93.1: ICD-10-CM

## 2021-05-18 DIAGNOSIS — L89.324: ICD-10-CM

## 2021-05-18 DIAGNOSIS — E11.40: ICD-10-CM

## 2021-05-18 DIAGNOSIS — J96.21: ICD-10-CM

## 2021-05-18 DIAGNOSIS — E88.09: ICD-10-CM

## 2021-05-18 DIAGNOSIS — K29.40: ICD-10-CM

## 2021-05-18 DIAGNOSIS — Z86.16: ICD-10-CM

## 2021-05-18 DIAGNOSIS — Z79.51: ICD-10-CM

## 2021-05-18 DIAGNOSIS — A41.9: Primary | ICD-10-CM

## 2021-05-18 DIAGNOSIS — G20: ICD-10-CM

## 2021-05-18 DIAGNOSIS — Z98.890: ICD-10-CM

## 2021-05-18 DIAGNOSIS — R13.10: ICD-10-CM

## 2021-05-18 DIAGNOSIS — I70.0: ICD-10-CM

## 2021-05-18 DIAGNOSIS — L30.4: ICD-10-CM

## 2021-05-18 DIAGNOSIS — D63.1: ICD-10-CM

## 2021-05-18 DIAGNOSIS — E11.22: ICD-10-CM

## 2021-05-18 DIAGNOSIS — R65.21: ICD-10-CM

## 2021-05-18 DIAGNOSIS — E11.51: ICD-10-CM

## 2021-05-18 DIAGNOSIS — J96.22: ICD-10-CM

## 2021-05-18 DIAGNOSIS — Z93.0: ICD-10-CM

## 2021-05-18 DIAGNOSIS — M10.9: ICD-10-CM

## 2021-05-18 DIAGNOSIS — F02.80: ICD-10-CM

## 2021-05-18 DIAGNOSIS — Z99.11: ICD-10-CM

## 2021-05-18 DIAGNOSIS — E03.9: ICD-10-CM

## 2021-05-18 LAB
ALBUMIN SERPL BCP-MCNC: 1 G/DL (ref 3.4–5)
ALP SERPL-CCNC: 660 U/L (ref 46–116)
ALT SERPL W P-5'-P-CCNC: 307 U/L (ref 12–78)
AST SERPL W P-5'-P-CCNC: 810 U/L (ref 15–37)
BASE EXCESS BLDA CALC-SCNC: -21.4 MMOL/L
BASE EXCESS BLDA CALC-SCNC: -24.9 MMOL/L
BASOPHILS # BLD AUTO: 0.1 /CMM (ref 0–0.2)
BASOPHILS NFR BLD AUTO: 0.3 % (ref 0–2)
BILIRUB SERPL-MCNC: 1 MG/DL (ref 0.2–1)
BUN SERPL-MCNC: 92 MG/DL (ref 7–18)
BUN SERPL-MCNC: 93 MG/DL (ref 7–18)
CALCIUM SERPL-MCNC: 8.4 MG/DL (ref 8.5–10.1)
CALCIUM SERPL-MCNC: 8.6 MG/DL (ref 8.5–10.1)
CHLORIDE SERPL-SCNC: 95 MMOL/L (ref 98–107)
CHLORIDE SERPL-SCNC: 95 MMOL/L (ref 98–107)
CO2 SERPL-SCNC: 14 MMOL/L (ref 21–32)
CO2 SERPL-SCNC: 17 MMOL/L (ref 21–32)
CREAT SERPL-MCNC: 3 MG/DL (ref 0.6–1.3)
CREAT SERPL-MCNC: 3.1 MG/DL (ref 0.6–1.3)
DO-HGB MFR BLDA: 458.6 MMHG
DO-HGB MFR BLDA: 575.6 MMHG
EOSINOPHIL NFR BLD AUTO: 0 % (ref 0–6)
GLUCOSE SERPL-MCNC: 118 MG/DL (ref 74–106)
GLUCOSE SERPL-MCNC: 170 MG/DL (ref 74–106)
HCT VFR BLD AUTO: 35 % (ref 33–45)
HGB BLD-MCNC: 10.6 G/DL (ref 11.5–14.8)
INHALED O2 CONCENTRATION: 100 %
INHALED O2 CONCENTRATION: 100 %
LYMPHOCYTES NFR BLD AUTO: 0.3 /CMM (ref 0.8–4.8)
LYMPHOCYTES NFR BLD AUTO: 0.7 % (ref 20–44)
LYMPHOCYTES NFR BLD MANUAL: 1 % (ref 16–48)
MCHC RBC AUTO-ENTMCNC: 30 G/DL (ref 31–36)
MCV RBC AUTO: 109 FL (ref 82–100)
METAMYELOCYTES NFR BLD MANUAL: 1 % (ref 0–0)
MONOCYTES NFR BLD AUTO: 1.8 /CMM (ref 0.1–1.3)
MONOCYTES NFR BLD AUTO: 5 % (ref 2–12)
MONOCYTES NFR BLD MANUAL: 6 % (ref 0–11)
NEUTROPHILS # BLD AUTO: 34.4 /CMM (ref 1.8–8.9)
NEUTROPHILS NFR BLD AUTO: 94 % (ref 43–81)
NEUTS BAND NFR BLD MANUAL: 5 % (ref 0–5)
NEUTS SEG NFR BLD MANUAL: 87 % (ref 42–76)
PCO2 TEMP ADJ BLDA: 28.5 MMHG (ref 35–45)
PCO2 TEMP ADJ BLDA: 34 MMHG (ref 35–45)
PH TEMP ADJ BLDA: 6.92 [PH] (ref 7.35–7.45)
PH TEMP ADJ BLDA: 7.05 [PH] (ref 7.35–7.45)
PLATELET # BLD AUTO: 279 /CMM (ref 150–450)
PO2 TEMP ADJ BLDA: 108.9 MMHG (ref 75–100)
PO2 TEMP ADJ BLDA: 220.4 MMHG (ref 75–100)
POTASSIUM SERPL-SCNC: 3.3 MMOL/L (ref 3.5–5.1)
POTASSIUM SERPL-SCNC: 4.8 MMOL/L (ref 3.5–5.1)
PROT SERPL-MCNC: 7.2 G/DL (ref 6.4–8.2)
RBC # BLD AUTO: 3.19 MIL/UL (ref 4–5.2)
SAO2 % BLDA: 97.5 % (ref 92–98.5)
SAO2 % BLDA: 99.6 % (ref 92–98.5)
SODIUM SERPL-SCNC: 131 MMOL/L (ref 136–145)
SODIUM SERPL-SCNC: 131 MMOL/L (ref 136–145)
VENTILATION MODE VENT: (no result)
VENTILATION MODE VENT: (no result)
WBC NRBC COR # BLD AUTO: 36.6 K/UL (ref 4.3–11)

## 2021-05-18 PROCEDURE — A7526 TRACHEOSTOMY TUBE COLLAR: HCPCS

## 2021-05-18 PROCEDURE — 5A1945Z RESPIRATORY VENTILATION, 24-96 CONSECUTIVE HOURS: ICD-10-PCS | Performed by: INTERNAL MEDICINE

## 2021-05-18 PROCEDURE — B548ZZA ULTRASONOGRAPHY OF SUPERIOR VENA CAVA, GUIDANCE: ICD-10-PCS | Performed by: NURSE PRACTITIONER

## 2021-05-18 PROCEDURE — 02HV33Z INSERTION OF INFUSION DEVICE INTO SUPERIOR VENA CAVA, PERCUTANEOUS APPROACH: ICD-10-PCS | Performed by: NURSE PRACTITIONER

## 2021-05-18 PROCEDURE — A4624 TRACHEAL SUCTION TUBE: HCPCS

## 2021-05-18 PROCEDURE — G0378 HOSPITAL OBSERVATION PER HR: HCPCS

## 2021-05-18 RX ADMIN — Medication SCH EACH: at 05:38

## 2021-05-18 RX ADMIN — POVIDONE-IODINE SCH GM: 100 OINTMENT TOPICAL at 22:37

## 2021-05-18 RX ADMIN — Medication SCH MG: at 07:20

## 2021-05-18 RX ADMIN — ZINC OXIDE SCH APPLIC: 200 OINTMENT TOPICAL at 22:46

## 2021-05-18 RX ADMIN — HYDROCORTISONE SODIUM SUCCINATE SCH MG: 100 INJECTION, POWDER, FOR SOLUTION INTRAMUSCULAR; INTRAVASCULAR at 15:42

## 2021-05-18 RX ADMIN — Medication SCH MG: at 02:01

## 2021-05-18 RX ADMIN — DOCUSATE SODIUM SCH MG: 50 LIQUID ORAL at 09:44

## 2021-05-18 RX ADMIN — POVIDONE-IODINE SCH APPLIC: 100 OINTMENT TOPICAL at 09:45

## 2021-05-18 RX ADMIN — POVIDONE-IODINE SCH APPLIC: 100 OINTMENT TOPICAL at 09:44

## 2021-05-18 RX ADMIN — LEVOTHYROXINE SODIUM SCH MCG: 75 TABLET ORAL at 05:38

## 2021-05-18 RX ADMIN — SODIUM HYPOCHLORITE SCH ML: 1.25 SOLUTION TOPICAL at 09:44

## 2021-05-18 RX ADMIN — ACETYLCYSTEINE SCH MG: 100 INHALANT RESPIRATORY (INHALATION) at 23:29

## 2021-05-18 RX ADMIN — CLOTRIMAZOLE SCH GM: 1 CREAM TOPICAL at 22:46

## 2021-05-18 RX ADMIN — INSULIN HUMAN PRN UNITS: 100 INJECTION, SOLUTION PARENTERAL at 05:40

## 2021-05-18 RX ADMIN — FAMOTIDINE SCH MG: 10 INJECTION INTRAVENOUS at 22:55

## 2021-05-18 RX ADMIN — Medication SCH TAB: at 09:44

## 2021-05-18 RX ADMIN — INSULIN HUMAN PRN UNITS: 100 INJECTION, SOLUTION PARENTERAL at 12:34

## 2021-05-18 RX ADMIN — PANTOPRAZOLE SODIUM SCH MG: 40 GRANULE, DELAYED RELEASE ORAL at 09:44

## 2021-05-18 RX ADMIN — Medication SCH EACH: at 12:32

## 2021-05-18 RX ADMIN — DEXTROSE MONOHYDRATE PRN MG: 50 INJECTION, SOLUTION INTRAVENOUS at 01:29

## 2021-05-18 RX ADMIN — ZINC OXIDE SCH APPLIC: 200 OINTMENT TOPICAL at 22:44

## 2021-05-18 RX ADMIN — FOLIC ACID SCH MG: 1 TABLET ORAL at 09:44

## 2021-05-18 RX ADMIN — Medication SCH MG: at 05:38

## 2021-05-18 RX ADMIN — HYDROGEN PEROXIDE SCH ML: 2.65 LIQUID TOPICAL at 22:38

## 2021-05-18 RX ADMIN — Medication SCH MG: at 13:45

## 2021-05-18 RX ADMIN — Medication SCH MG: at 19:34

## 2021-05-18 RX ADMIN — ZINC OXIDE SCH APPLIC: 200 OINTMENT TOPICAL at 09:45

## 2021-05-18 RX ADMIN — HYDROGEN PEROXIDE SCH ML: 2.65 LIQUID TOPICAL at 09:17

## 2021-05-18 RX ADMIN — Medication SCH MG: at 23:29

## 2021-05-18 RX ADMIN — DULOXETINE HYDROCHLORIDE SCH MG: 30 CAPSULE, DELAYED RELEASE ORAL at 09:44

## 2021-05-18 RX ADMIN — ACETYLCYSTEINE SCH MG: 100 INHALANT RESPIRATORY (INHALATION) at 13:45

## 2021-05-18 RX ADMIN — FAMOTIDINE SCH MG: 10 INJECTION INTRAVENOUS at 15:42

## 2021-05-18 RX ADMIN — POVIDONE-IODINE SCH GM: 100 OINTMENT TOPICAL at 22:36

## 2021-05-18 RX ADMIN — CLOTRIMAZOLE SCH APPLIC: 1 CREAM TOPICAL at 09:45

## 2021-05-18 RX ADMIN — Medication SCH EACH: at 18:26

## 2021-05-18 RX ADMIN — ACETYLCYSTEINE SCH MG: 100 INHALANT RESPIRATORY (INHALATION) at 07:20

## 2021-05-18 RX ADMIN — DOCUSATE SODIUM SCH MG: 50 LIQUID ORAL at 17:50

## 2021-05-18 RX ADMIN — HYDROCORTISONE SODIUM SUCCINATE SCH MG: 100 INJECTION, POWDER, FOR SOLUTION INTRAMUSCULAR; INTRAVASCULAR at 22:55

## 2021-05-18 NOTE — NUR
RECEIVED PT SPENSER DA SILVA 8 ON VENT. PT IN CRITICAL CONDITION. SX'D MOD AMT OF THICK YELLOW 
SECRETIONS. VENT ALARMS SET AND AUDIBLE. AMBU BAG AT BEDSIDE. WILL CONTINUE TO MONITOR.

-------------------------------------------------------------------------------

Addendum: 05/18/21 at 1949 by KRISTAL MORA RT

-------------------------------------------------------------------------------

Amended: Links added.

## 2021-05-18 NOTE — NUR
ICU RN ADMISSION

ADMITTED TO ROOM 259 BY BED FROM [facility] FOR HYPOTENSION, LOW O2 SAT.  CHRONIC TRACH VENT 
PT. FIO2 UP % NOW.  REPORT RECEIVED FROM [facility]AC[facility] RN.  GT FLUSHED AND CLAMPED.  
MIDLINE INTACT PER RUE, FLUSHED, UNABLE TO DRAW BLOOD BACK WITH SYRINGE.  LG WOUND ON SACRAL 
AREA, PERINEAL AREA EXCORIATION, BLACK DRY LESION ON DORSAL SURFACE OF RIGHT FOOT, LEFT LEG 
SKIN TEAR OOZING SEROUS FLUID.  NOTED AREAS PHOTOGRAPHED.  LABELS WITH PT NAME AND CURRENT 
ACCOUNT NUMBER NOT AVALIABLE AT THIS TIME.  PT PLACED ON CONTACT ISOLATION FOR REPORTED ESBL 
URINE.  BLOOD AND WOUND CULTURES WERE SENT FROM [facility] PER REPORT.

## 2021-05-18 NOTE — NUR
Seen and examined by Mikayla Watson Np with new orders repeat CBC on 5/19/21, and wound 
cultures, order carried out, and responsible party made aware.

## 2021-05-18 NOTE — NUR
Per [facility] recommendations to start IV vancomycin now, then the next vancomycin dose will be 
administer after dialysis days which are Tuesday, Thursday, and Saturday.

## 2021-05-18 NOTE — NUR
RN NOTES

Noted pt with episode of moderate vomiting when repositioning. Zofran 4mg IVP, as ordered. 
Will reassess and monitor closely.

## 2021-05-18 NOTE — NUR
ICU/LVN

CALLED THE ON-CALL ALTAGRACIA GRULLON ABOUT THE SITUATION FOUND PT WITH SATURATION AT 60'S-70'S, 
ALONG WITH LOW BP OF 60'S TO 70'S. HE SAID TO CHANGE OVER LEVO TO A RENAL DOSE, DUE TO PT IS 
A RENAL PT WITH HD ON MWF. ALSO SAID TO ADD A SECOND PRESSOR FOR LOW BP, DUE TO THE FACT WE 
ARE CURRENTLY MAXED OUT ON THE LEVO. ALL ORDERS WERE PLACED INTO CHART AND CHARGE NURSE MADE 
AWARE OF THESE NEW ORDERS.  WILL CONTINUE TO MONITOR THIS PT.

## 2021-05-18 NOTE — NUR
ICU/LVN ([phone] CALLED SON JANETH) 

ABOUT PT'S CODE STATUS, CURRENTLY PT IS A FULL CODE. PT'S SATURATION IS IN THE 60'S-70'S. 
PT'S BLOOD PRESSURE IS 60'S TO 70'S. SON CALLED BACK EXPLAINED ABOUT THE SERIOUSNESS OF HIS 
MOM'S SITUATION. EXPLAIN WHAT WAS FOUND UPON ASSESSMENT AND LET HIM KNOW WHAT IS CURRENTLY 
BEING DONE. ASKED SON IF HE WOULD LIKE TO CONTINUE TREATMENT BUT SHOULD HEART RATE GIVE OUT, 
THE SHOULD WE CODE PT OR NOT. PT'S SON SAID THAT HE DOESN'T WANT HIS MOM TO BE CODED, THAT 
IT'S HIS WISHES TO BE A DNR. DON SCOTT CAME ON THE PHONE WAS WITNESS TO THE DNR STATUS.

## 2021-05-18 NOTE — NUR
Patient gasping for air, saturation level 85, was connected to oxygen at 100%, saturation 
levels 90%, vital signs bp 87/51, blood sugar 165, received an order per Dr. Robertson to 
transfer patient to ICU, patient was transfer to ICU room #259, car Church made aware.

Bed will be on hold x 7 days.

## 2021-05-18 NOTE — NUR
ICU/LVN

UPON ASSESSMENT T WAS FOUND WITH YELLOWISH LOOKING FEEDING AROUND EARS, MOUTH, NOSE, DOWN 
THE BACK OF NECK. CHANGES BEDDING AND SUCTIONED PT. CHARGE NURSE MADE AWARE OF FINDS ALSO ON 
CALL MD FRIAS. WILL CLOSELY MONITOR THIS PT.

## 2021-05-18 NOTE — NUR
RT

PATIENT REC'D AS TRANSFER FROM SUB AC[facility]. TRACHED SHILEY 8 ON [facility] VENT WITH SETTINGS AC 20, 
400, 100% +0. ABG DONE WITH RESULTS SENT TO DR MATHIS. PATIENT IN CRITICAL CONDITION. VENT 
ALARMS CHECKED + AUDIBLE. AMBU BAG AT [facility].

-------------------------------------------------------------------------------

Addendum: 05/18/21 at 1541 by AARON ATWOOD RT

-------------------------------------------------------------------------------

Amended: Links added.

## 2021-05-18 NOTE — NUR
New order per Mikayla Watson Np start vancomycin per pharmacy recommendations,for bacterial 
infections and possible sepsis. Spoke to Eduardo per pharmacy recommendations to start 
vancomycin 1 gram in IV D5W 250ml (Iv d5W 250Ml), 250ml @250ml /1 hr, BMP level, and through 
level, order carried out, and responsible party made aware.

## 2021-05-18 NOTE — NUR
ICU/LVN

DR CAMP WAS ON CALL FOR DR MATHIS, CALLED ABOUT THE LOW SATURATION, IT'S 60'S TO 70'S. 
NOTIFED HIM THAT PT APPEARS TO HAVE FEEDING ALL OVER TRACH, MOUTH, NOSE. ALSO MADE HIM AWARE 
OF RECENT ABG'S AND CURRENT VENT SETTINGS. GAVE ORDER FOR VENT CHANGES TO ADD PEEK OF 5 UP 
TO 8. NOTIFED CHARGE NURSE OF THIS ALSO RT THAO WHO WAS AT BEDSIDE. WILL CONTINUE TO MONITOR 
THIS PT.

## 2021-05-18 NOTE — NUR
RN NOTES 

RECEIVED PT ON BED IN ROOM 259, VENT/ TRACH DEPENDENT, PT IS OBTUNDED, NONVERBAL , DOES NOT 
FOLLOW COMMAND , RESPONDS TO PAINFUL STIMULI, OPENS EYES AT TIMES, ON TELE SR- ST, NO 
DISTRESS  NOTED. PT ON LEVO AT .1 MCG/KG/MIN , R UPPER ARM MIDLINE SITE CLEAN, DRY AND 
INTACT, PT PLACED ON WARM BLANKET , t=93.2 AT THIS TIME , SKIN AND WOUND ASSESSMENT DONE. SR 
UP X3, CALL LIGHT WITHIN EASY REACH, BED LOCKED AND IN LOWEST POSITION CONTINUE TO MONITOR .

## 2021-05-18 NOTE — NUR
RT

PER DR MATHIS VENT SETTINGS CHANGED TO AC 32, 240, 100% +0. RN LAUREL NOTIFIED

-------------------------------------------------------------------------------

Addendum: 05/18/21 at 1541 by AARON ATWOOD RT

-------------------------------------------------------------------------------

Amended: Links added.

## 2021-05-18 NOTE — NUR
ICU/LVN

STAT LAB OF GLUCOSE PLACED IN COMPUTER, THE FINGER STICK IS READS LOW. STARTED THE PROTOCAL 
FOR LOW BLOOD SUGAR

## 2021-05-18 NOTE — NUR
ICU/LVN

2100N MEDICATION WERE GIVEN LATE TRYING TO STABILIZE AND TITRATE LEVO AND MATHEW. ALSO CALLED 
THE MD FOR NPO ORDER FOR PO MEDS DUE TO G/TUBE TO LOW INTERM. SUCTION. 800ML FROM G/TUBE WAS 
GOTTEN OUT AT THE START OF THE SHIFT

## 2021-05-18 NOTE — NUR
RN NOTES 

LEVO AT .4 MCG/KG/MIN RUNNING AT THIS TIME, PICC LINE NURSE AT THE BEDSIDE FOR PICC LINE 
PLACEMENT . T=94.2, PT ON WARM BLANKET, WILL ENDOSE TO NIGHT SHIFT NURSE FOR CONTINUITY OF 
CARE

## 2021-05-18 NOTE — NUR
ADDED PEEP OF 5 PER DR CAMP. IF NECESSARY CAN GO UP TO PEEP OF 8.

-------------------------------------------------------------------------------

Addendum: 05/18/21 at 2008 by KRISTAL MORA RT

-------------------------------------------------------------------------------

Amended: Links added.

## 2021-05-18 NOTE — NUR
Seen and examined by Dr. Robertson, relayed abnormal lab results, WBC 36.6, ordered blood 
cultures x2, order carried out, and family member made aware.

## 2021-05-19 VITALS — SYSTOLIC BLOOD PRESSURE: 74 MMHG | DIASTOLIC BLOOD PRESSURE: 33 MMHG

## 2021-05-19 VITALS — DIASTOLIC BLOOD PRESSURE: 55 MMHG | SYSTOLIC BLOOD PRESSURE: 82 MMHG

## 2021-05-19 VITALS — DIASTOLIC BLOOD PRESSURE: 43 MMHG | SYSTOLIC BLOOD PRESSURE: 66 MMHG

## 2021-05-19 VITALS — DIASTOLIC BLOOD PRESSURE: 55 MMHG | SYSTOLIC BLOOD PRESSURE: 85 MMHG

## 2021-05-19 VITALS — SYSTOLIC BLOOD PRESSURE: 82 MMHG | DIASTOLIC BLOOD PRESSURE: 39 MMHG

## 2021-05-19 VITALS — SYSTOLIC BLOOD PRESSURE: 96 MMHG | DIASTOLIC BLOOD PRESSURE: 41 MMHG

## 2021-05-19 VITALS — DIASTOLIC BLOOD PRESSURE: 57 MMHG | SYSTOLIC BLOOD PRESSURE: 104 MMHG

## 2021-05-19 VITALS — SYSTOLIC BLOOD PRESSURE: 68 MMHG | DIASTOLIC BLOOD PRESSURE: 27 MMHG

## 2021-05-19 VITALS — DIASTOLIC BLOOD PRESSURE: 43 MMHG | SYSTOLIC BLOOD PRESSURE: 82 MMHG

## 2021-05-19 VITALS — DIASTOLIC BLOOD PRESSURE: 53 MMHG | SYSTOLIC BLOOD PRESSURE: 70 MMHG

## 2021-05-19 VITALS — DIASTOLIC BLOOD PRESSURE: 51 MMHG | SYSTOLIC BLOOD PRESSURE: 116 MMHG

## 2021-05-19 VITALS — DIASTOLIC BLOOD PRESSURE: 45 MMHG | SYSTOLIC BLOOD PRESSURE: 97 MMHG

## 2021-05-19 VITALS — SYSTOLIC BLOOD PRESSURE: 115 MMHG | DIASTOLIC BLOOD PRESSURE: 65 MMHG

## 2021-05-19 VITALS — DIASTOLIC BLOOD PRESSURE: 19 MMHG | SYSTOLIC BLOOD PRESSURE: 101 MMHG

## 2021-05-19 VITALS — DIASTOLIC BLOOD PRESSURE: 49 MMHG | SYSTOLIC BLOOD PRESSURE: 72 MMHG

## 2021-05-19 VITALS — DIASTOLIC BLOOD PRESSURE: 53 MMHG | SYSTOLIC BLOOD PRESSURE: 96 MMHG

## 2021-05-19 VITALS — SYSTOLIC BLOOD PRESSURE: 104 MMHG | DIASTOLIC BLOOD PRESSURE: 51 MMHG

## 2021-05-19 VITALS — SYSTOLIC BLOOD PRESSURE: 135 MMHG | DIASTOLIC BLOOD PRESSURE: 65 MMHG

## 2021-05-19 VITALS — SYSTOLIC BLOOD PRESSURE: 149 MMHG | DIASTOLIC BLOOD PRESSURE: 56 MMHG

## 2021-05-19 VITALS — SYSTOLIC BLOOD PRESSURE: 102 MMHG | DIASTOLIC BLOOD PRESSURE: 56 MMHG

## 2021-05-19 VITALS — DIASTOLIC BLOOD PRESSURE: 53 MMHG | SYSTOLIC BLOOD PRESSURE: 78 MMHG

## 2021-05-19 VITALS — SYSTOLIC BLOOD PRESSURE: 63 MMHG | DIASTOLIC BLOOD PRESSURE: 50 MMHG

## 2021-05-19 VITALS — SYSTOLIC BLOOD PRESSURE: 69 MMHG | DIASTOLIC BLOOD PRESSURE: 54 MMHG

## 2021-05-19 VITALS — DIASTOLIC BLOOD PRESSURE: 58 MMHG | SYSTOLIC BLOOD PRESSURE: 107 MMHG

## 2021-05-19 VITALS — DIASTOLIC BLOOD PRESSURE: 45 MMHG | SYSTOLIC BLOOD PRESSURE: 109 MMHG

## 2021-05-19 VITALS — SYSTOLIC BLOOD PRESSURE: 129 MMHG | DIASTOLIC BLOOD PRESSURE: 73 MMHG

## 2021-05-19 LAB
BASE EXCESS BLDA CALC-SCNC: -30 MMOL/L
BASOPHILS # BLD AUTO: 0.1 /CMM (ref 0–0.2)
BASOPHILS NFR BLD AUTO: 0.2 % (ref 0–2)
BUN SERPL-MCNC: 93 MG/DL (ref 7–18)
CALCIUM SERPL-MCNC: 7.7 MG/DL (ref 8.5–10.1)
CHLORIDE SERPL-SCNC: 94 MMOL/L (ref 98–107)
CO2 SERPL-SCNC: 5 MMOL/L (ref 21–32)
CREAT SERPL-MCNC: 3.1 MG/DL (ref 0.6–1.3)
DO-HGB MFR BLDA: 610.6 MMHG
EOSINOPHIL NFR BLD AUTO: 0.3 % (ref 0–6)
GLUCOSE SERPL-MCNC: 177 MG/DL (ref 74–106)
HCT VFR BLD AUTO: 35 % (ref 33–45)
HGB BLD-MCNC: 9.5 G/DL (ref 11.5–14.8)
INHALED O2 CONCENTRATION: 100 %
LYMPHOCYTES NFR BLD AUTO: 0.8 /CMM (ref 0.8–4.8)
LYMPHOCYTES NFR BLD AUTO: 1.8 % (ref 20–44)
LYMPHOCYTES NFR BLD MANUAL: 1 % (ref 16–48)
MAGNESIUM SERPL-MCNC: 2.8 MG/DL (ref 1.8–2.4)
MCHC RBC AUTO-ENTMCNC: 27 G/DL (ref 31–36)
MCV RBC AUTO: 121 FL (ref 82–100)
METAMYELOCYTES NFR BLD MANUAL: 1 % (ref 0–0)
MONOCYTES NFR BLD AUTO: 1.2 /CMM (ref 0.1–1.3)
MONOCYTES NFR BLD AUTO: 2.6 % (ref 2–12)
MONOCYTES NFR BLD MANUAL: 2 % (ref 0–11)
MYELOCYTES NFR BLD MANUAL: 1 % (ref 0–0)
NEUTROPHILS # BLD AUTO: 44.5 /CMM (ref 1.8–8.9)
NEUTROPHILS NFR BLD AUTO: 95.1 % (ref 43–81)
NEUTS BAND NFR BLD MANUAL: 43 % (ref 0–5)
NEUTS SEG NFR BLD MANUAL: 52 % (ref 42–76)
PCO2 TEMP ADJ BLDA: 21.2 MMHG (ref 35–45)
PH TEMP ADJ BLDA: 6.8 [PH] (ref 7.35–7.45)
PHOSPHATE SERPL-MCNC: 13.3 MG/DL (ref 2.5–4.9)
PLATELET # BLD AUTO: 140 /CMM (ref 150–450)
PO2 TEMP ADJ BLDA: 81.2 MMHG (ref 75–100)
POTASSIUM SERPL-SCNC: 7.3 MMOL/L (ref 3.5–5.1)
RBC # BLD AUTO: 2.87 MIL/UL (ref 4–5.2)
SAO2 % BLDA: 88.4 % (ref 92–98.5)
SODIUM SERPL-SCNC: 130 MMOL/L (ref 136–145)
VENTILATION MODE VENT: (no result)
WBC NRBC COR # BLD AUTO: 46.8 K/UL (ref 4.3–11)

## 2021-05-19 RX ADMIN — SODIUM CHLORIDE PRN MLS/HR: 9 INJECTION, SOLUTION INTRAVENOUS at 09:39

## 2021-05-19 RX ADMIN — ZINC OXIDE SCH APPLIC: 200 OINTMENT TOPICAL at 08:16

## 2021-05-19 RX ADMIN — Medication SCH EACH: at 14:04

## 2021-05-19 RX ADMIN — POVIDONE-IODINE SCH GM: 100 OINTMENT TOPICAL at 08:18

## 2021-05-19 RX ADMIN — Medication SCH EACH: at 11:17

## 2021-05-19 RX ADMIN — SODIUM CHLORIDE PRN MLS/HR: 9 INJECTION, SOLUTION INTRAVENOUS at 02:21

## 2021-05-19 RX ADMIN — CLOTRIMAZOLE SCH GM: 1 CREAM TOPICAL at 08:18

## 2021-05-19 RX ADMIN — SODIUM CHLORIDE PRN MLS/HR: 9 INJECTION, SOLUTION INTRAVENOUS at 10:59

## 2021-05-19 RX ADMIN — DEXTROSE MONOHYDRATE PRN ML: 500 INJECTION PARENTERAL at 03:11

## 2021-05-19 RX ADMIN — ACETYLCYSTEINE SCH MG: 100 INHALANT RESPIRATORY (INHALATION) at 07:35

## 2021-05-19 RX ADMIN — SODIUM CHLORIDE PRN MLS/HR: 9 INJECTION, SOLUTION INTRAVENOUS at 02:17

## 2021-05-19 RX ADMIN — Medication SCH ML: at 12:19

## 2021-05-19 RX ADMIN — Medication SCH EACH: at 06:23

## 2021-05-19 RX ADMIN — POVIDONE-IODINE SCH GM: 100 OINTMENT TOPICAL at 08:17

## 2021-05-19 RX ADMIN — SODIUM CHLORIDE PRN MLS/HR: 9 INJECTION, SOLUTION INTRAVENOUS at 13:36

## 2021-05-19 RX ADMIN — Medication SCH EACH: at 12:18

## 2021-05-19 RX ADMIN — Medication SCH MG: at 07:35

## 2021-05-19 RX ADMIN — HYDROCORTISONE SODIUM SUCCINATE SCH MG: 100 INJECTION, POWDER, FOR SOLUTION INTRAMUSCULAR; INTRAVASCULAR at 04:34

## 2021-05-19 RX ADMIN — Medication SCH ML: at 08:06

## 2021-05-19 RX ADMIN — Medication SCH EACH: at 09:52

## 2021-05-19 RX ADMIN — Medication SCH EACH: at 13:10

## 2021-05-19 RX ADMIN — SODIUM CHLORIDE PRN MLS/HR: 9 INJECTION, SOLUTION INTRAVENOUS at 04:33

## 2021-05-19 RX ADMIN — Medication SCH EACH: at 08:31

## 2021-05-19 RX ADMIN — ZINC OXIDE SCH APPLIC: 200 OINTMENT TOPICAL at 08:17

## 2021-05-19 RX ADMIN — Medication SCH EACH: at 00:29

## 2021-05-19 RX ADMIN — FAMOTIDINE SCH MG: 10 INJECTION INTRAVENOUS at 08:13

## 2021-05-19 RX ADMIN — DOCUSATE SODIUM SCH MG: 50 LIQUID ORAL at 08:05

## 2021-05-19 RX ADMIN — DEXTROSE MONOHYDRATE PRN ML: 500 INJECTION PARENTERAL at 00:31

## 2021-05-19 RX ADMIN — Medication SCH MG: at 13:30

## 2021-05-19 RX ADMIN — HYDROCORTISONE SODIUM SUCCINATE SCH MG: 100 INJECTION, POWDER, FOR SOLUTION INTRAMUSCULAR; INTRAVASCULAR at 13:08

## 2021-05-19 RX ADMIN — Medication SCH EACH: at 10:58

## 2021-05-19 RX ADMIN — DEXTROSE MONOHYDRATE PRN ML: 500 INJECTION PARENTERAL at 14:09

## 2021-05-19 RX ADMIN — HYDROGEN PEROXIDE SCH ML: 2.65 LIQUID TOPICAL at 09:51

## 2021-05-19 NOTE — NUR
RN ICU

BG check on right hand 28, BG check on left hand 145.  new order for stat lab draw for 
glucose entered per charge nurse gunner for accurate reading to treat. lab draw pending.

## 2021-05-19 NOTE — NUR
RN ICU

[facility] [employer] called spoke to Elzbieta pt not candidate for donation Reference # [id number]. 
Record of death/Release of remains form filled out, signed by pt car Church and placed in pt 
chart.

## 2021-05-19 NOTE — NUR
ICU/LVN

POSITIVE BLOOD CULTURE OF GRAM POSITVE COCCI TRACE.ALONG WITH WBC OF 46.8


-------------------------------------------------------------------------------

Addendum: 05/19/21 at 0648 by FRANCISCO CRUM LVN

-------------------------------------------------------------------------------

ON CALL MITCHEL AGUILERA CALLED BACK SAID OK NO NEW ORDERS.ALSO SAID TO DO ACCUCHECKS Q 1 HR

## 2021-05-19 NOTE — NUR
Family Support: 



REMA called the pt.'s son, Ranjit Weston [phone] to provide emotional support after pt. 
. Ranjit thanked REMA. REMA validated his feelings as loss & grief. SW offered pt. 
resources for loss & grief. REMA also discussed  arrangements with Ranjit. Ranjit stated 
he does not have any  arrangement at the time being. REMA offered Ranjit resources for 
Mortuaries & Cemeteries in the area and Ranjit was agreeable. REMA will be available as needed.

## 2021-05-19 NOTE — NUR
RN ICU 

Dr Robertson made aware that pt has GT meds ordered and that pt not tolerating meds or feeding 
in gtube and that peg is to suction to prevent further aspiration. ok per md to hold ALL 
gtube meds at this time.

## 2021-05-19 NOTE — NUR
RN ICU

Dr Robertson at bedside assessing pt and updated on pt status.  md aware that pt maxed on 2 
pressors, unresponsive, hypotensive and hypothermic.  no new orders at this time.

## 2021-05-19 NOTE — NUR
RN ICU

All AM PO meds held d/t high residuals, pt not tolerating, pt aspirated, peg to suction.  
charge nurse Koko OCHOA aware.  MD aware.

## 2021-05-19 NOTE — NUR
ICU/LVN

BLOOD SUGAR IS 50, CALLED ON CALL WILLY WHO THEN ORDERED D10 IN 250ML AT 100ML. THEN TO DO 
ACCU CHECK EVERY 1 HOUR UNTIL STABLE. THEN WILL START TO DO D50 IVP THERE AFTER, ALL ORDERS 
PER ON CALL WILLY. ALSO MADE HIM AWARE PT IS IN CHF AND IS A HD PT.

## 2021-05-19 NOTE — NUR
RN ICU

Dr Madrid at bedside assessing pt and updated on pt status.  md aware that pt maxed on 2 
pressors, unresponsive, hypotensive, hypothermic and desaturating with spo2 60s, no new 
orders per md at this time.  will continue to monitor.

## 2021-05-19 NOTE — NUR
RN ICU

Dr Larsen at bedside.  md aware that pt max on 2 pressors, unresponsive, hypotensive and 
hypothermic.  no new orders at this time.

## 2021-05-19 NOTE — NUR
RN ICU 

pt son Ranjit at bedside, awaiting for uncle to come and view pt body before [place] 
[facility]  pt given visiting time limit per charge nurse Koko OCHOA before post mortem care is to 
be completed.

## 2021-05-19 NOTE — NUR
RT

PATIENT REC'D IN CRITICAL CONDITION. TRACHED ON [facility] VENT. ABG DONE WITH RESULTS REPORTED TO 
DR MATHIS. 

-------------------------------------------------------------------------------

Addendum: 05/19/21 at 0756 by AARON ATWOOD RT

-------------------------------------------------------------------------------

Amended: Links added.

## 2021-05-19 NOTE — NUR
RN ICU

Post mortem care done with charge nurse Koko OCHOA. Lines and tubes removed.  pt toe tagged and 
placed in bag.  security called for pt transport to [facility].  pt has no belongings at 
bedside.

## 2021-05-19 NOTE — NUR
RN ICU

bs 47 , d50 given per protocol. will recheck.  HD nurse at bedside setting up for dialysis.  
Son Ranjit at bedside updated on mothers condition.

## 2021-05-19 NOTE — NUR
ICU/LVN

Finger stick is low, stat lab done showed 50. Change nurse is pushing D10 FOR THIS CRITICAL 
LABS. WILL RECHECK BLOOD SUGAR AGAIN 

-------------------------------------------------------------------------------

Addendum: 05/19/21 at 0118 by FRANCISCO CRUM LVN

-------------------------------------------------------------------------------

IT WAS D50 THAT WAS PUSHED NOT D10.

## 2021-05-19 NOTE — NUR
ICU/LVN

REPEART BLOOD SUGAR IS 36, CHARGE NURSE MADE AWARE, GAVE D50FOR THIS WILL REPEAT THE FINGER 
STICK UNTIL SUGAR IS STABLE PER ON CALL MITCHEL AGUILERA.

## 2021-05-19 NOTE — NUR
RN ICU



Pt  (asystole), Charge nurse Koko RN at bedside pronounced pt.  Dr Larsen informed 
via md messaging, no response at this time.  Wes Church at bedside.

## 2021-05-19 NOTE — NUR
RN ICU

Lab at bedside to do blood draw for stat labs.  new bag of levophed requested from pharmacy.

## 2021-05-19 NOTE — NUR
RN ICU

Bedside report taken from [facility] nurse Mavis RN. pt unresponsive, pupils unequal nonreactive.  
pt does not open eyes or respond to painful stimuli, no movement in bue and ble, pt flacid.  
pt has trach to vent, fio2 100%, spo2 70s.  ryan lung sound diminished.  pt has yellow 
substance upon suction.  pt has peg to low intermittent suction, with yellow output, pt npo 
at this time d/t aspiration and intolerance.  pt anuric, HD pt.  pt has multiple pressure 
ulcers and wounds throughout body, see Flowsheet.  pt on max dosage of levophed and 
neosynephrine, pt hypotensive in 60s-80s, md and charge nurse aware.  pt on bicarb drip.  
all lines traced, pt oozing blood from lue picc site.  all drips verified. safety measures 
in place.  will continue to monitor.

## 2021-05-19 NOTE — NUR
RN DEATH NOTE

PT DNR STATUS.  3RD DEG AVB PROCEEDING TO AGONAL RHYTHM THEN ASYSTOLE OBSERVED ON MONITOR.  
NO PALPABLE PULSES, PUPILS FIXED AND DILATED.  NO SPONTANEOUS RESPIRATIONS.  DEATH 
PRONOUNCED AT 1428.  SON AT BEDSIDE.

## 2021-05-19 NOTE — NUR
WOUND CARE CONSULT: PT VERY UNSTABLE AT THIS TIME. UNABLE TO DO SKIN ASSESSMENT DUE TO 
HEMODYNAMIC INSTABILITY. DISCUSSED SKIN PROTECTION WITH NURSING STAFF. PT FOLLOWED BY 
SURGICAL AND PODIATRY TEAMS FOR WOUND CARE. DEFER TO SURGICAL TEAMS FOR WOUND TREATMENT 
PLAN. PT IS ON FIRST STEP CIRRUS LOW AIRLOSS MATTRESS. MD IN AGREEMENT WITH PLAN OF CARE.

## 2021-05-20 NOTE — NUR
Resources: 

REMA emailed Bereavement & grief mental health resources & list or mortuaries &  homes 
in the area to Ranjit Weston at [email or web address] stated that he is 
uncertain of the next steps he has to take. REMA provide Ranjit's contact information to Nacho 
from nursing office who can better assist Ranjit. REMA notified Ranjit that Nacho can assist 
him. REMA will be available as needed.

## 2021-10-11 NOTE — NUR
MS/RN   Vaccination status



Called sub acute to follow up on patient's vaccination status. Informed that patient was new 
to their facility and there was no record on file as to when patient last received both the 
flu and pneumonia vaccine. Called placed to previous sub acute at [facility], spoke 
with Ana Cristina. Stated that she would call back with dates. Minocycline Counseling: Patient advised regarding possible photosensitivity and discoloration of the teeth, skin, lips, tongue and gums.  Patient instructed to avoid sunlight, if possible.  When exposed to sunlight, patients should wear protective clothing, sunglasses, and sunscreen.  The patient was instructed to call the office immediately if the following severe adverse effects occur:  hearing changes, easy bruising/bleeding, severe headache, or vision changes.  The patient verbalized understanding of the proper use and possible adverse effects of minocycline.  All of the patient's questions and concerns were addressed.

## 2022-01-18 NOTE — NUR
RN ICU OPENING NOTE



PT RECEIVED ON VENT SETTINGS OF ETT 7 AT 22 LIP, AC 12, , EIO2 50% PEEP 0. PT SHOWS NO 
SIGNS OF RESP DISTRESS OR SOB. PT IS SEDATED ON DIP 25 MCG/KG/MIN, DOPAMINE AT 20 MCG/KG/MIN 
AND BICARB AT 100ML/HR. PT HAS BILATERAL SOFT WRIST RESTRAINTS; CMS INTACT. LAC #18 INTACT 
WITH NO SIGNS OF INFILTRATION OR INFECTION, RT FEMORAL TLC INTACT WITH NO SIGNS OF INFECTION 
OR INFILTRATION. PT HAS ASHBY CATHETER DRAINING ABIOLA URINE TO GRAVITY. PT HAD BILATERAL ARM 
BRUISING AND A SACRAL WOUND PARTIAL THICKNESS. ALL PT SAFETY PRECAUTIONS ARE IN PLACE. WILL 
CONTINUE TO MONITOR 18-Jan-2022 08:31

## 2023-02-09 NOTE — NUR
ICU/LVN

TYRLENOL GIVEN FOR TEMP. 100.6 AX. WILL CONTINUE TO MONITOR THIS PT AND HER TEMP. How Severe Is Your Rash?: moderate Is This A New Presentation, Or A Follow-Up?: Rash

## 2023-11-17 NOTE — NUR
August   PATIENTS G-TUBE RESIDUAL 50CC, RESTARTED NEPRO @ 20ML/HR. HEAD OF BED REMAINS ELEVATED. WILL 
ENDORSE TO ONCOMING SHIFT.

## 2024-04-18 NOTE — NUR
RN NOTES

AWAKE, MORNING CARE RENDERED, NOT IN DISTRESS, NO PAIN NOTED, SIDERAILSUPX2, PT. NEEDS 
ATTENDED 0367